# Patient Record
Sex: FEMALE | Race: WHITE | Employment: UNEMPLOYED | URBAN - METROPOLITAN AREA
[De-identification: names, ages, dates, MRNs, and addresses within clinical notes are randomized per-mention and may not be internally consistent; named-entity substitution may affect disease eponyms.]

---

## 2017-02-27 ENCOUNTER — HOSPITAL ENCOUNTER (EMERGENCY)
Age: 72
Discharge: HOME | End: 2017-02-27
Attending: EMERGENCY MEDICINE
Payer: COMMERCIAL

## 2017-02-27 VITALS
RESPIRATION RATE: 18 BRPM | DIASTOLIC BLOOD PRESSURE: 74 MMHG | HEIGHT: 62 IN | SYSTOLIC BLOOD PRESSURE: 150 MMHG | HEART RATE: 78 BPM | BODY MASS INDEX: 21.16 KG/M2 | OXYGEN SATURATION: 99 % | WEIGHT: 115 LBS | TEMPERATURE: 98.2 F

## 2017-02-27 DIAGNOSIS — S61.219A LACERATION OF FINGER, INITIAL ENCOUNTER: Primary | ICD-10-CM

## 2017-02-27 PROCEDURE — 99282 EMERGENCY DEPT VISIT SF MDM: CPT

## 2017-02-27 PROCEDURE — 12001 RPR S/N/AX/GEN/TRNK 2.5CM/<: CPT

## 2017-02-27 RX ORDER — LIDOCAINE HYDROCHLORIDE 10 MG/ML
20 INJECTION, SOLUTION INFILTRATION; PERINEURAL ONCE
Status: DISCONTINUED | OUTPATIENT
Start: 2017-02-27 | End: 2017-02-27 | Stop reason: HOSPADM

## 2018-03-01 ENCOUNTER — HOSPITAL ENCOUNTER (OUTPATIENT)
Dept: PREADMISSION TESTING | Age: 73
Discharge: HOME | End: 2018-03-05
Payer: COMMERCIAL

## 2018-03-01 VITALS
BODY MASS INDEX: 22.85 KG/M2 | RESPIRATION RATE: 16 BRPM | HEART RATE: 74 BPM | SYSTOLIC BLOOD PRESSURE: 162 MMHG | DIASTOLIC BLOOD PRESSURE: 68 MMHG | WEIGHT: 121.03 LBS | OXYGEN SATURATION: 97 % | HEIGHT: 61 IN

## 2018-03-01 LAB
ABSOLUTE EOS #: 0.1 K/UL (ref 0–0.4)
ABSOLUTE IMMATURE GRANULOCYTE: ABNORMAL K/UL (ref 0–0.3)
ABSOLUTE LYMPH #: 1 K/UL (ref 1–4.8)
ABSOLUTE MONO #: 0.2 K/UL (ref 0.2–0.8)
ANION GAP SERPL CALCULATED.3IONS-SCNC: 8 MMOL/L (ref 9–17)
BASOPHILS # BLD: 1 % (ref 0–2)
BASOPHILS ABSOLUTE: 0 K/UL (ref 0–0.2)
BUN BLDV-MCNC: 12 MG/DL (ref 8–23)
CHLORIDE BLD-SCNC: 106 MMOL/L (ref 98–107)
CO2: 30 MMOL/L (ref 20–31)
CREAT SERPL-MCNC: 0.58 MG/DL (ref 0.5–0.9)
DIFFERENTIAL TYPE: ABNORMAL
EKG ATRIAL RATE: 62 BPM
EKG P AXIS: 59 DEGREES
EKG P-R INTERVAL: 166 MS
EKG Q-T INTERVAL: 402 MS
EKG QRS DURATION: 86 MS
EKG QTC CALCULATION (BAZETT): 408 MS
EKG R AXIS: 80 DEGREES
EKG T AXIS: 74 DEGREES
EKG VENTRICULAR RATE: 62 BPM
EOSINOPHILS RELATIVE PERCENT: 3 % (ref 1–4)
GFR AFRICAN AMERICAN: >60 ML/MIN
GFR NON-AFRICAN AMERICAN: >60 ML/MIN
GFR SERPL CREATININE-BSD FRML MDRD: NORMAL ML/MIN/{1.73_M2}
GFR SERPL CREATININE-BSD FRML MDRD: NORMAL ML/MIN/{1.73_M2}
HCT VFR BLD CALC: 39.1 % (ref 36–46)
HEMOGLOBIN: 12.9 G/DL (ref 12–16)
IMMATURE GRANULOCYTES: ABNORMAL %
LYMPHOCYTES # BLD: 24 % (ref 24–44)
MCH RBC QN AUTO: 29 PG (ref 26–34)
MCHC RBC AUTO-ENTMCNC: 33.1 G/DL (ref 31–37)
MCV RBC AUTO: 87.8 FL (ref 80–100)
MONOCYTES # BLD: 5 % (ref 1–7)
NRBC AUTOMATED: ABNORMAL PER 100 WBC
PDW BLD-RTO: 14.1 % (ref 11.5–14.5)
PLATELET # BLD: 173 K/UL (ref 130–400)
PLATELET ESTIMATE: ABNORMAL
PMV BLD AUTO: 8.5 FL (ref 6–12)
POTASSIUM SERPL-SCNC: 4.3 MMOL/L (ref 3.7–5.3)
RBC # BLD: 4.45 M/UL (ref 4–5.2)
RBC # BLD: ABNORMAL 10*6/UL
SEG NEUTROPHILS: 67 % (ref 36–66)
SEGMENTED NEUTROPHILS ABSOLUTE COUNT: 2.9 K/UL (ref 1.8–7.7)
SODIUM BLD-SCNC: 144 MMOL/L (ref 135–144)
WBC # BLD: 4.3 K/UL (ref 3.5–11)
WBC # BLD: ABNORMAL 10*3/UL

## 2018-03-01 PROCEDURE — 84520 ASSAY OF UREA NITROGEN: CPT

## 2018-03-01 PROCEDURE — 93005 ELECTROCARDIOGRAM TRACING: CPT

## 2018-03-01 PROCEDURE — 82565 ASSAY OF CREATININE: CPT

## 2018-03-01 PROCEDURE — 36415 COLL VENOUS BLD VENIPUNCTURE: CPT

## 2018-03-01 PROCEDURE — 80051 ELECTROLYTE PANEL: CPT

## 2018-03-01 PROCEDURE — 85025 COMPLETE CBC W/AUTO DIFF WBC: CPT

## 2018-03-01 RX ORDER — TRAMADOL HYDROCHLORIDE 50 MG/1
50 TABLET ORAL EVERY 8 HOURS PRN
COMMUNITY
End: 2018-05-08

## 2018-03-01 ASSESSMENT — PAIN DESCRIPTION - FREQUENCY: FREQUENCY: CONTINUOUS

## 2018-03-01 ASSESSMENT — PAIN SCALES - GENERAL: PAINLEVEL_OUTOF10: 10

## 2018-03-01 ASSESSMENT — PAIN DESCRIPTION - ONSET: ONSET: AWAKENED FROM SLEEP

## 2018-03-01 ASSESSMENT — PAIN DESCRIPTION - ORIENTATION: ORIENTATION: LEFT

## 2018-03-01 ASSESSMENT — PAIN DESCRIPTION - PAIN TYPE: TYPE: CHRONIC PAIN

## 2018-03-01 ASSESSMENT — PAIN DESCRIPTION - LOCATION: LOCATION: HIP

## 2018-03-01 ASSESSMENT — PAIN DESCRIPTION - PROGRESSION: CLINICAL_PROGRESSION: GRADUALLY WORSENING

## 2018-03-01 ASSESSMENT — PAIN DESCRIPTION - DESCRIPTORS: DESCRIPTORS: CONSTANT;STABBING

## 2018-03-01 NOTE — PRE-PROCEDURE INSTRUCTIONS
not acceptable. Your friend or family member must stay at the hospital throughout your procedure. Someone must remain with you for the first 24 hours after your surgery if you receive anesthesia or medication. If you do not have someone to stay with you, your procedure may be cancelled.       If you have any other questions regarding your procedure or the day of surgery, please call [phone]      _________________________  ____________________________  Signature (Patient)              Signature (Provider) & date

## 2018-03-02 RX ORDER — VANCOMYCIN HYDROCHLORIDE 1 G/200ML
1000 INJECTION, SOLUTION INTRAVENOUS ONCE
Status: CANCELLED | OUTPATIENT
Start: 2018-03-14 | End: 2018-03-14

## 2018-03-13 ENCOUNTER — ANESTHESIA EVENT (OUTPATIENT)
Dept: OPERATING ROOM | Age: 73
End: 2018-03-13
Payer: MEDICARE

## 2018-03-14 ENCOUNTER — ANESTHESIA (OUTPATIENT)
Dept: OPERATING ROOM | Age: 73
End: 2018-03-14
Payer: MEDICARE

## 2018-03-14 ENCOUNTER — HOSPITAL ENCOUNTER (OUTPATIENT)
Age: 73
Setting detail: OUTPATIENT SURGERY
Discharge: HOME | End: 2018-03-14
Attending: ORTHOPAEDIC SURGERY | Admitting: ORTHOPAEDIC SURGERY
Payer: MEDICARE

## 2018-03-14 VITALS
HEART RATE: 69 BPM | SYSTOLIC BLOOD PRESSURE: 156 MMHG | WEIGHT: 121.03 LBS | TEMPERATURE: 96.8 F | BODY MASS INDEX: 22.85 KG/M2 | OXYGEN SATURATION: 98 % | DIASTOLIC BLOOD PRESSURE: 46 MMHG | RESPIRATION RATE: 18 BRPM | HEIGHT: 61 IN

## 2018-03-14 VITALS — OXYGEN SATURATION: 100 % | DIASTOLIC BLOOD PRESSURE: 56 MMHG | TEMPERATURE: 94.6 F | SYSTOLIC BLOOD PRESSURE: 116 MMHG

## 2018-03-14 DIAGNOSIS — G89.18 POST-OP PAIN: Primary | ICD-10-CM

## 2018-03-14 PROCEDURE — 6360000002 HC RX W HCPCS: Performed by: ORTHOPAEDIC SURGERY

## 2018-03-14 PROCEDURE — 7100000000 HC PACU RECOVERY - FIRST 15 MIN: Performed by: ORTHOPAEDIC SURGERY

## 2018-03-14 PROCEDURE — 6360000002 HC RX W HCPCS: Performed by: NURSE ANESTHETIST, CERTIFIED REGISTERED

## 2018-03-14 PROCEDURE — C1713 ANCHOR/SCREW BN/BN,TIS/BN: HCPCS | Performed by: ORTHOPAEDIC SURGERY

## 2018-03-14 PROCEDURE — A6454 SELF-ADHER BAND W>=3" <5"/YD: HCPCS | Performed by: ORTHOPAEDIC SURGERY

## 2018-03-14 PROCEDURE — 2580000003 HC RX 258: Performed by: ORTHOPAEDIC SURGERY

## 2018-03-14 PROCEDURE — 2720000010 HC SURG SUPPLY STERILE: Performed by: ORTHOPAEDIC SURGERY

## 2018-03-14 PROCEDURE — 7100000010 HC PHASE II RECOVERY - FIRST 15 MIN: Performed by: ORTHOPAEDIC SURGERY

## 2018-03-14 PROCEDURE — 6360000002 HC RX W HCPCS: Performed by: ANESTHESIOLOGY

## 2018-03-14 PROCEDURE — 6370000000 HC RX 637 (ALT 250 FOR IP): Performed by: ANESTHESIOLOGY

## 2018-03-14 PROCEDURE — 2500000003 HC RX 250 WO HCPCS: Performed by: ANESTHESIOLOGY

## 2018-03-14 PROCEDURE — 3700000000 HC ANESTHESIA ATTENDED CARE: Performed by: ORTHOPAEDIC SURGERY

## 2018-03-14 PROCEDURE — 7100000011 HC PHASE II RECOVERY - ADDTL 15 MIN: Performed by: ORTHOPAEDIC SURGERY

## 2018-03-14 PROCEDURE — 2580000003 HC RX 258: Performed by: NURSE ANESTHETIST, CERTIFIED REGISTERED

## 2018-03-14 PROCEDURE — 3700000001 HC ADD 15 MINUTES (ANESTHESIA): Performed by: ORTHOPAEDIC SURGERY

## 2018-03-14 PROCEDURE — 64450 NJX AA&/STRD OTHER PN/BRANCH: CPT | Performed by: ANESTHESIOLOGY

## 2018-03-14 PROCEDURE — 3600000013 HC SURGERY LEVEL 3 ADDTL 15MIN: Performed by: ORTHOPAEDIC SURGERY

## 2018-03-14 PROCEDURE — 2500000003 HC RX 250 WO HCPCS: Performed by: NURSE ANESTHETIST, CERTIFIED REGISTERED

## 2018-03-14 PROCEDURE — 2500000003 HC RX 250 WO HCPCS: Performed by: ORTHOPAEDIC SURGERY

## 2018-03-14 PROCEDURE — 3600000003 HC SURGERY LEVEL 3 BASE: Performed by: ORTHOPAEDIC SURGERY

## 2018-03-14 PROCEDURE — 7100000001 HC PACU RECOVERY - ADDTL 15 MIN: Performed by: ORTHOPAEDIC SURGERY

## 2018-03-14 DEVICE — ANCHOR SUTURE BIOCOMP 4.75X19.1 MM SWIVELOCK C: Type: IMPLANTABLE DEVICE | Site: HIP | Status: FUNCTIONAL

## 2018-03-14 RX ORDER — LIDOCAINE HYDROCHLORIDE 10 MG/ML
INJECTION, SOLUTION INFILTRATION; PERINEURAL PRN
Status: DISCONTINUED | OUTPATIENT
Start: 2018-03-14 | End: 2018-03-14 | Stop reason: SDUPTHER

## 2018-03-14 RX ORDER — NEOSTIGMINE METHYLSULFATE 5 MG/5 ML
SYRINGE (ML) INTRAVENOUS PRN
Status: DISCONTINUED | OUTPATIENT
Start: 2018-03-14 | End: 2018-03-14 | Stop reason: SDUPTHER

## 2018-03-14 RX ORDER — LIDOCAINE HYDROCHLORIDE 20 MG/ML
INJECTION, SOLUTION INFILTRATION; PERINEURAL PRN
Status: DISCONTINUED | OUTPATIENT
Start: 2018-03-14 | End: 2018-03-14 | Stop reason: SDUPTHER

## 2018-03-14 RX ORDER — LIDOCAINE HYDROCHLORIDE 10 MG/ML
1 INJECTION, SOLUTION EPIDURAL; INFILTRATION; INTRACAUDAL; PERINEURAL
Status: DISCONTINUED | OUTPATIENT
Start: 2018-03-15 | End: 2018-03-14 | Stop reason: HOSPADM

## 2018-03-14 RX ORDER — ONDANSETRON 2 MG/ML
4 INJECTION INTRAMUSCULAR; INTRAVENOUS
Status: DISCONTINUED | OUTPATIENT
Start: 2018-03-14 | End: 2018-03-14 | Stop reason: HOSPADM

## 2018-03-14 RX ORDER — ONDANSETRON 4 MG/1
4 TABLET, ORALLY DISINTEGRATING ORAL EVERY 8 HOURS PRN
Qty: 30 TABLET | Refills: 0
Start: 2018-03-14 | End: 2018-05-08

## 2018-03-14 RX ORDER — HYDROMORPHONE HCL 110MG/55ML
0.5 PATIENT CONTROLLED ANALGESIA SYRINGE INTRAVENOUS EVERY 5 MIN PRN
Status: DISCONTINUED | OUTPATIENT
Start: 2018-03-14 | End: 2018-03-14 | Stop reason: HOSPADM

## 2018-03-14 RX ORDER — ROCURONIUM BROMIDE 10 MG/ML
INJECTION, SOLUTION INTRAVENOUS PRN
Status: DISCONTINUED | OUTPATIENT
Start: 2018-03-14 | End: 2018-03-14 | Stop reason: SDUPTHER

## 2018-03-14 RX ORDER — FENTANYL CITRATE 50 UG/ML
INJECTION, SOLUTION INTRAMUSCULAR; INTRAVENOUS PRN
Status: DISCONTINUED | OUTPATIENT
Start: 2018-03-14 | End: 2018-03-14 | Stop reason: SDUPTHER

## 2018-03-14 RX ORDER — SODIUM CHLORIDE 0.9 % (FLUSH) 0.9 %
10 SYRINGE (ML) INJECTION PRN
Status: DISCONTINUED | OUTPATIENT
Start: 2018-03-14 | End: 2018-03-14 | Stop reason: HOSPADM

## 2018-03-14 RX ORDER — OXYCODONE HYDROCHLORIDE AND ACETAMINOPHEN 5; 325 MG/1; MG/1
TABLET ORAL
Qty: 60 TABLET | Refills: 0
Start: 2018-03-14 | End: 2018-03-21

## 2018-03-14 RX ORDER — DEXAMETHASONE SODIUM PHOSPHATE 10 MG/ML
INJECTION INTRAMUSCULAR; INTRAVENOUS PRN
Status: DISCONTINUED | OUTPATIENT
Start: 2018-03-14 | End: 2018-03-14 | Stop reason: SDUPTHER

## 2018-03-14 RX ORDER — SODIUM CHLORIDE 9 MG/ML
INJECTION, SOLUTION INTRAVENOUS CONTINUOUS
Status: DISCONTINUED | OUTPATIENT
Start: 2018-03-15 | End: 2018-03-14 | Stop reason: HOSPADM

## 2018-03-14 RX ORDER — SODIUM CHLORIDE 0.9 % (FLUSH) 0.9 %
10 SYRINGE (ML) INJECTION EVERY 12 HOURS SCHEDULED
Status: DISCONTINUED | OUTPATIENT
Start: 2018-03-14 | End: 2018-03-14 | Stop reason: HOSPADM

## 2018-03-14 RX ORDER — VANCOMYCIN HYDROCHLORIDE 1 G/200ML
1000 INJECTION, SOLUTION INTRAVENOUS ONCE
Status: COMPLETED | OUTPATIENT
Start: 2018-03-14 | End: 2018-03-14

## 2018-03-14 RX ORDER — SODIUM CHLORIDE, SODIUM LACTATE, POTASSIUM CHLORIDE, CALCIUM CHLORIDE 600; 310; 30; 20 MG/100ML; MG/100ML; MG/100ML; MG/100ML
INJECTION, SOLUTION INTRAVENOUS CONTINUOUS PRN
Status: DISCONTINUED | OUTPATIENT
Start: 2018-03-14 | End: 2018-03-14 | Stop reason: SDUPTHER

## 2018-03-14 RX ORDER — BUPIVACAINE HYDROCHLORIDE 2.5 MG/ML
INJECTION, SOLUTION EPIDURAL; INFILTRATION; INTRACAUDAL PRN
Status: DISCONTINUED | OUTPATIENT
Start: 2018-03-14 | End: 2018-03-14 | Stop reason: HOSPADM

## 2018-03-14 RX ORDER — ROPIVACAINE HYDROCHLORIDE 5 MG/ML
INJECTION, SOLUTION EPIDURAL; INFILTRATION; PERINEURAL PRN
Status: DISCONTINUED | OUTPATIENT
Start: 2018-03-14 | End: 2018-03-14 | Stop reason: SDUPTHER

## 2018-03-14 RX ORDER — ONDANSETRON 2 MG/ML
INJECTION INTRAMUSCULAR; INTRAVENOUS PRN
Status: DISCONTINUED | OUTPATIENT
Start: 2018-03-14 | End: 2018-03-14 | Stop reason: SDUPTHER

## 2018-03-14 RX ORDER — FENTANYL CITRATE 50 UG/ML
25 INJECTION, SOLUTION INTRAMUSCULAR; INTRAVENOUS EVERY 5 MIN PRN
Status: DISCONTINUED | OUTPATIENT
Start: 2018-03-14 | End: 2018-03-14 | Stop reason: HOSPADM

## 2018-03-14 RX ORDER — PROPOFOL 10 MG/ML
INJECTION, EMULSION INTRAVENOUS PRN
Status: DISCONTINUED | OUTPATIENT
Start: 2018-03-14 | End: 2018-03-14 | Stop reason: SDUPTHER

## 2018-03-14 RX ORDER — KETOROLAC TROMETHAMINE 30 MG/ML
INJECTION, SOLUTION INTRAMUSCULAR; INTRAVENOUS PRN
Status: DISCONTINUED | OUTPATIENT
Start: 2018-03-14 | End: 2018-03-14 | Stop reason: SDUPTHER

## 2018-03-14 RX ORDER — OXYCODONE HYDROCHLORIDE AND ACETAMINOPHEN 5; 325 MG/1; MG/1
1 TABLET ORAL
Status: COMPLETED | OUTPATIENT
Start: 2018-03-14 | End: 2018-03-14

## 2018-03-14 RX ORDER — MIDAZOLAM HYDROCHLORIDE 1 MG/ML
1 INJECTION INTRAMUSCULAR; INTRAVENOUS ONCE
Status: COMPLETED | OUTPATIENT
Start: 2018-03-14 | End: 2018-03-14

## 2018-03-14 RX ORDER — SODIUM CHLORIDE, SODIUM LACTATE, POTASSIUM CHLORIDE, CALCIUM CHLORIDE 600; 310; 30; 20 MG/100ML; MG/100ML; MG/100ML; MG/100ML
INJECTION, SOLUTION INTRAVENOUS CONTINUOUS
Status: DISCONTINUED | OUTPATIENT
Start: 2018-03-15 | End: 2018-03-14 | Stop reason: HOSPADM

## 2018-03-14 RX ORDER — FENTANYL CITRATE 50 UG/ML
50 INJECTION, SOLUTION INTRAMUSCULAR; INTRAVENOUS EVERY 5 MIN PRN
Status: DISCONTINUED | OUTPATIENT
Start: 2018-03-14 | End: 2018-03-14 | Stop reason: HOSPADM

## 2018-03-14 RX ORDER — HYDROMORPHONE HCL 110MG/55ML
0.25 PATIENT CONTROLLED ANALGESIA SYRINGE INTRAVENOUS EVERY 5 MIN PRN
Status: DISCONTINUED | OUTPATIENT
Start: 2018-03-14 | End: 2018-03-14 | Stop reason: HOSPADM

## 2018-03-14 RX ORDER — GLYCOPYRROLATE 1 MG/5 ML
SYRINGE (ML) INTRAVENOUS PRN
Status: DISCONTINUED | OUTPATIENT
Start: 2018-03-14 | End: 2018-03-14 | Stop reason: SDUPTHER

## 2018-03-14 RX ADMIN — ROCURONIUM BROMIDE 30 MG: 10 INJECTION, SOLUTION INTRAVENOUS at 12:47

## 2018-03-14 RX ADMIN — DEXAMETHASONE SODIUM PHOSPHATE 10 MG: 10 INJECTION INTRAMUSCULAR; INTRAVENOUS at 13:01

## 2018-03-14 RX ADMIN — MIDAZOLAM HYDROCHLORIDE 1 MG: 1 INJECTION, SOLUTION INTRAMUSCULAR; INTRAVENOUS at 12:19

## 2018-03-14 RX ADMIN — Medication 1.5 MG: at 14:19

## 2018-03-14 RX ADMIN — Medication 0.2 MG: at 14:19

## 2018-03-14 RX ADMIN — ONDANSETRON 4 MG: 2 INJECTION, SOLUTION INTRAMUSCULAR; INTRAVENOUS at 13:13

## 2018-03-14 RX ADMIN — FENTANYL CITRATE 50 MCG: 50 INJECTION, SOLUTION INTRAMUSCULAR; INTRAVENOUS at 13:29

## 2018-03-14 RX ADMIN — SODIUM CHLORIDE, POTASSIUM CHLORIDE, SODIUM LACTATE AND CALCIUM CHLORIDE: 600; 310; 30; 20 INJECTION, SOLUTION INTRAVENOUS at 12:43

## 2018-03-14 RX ADMIN — ROPIVACAINE HYDROCHLORIDE 30 ML: 5 INJECTION, SOLUTION EPIDURAL; INFILTRATION; PERINEURAL at 12:18

## 2018-03-14 RX ADMIN — FENTANYL CITRATE 50 MCG: 50 INJECTION, SOLUTION INTRAMUSCULAR; INTRAVENOUS at 15:12

## 2018-03-14 RX ADMIN — FENTANYL CITRATE 50 MCG: 50 INJECTION, SOLUTION INTRAMUSCULAR; INTRAVENOUS at 14:48

## 2018-03-14 RX ADMIN — PHENYLEPHRINE HYDROCHLORIDE 200 MCG: 10 INJECTION INTRAVENOUS at 13:05

## 2018-03-14 RX ADMIN — OXYCODONE HYDROCHLORIDE AND ACETAMINOPHEN 1 TABLET: 5; 325 TABLET ORAL at 15:31

## 2018-03-14 RX ADMIN — Medication 2.5 MG: at 14:14

## 2018-03-14 RX ADMIN — FENTANYL CITRATE 50 MCG: 50 INJECTION, SOLUTION INTRAMUSCULAR; INTRAVENOUS at 12:47

## 2018-03-14 RX ADMIN — KETOROLAC TROMETHAMINE 30 MG: 30 INJECTION, SOLUTION INTRAMUSCULAR at 13:45

## 2018-03-14 RX ADMIN — VANCOMYCIN HYDROCHLORIDE 1 G: 1 INJECTION, SOLUTION INTRAVENOUS at 12:45

## 2018-03-14 RX ADMIN — PROPOFOL 150 MG: 10 INJECTION, EMULSION INTRAVENOUS at 12:47

## 2018-03-14 RX ADMIN — LIDOCAINE HYDROCHLORIDE 3 ML: 10 INJECTION, SOLUTION INFILTRATION; PERINEURAL at 12:18

## 2018-03-14 RX ADMIN — Medication 0.4 MG: at 14:14

## 2018-03-14 RX ADMIN — LIDOCAINE HYDROCHLORIDE 50 MG: 20 INJECTION, SOLUTION INFILTRATION; PERINEURAL at 12:47

## 2018-03-14 ASSESSMENT — PULMONARY FUNCTION TESTS
PIF_VALUE: 20
PIF_VALUE: 16
PIF_VALUE: 15
PIF_VALUE: 17
PIF_VALUE: 16
PIF_VALUE: 15
PIF_VALUE: 20
PIF_VALUE: 15
PIF_VALUE: 26
PIF_VALUE: 28
PIF_VALUE: 15
PIF_VALUE: 26
PIF_VALUE: 16
PIF_VALUE: 15
PIF_VALUE: 2
PIF_VALUE: 15
PIF_VALUE: 2
PIF_VALUE: 15
PIF_VALUE: 0
PIF_VALUE: 15
PIF_VALUE: 16
PIF_VALUE: 15
PIF_VALUE: 20
PIF_VALUE: 15
PIF_VALUE: 15
PIF_VALUE: 16
PIF_VALUE: 15
PIF_VALUE: 2
PIF_VALUE: 15
PIF_VALUE: 0
PIF_VALUE: 15
PIF_VALUE: 15
PIF_VALUE: 6
PIF_VALUE: 15
PIF_VALUE: 29
PIF_VALUE: 15
PIF_VALUE: 16
PIF_VALUE: 15
PIF_VALUE: 1
PIF_VALUE: 15
PIF_VALUE: 16
PIF_VALUE: 15
PIF_VALUE: 15
PIF_VALUE: 14
PIF_VALUE: 15
PIF_VALUE: 15
PIF_VALUE: 14
PIF_VALUE: 2
PIF_VALUE: 15
PIF_VALUE: 2
PIF_VALUE: 16
PIF_VALUE: 1
PIF_VALUE: 14
PIF_VALUE: 15
PIF_VALUE: 2
PIF_VALUE: 2
PIF_VALUE: 19
PIF_VALUE: 14
PIF_VALUE: 15
PIF_VALUE: 15
PIF_VALUE: 2
PIF_VALUE: 20
PIF_VALUE: 16
PIF_VALUE: 14
PIF_VALUE: 16
PIF_VALUE: 15
PIF_VALUE: 2
PIF_VALUE: 15
PIF_VALUE: 15
PIF_VALUE: 17
PIF_VALUE: 15
PIF_VALUE: 14
PIF_VALUE: 20
PIF_VALUE: 15
PIF_VALUE: 0
PIF_VALUE: 15
PIF_VALUE: 15

## 2018-03-14 ASSESSMENT — PAIN SCALES - GENERAL
PAINLEVEL_OUTOF10: 8
PAINLEVEL_OUTOF10: 0
PAINLEVEL_OUTOF10: 7
PAINLEVEL_OUTOF10: 7
PAINLEVEL_OUTOF10: 0
PAINLEVEL_OUTOF10: 10
PAINLEVEL_OUTOF10: 5
PAINLEVEL_OUTOF10: 2
PAINLEVEL_OUTOF10: 10
PAINLEVEL_OUTOF10: 0

## 2018-03-14 ASSESSMENT — PAIN DESCRIPTION - PAIN TYPE
TYPE: SURGICAL PAIN
TYPE: CHRONIC PAIN

## 2018-03-14 ASSESSMENT — PAIN DESCRIPTION - LOCATION
LOCATION: HIP
LOCATION: HIP

## 2018-03-14 ASSESSMENT — PAIN DESCRIPTION - DESCRIPTORS: DESCRIPTORS: CONSTANT;STABBING

## 2018-03-14 ASSESSMENT — LIFESTYLE VARIABLES: SMOKING_STATUS: 0

## 2018-03-14 ASSESSMENT — PAIN DESCRIPTION - PROGRESSION: CLINICAL_PROGRESSION: GRADUALLY WORSENING

## 2018-03-14 ASSESSMENT — PAIN DESCRIPTION - FREQUENCY: FREQUENCY: CONTINUOUS

## 2018-03-14 ASSESSMENT — PAIN DESCRIPTION - ONSET: ONSET: AWAKENED FROM SLEEP

## 2018-03-14 ASSESSMENT — ENCOUNTER SYMPTOMS: SHORTNESS OF BREATH: 1

## 2018-03-14 ASSESSMENT — PAIN DESCRIPTION - ORIENTATION
ORIENTATION: LEFT
ORIENTATION: LEFT

## 2018-03-14 NOTE — BRIEF OP NOTE
Brief Postoperative Note  ______________________________________________________________    Patient: Julian Gray  YOB: 1945  MRN: [id number]  Date of Procedure: 3/14/2018    Pre-Op Diagnosis: TROCHANTERIC BURSITIS LEFT HIP, GLUTEUS MEDIUS TEAR LEFT    Post-Op Diagnosis: Same       Procedure(s):  1. ARTHROSCOPIC IT BAND RELEASE  2. ARTHROSCOPIC TROCHANTERIC BURSECTOMY   3. ARTHROSCOPIC GLUTEUS MEDIUS REPAIR    Anesthesia: General    Surgeon(s):  DO Carrie Villafana,  PGY-4  Sue Richards DO PGY-2    Staff:  Scrub Person First: Ciro Auguste     Estimated Blood Loss: 10 mL    Fluids: 700 cc crystalloid    Complications: None    Specimens:   * No specimens in log *    Implants:  Implant Name Type Inv.  Item Serial No.  Lot No. LRB No. Used   ANCHOR SUTURE SWIVELOCK 4.75 X 19.1 BIOCOMPOSITE MIN 5EA Fastener ANCHOR SUTURE SWIVELOCK 4.75 X 19.1 BIOCOMPOSITE MIN 5EA   [employer] [id number] Left 1     Drains:   Urethral Catheter Non-latex 16 fr (Active)     Findings: degenerative fraying and tear of gluteus medius, tight IT band with inflamed trochanteric bursa; see Op Note for details    Kasia Lovett DO  Date: 3/14/2018  Time: 2:21 PM

## 2018-03-14 NOTE — INTERVAL H&P NOTE
History and Physical Update    Pt Name: Samuel Root  MRN: [id number]  YOB: 1945  Date of evaluation: 3/14/2018      [x] I have reviewed the H&P done in [facility] dated 3/1/18 by Jamal Coelho CNP which meets the criteria for an Interval History and Physical note and is attached below. [x] I have examined  Samuel Case  There are no changes to the patient who is scheduled for a Left hip arthroscopy with IT band release, bursectomy and possible gluteus muscle repair by Dr Doty for Greater trochanteric bursisits of left hip  The patient denies health changes, fever, chills, productive cough, SOB,  chest pain, open sores or wounds. Vital signs: /65   Pulse 74   Temp 98.4 °F (36.9 °C) (Oral)   Resp 16   SpO2 98%     Allergies:  Bee pollen; Erythromycin; Pcn [penicillins]; and Tetanus toxoids    Medications:    Prior to Admission medications    Medication Sig Start Date End Date Taking? Authorizing Provider   traMADol (ULTRAM) 50 MG tablet Take 50 mg by mouth every 8 hours as needed for Pain. Historical Provider, MD   Probiotic Product (PROBIOTIC DAILY PO) Take by mouth 1 chewable daily prn    Historical Provider, MD   lisinopril (PRINIVIL;ZESTRIL) 10 MG tablet Take 15 mg by mouth daily     Historical Provider, MD       This is a 81 y.o. female who is pleasant, cooperative, alert and oriented x3, in no acute distress. Heart: Heart sounds are normal.  HR 74 regular rate and rhythm without murmur, gallop or rub. Lungs: Normal respiratory effort with good air exchange, unlabored and clear to auscultation without wheezes or rales bilaterally   Abdomen: soft, nontender, nondistended with bowel sounds .        Labs:  Recent Labs      03/01/18   1150   HGB  12.9   HCT  39.1   WBC  4.3   MCV  87.8   PLT  173   NA  144   K  4.3   CL  106   CO2  30   BUN  12   CREATININE  0.58       Carloz Leroy  APRN, ANP-BC  Electronically signed 3/14/2018 at 11:10 AM

## 2018-03-26 NOTE — OP NOTE
shaver proximally. The IT  band was then identified. ArthroCare device was utilized to open up the IT  band. The IT band then was split with the ArthroCare device between the  two portals. Underneath, there was bursa that was present, this was  resected back to a stable border by internally and externally rotating the  hip. The posterior structures identified and were intact. A T-shaped  split in the IT band was performed at the area of the trochanteric ridge  producing a john paul-shaped open area in the IT band. There was a very  thick posterior band that was present. The gluteus minimus was then  identified and there was tearing present. Debridement of the tear revealed  a much larger undersurface hidden lesion tear of the gluteus medius. The  gluteus minimus was intact. There was a crescent-shaped defect and a small  area of tendinous tissue that was left. The greater trochanter was then  prepared with a shanelle. Utilizing a suture passer in a horizontal mattress  fashion, a FiberTape was passed through the reducible portion of the  gluteus minimus tear. This was the anterior portion closest to the  prominence of the trochanteric ridge. This then could be reduced to the  trochanteric ridge under minimal tension. From proximal to distal, a punch  was utilized and a tap was performed. The tendon then was reduced to its  insertion and a 4.75 mm SwiveLock anchor was placed repairing the anterior  third of the gluteus medius tendon. There was a defect present  posteriorly, where there was just deficient tissue that was present. At  this time, all suction was removed from the subfascial layer. It was  injected with 20 mL of 5% Marcaine plain. Portals were closed with 3-0  nylon suture. A bulky dressing was placed. The patient was awakened by  Department of Anesthesia and transferred to the postanesthesia care unit in  a stable condition.         Ugo Evans    D: 03/23/2018 19:38:57       T:

## 2018-05-08 ENCOUNTER — HOSPITAL ENCOUNTER (EMERGENCY)
Age: 73
Discharge: HOME | End: 2018-05-08
Attending: EMERGENCY MEDICINE
Payer: MEDICARE

## 2018-05-08 ENCOUNTER — APPOINTMENT (OUTPATIENT)
Dept: GENERAL RADIOLOGY | Age: 73
End: 2018-05-08
Payer: MEDICARE

## 2018-05-08 VITALS
SYSTOLIC BLOOD PRESSURE: 158 MMHG | RESPIRATION RATE: 18 BRPM | HEIGHT: 61 IN | HEART RATE: 68 BPM | OXYGEN SATURATION: 97 % | WEIGHT: 120 LBS | DIASTOLIC BLOOD PRESSURE: 59 MMHG | TEMPERATURE: 98.6 F | BODY MASS INDEX: 22.66 KG/M2

## 2018-05-08 DIAGNOSIS — T14.8XXA MUSCLE STRAIN: Primary | ICD-10-CM

## 2018-05-08 DIAGNOSIS — R07.89 CHEST WALL PAIN: ICD-10-CM

## 2018-05-08 LAB
ABSOLUTE EOS #: 0.1 K/UL (ref 0–0.4)
ABSOLUTE IMMATURE GRANULOCYTE: ABNORMAL K/UL (ref 0–0.3)
ABSOLUTE LYMPH #: 1 K/UL (ref 1–4.8)
ABSOLUTE MONO #: 0.5 K/UL (ref 0.2–0.8)
ANION GAP SERPL CALCULATED.3IONS-SCNC: 12 MMOL/L (ref 9–17)
BASOPHILS # BLD: 1 % (ref 0–2)
BASOPHILS ABSOLUTE: 0.1 K/UL (ref 0–0.2)
BUN BLDV-MCNC: 12 MG/DL (ref 8–23)
BUN/CREAT BLD: 24 (ref 9–20)
CALCIUM SERPL-MCNC: 8.5 MG/DL (ref 8.6–10.4)
CHLORIDE BLD-SCNC: 107 MMOL/L (ref 98–107)
CO2: 24 MMOL/L (ref 20–31)
CREAT SERPL-MCNC: 0.51 MG/DL (ref 0.5–0.9)
DIFFERENTIAL TYPE: ABNORMAL
EKG ATRIAL RATE: 87 BPM
EKG P AXIS: 50 DEGREES
EKG P-R INTERVAL: 148 MS
EKG Q-T INTERVAL: 364 MS
EKG QRS DURATION: 86 MS
EKG QTC CALCULATION (BAZETT): 438 MS
EKG R AXIS: 68 DEGREES
EKG T AXIS: 52 DEGREES
EKG VENTRICULAR RATE: 87 BPM
EOSINOPHILS RELATIVE PERCENT: 2 % (ref 1–4)
GFR AFRICAN AMERICAN: >60 ML/MIN
GFR NON-AFRICAN AMERICAN: >60 ML/MIN
GFR SERPL CREATININE-BSD FRML MDRD: ABNORMAL ML/MIN/{1.73_M2}
GFR SERPL CREATININE-BSD FRML MDRD: ABNORMAL ML/MIN/{1.73_M2}
GLUCOSE BLD-MCNC: 100 MG/DL (ref 70–99)
HCT VFR BLD CALC: 36.8 % (ref 36–46)
HEMOGLOBIN: 12.2 G/DL (ref 12–16)
IMMATURE GRANULOCYTES: ABNORMAL %
LYMPHOCYTES # BLD: 14 % (ref 24–44)
MCH RBC QN AUTO: 29.5 PG (ref 26–34)
MCHC RBC AUTO-ENTMCNC: 33.1 G/DL (ref 31–37)
MCV RBC AUTO: 89.3 FL (ref 80–100)
MONOCYTES # BLD: 7 % (ref 1–7)
MYOGLOBIN: <21 NG/ML (ref 25–58)
NRBC AUTOMATED: ABNORMAL PER 100 WBC
PDW BLD-RTO: 14.5 % (ref 11.5–14.5)
PLATELET # BLD: 170 K/UL (ref 130–400)
PLATELET ESTIMATE: ABNORMAL
PMV BLD AUTO: 8.7 FL (ref 6–12)
POTASSIUM SERPL-SCNC: 3.8 MMOL/L (ref 3.7–5.3)
RBC # BLD: 4.13 M/UL (ref 4–5.2)
RBC # BLD: ABNORMAL 10*6/UL
SEG NEUTROPHILS: 76 % (ref 36–66)
SEGMENTED NEUTROPHILS ABSOLUTE COUNT: 5.3 K/UL (ref 1.8–7.7)
SODIUM BLD-SCNC: 143 MMOL/L (ref 135–144)
TROPONIN INTERP: ABNORMAL
TROPONIN T: <0.03 NG/ML
WBC # BLD: 7.1 K/UL (ref 3.5–11)
WBC # BLD: ABNORMAL 10*3/UL

## 2018-05-08 PROCEDURE — 80048 BASIC METABOLIC PNL TOTAL CA: CPT

## 2018-05-08 PROCEDURE — 83874 ASSAY OF MYOGLOBIN: CPT

## 2018-05-08 PROCEDURE — 99285 EMERGENCY DEPT VISIT HI MDM: CPT

## 2018-05-08 PROCEDURE — 93005 ELECTROCARDIOGRAM TRACING: CPT

## 2018-05-08 PROCEDURE — 84484 ASSAY OF TROPONIN QUANT: CPT

## 2018-05-08 PROCEDURE — 85025 COMPLETE CBC W/AUTO DIFF WBC: CPT

## 2018-05-08 PROCEDURE — 71046 X-RAY EXAM CHEST 2 VIEWS: CPT

## 2018-05-08 RX ORDER — HYDROCODONE BITARTRATE AND ACETAMINOPHEN 5; 325 MG/1; MG/1
1 TABLET ORAL EVERY 6 HOURS PRN
Qty: 20 TABLET | Refills: 0 | Status: SHIPPED | OUTPATIENT
Start: 2018-05-08 | End: 2018-05-15

## 2018-05-08 RX ORDER — TIZANIDINE 4 MG/1
4 TABLET ORAL EVERY 8 HOURS PRN
Qty: 20 TABLET | Refills: 0 | Status: SHIPPED | OUTPATIENT
Start: 2018-05-08 | End: 2021-09-11 | Stop reason: ALTCHOICE

## 2018-05-08 RX ORDER — IBUPROFEN 600 MG/1
600 TABLET ORAL EVERY 8 HOURS PRN
Qty: 15 TABLET | Refills: 0 | Status: SHIPPED | OUTPATIENT
Start: 2018-05-08 | End: 2021-09-11 | Stop reason: ALTCHOICE

## 2018-05-08 ASSESSMENT — ENCOUNTER SYMPTOMS
ABDOMINAL PAIN: 0
VOMITING: 0
COLOR CHANGE: 0
COUGH: 0
BACK PAIN: 0
NAUSEA: 0
SHORTNESS OF BREATH: 0

## 2018-05-08 ASSESSMENT — PAIN DESCRIPTION - PAIN TYPE: TYPE: ACUTE PAIN

## 2018-05-08 ASSESSMENT — PAIN DESCRIPTION - FREQUENCY: FREQUENCY: CONTINUOUS

## 2018-05-08 ASSESSMENT — PAIN DESCRIPTION - DESCRIPTORS: DESCRIPTORS: SHARP;RADIATING

## 2018-05-08 ASSESSMENT — PAIN SCALES - GENERAL: PAINLEVEL_OUTOF10: 10

## 2018-05-08 ASSESSMENT — PAIN DESCRIPTION - LOCATION: LOCATION: CHEST

## 2019-01-17 ENCOUNTER — APPOINTMENT (OUTPATIENT)
Dept: CT IMAGING | Age: 74
End: 2019-01-17
Payer: MEDICARE

## 2019-01-17 ENCOUNTER — HOSPITAL ENCOUNTER (EMERGENCY)
Age: 74
Discharge: HOME | End: 2019-01-17
Attending: EMERGENCY MEDICINE
Payer: MEDICARE

## 2019-01-17 VITALS
HEIGHT: 62 IN | SYSTOLIC BLOOD PRESSURE: 183 MMHG | WEIGHT: 120 LBS | TEMPERATURE: 97.7 F | DIASTOLIC BLOOD PRESSURE: 75 MMHG | OXYGEN SATURATION: 100 % | BODY MASS INDEX: 22.08 KG/M2 | HEART RATE: 75 BPM | RESPIRATION RATE: 16 BRPM

## 2019-01-17 DIAGNOSIS — M25.552 LEFT HIP PAIN: Primary | ICD-10-CM

## 2019-01-17 PROCEDURE — 6370000000 HC RX 637 (ALT 250 FOR IP): Performed by: NURSE PRACTITIONER

## 2019-01-17 PROCEDURE — 73700 CT LOWER EXTREMITY W/O DYE: CPT

## 2019-01-17 PROCEDURE — 99283 EMERGENCY DEPT VISIT LOW MDM: CPT

## 2019-01-17 RX ORDER — IBUPROFEN 600 MG/1
600 TABLET ORAL EVERY 8 HOURS PRN
Qty: 40 TABLET | Refills: 0 | Status: SHIPPED | OUTPATIENT
Start: 2019-01-17 | End: 2021-09-11 | Stop reason: ALTCHOICE

## 2019-01-17 RX ORDER — IBUPROFEN 600 MG/1
600 TABLET ORAL ONCE
Status: COMPLETED | OUTPATIENT
Start: 2019-01-17 | End: 2019-01-17

## 2019-01-17 RX ADMIN — IBUPROFEN 600 MG: 600 TABLET ORAL at 12:41

## 2019-01-17 ASSESSMENT — PAIN SCALES - GENERAL
PAINLEVEL_OUTOF10: 7
PAINLEVEL_OUTOF10: 10

## 2019-01-17 ASSESSMENT — PAIN DESCRIPTION - ORIENTATION: ORIENTATION: LEFT

## 2019-01-17 ASSESSMENT — PAIN DESCRIPTION - DESCRIPTORS: DESCRIPTORS: PRESSURE;SHARP

## 2019-01-17 ASSESSMENT — PAIN DESCRIPTION - LOCATION: LOCATION: BACK;LEG

## 2021-09-11 ENCOUNTER — HOSPITAL ENCOUNTER (EMERGENCY)
Age: 76
Discharge: HOME | End: 2021-09-12
Attending: EMERGENCY MEDICINE
Payer: MEDICARE

## 2021-09-11 ENCOUNTER — APPOINTMENT (OUTPATIENT)
Dept: CT IMAGING | Age: 76
End: 2021-09-11
Payer: MEDICARE

## 2021-09-11 VITALS
OXYGEN SATURATION: 97 % | SYSTOLIC BLOOD PRESSURE: 193 MMHG | TEMPERATURE: 99.3 F | WEIGHT: 120 LBS | DIASTOLIC BLOOD PRESSURE: 71 MMHG | RESPIRATION RATE: 20 BRPM | HEART RATE: 91 BPM | HEIGHT: 62 IN | BODY MASS INDEX: 22.08 KG/M2

## 2021-09-11 DIAGNOSIS — R07.89 CHEST WALL PAIN: Primary | ICD-10-CM

## 2021-09-11 LAB
ABSOLUTE EOS #: 0.2 K/UL (ref 0–0.4)
ABSOLUTE IMMATURE GRANULOCYTE: ABNORMAL K/UL (ref 0–0.3)
ABSOLUTE LYMPH #: 0.7 K/UL (ref 1–4.8)
ABSOLUTE MONO #: 0.3 K/UL (ref 0.1–1.2)
BASOPHILS # BLD: 2 % (ref 0–2)
BASOPHILS ABSOLUTE: 0.1 K/UL (ref 0–0.2)
DIFFERENTIAL TYPE: ABNORMAL
EOSINOPHILS RELATIVE PERCENT: 3 % (ref 1–4)
HCT VFR BLD CALC: 38.5 % (ref 36–46)
HEMOGLOBIN: 12.8 G/DL (ref 12–16)
IMMATURE GRANULOCYTES: ABNORMAL %
LYMPHOCYTES # BLD: 10 % (ref 24–44)
MCH RBC QN AUTO: 28.5 PG (ref 26–34)
MCHC RBC AUTO-ENTMCNC: 33.3 G/DL (ref 31–37)
MCV RBC AUTO: 85.6 FL (ref 80–100)
MONOCYTES # BLD: 5 % (ref 2–11)
NRBC AUTOMATED: ABNORMAL PER 100 WBC
PDW BLD-RTO: 13.9 % (ref 12.5–15.4)
PLATELET # BLD: 179 K/UL (ref 140–450)
PLATELET ESTIMATE: ABNORMAL
PMV BLD AUTO: 8.9 FL (ref 6–12)
RBC # BLD: 4.5 M/UL (ref 4–5.2)
RBC # BLD: ABNORMAL 10*6/UL
SEG NEUTROPHILS: 80 % (ref 36–66)
SEGMENTED NEUTROPHILS ABSOLUTE COUNT: 5.4 K/UL (ref 1.8–7.7)
WBC # BLD: 6.8 K/UL (ref 3.5–11)
WBC # BLD: ABNORMAL 10*3/UL

## 2021-09-11 PROCEDURE — 6360000002 HC RX W HCPCS: Performed by: EMERGENCY MEDICINE

## 2021-09-11 PROCEDURE — 84484 ASSAY OF TROPONIN QUANT: CPT

## 2021-09-11 PROCEDURE — 85025 COMPLETE CBC W/AUTO DIFF WBC: CPT

## 2021-09-11 PROCEDURE — 83735 ASSAY OF MAGNESIUM: CPT

## 2021-09-11 PROCEDURE — 99283 EMERGENCY DEPT VISIT LOW MDM: CPT

## 2021-09-11 PROCEDURE — 80053 COMPREHEN METABOLIC PANEL: CPT

## 2021-09-11 PROCEDURE — 93005 ELECTROCARDIOGRAM TRACING: CPT | Performed by: EMERGENCY MEDICINE

## 2021-09-11 PROCEDURE — 83690 ASSAY OF LIPASE: CPT

## 2021-09-11 PROCEDURE — 96374 THER/PROPH/DIAG INJ IV PUSH: CPT

## 2021-09-11 PROCEDURE — 2580000003 HC RX 258: Performed by: EMERGENCY MEDICINE

## 2021-09-11 PROCEDURE — 96375 TX/PRO/DX INJ NEW DRUG ADDON: CPT

## 2021-09-11 PROCEDURE — 36415 COLL VENOUS BLD VENIPUNCTURE: CPT

## 2021-09-11 RX ORDER — MORPHINE SULFATE 4 MG/ML
4 INJECTION, SOLUTION INTRAMUSCULAR; INTRAVENOUS ONCE
Status: COMPLETED | OUTPATIENT
Start: 2021-09-11 | End: 2021-09-11

## 2021-09-11 RX ORDER — 0.9 % SODIUM CHLORIDE 0.9 %
1000 INTRAVENOUS SOLUTION INTRAVENOUS ONCE
Status: COMPLETED | OUTPATIENT
Start: 2021-09-11 | End: 2021-09-12

## 2021-09-11 RX ADMIN — SODIUM CHLORIDE 1000 ML: 9 INJECTION, SOLUTION INTRAVENOUS at 23:46

## 2021-09-11 RX ADMIN — MORPHINE SULFATE 4 MG: 4 INJECTION INTRAVENOUS at 23:46

## 2021-09-11 ASSESSMENT — PAIN SCALES - GENERAL
PAINLEVEL_OUTOF10: 10
PAINLEVEL_OUTOF10: 10

## 2021-09-11 ASSESSMENT — ENCOUNTER SYMPTOMS
RHINORRHEA: 0
SORE THROAT: 0
VOMITING: 0
BACK PAIN: 0
NAUSEA: 0
DIARRHEA: 0
ABDOMINAL PAIN: 0
SHORTNESS OF BREATH: 0
EYE PAIN: 0
COUGH: 0

## 2021-09-11 ASSESSMENT — PAIN DESCRIPTION - LOCATION: LOCATION: CHEST

## 2021-09-12 ENCOUNTER — APPOINTMENT (OUTPATIENT)
Dept: CT IMAGING | Age: 76
End: 2021-09-12
Payer: MEDICARE

## 2021-09-12 LAB
ALBUMIN SERPL-MCNC: 4.2 G/DL (ref 3.5–5.2)
ALBUMIN/GLOBULIN RATIO: 1.6 (ref 1–2.5)
ALP BLD-CCNC: 81 U/L (ref 35–104)
ALT SERPL-CCNC: 12 U/L (ref 5–33)
ANION GAP SERPL CALCULATED.3IONS-SCNC: 9 MMOL/L (ref 9–17)
AST SERPL-CCNC: 12 U/L
BILIRUB SERPL-MCNC: 0.35 MG/DL (ref 0.3–1.2)
BUN BLDV-MCNC: 18 MG/DL (ref 8–23)
BUN/CREAT BLD: ABNORMAL (ref 9–20)
CALCIUM SERPL-MCNC: 9.2 MG/DL (ref 8.6–10.4)
CHLORIDE BLD-SCNC: 105 MMOL/L (ref 98–107)
CO2: 25 MMOL/L (ref 20–31)
CREAT SERPL-MCNC: 0.58 MG/DL (ref 0.5–0.9)
GFR AFRICAN AMERICAN: >60 ML/MIN
GFR NON-AFRICAN AMERICAN: >60 ML/MIN
GFR SERPL CREATININE-BSD FRML MDRD: ABNORMAL ML/MIN/{1.73_M2}
GFR SERPL CREATININE-BSD FRML MDRD: ABNORMAL ML/MIN/{1.73_M2}
GLUCOSE BLD-MCNC: 122 MG/DL (ref 70–99)
LIPASE: 25 U/L (ref 13–60)
MAGNESIUM: 2.1 MG/DL (ref 1.6–2.6)
POTASSIUM SERPL-SCNC: 3.7 MMOL/L (ref 3.7–5.3)
SODIUM BLD-SCNC: 139 MMOL/L (ref 135–144)
TOTAL PROTEIN: 6.9 G/DL (ref 6.4–8.3)
TROPONIN INTERP: NORMAL
TROPONIN T: NORMAL NG/ML
TROPONIN, HIGH SENSITIVITY: 6 NG/L (ref 0–14)

## 2021-09-12 PROCEDURE — 71260 CT THORAX DX C+: CPT

## 2021-09-12 PROCEDURE — 6360000002 HC RX W HCPCS: Performed by: EMERGENCY MEDICINE

## 2021-09-12 PROCEDURE — 6370000000 HC RX 637 (ALT 250 FOR IP): Performed by: EMERGENCY MEDICINE

## 2021-09-12 PROCEDURE — 2580000003 HC RX 258: Performed by: EMERGENCY MEDICINE

## 2021-09-12 PROCEDURE — 6360000004 HC RX CONTRAST MEDICATION: Performed by: EMERGENCY MEDICINE

## 2021-09-12 RX ORDER — SODIUM CHLORIDE 0.9 % (FLUSH) 0.9 %
10 SYRINGE (ML) INJECTION PRN
Status: DISCONTINUED | OUTPATIENT
Start: 2021-09-12 | End: 2021-09-12 | Stop reason: HOSPADM

## 2021-09-12 RX ORDER — OXYCODONE HYDROCHLORIDE AND ACETAMINOPHEN 5; 325 MG/1; MG/1
1-2 TABLET ORAL EVERY 6 HOURS PRN
Qty: 10 TABLET | Refills: 0 | Status: SHIPPED | OUTPATIENT
Start: 2021-09-12 | End: 2021-09-15

## 2021-09-12 RX ORDER — OXYCODONE HYDROCHLORIDE AND ACETAMINOPHEN 5; 325 MG/1; MG/1
2 TABLET ORAL ONCE
Status: COMPLETED | OUTPATIENT
Start: 2021-09-12 | End: 2021-09-12

## 2021-09-12 RX ORDER — 0.9 % SODIUM CHLORIDE 0.9 %
80 INTRAVENOUS SOLUTION INTRAVENOUS ONCE
Status: COMPLETED | OUTPATIENT
Start: 2021-09-12 | End: 2021-09-12

## 2021-09-12 RX ADMIN — OXYCODONE HYDROCHLORIDE AND ACETAMINOPHEN 2 TABLET: 5; 325 TABLET ORAL at 01:15

## 2021-09-12 RX ADMIN — IOPAMIDOL 75 ML: 755 INJECTION, SOLUTION INTRAVENOUS at 00:14

## 2021-09-12 RX ADMIN — SODIUM CHLORIDE 80 ML: 9 INJECTION, SOLUTION INTRAVENOUS at 00:15

## 2021-09-12 RX ADMIN — HYDROMORPHONE HYDROCHLORIDE 0.5 MG: 1 INJECTION, SOLUTION INTRAMUSCULAR; INTRAVENOUS; SUBCUTANEOUS at 01:15

## 2021-09-12 RX ADMIN — Medication 10 ML: at 00:15

## 2021-09-12 ASSESSMENT — PAIN SCALES - GENERAL: PAINLEVEL_OUTOF10: 10

## 2021-09-12 NOTE — ED PROVIDER NOTES
[id number] [facility] ED  [address] [facility] [address] [place] [address]  Phone: [phone]  Fax: [phone] [facility]          Pt Name: Rhea Rojas  MRN: [id number]  [place] 1945  Date of evaluation: 9/11/2021      CHIEF COMPLAINT       Chief Complaint   Patient presents with    Chest Pain       HISTORY OF PRESENT ILLNESS       Rhea Rojas is a 78 y.o. female who presents with mid and lower chest discomfort since she pushed about 10 shopping carts at [employer]. States the shopping cart handle pressed up against her chest when she pushed and fairly immediately felt pain afterwards. She reports she tried to lay down and her left arm went numb. Resolved after she sat back up. Movement makes it worse rest makes it better. No dyspnea. Does have pleuritic chest pain. No history of PE or DVT. Denies other symptoms at this time. REVIEW OF SYSTEMS       Review of Systems   Constitutional: Negative for chills, fatigue and fever. HENT: Negative for rhinorrhea and sore throat. Eyes: Negative for pain. Respiratory: Negative for cough and shortness of breath. Cardiovascular: Positive for chest pain. Gastrointestinal: Negative for abdominal pain, diarrhea, nausea and vomiting. Genitourinary: Negative for difficulty urinating. Musculoskeletal: Negative for back pain and neck pain. Skin: Negative for rash. Neurological: Negative for weakness and headaches. PAST MEDICAL HISTORY    has a past medical history of Arthritis, Displacement of lumbar intervertebral disc without myelopathy, Hypertension, Lumbar disc disease, Personal history of TIA (transient ischemic attack), Sleep deprivation, SOB (shortness of breath), and Wears glasses. SURGICAL HISTORY      has a past surgical history that includes Wrist surgery (Left); Hysterectomy; Ankle surgery (Right); Tonsillectomy; Nerve Block (10/19/15); Nerve Block (10-26-15 );  Foot surgery (2015); Hip arthroscopy (Left, 03/14/2018); and pr hip arthroscopy, dx (Left, 3/14/2018). CURRENT MEDICATIONS       Discharge Medication List as of 9/12/2021 12:58 AM      CONTINUE these medications which have NOT CHANGED    Details   Capsaicin-Menthol (ALLEVESS EX) Apply topicallyHistorical Med      lisinopril (PRINIVIL;ZESTRIL) 10 MG tablet Take 15 mg by mouth daily Historical Med             ALLERGIES     is allergic to bee pollen, erythromycin, pcn [penicillins], and tetanus toxoids. FAMILY HISTORY     She indicated that the status of her mother is unknown. She indicated that the status of her father is unknown. She indicated that the status of her brother is unknown. She indicated that the status of her paternal grandfather is unknown.     family history includes Heart Disease in her father and paternal grandfather; Raeanne fer in her brother and mother. SOCIAL HISTORY      reports that she has never smoked. She has never used smokeless tobacco. She reports that she does not drink alcohol and does not use drugs. PHYSICAL EXAM     INITIAL VITALS:  height is 5' 2\" (1.575 m) and weight is 54.4 kg (120 lb). Her oral temperature is 99.3 °F (37.4 °C). Her blood pressure is 193/71 (abnormal) and her pulse is 91. Her respiration is 20 and oxygen saturation is 97%. Physical Exam  Vitals reviewed. Constitutional:       General: She is not in acute distress. Appearance: She is well-developed. She is not ill-appearing or toxic-appearing. HENT:      Head: Normocephalic and atraumatic. Right Ear: External ear normal.      Left Ear: External ear normal.   Eyes:      General: Lids are normal.   Neck:      Trachea: No tracheal deviation. Cardiovascular:      Rate and Rhythm: Normal rate and regular rhythm. Pulmonary:      Effort: Pulmonary effort is normal. No respiratory distress. Breath sounds: Normal breath sounds.    Chest:          Comments: Patient does have chest wall tenderness to the area shown on the picture. Otherwise benign chest exam  Abdominal:      Palpations: Abdomen is soft. Tenderness: There is no abdominal tenderness. Skin:     General: Skin is warm and dry. Neurological:      Mental Status: She is alert. GCS: GCS eye subscore is 4. GCS verbal subscore is 5. GCS motor subscore is 6. Psychiatric:         Speech: Speech normal.       DIFFERENTIAL DIAGNOSIS/ MDM:     Plan to be to initiate a further cardiopulmonary musculoskeletal work-up. Most likely secondary to pushing the carts. DIAGNOSTIC RESULTS     EKG: All EKG's are interpreted by the Emergency Department Physician who either signs or Co-signs this chart in the absence of a cardiologist.    Interpreted by No att. providers found     Rhythm: normal sinus   Rate: normal  Axis: normal  Ectopy: none  Conduction: normal  ST Segments: no acute change  T Waves: no acute change    Clinical Impression: normal sinus rhythm with no acute changes/normal EKG. No acute infarction/ischemia noted. RADIOLOGY:   Interpretation per the Radiologist below, if available at the time of this note:  CT CHEST PULMONARY EMBOLISM W CONTRAST   Final Result   Mildly motion limited evaluation with no evidence of acute pulmonary embolus   to the segmental level. No acute process in the chest.             No results found.     LABS:  Results for orders placed or performed during the hospital encounter of 09/11/21   Troponin   Result Value Ref Range    Troponin, High Sensitivity 6 0 - 14 ng/L    Troponin T NOT REPORTED <0.03 ng/mL    Troponin Interp NOT REPORTED    Magnesium   Result Value Ref Range    Magnesium 2.1 1.6 - 2.6 mg/dL   Comprehensive Metabolic Panel   Result Value Ref Range    Glucose 122 (H) 70 - 99 mg/dL    BUN 18 8 - 23 mg/dL    CREATININE 0.58 0.50 - 0.90 mg/dL    Bun/Cre Ratio NOT REPORTED 9 - 20    Calcium 9.2 8.6 - 10.4 mg/dL    Sodium 139 135 - 144 mmol/L    Potassium 3.7 3.7 - 5.3 mmol/L    Chloride 105 98 - 107 mmol/L CO2 25 20 - 31 mmol/L    Anion Gap 9 9 - 17 mmol/L    Alkaline Phosphatase 81 35 - 104 U/L    ALT 12 5 - 33 U/L    AST 12 <32 U/L    Total Bilirubin 0.35 0.3 - 1.2 mg/dL    Total Protein 6.9 6.4 - 8.3 g/dL    Albumin 4.2 3.5 - 5.2 g/dL    Albumin/Globulin Ratio 1.6 1.0 - 2.5    GFR Non-African American >60 >60 mL/min    GFR African American >60 >60 mL/min    GFR Comment          GFR Staging NOT REPORTED    CBC Auto Differential   Result Value Ref Range    WBC 6.8 3.5 - 11.0 k/uL    RBC 4.50 4.0 - 5.2 m/uL    Hemoglobin 12.8 12.0 - 16.0 g/dL    Hematocrit 38.5 36 - 46 %    MCV 85.6 80 - 100 fL    MCH 28.5 26 - 34 pg    MCHC 33.3 31 - 37 g/dL    RDW 13.9 12.5 - 15.4 %    Platelets 507 735 - 824 k/uL    MPV 8.9 6.0 - 12.0 fL    NRBC Automated NOT REPORTED per 100 WBC    Differential Type NOT REPORTED     Seg Neutrophils 80 (H) 36 - 66 %    Lymphocytes 10 (L) 24 - 44 %    Monocytes 5 2 - 11 %    Eosinophils % 3 1 - 4 %    Basophils 2 0 - 2 %    Immature Granulocytes NOT REPORTED 0 %    Segs Absolute 5.40 1.8 - 7.7 k/uL    Absolute Lymph # 0.70 (L) 1.0 - 4.8 k/uL    Absolute Mono # 0.30 0.1 - 1.2 k/uL    Absolute Eos # 0.20 0.0 - 0.4 k/uL    Basophils Absolute 0.10 0.0 - 0.2 k/uL    Absolute Immature Granulocyte NOT REPORTED 0.00 - 0.30 k/uL    WBC Morphology NOT REPORTED     RBC Morphology NOT REPORTED     Platelet Estimate NOT REPORTED    Lipase   Result Value Ref Range    Lipase 25 13 - 60 U/L       EMERGENCY DEPARTMENT COURSE:     The patient was given the following medications:  Orders Placed This Encounter   Medications    0.9 % sodium chloride bolus    morphine injection 4 mg    iopamidol (ISOVUE-370) 76 % injection 75 mL    sodium chloride flush 0.9 % injection 10 mL    0.9 % sodium chloride bolus    HYDROmorphone (DILAUDID) injection 1 mg    oxyCODONE-acetaminophen (PERCOCET) 5-325 MG per tablet     Sig: Take 1-2 tablets by mouth every 6 hours as needed for Pain for up to 3 days.  WARNING:  May cause drowsiness. May impair ability to operate vehicles or machinery. Do not use in combination with alcohol. Dispense:  10 tablet     Refill:  0    oxyCODONE-acetaminophen (PERCOCET) 5-325 MG per tablet 2 tablet        Vitals:    Vitals:    09/11/21 2336   BP: (!) 193/71   Pulse: 91   Resp: 20   Temp: 99.3 °F (37.4 °C)   TempSrc: Oral   SpO2: 97%   Weight: 54.4 kg (120 lb)   Height: 5' 2\" (1.575 m)     -------------------------  BP: (!) 193/71, Temp: 99.3 °F (37.4 °C), Pulse: 91, Resp: 20      Re-evaluation Notes    Patient doing well on reevaluation. No acute changes. Cardiac work-up negative. CT scan of the chest shows no acute significant findings or obvious trauma. I feel she can be managed appropriately as an outpatient. I do not feel she requires further cardiac monitoring and I feel this is chest wall/trauma related. No evidence of fractures. Advised to follow-up with her PCP return right away if worsening or for new or concerning symptoms. She is comfortable with this plan peer    The patient presents with chest pain that is not suggesting in nature of pulmonary embolus, aortic dissection, cardiac ischemia, or other serious etiology. I considered an aortic dissection, but this is unlikely as patient is not complaining of tearing or ripping chest pain that is radiating to the back, the patient has no new neurological abnormalities and pulses are equal to all extremities. Mediastinum is within normal limits. Patient appears comfortable on physical exam and is not in distress. I also thought about a cardiac tamponade, but this is unlikely as patient is hemodynamically stable. Heart sounds are not distant, EKG does not show signs of electrical alternans and there is no JVD. I also thought about a tension pneumothorax, but this is unlikely given bilateral breath sounds and no signs of hemodynamic instability. I do not feel the patient has a PE. No clinical evidence of DVT.   I thought about an esophageal perforation, but history and physical exam does not suggest vomiting, followed by chest pain. No signs of Hamman's crunch on physical exam; again, patient appears comfortable and is well appearing and non toxic. The patient has been instructed to return if the symptpoms change or worsen in any way. Given the extremely low risk of these diagnoses further testing and evaluation for these possibilites are not indicated at this time. The patient appears stable for discharge and has been instructed to return immediately if the symptoms worsen in any way, or in 8-12 hr if not improved for re-evaluation. We also discussed returning to the Emergency Department immediately if new or worsening symptoms occur. We have discussed the symptoms which are most concerning (e.g., worsening pain, shortness of breath, a feeling of passing out, fever, any neurologic symptoms, abdominal pain or vomiting) that necessitate immediate return. The patient understands that at this time there is no evidence for a more malignant underlying process, but the patient also understands that early in the process of an illness or injury, an emergency department workup can be falsely reassuring. Routine discharge counseling was given, and the patient understands that worsening, changing or persistent symptoms should prompt an immediate call or follow up with their primary physician or return to the emergency department. The importance of appropriate follow up was also discussed. I have reviewed the disposition diagnosis with the patient and or their family/guardian. I have answered their questions and given discharge instructions. They voiced understanding of these instructions and did not have any further questions or complaints. CONSULTS:    None    CRITICAL CARE:     None    PROCEDURES:    None    FINAL IMPRESSION      1.  Chest wall pain          DISPOSITION/PLAN   DISPOSITION Decision To Discharge 09/12/2021 12:54:42 AM      Condition on Disposition    Improved    PATIENT REFERRED TO:  Ramesh Hernández, APRN - CNP  [id number]  [facility] [phone]    Schedule an appointment as soon as possible for a visit in 2 days        DISCHARGE MEDICATIONS:  Discharge Medication List as of 9/12/2021 12:58 AM      START taking these medications    Details   oxyCODONE-acetaminophen (PERCOCET) 5-325 MG per tablet Take 1-2 tablets by mouth every 6 hours as needed for Pain for up to 3 days. WARNING:  May cause drowsiness. May impair ability to operate vehicles or machinery.   Do not use in combination with alcohol., Disp-10 tablet, R-0Print             (Please note that portions of this note were completed with a voice recognition program.  Efforts were made to edit the dictations but occasionally words are mis-transcribed.)    Janeen Dsouza DO, DO  Attending Emergency Physician       Janeen Dsouza DO  09/12/21 0211

## 2021-09-13 LAB
EKG ATRIAL RATE: 84 BPM
EKG P AXIS: 47 DEGREES
EKG P-R INTERVAL: 172 MS
EKG Q-T INTERVAL: 362 MS
EKG QRS DURATION: 80 MS
EKG QTC CALCULATION (BAZETT): 427 MS
EKG R AXIS: 12 DEGREES
EKG T AXIS: 67 DEGREES
EKG VENTRICULAR RATE: 84 BPM

## 2021-09-26 ENCOUNTER — HOSPITAL ENCOUNTER (EMERGENCY)
Age: 76
Discharge: HOME | DRG: 871 | End: 2021-09-26
Attending: EMERGENCY MEDICINE
Payer: MEDICARE

## 2021-09-26 ENCOUNTER — APPOINTMENT (OUTPATIENT)
Dept: GENERAL RADIOLOGY | Age: 76
DRG: 871 | End: 2021-09-26
Payer: MEDICARE

## 2021-09-26 VITALS
HEART RATE: 109 BPM | DIASTOLIC BLOOD PRESSURE: 72 MMHG | WEIGHT: 120 LBS | OXYGEN SATURATION: 93 % | HEIGHT: 62 IN | TEMPERATURE: 98.4 F | SYSTOLIC BLOOD PRESSURE: 125 MMHG | RESPIRATION RATE: 16 BRPM | BODY MASS INDEX: 22.08 KG/M2

## 2021-09-26 DIAGNOSIS — J40 BRONCHITIS: Primary | ICD-10-CM

## 2021-09-26 DIAGNOSIS — R05.9 COUGH: ICD-10-CM

## 2021-09-26 LAB
ABSOLUTE EOS #: 0.04 K/UL (ref 0–0.4)
ABSOLUTE IMMATURE GRANULOCYTE: ABNORMAL K/UL (ref 0–0.3)
ABSOLUTE LYMPH #: 0.92 K/UL (ref 1–4.8)
ABSOLUTE MONO #: 0.92 K/UL (ref 0.1–1.2)
ALBUMIN SERPL-MCNC: 3.4 G/DL (ref 3.5–5.2)
ALBUMIN/GLOBULIN RATIO: 1.3 (ref 1–2.5)
ALP BLD-CCNC: 90 U/L (ref 35–104)
ALT SERPL-CCNC: 8 U/L (ref 5–33)
ANION GAP SERPL CALCULATED.3IONS-SCNC: 11 MMOL/L (ref 9–17)
AST SERPL-CCNC: 9 U/L
BASOPHILS # BLD: 0 % (ref 0–2)
BASOPHILS ABSOLUTE: 0.04 K/UL (ref 0–0.2)
BILIRUB SERPL-MCNC: 0.72 MG/DL (ref 0.3–1.2)
BNP INTERPRETATION: ABNORMAL
BUN BLDV-MCNC: 9 MG/DL (ref 8–23)
BUN/CREAT BLD: ABNORMAL (ref 9–20)
CALCIUM SERPL-MCNC: 8.3 MG/DL (ref 8.6–10.4)
CHLORIDE BLD-SCNC: 96 MMOL/L (ref 98–107)
CO2: 23 MMOL/L (ref 20–31)
CREAT SERPL-MCNC: 0.51 MG/DL (ref 0.5–0.9)
D-DIMER QUANTITATIVE: 2.37 MG/L FEU
DIFFERENTIAL TYPE: ABNORMAL
EOSINOPHILS RELATIVE PERCENT: 0 % (ref 1–4)
GFR AFRICAN AMERICAN: >60 ML/MIN
GFR NON-AFRICAN AMERICAN: >60 ML/MIN
GFR SERPL CREATININE-BSD FRML MDRD: ABNORMAL ML/MIN/{1.73_M2}
GFR SERPL CREATININE-BSD FRML MDRD: ABNORMAL ML/MIN/{1.73_M2}
GLUCOSE BLD-MCNC: 124 MG/DL (ref 70–99)
HCT VFR BLD CALC: 32.8 % (ref 36–46)
HEMOGLOBIN: 10.6 G/DL (ref 12–16)
IMMATURE GRANULOCYTES: ABNORMAL %
INR BLD: 1
LYMPHOCYTES # BLD: 8 % (ref 24–44)
MCH RBC QN AUTO: 28.6 PG (ref 26–34)
MCHC RBC AUTO-ENTMCNC: 32.3 G/DL (ref 31–37)
MCV RBC AUTO: 88.6 FL (ref 80–100)
MONOCYTES # BLD: 8 % (ref 2–11)
NRBC AUTOMATED: ABNORMAL PER 100 WBC
PARTIAL THROMBOPLASTIN TIME: 26.1 SEC (ref 21.3–31.3)
PDW BLD-RTO: 13.6 % (ref 12.5–15.4)
PLATELET # BLD: 236 K/UL (ref 140–450)
PLATELET ESTIMATE: ABNORMAL
PMV BLD AUTO: 10 FL (ref 8–14)
POTASSIUM SERPL-SCNC: 3.7 MMOL/L (ref 3.7–5.3)
PRO-BNP: 717 PG/ML
PROTHROMBIN TIME: 10.5 SEC (ref 9.4–12.6)
RBC # BLD: 3.7 M/UL (ref 4–5.2)
RBC # BLD: ABNORMAL 10*6/UL
SEG NEUTROPHILS: 84 % (ref 36–66)
SEGMENTED NEUTROPHILS ABSOLUTE COUNT: 9.36 K/UL (ref 1.8–7.7)
SODIUM BLD-SCNC: 130 MMOL/L (ref 135–144)
TOTAL PROTEIN: 6 G/DL (ref 6.4–8.3)
TROPONIN INTERP: NORMAL
TROPONIN T: NORMAL NG/ML
TROPONIN, HIGH SENSITIVITY: 10 NG/L (ref 0–14)
WBC # BLD: 11.3 K/UL (ref 3.5–11)
WBC # BLD: ABNORMAL 10*3/UL

## 2021-09-26 PROCEDURE — 93005 ELECTROCARDIOGRAM TRACING: CPT | Performed by: EMERGENCY MEDICINE

## 2021-09-26 PROCEDURE — 87040 BLOOD CULTURE FOR BACTERIA: CPT

## 2021-09-26 PROCEDURE — 83880 ASSAY OF NATRIURETIC PEPTIDE: CPT

## 2021-09-26 PROCEDURE — 96365 THER/PROPH/DIAG IV INF INIT: CPT

## 2021-09-26 PROCEDURE — 85610 PROTHROMBIN TIME: CPT

## 2021-09-26 PROCEDURE — 80053 COMPREHEN METABOLIC PANEL: CPT

## 2021-09-26 PROCEDURE — 84484 ASSAY OF TROPONIN QUANT: CPT

## 2021-09-26 PROCEDURE — 85379 FIBRIN DEGRADATION QUANT: CPT

## 2021-09-26 PROCEDURE — 87205 SMEAR GRAM STAIN: CPT

## 2021-09-26 PROCEDURE — 99283 EMERGENCY DEPT VISIT LOW MDM: CPT

## 2021-09-26 PROCEDURE — 85025 COMPLETE CBC W/AUTO DIFF WBC: CPT

## 2021-09-26 PROCEDURE — 71045 X-RAY EXAM CHEST 1 VIEW: CPT

## 2021-09-26 PROCEDURE — 87150 DNA/RNA AMPLIFIED PROBE: CPT

## 2021-09-26 PROCEDURE — 6360000002 HC RX W HCPCS: Performed by: EMERGENCY MEDICINE

## 2021-09-26 PROCEDURE — 36415 COLL VENOUS BLD VENIPUNCTURE: CPT

## 2021-09-26 PROCEDURE — 85730 THROMBOPLASTIN TIME PARTIAL: CPT

## 2021-09-26 PROCEDURE — 6370000000 HC RX 637 (ALT 250 FOR IP): Performed by: EMERGENCY MEDICINE

## 2021-09-26 PROCEDURE — 2580000003 HC RX 258: Performed by: EMERGENCY MEDICINE

## 2021-09-26 PROCEDURE — 96375 TX/PRO/DX INJ NEW DRUG ADDON: CPT

## 2021-09-26 RX ORDER — ALBUTEROL SULFATE 90 UG/1
2 AEROSOL, METERED RESPIRATORY (INHALATION) EVERY 4 HOURS PRN
Qty: 1 EACH | Refills: 0 | Status: ON HOLD
Start: 2021-09-26 | End: 2021-12-20 | Stop reason: HOSPADM

## 2021-09-26 RX ORDER — LEVOFLOXACIN 500 MG/1
500 TABLET, FILM COATED ORAL DAILY
Qty: 10 TABLET | Refills: 0 | Status: ON HOLD
Start: 2021-09-26 | End: 2021-10-05 | Stop reason: HOSPADM

## 2021-09-26 RX ORDER — ONDANSETRON 2 MG/ML
4 INJECTION INTRAMUSCULAR; INTRAVENOUS ONCE
Status: COMPLETED | OUTPATIENT
Start: 2021-09-26 | End: 2021-09-26

## 2021-09-26 RX ORDER — BENZONATATE 100 MG/1
200 CAPSULE ORAL ONCE
Status: COMPLETED | OUTPATIENT
Start: 2021-09-26 | End: 2021-09-26

## 2021-09-26 RX ORDER — AZITHROMYCIN 250 MG/1
TABLET, FILM COATED ORAL
COMMUNITY
Start: 2021-09-23 | End: 2021-09-26

## 2021-09-26 RX ORDER — GUAIFENESIN/DEXTROMETHORPHAN 100-10MG/5
5 SYRUP ORAL 4 TIMES DAILY PRN
Qty: 1 EACH | Refills: 0 | Status: SHIPPED | OUTPATIENT
Start: 2021-09-26 | End: 2021-10-06

## 2021-09-26 RX ORDER — GUAIFENESIN 600 MG/1
600 TABLET, EXTENDED RELEASE ORAL 2 TIMES DAILY
Qty: 20 TABLET | Refills: 0 | Status: SHIPPED | OUTPATIENT
Start: 2021-09-26 | End: 2021-10-06

## 2021-09-26 RX ORDER — BENZONATATE 100 MG/1
100 CAPSULE ORAL 3 TIMES DAILY PRN
Qty: 20 CAPSULE | Refills: 0 | Status: ON HOLD
Start: 2021-09-26 | End: 2021-10-05 | Stop reason: HOSPADM

## 2021-09-26 RX ADMIN — HYDROMORPHONE HYDROCHLORIDE 0.5 MG: 1 INJECTION, SOLUTION INTRAMUSCULAR; INTRAVENOUS; SUBCUTANEOUS at 17:51

## 2021-09-26 RX ADMIN — ONDANSETRON 4 MG: 2 INJECTION INTRAMUSCULAR; INTRAVENOUS at 17:50

## 2021-09-26 RX ADMIN — CEFTRIAXONE SODIUM 1000 MG: 1 INJECTION, POWDER, FOR SOLUTION INTRAMUSCULAR; INTRAVENOUS at 17:50

## 2021-09-26 RX ADMIN — BENZONATATE 200 MG: 100 CAPSULE ORAL at 17:50

## 2021-09-26 ASSESSMENT — PAIN SCALES - GENERAL: PAINLEVEL_OUTOF10: 8

## 2021-09-26 NOTE — ED PROVIDER NOTES
[id number] [facility]  [address] [address] [place] [address]  Phone: [phone]  Fax: Elizabeth Forrest [phone]      Pt Name: Morales Mcneal  MRN: [id number]  [place] 1945  Date of evaluation: 9/26/2021    CHIEF COMPLAINT       Chief Complaint   Patient presents with    Cough     for about 2 weeks, pain on left side, taking amoxicillin since wednesday unchanged, was at urgent care prior and diagnosed with pleural effusion, negative covid test       HISTORY OF PRESENT ILLNESS    Morales Mcneal is a 78 y.o. female who presents with a recurrent persistent cough that has been dry and nonproductive for the last week or 2. The daughter states that they took her to urgent care center over [address] of [address] which she was diagnosed as having fluid in her lungs and was told to come to the emergency department to possibly be admitted. She states that she has had pneumonia in the past when she was admitted to the [facility] at that time. Does state that they did a Covid test on her today and it was negative. He has no history of prior lung problems no COPD no emphysema she is a non-smoker. REVIEW OF SYSTEMS     Constitutional: No fevers or chills   HEENT: No sore throat, rhinorrhea, or earache   Eyes: No blurry vision or double vision no drainage   Cardiovascular: No chest pain or tachycardia   Respiratory:  see above  Gastrointestinal: No nausea, vomiting, diarrhea, constipation, or abdominal pain   : No hematuria or dysuria   Musculoskeletal: No swelling or pain   Skin: No rash   Neurological: No focal neurologic complaints, paresthesias, weakness, or headache   PAST MEDICAL HISTORY    has a past medical history of Arthritis, Displacement of lumbar intervertebral disc without myelopathy, Hypertension, Lumbar disc disease, Personal history of TIA (transient ischemic attack), Sleep deprivation, SOB (shortness of breath), and Wears glasses.     SURGICAL HISTORY      has a past surgical history that includes Wrist surgery (Left); Hysterectomy; Ankle surgery (Right); Tonsillectomy; Nerve Block (10/19/15); Nerve Block (10-26-15 ); Foot surgery (2015); Hip arthroscopy (Left, 03/14/2018); and pr hip arthroscopy, dx (Left, 3/14/2018). CURRENT MEDICATIONS       Previous Medications    AZITHROMYCIN (ZITHROMAX) 250 MG TABLET    Take 2 tablets the first day, then 1 tablet daily for 4 days. CAPSAICIN-MENTHOL (ALLEVESS EX)    Apply topically    LISINOPRIL (PRINIVIL;ZESTRIL) 10 MG TABLET    Take 15 mg by mouth daily        ALLERGIES     is allergic to bee pollen, erythromycin, pcn [penicillins], tetanus toxoids, and tetracyclines & related. FAMILY HISTORY     She indicated that the status of her mother is unknown. She indicated that the status of her father is unknown. She indicated that the status of her brother is unknown. She indicated that the status of her paternal grandfather is unknown.     family history includes Heart Disease in her father and paternal grandfather; Moira Giovanni in her brother and mother. SOCIAL HISTORY      reports that she has never smoked. She has never used smokeless tobacco. She reports that she does not drink alcohol and does not use drugs. PHYSICAL EXAM       ED Triage Vitals   BP Temp Temp src Pulse Resp SpO2 Height Weight   -- -- -- -- -- -- -- --     Constitutional: Alert, oriented x3, nontoxic, answering questions appropriately, acting properly for age, in no acute distress   HEENT: Extraocular muscles intact, mucus membranes moist, TMs clear bilaterally, no posterior pharyngeal erythema or exudates, Pupils equal, round, reactive to light,   Neck: Trachea midline   Cardiovascular: Regular rhythm and rate no S3, S4, or murmurs   Respiratory: Shallow respiratory effort with a dry nonproductive cough occasional end expiratory wheeze.   Decreased at the bases quarter to alf up symmetrically  Gastrointestinal: Soft, nontender, nondistended, positive bowel sounds. No rebound, rigidity, or guarding. Musculoskeletal: No extremity pain or swelling   Neurologic: Moving all 4 extremities without difficulty there are no gross focal neurologic deficits   Skin: Warm and dry     DIFFERENTIAL DIAGNOSIS/ MDM:   Pneumonia versus pleural effusion versus host of heart failure. On discussion with the patient with her family in attendance the patient is adamant to go home she states that she wants to go home she feels better at home she wants medications to help her with her cough. She does voice some concern that there is lung cancer that runs in her family recommend that she possibly get a referral to a pulmonologist for bronchoscopy. She is adamant that she is not going to stay here at the hospital.  Understands that she could return at any time. DIAGNOSTIC RESULTS     EKG: All EKG's are interpreted by the Emergency Department Physician who either signs or Co-signs this chart in the absence of a cardiologist.  EKG performed at 1628 shows a sinus tachycardia with a ventricular rate of 121. The axis is left there is poor R wave progression in the lateral leads consistent with a remote strain pattern or infarction pattern. The NV interval is 138 and the QRS duration is 120.       Not indicated unless otherwise documented above    LABS:  Results for orders placed or performed during the hospital encounter of 09/26/21   CBC Auto Differential   Result Value Ref Range    WBC 11.3 (H) 3.5 - 11.0 k/uL    RBC 3.70 (L) 4.0 - 5.2 m/uL    Hemoglobin 10.6 (L) 12.0 - 16.0 g/dL    Hematocrit 32.8 (L) 36 - 46 %    MCV 88.6 80 - 100 fL    MCH 28.6 26 - 34 pg    MCHC 32.3 31 - 37 g/dL    RDW 13.6 12.5 - 15.4 %    Platelets 101 983 - 122 k/uL    MPV 10.0 8.0 - 14.0 fL    NRBC Automated NOT REPORTED per 100 WBC    Differential Type NOT REPORTED     Immature Granulocytes NOT REPORTED 0 %    Absolute Immature Granulocyte NOT REPORTED 0.00 - 0.30 k/uL    WBC Morphology NOT REPORTED RBC Morphology NOT REPORTED     Platelet Estimate NOT REPORTED     Seg Neutrophils 84 (H) 36 - 66 %    Lymphocytes 8 (L) 24 - 44 %    Monocytes 8 2 - 11 %    Eosinophils % 0 (L) 1 - 4 %    Basophils 0 0 - 2 %    Segs Absolute 9.36 (H) 1.8 - 7.7 k/uL    Absolute Lymph # 0.92 (L) 1.0 - 4.8 k/uL    Absolute Mono # 0.92 0.1 - 1.2 k/uL    Absolute Eos # 0.04 0.0 - 0.4 k/uL    Basophils Absolute 0.04 0.0 - 0.2 k/uL   Comprehensive Metabolic Panel w/ Reflex to MG   Result Value Ref Range    Glucose 124 (H) 70 - 99 mg/dL    BUN 9 8 - 23 mg/dL    CREATININE 0.51 0.50 - 0.90 mg/dL    Bun/Cre Ratio NOT REPORTED 9 - 20    Calcium 8.3 (L) 8.6 - 10.4 mg/dL    Sodium 130 (L) 135 - 144 mmol/L    Potassium 3.7 3.7 - 5.3 mmol/L    Chloride 96 (L) 98 - 107 mmol/L    CO2 23 20 - 31 mmol/L    Anion Gap 11 9 - 17 mmol/L    Alkaline Phosphatase 90 35 - 104 U/L    ALT 8 5 - 33 U/L    AST 9 <32 U/L    Total Bilirubin 0.72 0.3 - 1.2 mg/dL    Total Protein 6.0 (L) 6.4 - 8.3 g/dL    Albumin 3.4 (L) 3.5 - 5.2 g/dL    Albumin/Globulin Ratio 1.3 1.0 - 2.5    GFR Non-African American >60 >60 mL/min    GFR African American >60 >60 mL/min    GFR Comment          GFR Staging NOT REPORTED    Troponin   Result Value Ref Range    Troponin, High Sensitivity 10 0 - 14 ng/L    Troponin T NOT REPORTED <0.03 ng/mL    Troponin Interp NOT REPORTED    Brain Natriuretic Peptide   Result Value Ref Range    Pro- (H) <300 pg/mL    BNP Interpretation Pro-BNP Reference Range:    D-Dimer, Quantitative   Result Value Ref Range    D-Dimer, Quant 2.37 mg/L FEU   Protime-INR   Result Value Ref Range    Protime 10.5 9.4 - 12.6 sec    INR 1.0    APTT   Result Value Ref Range    PTT 26.1 21.3 - 31.3 sec   EKG 12 Lead   Result Value Ref Range    Ventricular Rate 121 BPM    Atrial Rate 121 BPM    P-R Interval 138 ms    QRS Duration 120 ms    Q-T Interval 342 ms    QTc Calculation (Bazett) 485 ms    P Axis 17 degrees    R Axis -38 degrees    T Axis 113 degrees       Not indicated unless otherwise documented above    RADIOLOGY:   I reviewed the radiologist interpretations:    XR CHEST PORTABLE   Final Result   Small bilateral pleural effusions with left mid to lower lobe infiltrate or   atelectasis. Clinical correlation for pneumonia. Clinical and imaging follow-up to resolution recommended. Not indicated unless otherwise documented above    EMERGENCY DEPARTMENT COURSE:     The patient was given the following medications:  Orders Placed This Encounter   Medications    cefTRIAXone (ROCEPHIN) 1000 mg IVPB in 50 mL D5W minibag     Order Specific Question:   Antimicrobial Indications     Answer:   Pneumonia (CAP)    HYDROmorphone (DILAUDID) injection 0.5 mg    ondansetron (ZOFRAN) injection 4 mg    benzonatate (TESSALON) capsule 200 mg    guaiFENesin (MUCINEX) 600 MG extended release tablet     Sig: Take 1 tablet by mouth 2 times daily for 10 days     Dispense:  20 tablet     Refill:  0    benzonatate (TESSALON) 100 MG capsule     Sig: Take 1 capsule by mouth 3 times daily as needed for Cough     Dispense:  20 capsule     Refill:  0    albuterol sulfate  (90 Base) MCG/ACT inhaler     Sig: Inhale 2 puffs into the lungs every 4 hours as needed for Wheezing or Shortness of Breath     Dispense:  1 each     Refill:  0    guaiFENesin-dextromethorphan (ROBITUSSIN DM) 100-10 MG/5ML syrup     Sig: Take 5 mLs by mouth 4 times daily as needed for Cough     Dispense:  1 each     Refill:  0        Vitals:   -------------------------  /76   Pulse 124   Temp 98.4 °F (36.9 °C) (Oral)   Resp 20   Ht 5' 2\" (1.575 m)   Wt 54.4 kg (120 lb)   SpO2 94%   BMI 21.95 kg/m²         I have reviewed the disposition diagnosis with the patient and or their family/guardian. I have answered their questions and given discharge instructions. They voiced understanding of these instructions and did not have any furtherquestions or complaints.     CRITICAL CARE:    None    CONSULTS:    None    PROCEDURES:    None      OARRS Report if indicated             FINAL IMPRESSION      1. Bronchitis    2. Cough          DISPOSITION/PLAN   DISPOSITION Decision To Discharge 09/26/2021 06:10:14 PM        CONDITION ON DISPOSITION: STABLE       PATIENT REFERRED TO:  CELY Boone CNP  [address] [id number]  [phone]    In 2 days  If symptoms worsen    [facility] ED  [address] [address]  [phone]          DISCHARGE MEDICATIONS:  New Prescriptions    ALBUTEROL SULFATE  (90 BASE) MCG/ACT INHALER    Inhale 2 puffs into the lungs every 4 hours as needed for Wheezing or Shortness of Breath    BENZONATATE (TESSALON) 100 MG CAPSULE    Take 1 capsule by mouth 3 times daily as needed for Cough    GUAIFENESIN (MUCINEX) 600 MG EXTENDED RELEASE TABLET    Take 1 tablet by mouth 2 times daily for 10 days    GUAIFENESIN-DEXTROMETHORPHAN (ROBITUSSIN DM) 100-10 MG/5ML SYRUP    Take 5 mLs by mouth 4 times daily as needed for Cough       (Please note that portions of thisnote were completed with a voice recognition program.  Efforts were made to edit the dictations but occasionally words are mis-transcribed.)    Josue Cleveland MD,, MD  Attending Emergency Physician        Josue Cleveland MD  09/26/21 0670

## 2021-09-26 NOTE — ED NOTES
Patient provided with discharge instructions, prescriptions, and follow up information. Verbalized understanding. IV discontinued and dry dressing in place. A&OX3. Steady gait noted at discharge. Wheelchair declined by patient.       Mariaa Grewal RN  09/26/21 8847

## 2021-09-27 ENCOUNTER — APPOINTMENT (OUTPATIENT)
Dept: CT IMAGING | Age: 76
DRG: 871 | End: 2021-09-27
Payer: MEDICARE

## 2021-09-27 ENCOUNTER — HOSPITAL ENCOUNTER (INPATIENT)
Age: 76
LOS: 8 days | Discharge: HOME | DRG: 871 | End: 2021-10-05
Attending: EMERGENCY MEDICINE | Admitting: FAMILY MEDICINE
Payer: MEDICARE

## 2021-09-27 DIAGNOSIS — R07.89 CHEST WALL PAIN: ICD-10-CM

## 2021-09-27 DIAGNOSIS — R05.9 COUGH: ICD-10-CM

## 2021-09-27 DIAGNOSIS — R76.8 POSITIVE ANA (ANTINUCLEAR ANTIBODY): ICD-10-CM

## 2021-09-27 DIAGNOSIS — J90 PLEURAL EFFUSION: Primary | ICD-10-CM

## 2021-09-27 DIAGNOSIS — I31.39 PERICARDIAL EFFUSION: ICD-10-CM

## 2021-09-27 PROBLEM — J18.9 PNEUMONIA: Status: ACTIVE | Noted: 2021-09-27

## 2021-09-27 PROBLEM — J18.9 PNA (PNEUMONIA): Status: ACTIVE | Noted: 2021-09-27

## 2021-09-27 LAB
ABSOLUTE EOS #: 0 K/UL (ref 0–0.4)
ABSOLUTE IMMATURE GRANULOCYTE: ABNORMAL K/UL (ref 0–0.3)
ABSOLUTE LYMPH #: 0.72 K/UL (ref 1–4.8)
ABSOLUTE MONO #: 0.86 K/UL (ref 0.1–1.2)
ANION GAP SERPL CALCULATED.3IONS-SCNC: 10 MMOL/L (ref 9–17)
BASOPHILS # BLD: 0 % (ref 0–2)
BASOPHILS ABSOLUTE: 0.03 K/UL (ref 0–0.2)
BUN BLDV-MCNC: 10 MG/DL (ref 8–23)
BUN/CREAT BLD: ABNORMAL (ref 9–20)
CALCIUM SERPL-MCNC: 8.1 MG/DL (ref 8.6–10.4)
CHLORIDE BLD-SCNC: 91 MMOL/L (ref 98–107)
CO2: 22 MMOL/L (ref 20–31)
CREAT SERPL-MCNC: 0.46 MG/DL (ref 0.5–0.9)
DIFFERENTIAL TYPE: ABNORMAL
EKG ATRIAL RATE: 120 BPM
EKG ATRIAL RATE: 121 BPM
EKG P AXIS: 17 DEGREES
EKG P AXIS: 35 DEGREES
EKG P-R INTERVAL: 138 MS
EKG P-R INTERVAL: 170 MS
EKG Q-T INTERVAL: 296 MS
EKG Q-T INTERVAL: 342 MS
EKG QRS DURATION: 120 MS
EKG QRS DURATION: 72 MS
EKG QTC CALCULATION (BAZETT): 418 MS
EKG QTC CALCULATION (BAZETT): 485 MS
EKG R AXIS: -38 DEGREES
EKG R AXIS: 14 DEGREES
EKG T AXIS: 102 DEGREES
EKG T AXIS: 113 DEGREES
EKG VENTRICULAR RATE: 120 BPM
EKG VENTRICULAR RATE: 121 BPM
EOSINOPHILS RELATIVE PERCENT: 0 % (ref 1–4)
GFR AFRICAN AMERICAN: >60 ML/MIN
GFR NON-AFRICAN AMERICAN: >60 ML/MIN
GFR SERPL CREATININE-BSD FRML MDRD: ABNORMAL ML/MIN/{1.73_M2}
GFR SERPL CREATININE-BSD FRML MDRD: ABNORMAL ML/MIN/{1.73_M2}
GLUCOSE BLD-MCNC: 165 MG/DL (ref 70–99)
HCT VFR BLD CALC: 31.4 % (ref 36–46)
HEMOGLOBIN: 10.4 G/DL (ref 12–16)
IMMATURE GRANULOCYTES: ABNORMAL %
LYMPHOCYTES # BLD: 5 % (ref 24–44)
MCH RBC QN AUTO: 28.9 PG (ref 26–34)
MCHC RBC AUTO-ENTMCNC: 33.1 G/DL (ref 31–37)
MCV RBC AUTO: 87.2 FL (ref 80–100)
MONOCYTES # BLD: 6 % (ref 2–11)
NRBC AUTOMATED: ABNORMAL PER 100 WBC
PDW BLD-RTO: 13.4 % (ref 12.5–15.4)
PLATELET # BLD: 226 K/UL (ref 140–450)
PLATELET ESTIMATE: ABNORMAL
PMV BLD AUTO: 10.3 FL (ref 8–14)
POTASSIUM SERPL-SCNC: 4.3 MMOL/L (ref 3.7–5.3)
RBC # BLD: 3.6 M/UL (ref 4–5.2)
RBC # BLD: ABNORMAL 10*6/UL
SARS-COV-2, RAPID: NOT DETECTED
SEG NEUTROPHILS: 89 % (ref 36–66)
SEGMENTED NEUTROPHILS ABSOLUTE COUNT: 12.54 K/UL (ref 1.8–7.7)
SODIUM BLD-SCNC: 123 MMOL/L (ref 135–144)
SPECIMEN DESCRIPTION: NORMAL
TROPONIN INTERP: NORMAL
TROPONIN T: NORMAL NG/ML
TROPONIN, HIGH SENSITIVITY: 14 NG/L (ref 0–14)
WBC # BLD: 14.2 K/UL (ref 3.5–11)
WBC # BLD: ABNORMAL 10*3/UL

## 2021-09-27 PROCEDURE — 80048 BASIC METABOLIC PNL TOTAL CA: CPT

## 2021-09-27 PROCEDURE — 6360000004 HC RX CONTRAST MEDICATION: Performed by: EMERGENCY MEDICINE

## 2021-09-27 PROCEDURE — 6370000000 HC RX 637 (ALT 250 FOR IP): Performed by: EMERGENCY MEDICINE

## 2021-09-27 PROCEDURE — 93005 ELECTROCARDIOGRAM TRACING: CPT | Performed by: EMERGENCY MEDICINE

## 2021-09-27 PROCEDURE — 87635 SARS-COV-2 COVID-19 AMP PRB: CPT

## 2021-09-27 PROCEDURE — 36415 COLL VENOUS BLD VENIPUNCTURE: CPT

## 2021-09-27 PROCEDURE — 96374 THER/PROPH/DIAG INJ IV PUSH: CPT

## 2021-09-27 PROCEDURE — 2060000000 HC ICU INTERMEDIATE R&B

## 2021-09-27 PROCEDURE — 71260 CT THORAX DX C+: CPT

## 2021-09-27 PROCEDURE — 85025 COMPLETE CBC W/AUTO DIFF WBC: CPT

## 2021-09-27 PROCEDURE — 93005 ELECTROCARDIOGRAM TRACING: CPT | Performed by: STUDENT IN AN ORGANIZED HEALTH CARE EDUCATION/TRAINING PROGRAM

## 2021-09-27 PROCEDURE — 84484 ASSAY OF TROPONIN QUANT: CPT

## 2021-09-27 PROCEDURE — 2580000003 HC RX 258: Performed by: EMERGENCY MEDICINE

## 2021-09-27 PROCEDURE — 99285 EMERGENCY DEPT VISIT HI MDM: CPT

## 2021-09-27 PROCEDURE — 6360000002 HC RX W HCPCS: Performed by: EMERGENCY MEDICINE

## 2021-09-27 RX ORDER — LEVOFLOXACIN 5 MG/ML
500 INJECTION, SOLUTION INTRAVENOUS ONCE
Status: COMPLETED | OUTPATIENT
Start: 2021-09-27 | End: 2021-09-27

## 2021-09-27 RX ORDER — ONDANSETRON 2 MG/ML
4 INJECTION INTRAMUSCULAR; INTRAVENOUS ONCE
Status: COMPLETED | OUTPATIENT
Start: 2021-09-27 | End: 2021-09-27

## 2021-09-27 RX ORDER — BENZONATATE 100 MG/1
100 CAPSULE ORAL ONCE
Status: COMPLETED | OUTPATIENT
Start: 2021-09-27 | End: 2021-09-27

## 2021-09-27 RX ORDER — 0.9 % SODIUM CHLORIDE 0.9 %
80 INTRAVENOUS SOLUTION INTRAVENOUS ONCE
Status: COMPLETED | OUTPATIENT
Start: 2021-09-27 | End: 2021-09-27

## 2021-09-27 RX ORDER — SODIUM CHLORIDE 0.9 % (FLUSH) 0.9 %
10 SYRINGE (ML) INJECTION PRN
Status: DISCONTINUED | OUTPATIENT
Start: 2021-09-27 | End: 2021-10-05 | Stop reason: HOSPADM

## 2021-09-27 RX ADMIN — SODIUM CHLORIDE, PRESERVATIVE FREE 10 ML: 5 INJECTION INTRAVENOUS at 08:03

## 2021-09-27 RX ADMIN — HYDROMORPHONE HYDROCHLORIDE 0.5 MG: 1 INJECTION, SOLUTION INTRAMUSCULAR; INTRAVENOUS; SUBCUTANEOUS at 11:18

## 2021-09-27 RX ADMIN — BENZONATATE 100 MG: 100 CAPSULE ORAL at 08:02

## 2021-09-27 RX ADMIN — HYDROMORPHONE HYDROCHLORIDE 0.5 MG: 1 INJECTION, SOLUTION INTRAMUSCULAR; INTRAVENOUS; SUBCUTANEOUS at 16:30

## 2021-09-27 RX ADMIN — BENZONATATE 100 MG: 100 CAPSULE ORAL at 15:25

## 2021-09-27 RX ADMIN — ONDANSETRON 4 MG: 2 INJECTION, SOLUTION INTRAMUSCULAR; INTRAVENOUS at 16:30

## 2021-09-27 RX ADMIN — IOPAMIDOL 75 ML: 755 INJECTION, SOLUTION INTRAVENOUS at 08:02

## 2021-09-27 RX ADMIN — SODIUM CHLORIDE 80 ML: 9 INJECTION, SOLUTION INTRAVENOUS at 08:03

## 2021-09-27 RX ADMIN — LEVOFLOXACIN 500 MG: 5 INJECTION, SOLUTION INTRAVENOUS at 19:13

## 2021-09-27 RX ADMIN — HYDROMORPHONE HYDROCHLORIDE 0.5 MG: 1 INJECTION, SOLUTION INTRAMUSCULAR; INTRAVENOUS; SUBCUTANEOUS at 08:02

## 2021-09-27 RX ADMIN — ONDANSETRON 4 MG: 2 INJECTION INTRAMUSCULAR; INTRAVENOUS at 20:47

## 2021-09-27 ASSESSMENT — PAIN DESCRIPTION - PAIN TYPE: TYPE: ACUTE PAIN

## 2021-09-27 ASSESSMENT — PAIN DESCRIPTION - FREQUENCY: FREQUENCY: INTERMITTENT

## 2021-09-27 ASSESSMENT — PAIN DESCRIPTION - DESCRIPTORS: DESCRIPTORS: ACHING

## 2021-09-27 ASSESSMENT — PAIN SCALES - GENERAL
PAINLEVEL_OUTOF10: 8
PAINLEVEL_OUTOF10: 8
PAINLEVEL_OUTOF10: 6
PAINLEVEL_OUTOF10: 5

## 2021-09-27 ASSESSMENT — PAIN - FUNCTIONAL ASSESSMENT: PAIN_FUNCTIONAL_ASSESSMENT: PREVENTS OR INTERFERES SOME ACTIVE ACTIVITIES AND ADLS

## 2021-09-27 NOTE — ED PROVIDER NOTES
[address] [facility] ED  [address] [facility] [address]  Phone: [phone]  Fax: Elizabeth Forrest [phone]      Pt Name: Luisito Rivas  MRN: [id number]  [place] 1945  Date of evaluation: 9/27/2021    CHIEF COMPLAINT       Chief Complaint   Patient presents with    Cough       HISTORY OF PRESENT ILLNESS    Luisito Rivas is a 78 y.o. female who presents to the emergency room complaining of left-sided chest wall pain and persistent cough. Was seen here yesterday and treated offered admission at that time but wanted to go home. She continues to have cough and worsening left-sided chest pain. Tessalon and Dilaudid did help her yesterday. 2 weeks ago she was seen in urgent care diagnosed with fluid around her lungs and was placed on amoxicillin. Yesterday she was placed on Levaquin. She continues have pain with shortness of breath and presents tachycardic with a coughing fit. No fevers. No other symptoms. Pain worse with movement. REVIEW OF SYSTEMS       Constitutional: No fevers or chills   HEENT: No sore throat, rhinorrhea, or earache   Eyes: No blurry vision or double vision no drainage   Cardiovascular: Left-sided chest pain positive tachycardia  Respiratory: No wheezing positive shortness of breath and cough  Gastrointestinal: No nausea, vomiting, diarrhea, constipation, or abdominal pain   : No hematuria or dysuria   Musculoskeletal: No swelling or pain   Skin: No rash   Neurological: No focal neurologic complaints, paresthesias, weakness, or headache     PAST MEDICAL HISTORY    has a past medical history of Arthritis, Displacement of lumbar intervertebral disc without myelopathy, Hypertension, Lumbar disc disease, Personal history of TIA (transient ischemic attack), Sleep deprivation, SOB (shortness of breath), and Wears glasses. SURGICAL HISTORY      has a past surgical history that includes Wrist surgery (Left); Hysterectomy;  Ankle surgery (Right); Tonsillectomy; Nerve Block (10/19/15); Nerve Block (10-26-15 ); Foot surgery (2015); Hip arthroscopy (Left, 03/14/2018); and pr hip arthroscopy, dx (Left, 3/14/2018). CURRENT MEDICATIONS       Previous Medications    ALBUTEROL SULFATE  (90 BASE) MCG/ACT INHALER    Inhale 2 puffs into the lungs every 4 hours as needed for Wheezing or Shortness of Breath    BENZONATATE (TESSALON) 100 MG CAPSULE    Take 1 capsule by mouth 3 times daily as needed for Cough    CAPSAICIN-MENTHOL (ALLEVESS EX)    Apply topically    GUAIFENESIN (MUCINEX) 600 MG EXTENDED RELEASE TABLET    Take 1 tablet by mouth 2 times daily for 10 days    GUAIFENESIN-DEXTROMETHORPHAN (ROBITUSSIN DM) 100-10 MG/5ML SYRUP    Take 5 mLs by mouth 4 times daily as needed for Cough    LEVOFLOXACIN (LEVAQUIN) 500 MG TABLET    Take 1 tablet by mouth daily for 10 days    LISINOPRIL (PRINIVIL;ZESTRIL) 10 MG TABLET    Take 15 mg by mouth daily        ALLERGIES     is allergic to bee pollen, erythromycin, pcn [penicillins], tetanus toxoids, and tetracyclines & related. FAMILY HISTORY     She indicated that the status of her mother is unknown. She indicated that the status of her father is unknown. She indicated that the status of her brother is unknown. She indicated that the status of her paternal grandfather is unknown.     family history includes Heart Disease in her father and paternal grandfather; Rodolfo Zbigniew in her brother and mother. SOCIAL HISTORY      reports that she has never smoked. She has never used smokeless tobacco. She reports that she does not drink alcohol and does not use drugs.     PHYSICAL EXAM       ED Triage Vitals      98 °F (36.7 °C) 121 30 129/72 95 %         Constitutional: Alert, oriented x3, nontoxic, answering questions appropriately, acting properly for age, moderate distress with coughing fits  HEENT: Extraocular muscles intact,   Neck: Trachea midline   Cardiovascular: Regular rhythm and rate no S3, S4, or Platelet Estimate NOT REPORTED     Seg Neutrophils 89 (H) 36 - 66 %    Lymphocytes 5 (L) 24 - 44 %    Monocytes 6 2 - 11 %    Eosinophils % 0 (L) 1 - 4 %    Basophils 0 0 - 2 %    Segs Absolute 12.54 (H) 1.8 - 7.7 k/uL    Absolute Lymph # 0.72 (L) 1.0 - 4.8 k/uL    Absolute Mono # 0.86 0.1 - 1.2 k/uL    Absolute Eos # 0.00 0.0 - 0.4 k/uL    Basophils Absolute 0.03 0.0 - 0.2 k/uL   Basic Metabolic Panel   Result Value Ref Range    Glucose 165 (H) 70 - 99 mg/dL    BUN 10 8 - 23 mg/dL    CREATININE 0.46 (L) 0.50 - 0.90 mg/dL    Bun/Cre Ratio NOT REPORTED 9 - 20    Calcium 8.1 (L) 8.6 - 10.4 mg/dL    Sodium 123 (L) 135 - 144 mmol/L    Potassium 4.3 3.7 - 5.3 mmol/L    Chloride 91 (L) 98 - 107 mmol/L    CO2 22 20 - 31 mmol/L    Anion Gap 10 9 - 17 mmol/L    GFR Non-African American >60 >60 mL/min    GFR African American >60 >60 mL/min    GFR Comment          GFR Staging NOT REPORTED    Troponin   Result Value Ref Range    Troponin, High Sensitivity 14 0 - 14 ng/L    Troponin T NOT REPORTED <0.03 ng/mL    Troponin Interp NOT REPORTED    EKG 12 Lead   Result Value Ref Range    Ventricular Rate 120 BPM    Atrial Rate 120 BPM    P-R Interval 170 ms    QRS Duration 72 ms    Q-T Interval 296 ms    QTc Calculation (Bazett) 418 ms    P Axis 35 degrees    R Axis 14 degrees    T Axis 102 degrees       Not indicated unless otherwise documented above    RADIOLOGY:   I reviewed the radiologist interpretations:    CT CHEST PULMONARY EMBOLISM W CONTRAST   Final Result   1. No evidence for acute pulmonary embolism. 2. Moderate pericardial effusion. 3. Small right and moderate left pleural effusion with adjacent subsegmental   atelectasis.              Not indicated unless otherwise documented above    EMERGENCY DEPARTMENT COURSE:     The patient was given the following medications:  Orders Placed This Encounter   Medications    HYDROmorphone (DILAUDID) injection 0.5 mg    benzonatate (TESSALON) capsule 100 mg    0.9 % sodium chloride bolus    iopamidol (ISOVUE-370) 76 % injection 75 mL    sodium chloride flush 0.9 % injection 10 mL    HYDROmorphone (DILAUDID) injection 0.5 mg    benzonatate (TESSALON) capsule 100 mg    levoFLOXacin (LEVAQUIN) 500 MG/100ML infusion 500 mg     Order Specific Question:   Antimicrobial Indications     Answer:   Pneumonia (CAP)    ondansetron (ZOFRAN) injection 4 mg    HYDROmorphone (DILAUDID) injection 0.5 mg        Vitals:   -------------------------  /68   Pulse 109   Temp 98 °F (36.7 °C) (Skin)   Resp 28   Ht 5' 2\" (1.575 m)   Wt 54.4 kg (119 lb 14.9 oz)   SpO2 96%   BMI 21.94 kg/m²     9 AM CT no pulmonary embolism does show pericardial effusion was not present 3 weeks ago. Moderate pleural effusions on the left side. Patient will require transfer. Requesting [facility]. 9:25 AM discussed with Dr. Edi Schaefer at [facility] agrees to admission. Elevated white blood cell count received Levaquin yesterday at 4 PM not due until 4 PM today. Awaiting bed assignment and transfer. 6:15 PM patient reevaluated multiple times was having coughing spells here and there. Required pain medicine and antinausea medicine as well. Bed might not be available until tomorrow. She is hemodynamically stable. Her tachycardia is improved. No hypoxia. 7 PM care transferred to Dr. Anh Garza:    None    CONSULTS:    None    PROCEDURES:    None      OARRS Report if indicated             FINAL IMPRESSION      1. Pleural effusion    2. Chest wall pain    3. Pericardial effusion          DISPOSITION/PLAN   DISPOSITION Admitted 09/27/2021 10:01:28 AM        CONDITION ON DISPOSITION: STABLE       PATIENT REFERRED TO:  No follow-up provider specified.     DISCHARGE MEDICATIONS:  New Prescriptions    No medications on file       (Please note that portions of this note were completed with a voice recognition program.  Efforts were made to edit the dictations but occasionally words are mis-transcribed.)    Savannah Birmingham DO   Attending Emergency Physician     Savannah Birmingham DO  09/27/21 1324

## 2021-09-27 NOTE — Clinical Note
Patient Class: Inpatient [101]   REQUIRED: Diagnosis: Pneumonia [089814]   Estimated Length of Stay: Estimated stay of more than 2 midnights   Admitting Provider: Vic Ramirez [[id number]]   Preferred Department: M/S

## 2021-09-28 ENCOUNTER — APPOINTMENT (OUTPATIENT)
Dept: GENERAL RADIOLOGY | Age: 76
DRG: 871 | End: 2021-09-28
Payer: MEDICARE

## 2021-09-28 ENCOUNTER — APPOINTMENT (OUTPATIENT)
Dept: ULTRASOUND IMAGING | Age: 76
DRG: 871 | End: 2021-09-28
Payer: MEDICARE

## 2021-09-28 PROBLEM — A41.89 VIRAL SEPSIS (HCC): Status: ACTIVE | Noted: 2021-09-28

## 2021-09-28 PROBLEM — A41.9 SEPSIS (HCC): Status: ACTIVE | Noted: 2021-09-28

## 2021-09-28 PROBLEM — B97.89 VIRAL SEPSIS (HCC): Status: ACTIVE | Noted: 2021-09-28

## 2021-09-28 PROBLEM — R00.0 SINUS TACHYCARDIA: Status: ACTIVE | Noted: 2021-09-28

## 2021-09-28 PROBLEM — I10 ESSENTIAL HYPERTENSION: Status: ACTIVE | Noted: 2021-09-28

## 2021-09-28 PROBLEM — A32.7: Status: ACTIVE | Noted: 2021-09-28

## 2021-09-28 PROBLEM — J90 PLEURAL EFFUSION: Status: ACTIVE | Noted: 2021-09-28

## 2021-09-28 PROBLEM — I31.39 PERICARDIAL EFFUSION: Status: ACTIVE | Noted: 2021-09-28

## 2021-09-28 PROBLEM — R63.0 POOR APPETITE: Status: ACTIVE | Noted: 2021-09-28

## 2021-09-28 PROBLEM — E87.1 HYPONATREMIA: Status: ACTIVE | Noted: 2021-09-28

## 2021-09-28 LAB
ABSOLUTE EOS #: 0.09 K/UL (ref 0–0.4)
ABSOLUTE IMMATURE GRANULOCYTE: ABNORMAL K/UL (ref 0–0.3)
ABSOLUTE LYMPH #: 1.09 K/UL (ref 1–4.8)
ABSOLUTE MONO #: 1.17 K/UL (ref 0.1–1.2)
ADENOVIRUS PCR: NOT DETECTED
ANION GAP SERPL CALCULATED.3IONS-SCNC: 9 MMOL/L (ref 9–17)
BASOPHILS # BLD: 0 % (ref 0–2)
BASOPHILS ABSOLUTE: 0.04 K/UL (ref 0–0.2)
BORDETELLA PARAPERTUSSIS: NOT DETECTED
BORDETELLA PERTUSSIS PCR: NOT DETECTED
BUN BLDV-MCNC: 10 MG/DL (ref 8–23)
BUN/CREAT BLD: ABNORMAL (ref 9–20)
C-REACTIVE PROTEIN: 179.7 MG/L (ref 0–5)
CALCIUM SERPL-MCNC: 8.1 MG/DL (ref 8.6–10.4)
CHLAMYDIA PNEUMONIAE BY PCR: NOT DETECTED
CHLORIDE BLD-SCNC: 92 MMOL/L (ref 98–107)
CO2: 23 MMOL/L (ref 20–31)
CORONAVIRUS 229E PCR: NOT DETECTED
CORONAVIRUS HKU1 PCR: NOT DETECTED
CORONAVIRUS NL63 PCR: NOT DETECTED
CORONAVIRUS OC43 PCR: NOT DETECTED
CREAT SERPL-MCNC: 0.41 MG/DL (ref 0.5–0.9)
DIFFERENTIAL TYPE: ABNORMAL
EKG ATRIAL RATE: 101 BPM
EKG P AXIS: 43 DEGREES
EKG P-R INTERVAL: 186 MS
EKG Q-T INTERVAL: 322 MS
EKG QRS DURATION: 70 MS
EKG QTC CALCULATION (BAZETT): 417 MS
EKG R AXIS: 15 DEGREES
EKG T AXIS: 132 DEGREES
EKG VENTRICULAR RATE: 101 BPM
EOSINOPHILS RELATIVE PERCENT: 1 % (ref 1–4)
GFR AFRICAN AMERICAN: >60 ML/MIN
GFR NON-AFRICAN AMERICAN: >60 ML/MIN
GFR SERPL CREATININE-BSD FRML MDRD: ABNORMAL ML/MIN/{1.73_M2}
GFR SERPL CREATININE-BSD FRML MDRD: ABNORMAL ML/MIN/{1.73_M2}
GLUCOSE BLD-MCNC: 129 MG/DL (ref 70–99)
GLUCOSE, FLUID: 114 MG/DL
HCT VFR BLD CALC: 38.7 % (ref 36–46)
HEMOGLOBIN: 12.2 G/DL (ref 12–16)
HUMAN METAPNEUMOVIRUS PCR: NOT DETECTED
IMMATURE GRANULOCYTES: ABNORMAL %
INFLUENZA A BY PCR: NOT DETECTED
INFLUENZA A H1 (2009) PCR: ABNORMAL
INFLUENZA A H1 PCR: ABNORMAL
INFLUENZA A H3 PCR: ABNORMAL
INFLUENZA B BY PCR: NOT DETECTED
LACTATE DEHYDROGENASE, FLUID: 111 U/L
LACTATE DEHYDROGENASE: 152 U/L (ref 135–214)
LACTIC ACID, SEPSIS WHOLE BLOOD: 1.6 MMOL/L (ref 0.5–1.9)
LACTIC ACID, SEPSIS: NORMAL MMOL/L (ref 0.5–1.9)
LYMPHOCYTES # BLD: 8 % (ref 24–44)
MCH RBC QN AUTO: 28.9 PG (ref 26–34)
MCHC RBC AUTO-ENTMCNC: 31.5 G/DL (ref 31–37)
MCV RBC AUTO: 91.7 FL (ref 80–100)
MONOCYTES # BLD: 9 % (ref 2–11)
MYCOPLASMA PNEUMONIAE PCR: NOT DETECTED
NRBC AUTOMATED: ABNORMAL PER 100 WBC
PARAINFLUENZA 1 PCR: NOT DETECTED
PARAINFLUENZA 2 PCR: NOT DETECTED
PARAINFLUENZA 3 PCR: NOT DETECTED
PARAINFLUENZA 4 PCR: NOT DETECTED
PDW BLD-RTO: 13.7 % (ref 12.5–15.4)
PH FLUID: 8
PLATELET # BLD: 254 K/UL (ref 140–450)
PLATELET ESTIMATE: ABNORMAL
PMV BLD AUTO: 10.6 FL (ref 8–14)
POTASSIUM SERPL-SCNC: 4.5 MMOL/L (ref 3.7–5.3)
PROCALCITONIN: 0.13 NG/ML
RBC # BLD: 4.22 M/UL (ref 4–5.2)
RBC # BLD: ABNORMAL 10*6/UL
RESP SYNCYTIAL VIRUS PCR: NOT DETECTED
RHINO/ENTEROVIRUS PCR: DETECTED
SARS-COV-2, PCR: NOT DETECTED
SEG NEUTROPHILS: 82 % (ref 36–66)
SEGMENTED NEUTROPHILS ABSOLUTE COUNT: 11.05 K/UL (ref 1.8–7.7)
SODIUM BLD-SCNC: 124 MMOL/L (ref 135–144)
SPECIMEN DESCRIPTION: ABNORMAL
SPECIMEN TYPE: NORMAL
TOTAL PROTEIN, BODY FLUID: 3.5 G/DL
TOTAL PROTEIN: 5.9 G/DL (ref 6.4–8.3)
TSH SERPL DL<=0.05 MIU/L-ACNC: 1.24 MIU/L (ref 0.3–5)
WBC # BLD: 13.4 K/UL (ref 3.5–11)
WBC # BLD: ABNORMAL 10*3/UL

## 2021-09-28 PROCEDURE — 84145 PROCALCITONIN (PCT): CPT

## 2021-09-28 PROCEDURE — 86140 C-REACTIVE PROTEIN: CPT

## 2021-09-28 PROCEDURE — 2500000003 HC RX 250 WO HCPCS: Performed by: STUDENT IN AN ORGANIZED HEALTH CARE EDUCATION/TRAINING PROGRAM

## 2021-09-28 PROCEDURE — 86038 ANTINUCLEAR ANTIBODIES: CPT

## 2021-09-28 PROCEDURE — 93005 ELECTROCARDIOGRAM TRACING: CPT | Performed by: INTERNAL MEDICINE

## 2021-09-28 PROCEDURE — 6370000000 HC RX 637 (ALT 250 FOR IP): Performed by: STUDENT IN AN ORGANIZED HEALTH CARE EDUCATION/TRAINING PROGRAM

## 2021-09-28 PROCEDURE — 71046 X-RAY EXAM CHEST 2 VIEWS: CPT

## 2021-09-28 PROCEDURE — 87449 NOS EACH ORGANISM AG IA: CPT

## 2021-09-28 PROCEDURE — 89051 BODY FLUID CELL COUNT: CPT

## 2021-09-28 PROCEDURE — 88305 TISSUE EXAM BY PATHOLOGIST: CPT

## 2021-09-28 PROCEDURE — 87206 SMEAR FLUORESCENT/ACID STAI: CPT

## 2021-09-28 PROCEDURE — 84157 ASSAY OF PROTEIN OTHER: CPT

## 2021-09-28 PROCEDURE — 87070 CULTURE OTHR SPECIMN AEROBIC: CPT

## 2021-09-28 PROCEDURE — 83986 ASSAY PH BODY FLUID NOS: CPT

## 2021-09-28 PROCEDURE — 88112 CYTOPATH CELL ENHANCE TECH: CPT

## 2021-09-28 PROCEDURE — 83605 ASSAY OF LACTIC ACID: CPT

## 2021-09-28 PROCEDURE — 87205 SMEAR GRAM STAIN: CPT

## 2021-09-28 PROCEDURE — 2709999900 HC NON-CHARGEABLE SUPPLY

## 2021-09-28 PROCEDURE — 2580000003 HC RX 258: Performed by: EMERGENCY MEDICINE

## 2021-09-28 PROCEDURE — 86225 DNA ANTIBODY NATIVE: CPT

## 2021-09-28 PROCEDURE — 86235 NUCLEAR ANTIGEN ANTIBODY: CPT

## 2021-09-28 PROCEDURE — 6360000002 HC RX W HCPCS: Performed by: EMERGENCY MEDICINE

## 2021-09-28 PROCEDURE — 32555 ASPIRATE PLEURA W/ IMAGING: CPT

## 2021-09-28 PROCEDURE — 36415 COLL VENOUS BLD VENIPUNCTURE: CPT

## 2021-09-28 PROCEDURE — 83615 LACTATE (LD) (LDH) ENZYME: CPT

## 2021-09-28 PROCEDURE — 2060000000 HC ICU INTERMEDIATE R&B

## 2021-09-28 PROCEDURE — 87116 MYCOBACTERIA CULTURE: CPT

## 2021-09-28 PROCEDURE — 85025 COMPLETE CBC W/AUTO DIFF WBC: CPT

## 2021-09-28 PROCEDURE — 87075 CULTR BACTERIA EXCEPT BLOOD: CPT

## 2021-09-28 PROCEDURE — 87102 FUNGUS ISOLATION CULTURE: CPT

## 2021-09-28 PROCEDURE — 84155 ASSAY OF PROTEIN SERUM: CPT

## 2021-09-28 PROCEDURE — 2580000003 HC RX 258: Performed by: STUDENT IN AN ORGANIZED HEALTH CARE EDUCATION/TRAINING PROGRAM

## 2021-09-28 PROCEDURE — 87641 MR-STAPH DNA AMP PROBE: CPT

## 2021-09-28 PROCEDURE — 80048 BASIC METABOLIC PNL TOTAL CA: CPT

## 2021-09-28 PROCEDURE — 99223 1ST HOSP IP/OBS HIGH 75: CPT | Performed by: INTERNAL MEDICINE

## 2021-09-28 PROCEDURE — 99223 1ST HOSP IP/OBS HIGH 75: CPT | Performed by: STUDENT IN AN ORGANIZED HEALTH CARE EDUCATION/TRAINING PROGRAM

## 2021-09-28 PROCEDURE — 82945 GLUCOSE OTHER FLUID: CPT

## 2021-09-28 PROCEDURE — 87040 BLOOD CULTURE FOR BACTERIA: CPT

## 2021-09-28 PROCEDURE — 84443 ASSAY THYROID STIM HORMONE: CPT

## 2021-09-28 PROCEDURE — 0202U NFCT DS 22 TRGT SARS-COV-2: CPT

## 2021-09-28 PROCEDURE — 0W9B3ZZ DRAINAGE OF LEFT PLEURAL CAVITY, PERCUTANEOUS APPROACH: ICD-10-PCS | Performed by: RADIOLOGY

## 2021-09-28 RX ORDER — SODIUM CHLORIDE 0.9 % (FLUSH) 0.9 %
5-40 SYRINGE (ML) INJECTION PRN
Status: DISCONTINUED | OUTPATIENT
Start: 2021-09-28 | End: 2021-10-05 | Stop reason: HOSPADM

## 2021-09-28 RX ORDER — ONDANSETRON 2 MG/ML
4 INJECTION INTRAMUSCULAR; INTRAVENOUS ONCE
Status: COMPLETED | OUTPATIENT
Start: 2021-09-28 | End: 2021-09-28

## 2021-09-28 RX ORDER — ALBUTEROL SULFATE 90 UG/1
2 AEROSOL, METERED RESPIRATORY (INHALATION) EVERY 4 HOURS PRN
Status: DISCONTINUED | OUTPATIENT
Start: 2021-09-28 | End: 2021-10-05 | Stop reason: HOSPADM

## 2021-09-28 RX ORDER — ACETAMINOPHEN 325 MG/1
650 TABLET ORAL EVERY 6 HOURS PRN
Status: DISCONTINUED | OUTPATIENT
Start: 2021-09-28 | End: 2021-10-05 | Stop reason: HOSPADM

## 2021-09-28 RX ORDER — MAGNESIUM SULFATE 1 G/100ML
1000 INJECTION INTRAVENOUS PRN
Status: DISCONTINUED | OUTPATIENT
Start: 2021-09-28 | End: 2021-10-05 | Stop reason: HOSPADM

## 2021-09-28 RX ORDER — 0.9 % SODIUM CHLORIDE 0.9 %
500 INTRAVENOUS SOLUTION INTRAVENOUS ONCE
Status: COMPLETED | OUTPATIENT
Start: 2021-09-28 | End: 2021-09-28

## 2021-09-28 RX ORDER — LEVOFLOXACIN 5 MG/ML
750 INJECTION, SOLUTION INTRAVENOUS EVERY 24 HOURS
Status: DISCONTINUED | OUTPATIENT
Start: 2021-09-29 | End: 2021-09-28

## 2021-09-28 RX ORDER — BENZONATATE 100 MG/1
100 CAPSULE ORAL 3 TIMES DAILY PRN
Status: DISCONTINUED | OUTPATIENT
Start: 2021-09-28 | End: 2021-10-04

## 2021-09-28 RX ORDER — LISINOPRIL 5 MG/1
15 TABLET ORAL DAILY
Status: DISCONTINUED | OUTPATIENT
Start: 2021-09-28 | End: 2021-09-30

## 2021-09-28 RX ORDER — SODIUM CHLORIDE 0.9 % (FLUSH) 0.9 %
5-40 SYRINGE (ML) INJECTION EVERY 12 HOURS SCHEDULED
Status: DISCONTINUED | OUTPATIENT
Start: 2021-09-28 | End: 2021-10-05 | Stop reason: HOSPADM

## 2021-09-28 RX ORDER — OXYCODONE HYDROCHLORIDE 5 MG/1
5 TABLET ORAL EVERY 6 HOURS PRN
Status: DISCONTINUED | OUTPATIENT
Start: 2021-09-28 | End: 2021-10-05 | Stop reason: HOSPADM

## 2021-09-28 RX ORDER — POLYETHYLENE GLYCOL 3350 17 G/17G
17 POWDER, FOR SOLUTION ORAL DAILY PRN
Status: DISCONTINUED | OUTPATIENT
Start: 2021-09-28 | End: 2021-10-05 | Stop reason: HOSPADM

## 2021-09-28 RX ORDER — ACETAMINOPHEN 650 MG/1
650 SUPPOSITORY RECTAL EVERY 6 HOURS PRN
Status: DISCONTINUED | OUTPATIENT
Start: 2021-09-28 | End: 2021-10-05 | Stop reason: HOSPADM

## 2021-09-28 RX ORDER — SODIUM CHLORIDE 9 MG/ML
1000 INJECTION, SOLUTION INTRAVENOUS CONTINUOUS
Status: DISCONTINUED | OUTPATIENT
Start: 2021-09-28 | End: 2021-09-28

## 2021-09-28 RX ORDER — SODIUM CHLORIDE 9 MG/ML
INJECTION, SOLUTION INTRAVENOUS CONTINUOUS
Status: DISCONTINUED | OUTPATIENT
Start: 2021-09-28 | End: 2021-09-30

## 2021-09-28 RX ORDER — CODEINE PHOSPHATE AND GUAIFENESIN 10; 100 MG/5ML; MG/5ML
5 SOLUTION ORAL EVERY 4 HOURS PRN
Status: DISCONTINUED | OUTPATIENT
Start: 2021-09-28 | End: 2021-10-04

## 2021-09-28 RX ORDER — POTASSIUM CHLORIDE 20 MEQ/1
40 TABLET, EXTENDED RELEASE ORAL PRN
Status: DISCONTINUED | OUTPATIENT
Start: 2021-09-28 | End: 2021-10-05 | Stop reason: HOSPADM

## 2021-09-28 RX ORDER — DEXTROMETHORPHAN POLISTIREX 30 MG/5ML
30 SUSPENSION ORAL EVERY 12 HOURS SCHEDULED
Status: DISCONTINUED | OUTPATIENT
Start: 2021-09-28 | End: 2021-10-05 | Stop reason: HOSPADM

## 2021-09-28 RX ORDER — LIDOCAINE 4 G/G
1 PATCH TOPICAL DAILY
Status: DISCONTINUED | OUTPATIENT
Start: 2021-09-28 | End: 2021-10-05 | Stop reason: HOSPADM

## 2021-09-28 RX ORDER — ONDANSETRON 2 MG/ML
4 INJECTION INTRAMUSCULAR; INTRAVENOUS EVERY 6 HOURS PRN
Status: DISCONTINUED | OUTPATIENT
Start: 2021-09-28 | End: 2021-10-05 | Stop reason: HOSPADM

## 2021-09-28 RX ORDER — SODIUM CHLORIDE 9 MG/ML
25 INJECTION, SOLUTION INTRAVENOUS PRN
Status: DISCONTINUED | OUTPATIENT
Start: 2021-09-28 | End: 2021-10-05 | Stop reason: HOSPADM

## 2021-09-28 RX ORDER — LEVOFLOXACIN 5 MG/ML
750 INJECTION, SOLUTION INTRAVENOUS EVERY 24 HOURS
Status: DISCONTINUED | OUTPATIENT
Start: 2021-09-28 | End: 2021-09-28

## 2021-09-28 RX ORDER — ONDANSETRON 4 MG/1
4 TABLET, ORALLY DISINTEGRATING ORAL EVERY 8 HOURS PRN
Status: DISCONTINUED | OUTPATIENT
Start: 2021-09-28 | End: 2021-10-05 | Stop reason: HOSPADM

## 2021-09-28 RX ORDER — POTASSIUM CHLORIDE 7.45 MG/ML
10 INJECTION INTRAVENOUS PRN
Status: DISCONTINUED | OUTPATIENT
Start: 2021-09-28 | End: 2021-10-05 | Stop reason: HOSPADM

## 2021-09-28 RX ORDER — PROMETHAZINE HYDROCHLORIDE 25 MG/ML
12.5 INJECTION, SOLUTION INTRAMUSCULAR; INTRAVENOUS ONCE
Status: COMPLETED | OUTPATIENT
Start: 2021-09-28 | End: 2021-09-28

## 2021-09-28 RX ORDER — DIPHENHYDRAMINE HYDROCHLORIDE 50 MG/ML
25 INJECTION INTRAMUSCULAR; INTRAVENOUS EVERY 6 HOURS PRN
Status: DISCONTINUED | OUTPATIENT
Start: 2021-09-28 | End: 2021-10-05 | Stop reason: HOSPADM

## 2021-09-28 RX ADMIN — OXYCODONE HYDROCHLORIDE 5 MG: 5 TABLET ORAL at 23:35

## 2021-09-28 RX ADMIN — PROMETHAZINE HYDROCHLORIDE 12.5 MG: 25 INJECTION INTRAMUSCULAR; INTRAVENOUS at 02:13

## 2021-09-28 RX ADMIN — SODIUM CHLORIDE 500 ML: 0.9 INJECTION, SOLUTION INTRAVENOUS at 14:10

## 2021-09-28 RX ADMIN — Medication 30 MG: at 20:59

## 2021-09-28 RX ADMIN — SODIUM CHLORIDE: 9 INJECTION, SOLUTION INTRAVENOUS at 14:08

## 2021-09-28 RX ADMIN — OXYCODONE HYDROCHLORIDE 5 MG: 5 TABLET ORAL at 11:30

## 2021-09-28 RX ADMIN — SODIUM CHLORIDE 1000 ML: 9 INJECTION, SOLUTION INTRAVENOUS at 04:07

## 2021-09-28 RX ADMIN — HYDROMORPHONE HYDROCHLORIDE 0.5 MG: 1 INJECTION, SOLUTION INTRAMUSCULAR; INTRAVENOUS; SUBCUTANEOUS at 01:01

## 2021-09-28 RX ADMIN — BENZONATATE 100 MG: 100 CAPSULE ORAL at 17:25

## 2021-09-28 RX ADMIN — BENZONATATE 100 MG: 100 CAPSULE ORAL at 11:29

## 2021-09-28 RX ADMIN — BENZONATATE 100 MG: 100 CAPSULE ORAL at 06:34

## 2021-09-28 RX ADMIN — GUAIFENESIN AND CODEINE PHOSPHATE 5 ML: 100; 10 SOLUTION ORAL at 22:17

## 2021-09-28 RX ADMIN — GUAIFENESIN AND CODEINE PHOSPHATE 5 ML: 100; 10 SOLUTION ORAL at 17:39

## 2021-09-28 RX ADMIN — OXYCODONE HYDROCHLORIDE 5 MG: 5 TABLET ORAL at 17:25

## 2021-09-28 RX ADMIN — SULFAMETHOXAZOLE AND TRIMETHOPRIM 316.8 MG: 80; 16 INJECTION, SOLUTION, CONCENTRATE INTRAVENOUS at 20:51

## 2021-09-28 RX ADMIN — ONDANSETRON 4 MG: 2 INJECTION INTRAMUSCULAR; INTRAVENOUS at 01:01

## 2021-09-28 RX ADMIN — SODIUM CHLORIDE, PRESERVATIVE FREE 10 ML: 5 INJECTION INTRAVENOUS at 10:15

## 2021-09-28 ASSESSMENT — PAIN DESCRIPTION - ONSET: ONSET: ON-GOING

## 2021-09-28 ASSESSMENT — ENCOUNTER SYMPTOMS
NAUSEA: 0
ABDOMINAL DISTENTION: 0
SHORTNESS OF BREATH: 1
SINUS PRESSURE: 0
PHOTOPHOBIA: 0
BACK PAIN: 0
FACIAL SWELLING: 0
ANAL BLEEDING: 0
SORE THROAT: 0
VOMITING: 0
WHEEZING: 0
CONSTIPATION: 0
COUGH: 1
DIARRHEA: 0
STRIDOR: 0
RHINORRHEA: 0
ABDOMINAL PAIN: 0
CHEST TIGHTNESS: 0

## 2021-09-28 ASSESSMENT — PAIN DESCRIPTION - FREQUENCY: FREQUENCY: CONTINUOUS

## 2021-09-28 ASSESSMENT — PAIN SCALES - GENERAL
PAINLEVEL_OUTOF10: 4
PAINLEVEL_OUTOF10: 5
PAINLEVEL_OUTOF10: 7
PAINLEVEL_OUTOF10: 8
PAINLEVEL_OUTOF10: 8

## 2021-09-28 ASSESSMENT — PAIN DESCRIPTION - LOCATION: LOCATION: CHEST

## 2021-09-28 ASSESSMENT — PAIN DESCRIPTION - PAIN TYPE: TYPE: ACUTE PAIN

## 2021-09-28 ASSESSMENT — PAIN DESCRIPTION - DESCRIPTORS: DESCRIPTORS: ACHING

## 2021-09-28 NOTE — ED NOTES
Access called for update on status of [facility] availability[facility] not feel pt is Med/surg appropriate. Dr Fern Jack notified and requesting to discuss case with on-call . Prasanna Cotton supervisor notified.      Soheila Mercer RN  09/28/21 7173

## 2021-09-28 NOTE — H&P
[facility]  Office: [address], DO, Tamie Valadez DO, Agapito Padilla DO, Tarsha Amie Blood, DO, Katy Alexander MD, Woodrow Merchant MD, Aldo Navarro MD, Carlos Emery MD, Imtiaz Perkins MD, Viktor Valdivia MD, Julius Mcfadden MD, Rosmery Marlow DO, Zahira Taylor DO, Tiana Bond MD,  Logan Bernal DO, Amelia Horner MD, Filippo Mcmillan MD, Estevan Gibbs MD, Gvaino Phelps MD, , Yelena Hammer MD, Lyubov Hernandez MD, Deedee Palma MD, Adrianna Tesfaye CNP, [address], CNP, Jeannette Marcial, CNP, Alli Simpson, CNS, Keren Boucher, CNP, Brigida Farris, CNP, Franco Walters, CNP, Sharlene Soni, CNP, Snehal Cunha, CNP, Mary Dodge PAMichelleC, Leonora Fernandez, DNP, Asad Carranza, CNP, Christos Reina, CNP, Venancio Manuel, CNP, Nahun Marrero, CNP, Elias French, CNP, Flash Taylor, CNP, Tino Hernadez, CNP, Leti Jimenez, CNP         [facility]   [address] [facility]    HISTORY AND PHYSICAL EXAMINATION            Date:   9/28/2021  Patient name:  Cony Ceja  Date of admission:  9/27/2021  7:16 AM  MRN:   [id number]  Account:  [de-identified]  YOB: 1945  PCP:    CELY Gaitan CNP  Room:   2022/2022-01  Code Status:    Full Code    Chief Complaint:     Chief Complaint   Patient presents with    Cough       History Obtained From:     patient    History of Present Illness:     Cony Ceja is a 78 y.o. Non- / non  female who presents with Cough   and is admitted to the hospital for the management of <principal problem not specified>. 81-year-old female presented to [facility] ER with complaints of left-sided chest pain and persistent cough. Patient has had cough for last 2 weeks, and progressively worsening. Dry in nature, no phlegm production. She has heat intolerance. No fever or chills. Patient was discharged from ER a day ago with oral Levaquin, she returned back the next day as her symptoms were worsening.   She had poor appetite for last 4 days. Patient has been a non-smoker and has no previous history of cardiac or lung disease. In the ER patient was noted to be afebrile, tachycardic with pulse rate of 121, blood pressure 129/72, saturating well on room air around 95%. She was noted to have hyponatremia with sodium of 124, chloride 92, creatinine 0.4, glucose 129, pro-Clay 0.13. Elevated leukocytosis with WBC 14.2, 13.4. Rapid Covid was negative twice. Patient had CT chest which showed small right and moderate left pleural effusion with adjacent subsegmental atelectasis. CT scan was also concerning for moderate pericardial effusion. Family history positive of lung cancer in mother    Past Medical History:     Past Medical History:   Diagnosis Date    Arthritis     Displacement of lumbar intervertebral disc without myelopathy 9/30/2015    Hypertension     exacerbated after last injection 10-    Lumbar disc disease     Personal history of TIA (transient ischemic attack)     2012 mini stroke high blood pressure    Sleep deprivation     SOB (shortness of breath)     \"walk to fast and going upstairs\" \"can walk a city block\"    Wears glasses         Past Surgical History:     Past Surgical History:   Procedure Laterality Date    ANKLE SURGERY Right     x 3    FOOT SURGERY  2015    left    HIP ARTHROSCOPY Left 03/14/2018    band release bursectomy    HYSTERECTOMY      NERVE BLOCK  10/19/15    caudal #1  celestone 9mg    NERVE BLOCK  10-26-15     caudal epidural steroid block #2 decadron 5 mg    IL HIP ARTHROSCOPY, DX Left 3/14/2018    LEFT HIP ARTHROSCOPY WITH  IT BAND RELEASE BURSECTOMY WITH GLUTEUS MUSCLE REPAIR performed by Myla Navarro DO at [facility] Left     x 2        Medications Prior to Admission:     Prior to Admission medications    Medication Sig Start Date End Date Taking?  Authorizing Provider   guaiFENesin (MUCINEX) 600 MG extended release tablet Take 1 tablet by mouth 2 times daily for 10 days 9/26/21 10/6/21  Tess Odonnell MD   benzonatate (TESSALON) 100 MG capsule Take 1 capsule by mouth 3 times daily as needed for Cough 9/26/21 10/3/21  Tess Odonnell MD   albuterol sulfate  (90 Base) MCG/ACT inhaler Inhale 2 puffs into the lungs every 4 hours as needed for Wheezing or Shortness of Breath 9/26/21 10/3/21  Tess Odonnell MD   guaiFENesin-dextromethorphan [facility] DM) 100-10 MG/5ML syrup Take 5 mLs by mouth 4 times daily as needed for Cough 9/26/21 10/6/21  Tess Odonnell MD   levoFLOXacin (LEVAQUIN) 500 MG tablet Take 1 tablet by mouth daily for 10 days 9/26/21 10/6/21  Tess Odonnell MD   Capsaicin-Menthol (ALLEVESS EX) Apply topically    Historical Provider, MD   lisinopril (PRINIVIL;ZESTRIL) 10 MG tablet Take 15 mg by mouth daily     Historical Provider, MD        Allergies:     Bee pollen, Erythromycin, Pcn [penicillins], Tetanus toxoids, and Tetracyclines & related    Social History:     Tobacco:    reports that she has never smoked. She has never used smokeless tobacco.  Alcohol:      reports no history of alcohol use. Drug Use:  reports no history of drug use. Family History:     Family History   Problem Relation Age of Onset    Lung Cancer Mother     Lung Cancer Brother     Heart Disease Father     Heart Disease Paternal Grandfather        Review of Systems:     Positive and Negative as described in HPI. Review of Systems   Constitutional: Positive for activity change, appetite change, chills, diaphoresis and fatigue. Negative for fever. HENT: Positive for congestion. Negative for drooling, ear discharge, ear pain and facial swelling. Respiratory: Positive for cough and shortness of breath. Negative for wheezing and stridor. Cardiovascular: Positive for chest pain. Negative for palpitations and leg swelling.    Gastrointestinal: Negative for abdominal distention, abdominal pain, anal bleeding, constipation and diarrhea. Endocrine: Positive for heat intolerance. Genitourinary: Negative for difficulty urinating, flank pain, frequency and hematuria. Musculoskeletal: Positive for gait problem. Negative for arthralgias. Neurological: Negative for dizziness and facial asymmetry. Psychiatric/Behavioral: Negative for agitation, behavioral problems, confusion, decreased concentration and dysphoric mood. Physical Exam:   /74   Pulse 113   Temp 98.6 °F (37 °C) (Oral)   Resp (!) 31   Ht 5' 2\" (1.575 m)   Wt 139 lb 6.4 oz (63.2 kg)   SpO2 92%   BMI 25.50 kg/m²   Temp (24hrs), Av.6 °F (37 °C), Min:98.6 °F (37 °C), Max:98.6 °F (37 °C)    No results for input(s): POCGLU in the last 72 hours. No intake or output data in the 24 hours ending 21 1209    Physical Exam  HENT:      Head: Normocephalic and atraumatic. Right Ear: Tympanic membrane normal.      Left Ear: Tympanic membrane normal.      Nose: Congestion present. Mouth/Throat:      Mouth: Mucous membranes are dry. Eyes:      Extraocular Movements: Extraocular movements intact. Pupils: Pupils are equal, round, and reactive to light. Cardiovascular:      Rate and Rhythm: Regular rhythm. Tachycardia present. Pulses: Normal pulses. Heart sounds: No murmur heard. Pulmonary:      Effort: Pulmonary effort is normal.      Comments: Decreased breath sounds at the bases  Abdominal:      General: Abdomen is flat. Bowel sounds are normal.   Musculoskeletal:         General: Normal range of motion. Skin:     General: Skin is warm. Neurological:      General: No focal deficit present. Mental Status: She is alert and oriented to person, place, and time.    Psychiatric:         Mood and Affect: Mood normal.         Investigations:      Laboratory Testing:  Recent Results (from the past 24 hour(s))   EKG 12 Lead    Collection Time: 21 11:07 PM   Result Value Ref Range    Ventricular Rate 101 BPM    Atrial Rate 101 BPM    P-R Interval 186 ms    QRS Duration 70 ms    Q-T Interval 322 ms    QTc Calculation (Bazett) 417 ms    P Axis 43 degrees    R Axis 15 degrees    T Axis 132 degrees   Basic Metabolic Panel    Collection Time: 09/28/21  3:34 AM   Result Value Ref Range    Glucose 129 (H) 70 - 99 mg/dL    BUN 10 8 - 23 mg/dL    CREATININE 0.41 (L) 0.50 - 0.90 mg/dL    Bun/Cre Ratio NOT REPORTED 9 - 20    Calcium 8.1 (L) 8.6 - 10.4 mg/dL    Sodium 124 (L) 135 - 144 mmol/L    Potassium 4.5 3.7 - 5.3 mmol/L    Chloride 92 (L) 98 - 107 mmol/L    CO2 23 20 - 31 mmol/L    Anion Gap 9 9 - 17 mmol/L    GFR Non-African American >60 >60 mL/min    GFR African American >60 >60 mL/min    GFR Comment          GFR Staging NOT REPORTED    Procalcitonin    Collection Time: 09/28/21  3:34 AM   Result Value Ref Range    Procalcitonin 0.13 (H) <0.09 ng/mL   CBC auto differential    Collection Time: 09/28/21  6:32 AM   Result Value Ref Range    WBC 13.4 (H) 3.5 - 11.0 k/uL    RBC 4.22 4.0 - 5.2 m/uL    Hemoglobin 12.2 12.0 - 16.0 g/dL    Hematocrit 38.7 36 - 46 %    MCV 91.7 80 - 100 fL    MCH 28.9 26 - 34 pg    MCHC 31.5 31 - 37 g/dL    RDW 13.7 12.5 - 15.4 %    Platelets 776 624 - 530 k/uL    MPV 10.6 8.0 - 14.0 fL    NRBC Automated NOT REPORTED per 100 WBC    Differential Type NOT REPORTED     Immature Granulocytes NOT REPORTED 0 %    Absolute Immature Granulocyte NOT REPORTED 0.00 - 0.30 k/uL    WBC Morphology NOT REPORTED     RBC Morphology NOT REPORTED     Platelet Estimate NOT REPORTED     Seg Neutrophils 82 (H) 36 - 66 %    Lymphocytes 8 (L) 24 - 44 %    Monocytes 9 2 - 11 %    Eosinophils % 1 1 - 4 %    Basophils 0 0 - 2 %    Segs Absolute 11.05 (H) 1.8 - 7.7 k/uL    Absolute Lymph # 1.09 1.0 - 4.8 k/uL    Absolute Mono # 1.17 0.1 - 1.2 k/uL    Absolute Eos # 0.09 0.0 - 0.4 k/uL    Basophils Absolute 0.04 0.0 - 0.2 k/uL       Imaging/Diagnostics:  XR CHEST PORTABLE    Result Date: 9/26/2021  Small bilateral pleural effusions with left mid to lower lobe infiltrate or atelectasis. Clinical correlation for pneumonia. Clinical and imaging follow-up to resolution recommended. CT CHEST PULMONARY EMBOLISM W CONTRAST    Result Date: 9/27/2021  1. No evidence for acute pulmonary embolism. 2. Moderate pericardial effusion. 3. Small right and moderate left pleural effusion with adjacent subsegmental atelectasis. Assessment :      Hospital Problems         Last Modified POA    PNA (pneumonia) 9/27/2021 Yes    Pneumonia 9/27/2021 Yes    Sinus tachycardia 9/28/2021 Yes    Sepsis ([address] 9/28/2021 Yes    Poor appetite 9/28/2021 Yes    Pericardial effusion 9/28/2021 Yes    Pleural effusion 9/28/2021 Yes    Hyponatremia 9/28/2021 Yes          Plan:     Patient status inpatient in the Med/Surge    Sepsis likely secondary to pneumonia with leukocytosis-start Levaquin 750 mg IV daily, check viral respiratory panel, strep pneumoniae, Legionella, mycoplasma, Gram stain and culture respiratory. Bilateral pleural effusions-more on the left side, will plan for thoracentesis for diagnosis. Send fluid analysis for LDH, protein, Gram stain, AFB. Will consult pulmonology. Pericardial effusion- check echo to better characterize the effusion seen on CT scan, consult cardiology    Sinus tachycardia-we will give 500 ml fluid bolus and monitor for response. Hyponatremia with volume depletion-monitor response with fluid bolus, recheck sodium in the evening and and gentle hydration if tolerated by the patient. Essential hypertension-resume home lisinopril with holding parameters below blood pressure 043 systolic.     Persistent cough-add lidocaine patch, add guanifescin with Tessalon Perles     Hold dvt prop for thoracentesis    Consultations:   IP CONSULT TO CARDIOLOGY  IP CONSULT TO PULMONOLOGY     Patient is admitted as inpatient status because of co-morbidities listed above, severity of signs and symptoms as outlined, requirement for current medical therapies and most importantly because of direct risk to patient if care not provided in a hospital setting. Expected length of stay > 48 hours.     Altaf Vigil MD  9/28/2021  12:09 PM    Copy sent to Dr. Kait Graham, CELY - CNP

## 2021-09-28 NOTE — PLAN OF CARE
Patient blood culture positive for Listeria, respiratory viral panel shows positive rhino/enterovirus PCR. Patient is allergic to ampicillin, had anaphylaxis. We will continue Levaquin and consult infectious disease. Second blood culture report pending.

## 2021-09-28 NOTE — PROGRESS NOTES
Physical Therapy        Physical Therapy Cancel Note      DATE: 2021    NAME: Isabella Carver  MRN: [id number]   : 1945      Patient not seen this date for Physical Therapy due to:     Other: pt just admitted, little info in chart; per RN, pt to be tested for COVID; check back as time allows or evaluate 21      Electronically signed by Brooklyn Eaton PT on 2021 at 9:52 AM

## 2021-09-28 NOTE — PROGRESS NOTES
Physician Progress Note      PATIENT:               Lane Constantino  CSN #:                  [id number]  :                       1945  ADMIT DATE:       2021 7:16 AM  DISCH DATE:  RESPONDING  PROVIDER #:        Ryan Greer MD          QUERY TEXT:    Pt admitted with Sepsis/Pneumonia If possible, please document in the progress   notes and discharge summary if you are evaluating and/or treating any of the   following:    Note: CAP and HCAP indicate where the pneumonia was acquired, not a specific   type. The medical record reflects the following:  Risk Factors: Persistant cough,chest pain  Clinical Indicators: Transferred from [facility] [address] - chest   pain/cough/Pneumonia/Sepsis. No improvement in symptoms w/Amoxicillin given   PTA. CXR shows Bilateral pleural effusions/Pericardial effusion. viral   respiratory panel, strep pneumoniae, Legionella, mycoplasma, Gram stain and   culture respiratory ordered -pending results. Covid negative. Treatment:IV Levaquin, Inhalers, Cardioloy & Pulmonary consults   ,isolation,stepdown monitoring  Options provided:  -- Bacterial pneumonia  -- Viral pneumonia  -- Gram negative pneumonia  -- Gram positive pneumonia  -- MRSA pneumonia  -- MSSA pneumonia  -- Other - I will add my own diagnosis  -- Disagree - Not applicable / Not valid  -- Disagree - Clinically unable to determine / Unknown  -- Refer to Clinical Documentation Reviewer    PROVIDER RESPONSE TEXT:    This patient has gram positive pneumonia.     Query created by: Vinayak Diehl on 2021 2:42 PM      Electronically signed by:  Ryan Greer MD 2021 6:36 PM

## 2021-09-28 NOTE — PROGRESS NOTES
Patient here for ultrasound thoracentesis. Ren CLAIRE in room and consent signed. Ultrasound of left back done. Back prepped, draped and numbed with lidocaine. yellow fluid drained. Needle out and dressing on. Post scan has been completed. Specimen to be sent to lab. Patient for post CXR. Patient tolerated well.

## 2021-09-28 NOTE — BRIEF OP NOTE
Brief Postoperative Note for Thoracentesis    Natalie Kimbrough  YOB: 1945  [id number]    Pre-operative Diagnosis: left Pleural effusion      Post-operative Diagnosis: Same    Procedure: Ultrasound guided Thoracentesis     Anesthesia: 1% Lidocaine     Surgeons/Assistants: Amanda Gutiérrez PA-C    Complications: none    EBL: Minimal    Specimens: were obtained    Ultrasound guided left thoracentesis performed. 800 ml clear yellow fluid obtained. Dressing applied.       Electronically signed by DAKOTAH Salazar on 9/28/2021 at 4:27 PM

## 2021-09-28 NOTE — CARE COORDINATION
Case Management Initial Discharge Plan  Josué Davidson,             Met with:patient, spouse/SO (Hampton Ross  And family member(dtr) to discuss discharge plans. Information verified: address, contacts, phone number, , insurance Yes  Insurance Provider: Shobha Rock     Emergency Contact/Next of Kin name & number: as per face shhet Dtr and SO listed  Who are involved in patient's support system? above    PCP: CELY Sivla - CNP  Date of last visit: 2020       Discharge Planning    Living Arrangements:  Spouse/Significant Other     Home has 1 stories  2 stairs to climb to get into front door, 0stairs to climb to reach second floor  Location of bedroom/bathroom in home main    Patient able to perform ADL's:Independent    Current Services (outpatient & in home) no  DME equipment: no  DME provider: no    Is patient receiving oral anticoagulation therapy? No    If indicated:   Physician managing anticoagulation treatment:   Where does patient obtain lab work for ATC treatment? Potential Assistance Needed:   Pt requests if need is present she wants a nebulizer     Patient agreeable to home care: No  [place] of choice provided:  no    Prior SNF/Rehab Placement and Facility:   Agreeable to SNF/Rehab: No  [place] of choice provided: no     Evaluation: no    Expected Discharge date:       Patient expects to be discharged to: If home: is the family and/or caregiver wiling & able to provide support at home? yes  Who will be providing this support? above*    Follow Up Appointment: Best Day/ Time:      Transportation provider:   Transportation arrangements needed for discharge: No    Readmission Risk              Risk of Unplanned Readmission:  15             Does patient have a readmission risk score greater than 14?: No  If yes, follow-up appointment must be made within 7 days of discharge.      Goals of Care:       Educated pt on transitional options, did not  provided freedom of choice and are agreeable with plan      Discharge Plan:Home with SO independent          Electronically signed by Su Shone, RN on 9/28/21 at 3:04 PM EDT

## 2021-09-28 NOTE — CONSULTS
PULMONARY & CRITICAL CARE MEDICINE CONSULT NOTE     Patient:  Yesika Hahn  MRN: [id number]  [facility] date: 9/27/2021  Primary Care Physician: CELY Nelson - CNP  Consulting Physician: Karla Wynne MD  CODE Status: Full Code  LOS: 1     SUBJECTIVE     Reason for consult: Pleural Effusions and Cough x 2 weeks    HISTORY OF PRESENT ILLNESS:  The patient is a 78 y.o. female with PMH of asthma, hypertension who presented to the ER at [place] on 9/12/21 for complaints of chest pain, shortness of breath after pushing multiple shopping carts. CT chest at the time was unremarkable. This was attributed to physical strain and she was discharged from the ER. She returns to the [facility] ER on 9/26 with complaints of cough, shortness of breath and difficulty sleeping where CT scan of the chest shows moderate pericardial effusion, small right and moderate left pleural effusions with adjacent subsegmental atelectasis. Patient declined admission at that time and was discharged home with albuterol inhaler, tessalon, guaifenesin and dextromethorphan. Due to worsening symptoms she returned to the ER the next day, stayed overnight and was transferred to [facility] for admission on 9/28/21. On my evaluation, patient is tachycardic in the 130s, tachypneic, blood pressure stable, and unable to speak due to cough.  Respiratory panel was sent which showed Entero/rhinovirus positive PCR test.    PAST MEDICAL HISTORY:        Diagnosis Date    Arthritis     Displacement of lumbar intervertebral disc without myelopathy 9/30/2015    Hypertension     exacerbated after last injection 10-    Lumbar disc disease     Personal history of TIA (transient ischemic attack)     2012 mini stroke high blood pressure    Sleep deprivation     SOB (shortness of breath)     \"walk to fast and going upstairs\" \"can walk a city block\"    Wears glasses      PAST SURGICAL HISTORY:        Procedure Laterality Date    normal bowel sounds. Extremities:  No edema, redness, tenderness in the calves. Skin:  Warm, dry, no gross lesions or rashes. DATA REVIEW     Medications: Current Inpatient  Scheduled Meds:   lisinopril  15 mg Oral Daily    levofloxacin  750 mg IntraVENous Q24H    sodium chloride flush  5-40 mL IntraVENous 2 times per day    [Held by provider] enoxaparin  40 mg SubCUTAneous Daily    lidocaine  1 patch TransDERmal Daily    sodium chloride flush  5-40 mL IntraVENous 2 times per day    sodium chloride  500 mL IntraVENous Once     Continuous Infusions:   sodium chloride      sodium chloride      sodium chloride         INPUT/OUTPUT:  No intake/output data recorded. LABS:  ABGs:   No results for input(s): POCPH, POCPCO2, POCPO2, POCHCO3, YDJE4RFV in the last 72 hours. CBC:   Recent Labs     09/26/21 1653 09/27/21  0912 09/28/21  0632   WBC 11.3* 14.2* 13.4*   HGB 10.6* 10.4* 12.2   HCT 32.8* 31.4* 38.7   MCV 88.6 87.2 91.7    226 254   LYMPHOPCT 8* 5* 8*   RBC 3.70* 3.60* 4.22   MCH 28.6 28.9 28.9   MCHC 32.3 33.1 31.5   RDW 13.6 13.4 13.7     CRP:   No results for input(s): CRP in the last 72 hours. LDH:   Recent Labs     09/28/21  1246        BMP:   Recent Labs     09/26/21 1653 09/27/21  0912 09/28/21  0334   * 123* 124*   K 3.7 4.3 4.5   CL 96* 91* 92*   CO2 23 22 23   BUN 9 10 10   CREATININE 0.51 0.46* 0.41*   GLUCOSE 124* 165* 129*     Liver Function Test:   Recent Labs     09/26/21 1653 09/28/21  1246   PROT 6.0* 5.9*   LABALBU 3.4*  --    ALT 8  --    AST 9  --    ALKPHOS 90  --    BILITOT 0.72  --      Coagulation Profile:   Recent Labs     09/26/21  1653   INR 1.0   PROTIME 10.5   APTT 26.1     D-Dimer:  Recent Labs     09/26/21 1653   DDIMER 2.37     Lactic Acid:  No results for input(s): LACTA in the last 72 hours. Cardiac Enzymes:  No results for input(s): CKTOTAL, CKMB, CKMBINDEX, TROPONINI in the last 72 hours.     Invalid input(s): TROPONIN, HSTROP  BNP/ProBNP:   Recent Labs     09/26/21  1653   PROBNP 717*     Triglycerides:  No results for input(s): TRIG in the last 72 hours. Microbiology:  Urine Culture:  No components found for: CURINE  Blood Culture:  No components found for: CBLOOD, CFUNGUSBL  Sputum Culture:  No components found for: [facility]  Recent Labs     09/26/21  1740 09/27/21  0858 09/28/21  1000   SPECDESC . BLOOD   < > .NASOPHARYNGEAL SWAB   SPECIAL 14 mL Right Antecubital  --   --    CULTURE NO GROWTH 2 DAYS  --   --     < > = values in this interval not displayed. Recent Labs     09/26/21  1653 09/26/21  1740   SPECIAL 20 ML RIGHT ARM 14 mL Right Antecubital   CULTURE NO GROWTH 2 DAYS NO GROWTH 2 DAYS          Radiology Reports:  CT CHEST PULMONARY EMBOLISM W CONTRAST   Final Result   1. No evidence for acute pulmonary embolism. 2. Moderate pericardial effusion. 3. Small right and moderate left pleural effusion with adjacent subsegmental   atelectasis. XR CHEST (2 VW)    (Results Pending)   US THORACENTESIS Which side should the procedure be performed? Radiologist Recommendation    (Results Pending)        Echocardiogram:   No results found for this or any previous visit. ASSESSMENT AND PLAN     Assessment:    // Viral syndrome  // Pericardial effusion  // Pleural effusion    Plan:  - Respiratory support to keep saturations above 90%. - Thoracentesis today and labs to determine nature of pleural effusion  - Will need echo to determine severity of pericardial effusion and potential draining. Appreciate Cardiology recommendations  - Symptomatic management of cough. Dextromethorphan and PRN tessalon.  - Droplet precautions. Thank you for this interesting consult. We will continue to follow. I will discuss with attending.     Ari Parikh MD  PGY-3, Internal Medicine Resident  [address]         9/28/2021, 1:54 PM    Please note that this chart was generated using voice recognition Dragon dictation software. Although every effort was made to ensure the accuracy of this automated transcription, some errors in transcription may have occurred. Attending Physician Statement  I have discussed the care of Bonita Benjamin, including pertinent history and exam findings with the resident. I have reviewed the key elements of all parts of the encounter with the resident. I have seen and examined the patient with the resident. I agree with the assessment and plan and status of the problem list as documented. I seen the patient today I have reviewed the imaging studies, labs seen, chest x-rays and CT scan of the chest seen previously 2 weeks ago when on 09/27/2021. Patient had chest pain apparently on 09/11/2021 and was seen in the emergency room in [place] at that time apparently started after she pushed the cart she had a CT scan of the chest done on 09/12/2021 which was negative for any acute pathology infiltrate or effusion. According to patient she had been having cough for last 1-1/2-week which has been increasing she gets short of breath easily. Especially for last 1 week she has been having more cough cough is mostly dry she just get chest tightness. She also have orthopnea without pedal edema. According to patient she had gained 14 pounds in last 1-1/2 to 2 weeks. She did not complain of fever does not complain of nausea vomiting diarrhea abdominal pain. She does not complain of photophobia. She did not have altered mental status. She does not complain of hemoptysis, she does not complain of skin rash joint pain or swelling or arthralgia. Her symptoms were getting worse and [facility] and she had a chest x-ray done which shows mild bilateral pleural effusion but she did not want to wait in the emergency room and left.   She presented again on 09/27/2021 with symptoms worsening and had a CT scan of the chest done which showed bilateral pleural effusion moderate pericardial effusion she was transferred here to [place].  She was seen by cardiology today this morning. Waiting for echocardiogram.  Our service was consulted for pleural effusion and cough. When I saw her patient was tachycardic with heart rate of 120s sinus tachycardia. She was on room air maintaining saturation sitting up able to talk complete sentences. She had bilateral decreased breath sound at bases slightly more reduced on left side as compared to right and dullness on percussion at left base. Labs shows hyponatremia with sodium of one twenty-four BUN ten creatinine 0.41 bicarbonate twenty-three. CRP was 179.7. AST ALT 2 days ago was normal WBC this morning was 13.4 hemoglobin twelve platelet count two fifty-four. She has two Covid test done which was negative. Rhino/enterovirus PCR was positive. Pleuropericardial effusion likely inflammatory/viral/other infectious etiology less likely but connective tissue disease cannot be excluded although history is last 1-1/2 to 2-week and no other signs symptoms of arthralgia/arthritis or skin rash. On review of the CT scan I did not see areas of consolidation there is mild basilar atelectasis likely secondary to pleural effusion no other consolidation seen. Pericardial effusion is seen on CT scan with bilateral pleural effusion larger on the left as compared to right. Thoracentesis of left pleural effusion and will send pleural fluid analysis for culture cell count, chemistry and SOSA. Recommend SOSA with reflex. Recommend to check TSH. Check CRP. Need echocardiogram as early as possible to determine severity of pericardial effusion if there is any sign of tamponade and or cardiomyopathy. Need follow-up with cardiology to determine if there is need for pericardiocentesis. Suggest/consider ID evaluation    Discussed with daughter and patient questions answered.   Discussed with nursing staff, treatment and plan discussed. Please note that this chart was generated using voice recognition Dragon dictation software. Although every effort was made to ensure the accuracy of this automated transcription, some errors in transcription may have occurred.      Christiano Levin MD  9/28/2021 6:21 PM

## 2021-09-28 NOTE — CONSULTS
[facility]    Consult / H&P               Today's Date: 9/28/2021  Patient Name: Isai Whitmore  Date of admission: 9/27/2021  7:16 AM  Patient's age: 78 y.o., 1945  Admission Dx: Chest wall pain [R07.89]  Pericardial effusion [I31.3]  Pneumonia [J18.9]  Pleural effusion [J90]  PNA (pneumonia) [J18.9]    Reason for Consult:  Cardiac evaluation of pericardial effusion    Requesting Physician: Pernell Mojica MD    CHIEF COMPLAINT:  Cough, left sided chest pain     History Obtained From:  electronic medical record, RN     HISTORY OF PRESENT ILLNESS:      The patient is a 78 y. o.female who is admitted to the hospital for non productive cough, left sided chest pain, progressively worsening for last 2 weeks. She has shortness of breath associated with cough spells    Past Medical History:   has a past medical history of Arthritis, Displacement of lumbar intervertebral disc without myelopathy, Hypertension, Lumbar disc disease, Personal history of TIA (transient ischemic attack), Sleep deprivation, SOB (shortness of breath), and Wears glasses. Past Surgical History:   has a past surgical history that includes Wrist surgery (Left); Hysterectomy; Ankle surgery (Right); Tonsillectomy; Nerve Block (10/19/15); Nerve Block (10-26-15 ); Foot surgery (2015); Hip arthroscopy (Left, 03/14/2018); and pr hip arthroscopy, dx (Left, 3/14/2018). Home Medications:    Prior to Admission medications    Medication Sig Start Date End Date Taking?  Authorizing Provider   guaiFENesin (MUCINEX) 600 MG extended release tablet Take 1 tablet by mouth 2 times daily for 10 days 9/26/21 10/6/21  Zenia Hurd MD   benzonatate (TESSALON) 100 MG capsule Take 1 capsule by mouth 3 times daily as needed for Cough 9/26/21 10/3/21  Zenia Hurd MD   albuterol sulfate  (90 Base) MCG/ACT inhaler Inhale 2 puffs into the lungs every 4 hours as needed for Wheezing or Shortness of Breath 9/26/21 10/3/21  Karina Song MD Gianluca   guaiFENesin-dextromethorphan (ROBITUSSIN DM) 100-10 MG/5ML syrup Take 5 mLs by mouth 4 times daily as needed for Cough 9/26/21 10/6/21  Albert Diaz MD   levoFLOXacin (LEVAQUIN) 500 MG tablet Take 1 tablet by mouth daily for 10 days 9/26/21 10/6/21  Albert Diaz MD   Capsaicin-Menthol (ALLEVESS EX) Apply topically    Historical Provider, MD   lisinopril (PRINIVIL;ZESTRIL) 10 MG tablet Take 15 mg by mouth daily     Historical Provider, MD      Current Facility-Administered Medications: albuterol sulfate  (90 Base) MCG/ACT inhaler 2 puff, 2 puff, Inhalation, Q4H PRN  benzonatate (TESSALON) capsule 100 mg, 100 mg, Oral, TID PRN  lisinopril (PRINIVIL;ZESTRIL) tablet 15 mg, 15 mg, Oral, Daily  levoFLOXacin (LEVAQUIN) 750 MG/150ML infusion 750 mg, 750 mg, IntraVENous, Q24H  sodium chloride flush 0.9 % injection 5-40 mL, 5-40 mL, IntraVENous, 2 times per day  sodium chloride flush 0.9 % injection 5-40 mL, 5-40 mL, IntraVENous, PRN  0.9 % sodium chloride infusion, 25 mL, IntraVENous, PRN  enoxaparin (LOVENOX) injection 40 mg, 40 mg, SubCUTAneous, Daily  ondansetron (ZOFRAN-ODT) disintegrating tablet 4 mg, 4 mg, Oral, Q8H PRN **OR** ondansetron (ZOFRAN) injection 4 mg, 4 mg, IntraVENous, Q6H PRN  polyethylene glycol (GLYCOLAX) packet 17 g, 17 g, Oral, Daily PRN  acetaminophen (TYLENOL) tablet 650 mg, 650 mg, Oral, Q6H PRN **OR** acetaminophen (TYLENOL) suppository 650 mg, 650 mg, Rectal, Q6H PRN  potassium chloride (KLOR-CON M) extended release tablet 40 mEq, 40 mEq, Oral, PRN **OR** potassium bicarb-citric acid (EFFER-K) effervescent tablet 40 mEq, 40 mEq, Oral, PRN **OR** potassium chloride 10 mEq/100 mL IVPB (Peripheral Line), 10 mEq, IntraVENous, PRN  magnesium sulfate 1000 mg in dextrose 5% 100 mL IVPB, 1,000 mg, IntraVENous, PRN  sodium chloride flush 0.9 % injection 10 mL, 10 mL, IntraVENous, PRN    Allergies:  Bee pollen, Erythromycin, Pcn [penicillins], Tetanus toxoids, and Tetracyclines & related    Social History:   reports that she has never smoked. She has never used smokeless tobacco. She reports that she does not drink alcohol and does not use drugs. Family History: family history includes Heart Disease in her father and paternal grandfather; Nadine Pickup in her brother and mother. No h/o sudden cardiac death. Yes for premature CAD    REVIEW OF SYSTEMS:    Deferred as the patient is in isolation    PHYSICAL EXAM:      BP (!) 144/73   Pulse 91   Temp 98 °F (36.7 °C) (Skin)   Resp 27   Ht 5' 2\" (1.575 m)   Wt 119 lb 14.9 oz (54.4 kg)   SpO2 93%   BMI 21.94 kg/m²    Deferred as the patient is in isolation     DATA:    Diagnostics:      EKG:   Sinus tachycardia   Anterior infarct (cited on or before 11-SEP-2021)   Abnormal ECG   When compared with ECG of 26-SEP-2021 16:28,   QRS duration has decreased   Serial changes of evolving Anterior infarct Present    ECHO:   ordered, but not yet obtained. Stress Test:   not obtained. Cardiac Angiography:   not obtained. Labs:     CBC:   Recent Labs     09/27/21  0912 09/28/21  0632   WBC 14.2* 13.4*   HGB 10.4* 12.2   HCT 31.4* 38.7    254     BMP:   Recent Labs     09/27/21  0912 09/28/21  0334   * 124*   K 4.3 4.5   CO2 22 23   BUN 10 10   CREATININE 0.46* 0.41*   LABGLOM >60 >60   GLUCOSE 165* 129*     BNP: No results for input(s): BNP in the last 72 hours. PT/INR:   Recent Labs     09/26/21  1653   PROTIME 10.5   INR 1.0     APTT:  Recent Labs     09/26/21  1653   APTT 26.1     CARDIAC ENZYMES:No results for input(s): CKTOTAL, CKMB, CKMBINDEX, TROPONINI in the last 72 hours. FASTING LIPID PANEL:No results found for: HDL, LDLDIRECT, LDLCALC, TRIG  LIVER PROFILE:  Recent Labs     09/26/21  1653   AST 9   ALT 8   LABALBU 3.4*       IMPRESSION:    1. EKG- Sinus tachy   2. Trop- 10,14  3. ProBNP- 717   4. CXR- small b/l pleural effusion with mid to lower lobe infiltrate or atelectasis.    5. CT Chest- moderate pericardial effusion. 6. HTN, on lisinorpil  7. B/l varicose veins     Patient Active Problem List   Diagnosis    Displacement of lumbar intervertebral disc without myelopathy    PNA (pneumonia)    Pneumonia       RECOMMENDATIONS:  1. Will order TTE to evaluate the structure and function of heart as well as pericardial effusion. Further recommendations to follow 2D echo report. 2. Diuresis ? 3. Keep K above 4 and Mg 2.   4. Rest management as per the primary,       Will discuss with rounding attending Dr. Alexa Gonzalez  for final recommendations. Ouray Headings, MD  Cardiology Service  Internal Medicine Residency Program, PGY-1  [facility]        Attending Physician Statement:    I have discussed the care of  Jessica Iniguez , including pertinent history and exam findings, with the Cardiology fellow/resident. I have seen and examined the patient and the key elements of all parts of the encounter have been performed by me. I agree with the assessment, plan and orders as documented by the fellow/resident, after I modified exam findings and plan of treatments, and the final version is my approved version of the assessment. Additional Comments: The patient was not seen due to possible Covid Pneumonia. Will obtain Echo to assess for any pericardial effusion. Will follow.

## 2021-09-29 ENCOUNTER — APPOINTMENT (OUTPATIENT)
Dept: CARDIAC CATH/INVASIVE PROCEDURES | Age: 76
DRG: 871 | End: 2021-09-29
Payer: MEDICARE

## 2021-09-29 LAB
ABSOLUTE EOS #: 0.43 K/UL (ref 0–0.44)
ABSOLUTE IMMATURE GRANULOCYTE: 0.24 K/UL (ref 0–0.3)
ABSOLUTE LYMPH #: 1.34 K/UL (ref 1.1–3.7)
ABSOLUTE MONO #: 1.09 K/UL (ref 0.1–1.2)
ALBUMIN FLUID: 2.6 G/DL
ANION GAP SERPL CALCULATED.3IONS-SCNC: 13 MMOL/L (ref 9–17)
ANTI DNA DOUBLE STRANDED: 1 IU/ML
ANTI-NUCLEAR ANTIBODY (ANA): POSITIVE
APPEARANCE FLUID: NORMAL
BASO FLUID: NORMAL %
BASOPHILS # BLD: 1 % (ref 0–2)
BASOPHILS ABSOLUTE: 0.07 K/UL (ref 0–0.2)
BUN BLDV-MCNC: 12 MG/DL (ref 8–23)
BUN/CREAT BLD: ABNORMAL (ref 9–20)
CALCIUM SERPL-MCNC: 7.9 MG/DL (ref 8.6–10.4)
CASE NUMBER:: NORMAL
CHLORIDE BLD-SCNC: 94 MMOL/L (ref 98–107)
CO2: 19 MMOL/L (ref 20–31)
COLOR FLUID: NORMAL
CREAT SERPL-MCNC: 0.61 MG/DL (ref 0.5–0.9)
CULTURE: ABNORMAL
DIFFERENTIAL TYPE: ABNORMAL
EKG ATRIAL RATE: 120 BPM
EKG P AXIS: 20 DEGREES
EKG P-R INTERVAL: 132 MS
EKG Q-T INTERVAL: 342 MS
EKG QRS DURATION: 124 MS
EKG QTC CALCULATION (BAZETT): 483 MS
EKG R AXIS: -31 DEGREES
EKG T AXIS: 132 DEGREES
EKG VENTRICULAR RATE: 120 BPM
ENA ANTIBODIES SCREEN: 6 U/ML
EOSINOPHIL FLUID: NORMAL %
EOSINOPHILS RELATIVE PERCENT: 3 % (ref 1–4)
FLUID DIFF COMMENT: NORMAL
GFR AFRICAN AMERICAN: >60 ML/MIN
GFR NON-AFRICAN AMERICAN: >60 ML/MIN
GFR SERPL CREATININE-BSD FRML MDRD: ABNORMAL ML/MIN/{1.73_M2}
GFR SERPL CREATININE-BSD FRML MDRD: ABNORMAL ML/MIN/{1.73_M2}
GLUCOSE BLD-MCNC: 95 MG/DL (ref 70–99)
GLUCOSE, FLUID: 90 MG/DL
HCT VFR BLD CALC: 34.4 % (ref 36.3–47.1)
HEMOGLOBIN: 11 G/DL (ref 11.9–15.1)
IMMATURE GRANULOCYTES: 2 %
LACTATE DEHYDROGENASE, FLUID: 153 U/L
LACTATE DEHYDROGENASE: 179 U/L (ref 135–214)
LEGIONELLA PNEUMOPHILIA AG, URINE: NEGATIVE
LV EF: 55 %
LVEF MODALITY: NORMAL
LYMPHOCYTES # BLD: 11 % (ref 24–43)
LYMPHOCYTES, BODY FLUID: 4 %
Lab: ABNORMAL
MCH RBC QN AUTO: 30.1 PG (ref 25.2–33.5)
MCHC RBC AUTO-ENTMCNC: 32 G/DL (ref 28.4–34.8)
MCV RBC AUTO: 94 FL (ref 82.6–102.9)
MONOCYTE, FLUID: NORMAL %
MONOCYTES # BLD: 9 % (ref 3–12)
MRSA, DNA, NASAL: NORMAL
NEUTROPHIL, FLUID: 63 %
NRBC AUTOMATED: 0 PER 100 WBC
OTHER CELLS FLUID: NORMAL %
PDW BLD-RTO: 13.5 % (ref 11.8–14.4)
PH FLUID: 8
PLATELET # BLD: 454 K/UL (ref 138–453)
PLATELET ESTIMATE: ABNORMAL
PMV BLD AUTO: 10.7 FL (ref 8.1–13.5)
POTASSIUM SERPL-SCNC: 4.2 MMOL/L (ref 3.7–5.3)
RBC # BLD: 3.66 M/UL (ref 3.95–5.11)
RBC # BLD: ABNORMAL 10*6/UL
RBC FLUID: <3000 /MM3
SEG NEUTROPHILS: 75 % (ref 36–65)
SEGMENTED NEUTROPHILS ABSOLUTE COUNT: 9.5 K/UL (ref 1.5–8.1)
SODIUM BLD-SCNC: 126 MMOL/L (ref 135–144)
SPECIMEN DESCRIPTION: ABNORMAL
SPECIMEN DESCRIPTION: NORMAL
SPECIMEN DESCRIPTION: NORMAL
SPECIMEN TYPE: NORMAL
TOTAL PROTEIN, BODY FLUID: 4.4 G/DL
WBC # BLD: 12.7 K/UL (ref 3.5–11.3)
WBC # BLD: ABNORMAL 10*3/UL
WBC FLUID: 364 /MM3

## 2021-09-29 PROCEDURE — 6370000000 HC RX 637 (ALT 250 FOR IP): Performed by: STUDENT IN AN ORGANIZED HEALTH CARE EDUCATION/TRAINING PROGRAM

## 2021-09-29 PROCEDURE — 6360000002 HC RX W HCPCS

## 2021-09-29 PROCEDURE — 0W9B3ZZ DRAINAGE OF LEFT PLEURAL CAVITY, PERCUTANEOUS APPROACH: ICD-10-PCS | Performed by: RADIOLOGY

## 2021-09-29 PROCEDURE — C1769 GUIDE WIRE: HCPCS

## 2021-09-29 PROCEDURE — 94761 N-INVAS EAR/PLS OXIMETRY MLT: CPT

## 2021-09-29 PROCEDURE — 99233 SBSQ HOSP IP/OBS HIGH 50: CPT | Performed by: INTERNAL MEDICINE

## 2021-09-29 PROCEDURE — 2000000000 HC ICU R&B

## 2021-09-29 PROCEDURE — 82945 GLUCOSE OTHER FLUID: CPT

## 2021-09-29 PROCEDURE — 2709999900 HC NON-CHARGEABLE SUPPLY

## 2021-09-29 PROCEDURE — 83986 ASSAY PH BODY FLUID NOS: CPT

## 2021-09-29 PROCEDURE — 80048 BASIC METABOLIC PNL TOTAL CA: CPT

## 2021-09-29 PROCEDURE — 99232 SBSQ HOSP IP/OBS MODERATE 35: CPT | Performed by: INTERNAL MEDICINE

## 2021-09-29 PROCEDURE — 82042 OTHER SOURCE ALBUMIN QUAN EA: CPT

## 2021-09-29 PROCEDURE — 87205 SMEAR GRAM STAIN: CPT

## 2021-09-29 PROCEDURE — 87015 SPECIMEN INFECT AGNT CONCNTJ: CPT

## 2021-09-29 PROCEDURE — 2580000003 HC RX 258: Performed by: STUDENT IN AN ORGANIZED HEALTH CARE EDUCATION/TRAINING PROGRAM

## 2021-09-29 PROCEDURE — 93010 ELECTROCARDIOGRAM REPORT: CPT | Performed by: INTERNAL MEDICINE

## 2021-09-29 PROCEDURE — 83615 LACTATE (LD) (LDH) ENZYME: CPT

## 2021-09-29 PROCEDURE — 87075 CULTR BACTERIA EXCEPT BLOOD: CPT

## 2021-09-29 PROCEDURE — 33016 PERICARDIOCENTESIS W/IMAGING: CPT | Performed by: INTERNAL MEDICINE

## 2021-09-29 PROCEDURE — 2500000003 HC RX 250 WO HCPCS

## 2021-09-29 PROCEDURE — 2700000000 HC OXYGEN THERAPY PER DAY

## 2021-09-29 PROCEDURE — APPSS180 APP SPLIT SHARED TIME > 60 MINUTES: Performed by: NURSE PRACTITIONER

## 2021-09-29 PROCEDURE — 87116 MYCOBACTERIA CULTURE: CPT

## 2021-09-29 PROCEDURE — 87206 SMEAR FLUORESCENT/ACID STAI: CPT

## 2021-09-29 PROCEDURE — 36415 COLL VENOUS BLD VENIPUNCTURE: CPT

## 2021-09-29 PROCEDURE — 85025 COMPLETE CBC W/AUTO DIFF WBC: CPT

## 2021-09-29 PROCEDURE — 99222 1ST HOSP IP/OBS MODERATE 55: CPT | Performed by: INTERNAL MEDICINE

## 2021-09-29 PROCEDURE — 93306 TTE W/DOPPLER COMPLETE: CPT

## 2021-09-29 PROCEDURE — 0W9D3ZZ DRAINAGE OF PERICARDIAL CAVITY, PERCUTANEOUS APPROACH: ICD-10-PCS | Performed by: INTERNAL MEDICINE

## 2021-09-29 PROCEDURE — 89051 BODY FLUID CELL COUNT: CPT

## 2021-09-29 PROCEDURE — 84157 ASSAY OF PROTEIN OTHER: CPT

## 2021-09-29 PROCEDURE — 87070 CULTURE OTHR SPECIMN AEROBIC: CPT

## 2021-09-29 RX ADMIN — Medication 30 MG: at 07:55

## 2021-09-29 RX ADMIN — BENZONATATE 100 MG: 100 CAPSULE ORAL at 19:47

## 2021-09-29 RX ADMIN — BENZONATATE 100 MG: 100 CAPSULE ORAL at 10:43

## 2021-09-29 RX ADMIN — Medication 30 MG: at 19:48

## 2021-09-29 RX ADMIN — SODIUM CHLORIDE, PRESERVATIVE FREE 10 ML: 5 INJECTION INTRAVENOUS at 19:47

## 2021-09-29 RX ADMIN — LISINOPRIL 15 MG: 5 TABLET ORAL at 07:53

## 2021-09-29 RX ADMIN — BENZONATATE 100 MG: 100 CAPSULE ORAL at 01:35

## 2021-09-29 RX ADMIN — GUAIFENESIN AND CODEINE PHOSPHATE 5 ML: 100; 10 SOLUTION ORAL at 19:10

## 2021-09-29 RX ADMIN — OXYCODONE HYDROCHLORIDE 5 MG: 5 TABLET ORAL at 07:53

## 2021-09-29 RX ADMIN — GUAIFENESIN AND CODEINE PHOSPHATE 5 ML: 100; 10 SOLUTION ORAL at 03:49

## 2021-09-29 RX ADMIN — GUAIFENESIN AND CODEINE PHOSPHATE 5 ML: 100; 10 SOLUTION ORAL at 15:11

## 2021-09-29 RX ADMIN — OXYCODONE HYDROCHLORIDE 5 MG: 5 TABLET ORAL at 19:47

## 2021-09-29 ASSESSMENT — ENCOUNTER SYMPTOMS
EYE REDNESS: 0
COUGH: 1
ABDOMINAL PAIN: 0
SINUS PRESSURE: 0
BACK PAIN: 0
SORE THROAT: 0
CHEST TIGHTNESS: 0
DIARRHEA: 0
SHORTNESS OF BREATH: 1
VOMITING: 0
PHOTOPHOBIA: 0
RHINORRHEA: 0
NAUSEA: 0

## 2021-09-29 ASSESSMENT — PAIN SCALES - GENERAL
PAINLEVEL_OUTOF10: 3
PAINLEVEL_OUTOF10: 8
PAINLEVEL_OUTOF10: 7

## 2021-09-29 NOTE — PLAN OF CARE
Discussed with the lab, blood culture with gram-positive rods but is not Listeria    Stop Bactrim.     Jamal Gonzalez MD. Infectious Diseases

## 2021-09-29 NOTE — CONSULTS
[facility] - Initial Consult Note  Today's Date and Time: 9/29/2021, 11:53 AM    Impression :   Shortness of breath  History of TIA (2012 mini stroke high blood pressure)  Bacteremia with Gram positive bacilli = Contaminant 9-28-21    Recommendations:   Monitor off antibiotics    Medical Decision Making/Summary/Discussion:9/29/2021       Infection Control Recommendations   Oliver Precautions    Antimicrobial Stewardship Recommendations     Simplification of therapy  Targeted therapy  IV to oral conversion    Coordination of Outpatient Care:   Estimated Length of IV antimicrobials:None   Patient will need Midline Catheter Insertion: No  Patient will need PICC line Insertion:No  Patient will need: Home IV , [place],  SNF,  LTAC:TBD  Patient will need outpatient wound care:No    Chief complaint/reason for consultation:   Sepsis/ pneumonia      History of Present Illness:   Cony Ceja is a 78y.o.-year-old  female who was initially admitted on 9/27/2021. Patient seen at the request of Dr. Lorenzo Del Cid:    Patient presented to the ED on 9/27/2021 complaining of left-sided chest wall pain and persistent cough. She presented with shortness of breath pain and tachycardic with a coughing fit. Pain is worse with movement per ED note. She was seen a day before and was offered admission but she preferred going home. Two weeks ago she was seen at an urgent care and was diagnosed with pleural effusion and was placed on amoxicillin at the time. CURRENT EVALUATION: 9/29/2021      Patient seen with family at bedside. Patient complains of persistent coughing. Patient denies any nausea, vomiting, fever or chills at this time    Ultrasound guided thoracentesis was performed on 9/28/2021.  800 mL of clear yellow fluid was obtained. Cultures were sent to lab. Echo was completed at bedside today (9/29/2021).   Pending results    Bactrim was stopped at midnight as blood cultures with gram-positive rods. Likely contamination    Labs, X rays reviewed: 9/29/2021    BUN:12  Cr:0.61    WBC:12.7  Hb:11.0  Plat: 659    Cultures:  Urine:    Blood:   9-22-21: No growth in 22 hours    Sputum :    Wound:    Culture with smear, acid fast bacillius: pending 09/28/21    Discussed with patient, RN, family. I have personally reviewed the past medical history, past surgical history, medications, social history, and family history, and I have updated the database accordingly.   Past Medical History:     Past Medical History:   Diagnosis Date    Arthritis     Displacement of lumbar intervertebral disc without myelopathy 9/30/2015    Hypertension     exacerbated after last injection 10-    Lumbar disc disease     Personal history of TIA (transient ischemic attack)     2012 mini stroke high blood pressure    Sleep deprivation     SOB (shortness of breath)     \"walk to fast and going upstairs\" \"can walk a city block\"    Wears glasses        Past Surgical  History:     Past Surgical History:   Procedure Laterality Date    ANKLE SURGERY Right     x 3    FOOT SURGERY  2015    left    HIP ARTHROSCOPY Left 03/14/2018    band release bursectomy    HYSTERECTOMY      NERVE BLOCK  10/19/15    caudal #1  celestone 9mg    NERVE BLOCK  10-26-15     caudal epidural steroid block #2 decadron 5 mg    IA HIP ARTHROSCOPY, DX Left 3/14/2018    LEFT HIP ARTHROSCOPY WITH  IT BAND RELEASE BURSECTOMY WITH GLUTEUS MUSCLE REPAIR performed by Paul Lomas DO at [address] [place] [address] Avenue Left     x 2       Medications:      lisinopril  15 mg Oral Daily    sodium chloride flush  5-40 mL IntraVENous 2 times per day    [Held by provider] enoxaparin  40 mg SubCUTAneous Daily    lidocaine  1 patch TransDERmal Daily    sodium chloride flush  5-40 mL IntraVENous 2 times per day    dextromethorphan  30 mg Oral 2 times per day       Social History:     Social History Socioeconomic History    Marital status:      Spouse name: Not on file    Number of children: Not on file    Years of education: Not on file    Highest education level: Not on file   Occupational History     Employer: [employer]     Comment: [employer]   Tobacco Use    Smoking status: Never Smoker    Smokeless tobacco: Never Used   Vaping Use    Vaping Use: Never used   Substance and Sexual Activity    Alcohol use: No    Drug use: No    Sexual activity: Not on file   Other Topics Concern    Not on file   Social History Narrative    Not on file     Social Determinants of Health     Financial Resource Strain:     Difficulty of Paying Living Expenses:    Food Insecurity:     Worried About [address] in the Last Year:     [address] [address] [place] in the Last Year:    Transportation Needs:     Lack of Transportation (Medical):  Lack of Transportation (Non-Medical):    Physical Activity:     Days of Exercise per Week:     Minutes of Exercise per Session:    Stress:     Feeling of Stress :    Social Connections:     Frequency of Communication with Friends and Family:     Frequency of Social Gatherings with Friends and Family:     Attends Taoism Services:     Active Member of Clubs or Organizations:     Attends Club or Organization Meetings:     Marital Status:    Intimate Partner Violence:     Fear of Current or Ex-Partner:     Emotionally Abused:     Physically Abused:     Sexually Abused:        Family History:     Family History   Problem Relation Age of Onset    Lung Cancer Mother     Lung Cancer Brother     Heart Disease Father     Heart Disease Paternal Grandfather         Allergies:   Bee pollen, Erythromycin, Pcn [penicillins], Tetanus toxoids, and Tetracyclines & related     Review of Systems:   Constitutional: No fevers or chills. No systemic complaints  Head: No headaches  Eyes: No double vision or blurry vision. No conjunctival inflammation.   ENT: No sore throat or runny nose. . No hearing loss, tinnitus or vertigo. Cardiovascular: No chest pain or palpitations. No shortness of breath. No CALVILLO  Lung: No shortness of breath or cough. No sputum production  Abdomen: No nausea, vomiting, diarrhea, or abdominal pain. Jennifer Raddle No cramps. Genitourinary: No increased urinary frequency, or dysuria. No hematuria. No suprapubic or CVA pain  Musculoskeletal: No muscle aches or pains. No joint effusions, swelling or deformities  Hematologic: No bleeding or bruising. Neurologic: No headache, weakness, numbness, or tingling. Integument: No rash, no ulcers. Psychiatric: No depression. Endocrine: No polyuria, no polydipsia, no polyphagia. Physical Examination :     Patient Vitals for the past 8 hrs:   BP Temp Temp src Pulse Resp SpO2   09/29/21 1125 (!) 98/55 98.1 °F (36.7 °C) Oral 100 29 97 %   09/29/21 0753 138/66 -- -- -- -- --     General Appearance: Awake, alert, and in no apparent distress  Head:  Normocephalic, no trauma  Eyes: Pupils equal, round, reactive to light and accommodation; extraocular movements intact; sclera anicteric; conjunctivae pink. No embolic phenomena. ENT: Oropharynx clear, without erythema, exudate, or thrush. No tenderness of sinuses. Mouth/throat: mucosa pink and moist. No lesions. Dentition in good repair. Neck:Supple, without lymphadenopathy. Thyroid normal, No bruits. Pulmonary/Chest: Clear to auscultation, without wheezes, rales, or rhonchi. No dullness to percussion. Cardiovascular: Regular rate and rhythm without murmurs, rubs, or gallops. Abdomen: Soft, non tender. Bowel sounds normal. No organomegaly  All four Extremities: No cyanosis, clubbing, edema, or effusions. Neurologic: No gross sensory or motor deficits. Skin: Warm and dry with good turgor. No signs of peripheral arterial or venous insufficiency. No ulcerations. No open wounds.     Medical Decision Making -Laboratory:   I have independently reviewed/ordered the following labs:    CBC with Differential:   Recent Labs     09/28/21  0632 09/29/21  0509   WBC 13.4* 12.7*   HGB 12.2 11.0*   HCT 38.7 34.4*    454*   LYMPHOPCT 8* 11*   MONOPCT 9 9     BMP:   Recent Labs     09/28/21  0334 09/29/21  0509   * 126*   K 4.5 4.2   CL 92* 94*   CO2 23 19*   BUN 10 12   CREATININE 0.41* 0.61     Hepatic Function Panel:   Recent Labs     09/26/21  1653 09/28/21  1246   PROT 6.0* 5.9*   LABALBU 3.4*  --    BILITOT 0.72  --    ALKPHOS 90  --    ALT 8  --    AST 9  --      No results for input(s): RPR in the last 72 hours. No results for input(s): HIV in the last 72 hours. No results for input(s): BC in the last 72 hours. Lab Results   Component Value Date    RBC 3.66 09/29/2021    WBC 12.7 09/29/2021     Lab Results   Component Value Date    CREATININE 0.61 09/29/2021    GLUCOSE 95 09/29/2021       Medical Decision Making-Imaging:     EXAMINATION:   TWO XRAY VIEWS OF THE CHEST       9/28/2021 4:57 pm       COMPARISON:   CT chest September 27, 2021       HISTORY:   ORDERING SYSTEM PROVIDED HISTORY: evaluate pna, pleural effusions   TECHNOLOGIST PROVIDED HISTORY:   evaluate pna, pleural effusions       FINDINGS:   Cardiomegaly.  Small left greater than right pleural effusions.  Possible   mild edema.  Mediastinum normal.  Bony thorax intact.           Impression   Possible mild CHF.  Small effusions. EXAMINATION:   CTA OF THE CHEST 9/27/2021 7:47 am       TECHNIQUE:   CTA of the chest was performed after the administration of intravenous   contrast.  Multiplanar reformatted images are provided for review.  MIP   images are provided for review.  Dose modulation, iterative reconstruction,   and/or weight based adjustment of the mA/kV was utilized to reduce the   radiation dose to as low as reasonably achievable.       COMPARISON:   09/12/2021       HISTORY:   ORDERING SYSTEM PROVIDED HISTORY: Left-sided chest pain elevated D-dimer   yesterday   TECHNOLOGIST PROVIDED HISTORY:   Left-sided chest pain elevated D-dimer yesterday   Decision Support Exception - unselect if not a suspected or confirmed   emergency medical condition->Emergency Medical Condition (MA)   Reason for Exam: chest pain   Acuity: Acute   Type of Exam: Initial   Additional signs and symptoms: elevated D-dimer   Relevant Medical/Surgical History: hx HTN and TIA       FINDINGS:   Pulmonary Arteries: Pulmonary arteries show no evidence of intraluminal   filling defect to suggest pulmonary embolism.  Main pulmonary artery is   normal in caliber.       Mediastinum: Moderate pericardial effusion.  Minor atherosclerotic disease. Normal caliber aorta.  No significant lymphadenopathy.  Thyroid gland and   esophagus grossly normal.       Lungs/pleura: Small right and moderate left pleural effusion with adjacent   compressive subsegmental atelectasis at the lung base.       No significant lung nodules or masses.  No pneumothorax.       Upper Abdomen: Limited images of the upper abdomen are unremarkable.       Soft Tissues/Bones: No acute bone or soft tissue abnormality.           Impression   1. No evidence for acute pulmonary embolism. 2. Moderate pericardial effusion. 3. Small right and moderate left pleural effusion with adjacent subsegmental   atelectasis.           Medical Decision Wghgbo-[id number]-[id number]:       Medical Decision Making-Other:     Note:  Labs, medications, radiologic studies were reviewed with personal review of films  Large amounts of data were reviewed  Discussed with nursing Staff, Discharge planner  Infection Control and Prevention measures reviewed  All prior entries were reviewed  Administer medications as ordered  Prognosis: [address]  Discharge planning reviewed  Follow up as outpatient. Thank you for allowing us to participate in the care of this patient. Please call with questions.     Derrick Nettles DPM     ATTESTATION:    I have discussed the case, including pertinent history and exam findings with the residents and students. I have seen and examined the patient and the key elements of the encounter have been performed by me. I was present when the student obtained his information or examined the patient. I have reviewed the laboratory data, other diagnostic studies and discussed them with the residents. I have updated the medical record where necessary. I agree with the assessment, plan and orders as documented by the resident/ student.     Hellen Johnson MD.    Pager: [phone] - Office: [phone]

## 2021-09-29 NOTE — PLAN OF CARE
Problem: Pain:  Goal: Pain level will decrease  Description: Pain level will decrease  Outcome: Ongoing  Goal: Control of acute pain  Description: Control of acute pain  Outcome: Ongoing  Goal: Control of chronic pain  Description: Control of chronic pain  Outcome: Ongoing    Pain level assessment complete. Pt rated pain at 7/10. Pt educated on pain scale and control interventions. PRN pain medication given per pt request. Pt instructed to call out with new onset of pain or unrelieved pain. Will continue to monitor. Goal: Patient's pain/discomfort is manageable  Description: Patient's pain/discomfort is manageable  Outcome: Ongoing     Problem: Falls - Risk of:  Goal: Will remain free from falls  Description: Will remain free from falls  Outcome: Ongoing  Goal: Absence of physical injury  Description: Absence of physical injury  Outcome: Ongoing    Pt assessed as a fall risk this shift. Remains free from falls and accidental injury at this time. Fall precautions in place, including falling star sign. Floor free from obstacles, and bed is locked and in lowest position. Adequate lighting provided. Pt encouraged to call before getting Out Of Bed for any need. Will continue to monitor needs during hourly rounding, and reinforce education on use of call light.      Problem: Infection:  Goal: Will remain free from infection  Description: Will remain free from infection  Outcome: Ongoing     Problem: Safety:  Goal: Free from accidental physical injury  Description: Free from accidental physical injury  Outcome: Ongoing  Goal: Free from intentional harm  Description: Free from intentional harm  Outcome: Ongoing     Problem: Daily Care:  Goal: Daily care needs are met  Description: Daily care needs are met  Outcome: Ongoing     Problem: Skin Integrity:  Goal: Skin integrity will stabilize  Description: Skin integrity will stabilize  Outcome: Ongoing     Problem: Discharge Planning:  Goal: Patients continuum of care needs are met  Description: Patients continuum of care needs are met  Outcome: Ongoing

## 2021-09-29 NOTE — PROGRESS NOTES
[facility]  Office: [address], DO, Lorna Lobo, DO, Berkley Yamile, DO, Johana Cristina Blood, DO, Bina Dockery MD, Macie Johnson MD, Miles Medrano MD, Balbir Padilla MD, Hernan Herrera MD, Erick Burgos MD, Ivett Caballero MD, Pamella No, DO, Shaka Duke DO, Jess Ivory MD,  Derick Murdock DO, Max Mejia MD, Brian Houston MD, Karla Wynne MD, Francisca Grace MD, , Luis Roldan MD, Fe Larson MD, Martina Davis MD, Willie Bello, [facility], [facility], CNP, Betsy Fan, CNP, Emeli Jarquin, CNS, Ilsa Eaton, CNP, Clement Duron, CNP, Roxanna Boateng, CNP, Cheryl Orozco, CNP, Luke Boateng, CNP, Kameron Rodriguez PA-C, Cleone Denver, [facility], Livier Lal, CNP, Olga Neves, CNP, Stacey Nguyễn, CNP, Kyaw England, [facility], Jet Link, [facility], Aisha Castellon, [facility], Evan Jaquez, CNP, Mauriciojackson Cuello, [address] [place] [address]    Progress Note    9/29/2021    8:46 AM    Name:   Yesika Hahn  MRN:     [id number]     Acct:      [de-identified]   Room:   2022/2022-01  IP Day:  2  Admit Date:  9/27/2021  7:16 AM    PCP:   CELY Nelson CNP  Code Status:  Full Code    Subjective:     C/C:   Chief Complaint   Patient presents with    Cough     Interval History Status: worsened     Patient seen and evaluated in room resting in bed, tachycardic, hypotensive. Endorsing shortness of breath with some reproducible chest pain and nausea without emesis. No other patient symptomology at this time    Brief History:     Yesika Hahn is a 78 y.o. Non- / non  female who presents with Cough   and is admitted to the hospital for the management of <principal problem not specified>.     68-year-old female presented to [facility] ER with complaints of left-sided chest pain and persistent cough. Patient has had cough for last 2 weeks, and progressively worsening. Dry in nature, no phlegm production.  She has heat intolerance. No fever or chills. Patient was discharged from ER a day ago with oral Levaquin, she returned back the next day as her symptoms were worsening. She had poor appetite for last 4 days. Patient has been a non-smoker and has no previous history of cardiac or lung disease.     In the ER patient was noted to be afebrile, tachycardic with pulse rate of 121, blood pressure 129/72, saturating well on room air around 95%. She was noted to have hyponatremia with sodium of 124, chloride 92, creatinine 0.4, glucose 129, pro-Clay 0.13. Elevated leukocytosis with WBC 14.2, 13.4. Rapid Covid was negative twice. Patient had CT chest which showed small right and moderate left pleural effusion with adjacent subsegmental atelectasis. CT scan was also concerning for moderate pericardial effusion. Review of Systems:     Constitutional:  negative for chills, fevers, sweats  Respiratory:  negative for cough, dyspnea on exertion, +shortness of breath, wheezing  Cardiovascular:  negative for chest pain, chest pressure/discomfort, lower extremity edema, palpitations  Gastrointestinal:  negative for abdominal pain, constipation, diarrhea, nausea, vomiting  Neurological:  negative for dizziness, headache    Medications: Allergies:     Allergies   Allergen Reactions    Bee Pollen Anaphylaxis    Erythromycin Anaphylaxis    Pcn [Penicillins] Anaphylaxis    Tetanus Toxoids Anaphylaxis    Tetracyclines & Related Anaphylaxis       Current Meds:   Scheduled Meds:    lisinopril  15 mg Oral Daily    sodium chloride flush  5-40 mL IntraVENous 2 times per day    [Held by provider] enoxaparin  40 mg SubCUTAneous Daily    lidocaine  1 patch TransDERmal Daily    sodium chloride flush  5-40 mL IntraVENous 2 times per day    dextromethorphan  30 mg Oral 2 times per day     Continuous Infusions:    sodium chloride      sodium chloride      sodium chloride 30 mL/hr at 09/28/21 1408     PRN Meds: albuterol sulfate HFA, benzonatate, sodium chloride flush, sodium chloride, ondansetron **OR** ondansetron, polyethylene glycol, acetaminophen **OR** acetaminophen, potassium chloride **OR** potassium alternative oral replacement **OR** potassium chloride, magnesium sulfate, oxyCODONE, guaiFENesin-codeine, sodium chloride flush, sodium chloride, diphenhydrAMINE, sodium chloride flush    Data:     Past Medical History:   has a past medical history of Arthritis, Displacement of lumbar intervertebral disc without myelopathy, Hypertension, Lumbar disc disease, Personal history of TIA (transient ischemic attack), Sleep deprivation, SOB (shortness of breath), and Wears glasses. Social History:   reports that she has never smoked. She has never used smokeless tobacco. She reports that she does not drink alcohol and does not use drugs. Family History:   Family History   Problem Relation Age of Onset    Lung Cancer Mother     Lung Cancer Brother     Heart Disease Father     Heart Disease Paternal Grandfather        Vitals:  /66   Pulse 97   Temp 98.4 °F (36.9 °C) (Oral)   Resp 27   Ht 5' 2\" (1.575 m)   Wt 139 lb 6.4 oz (63.2 kg)   SpO2 95%   BMI 25.50 kg/m²   Temp (24hrs), Av.2 °F (36.8 °C), Min:97.9 °F (36.6 °C), Max:98.6 °F (37 °C)    No results for input(s): POCGLU in the last 72 hours. I/O (24Hr):     Intake/Output Summary (Last 24 hours) at 2021 0846  Last data filed at 2021 1640  Gross per 24 hour   Intake --   Output 250 ml   Net -250 ml       Labs:  Hematology:  Recent Labs     21  1653 21  1653 21  0912 21  0334 21  0632 21  0509   WBC 11.3*   < > 14.2*  --  13.4* 12.7*   RBC 3.70*   < > 3.60*  --  4.22 3.66*   HGB 10.6*   < > 10.4*  --  12.2 11.0*   HCT 32.8*   < > 31.4*  --  38.7 34.4*   MCV 88.6   < > 87.2  --  91.7 94.0   MCH 28.6   < > 28.9  --  28.9 30.1   MCHC 32.3   < > 33.1  --  31.5 32.0   RDW 13.6   < > 13.4  --  13.7 13.5      < > 226  --  254 454*   MPV 10.0   < > 10.3  --  10.6 10.7   CRP  --   --   --  179.7*  --   --    INR 1.0  --   --   --   --   --    DDIMER 2.37  --   --   --   --   --     < > = values in this interval not displayed. Chemistry:  Recent Labs     09/26/21 1653 09/26/21 1653 09/27/21 0912 09/28/21 0334 09/29/21  0509   *   < > 123* 124* 126*   K 3.7   < > 4.3 4.5 4.2   CL 96*   < > 91* 92* 94*   CO2 23   < > 22 23 19*   GLUCOSE 124*   < > 165* 129* 95   BUN 9   < > 10 10 12   CREATININE 0.51   < > 0.46* 0.41* 0.61   ANIONGAP 11   < > 10 9 13   LABGLOM >60   < > >60 >60 >60   GFRAA >60   < > >60 >60 >60   CALCIUM 8.3*   < > 8.1* 8.1* 7.9*   PROBNP 717*  --   --   --   --    TROPHS 10  --  14  --   --     < > = values in this interval not displayed. Recent Labs     09/26/21 1653 09/28/21 0334 09/28/21  1246   PROT 6.0*  --  5.9*   LABALBU 3.4*  --   --    TSH  --  1.24  --    AST 9  --   --    ALT 8  --   --    LDH  --   --  152   ALKPHOS 90  --   --    BILITOT 0.72  --   --        Lab Results   Component Value Date/Time    SPECIAL NOT REPORTED 09/28/2021 12:45 PM     Lab Results   Component Value Date/Time    CULTURE NO GROWTH 18 HOURS 09/28/2021 12:45 PM       Radiology:  XR CHEST (2 VW)    Result Date: 9/28/2021  Possible mild CHF. Small effusions. XR CHEST PORTABLE    Result Date: 9/26/2021  Small bilateral pleural effusions with left mid to lower lobe infiltrate or atelectasis. Clinical correlation for pneumonia. Clinical and imaging follow-up to resolution recommended. CT CHEST PULMONARY EMBOLISM W CONTRAST    Result Date: 9/27/2021  1. No evidence for acute pulmonary embolism. 2. Moderate pericardial effusion. 3. Small right and moderate left pleural effusion with adjacent subsegmental atelectasis. US THORACENTESIS Which side should the procedure be performed? Left    Result Date: 9/28/2021  Successful ultrasound guided thoracentesis.        Physical Examination:        General appearance: alert, cooperative and ill-appearing  Mental Status:  oriented to person, place and time and normal affect  Lungs: Diminished posteriorly on the left, crackles bilaterally posteriorly  Heart: Tachycardic and rhythm, no murmur  Abdomen:  soft, nontender, nondistended, normal bowel sounds, no masses, hepatomegaly, splenomegaly  Extremities:  no edema, redness, tenderness in the calves  Skin:  no gross lesions, rashes, induration    Assessment:        Hospital Problems         Last Modified POA    * (Principal) Listeria sepsis ([facility] [address] 9/28/2021 Yes    PNA (pneumonia) 9/27/2021 Yes    Pneumonia 9/27/2021 Yes    Sinus tachycardia 9/28/2021 Yes    Viral sepsis ([address] 9/28/2021 Yes    Poor appetite 9/28/2021 Yes    Pericardial effusion 9/28/2021 Yes    Pleural effusion 9/28/2021 Yes    Hyponatremia 9/28/2021 Yes    Essential hypertension 9/28/2021 Yes          Plan:        Traumatic cardiac tamponade:   - Echo results reviewed. - Discussed with cardiology. - Plan for pericardial tap/window and transfer to ICU for further monitoring    Bilateral pleural effusions:   - Worse on the left than the right initially. - Status post 800 mL of clear, yellow fluid from thoracentesis on 9/28    Acute hypoxic respiratory failure:   - Secondary to both 1 and 2.    - Continue low-flow O2 as warranted. Hyponatremia:   - Likely secondary to poor p.o. intake. - Patient states diminished appetite at home. - Will readdress fluid status post cardiac procedure    Gram-positive rods:   - Identified in 1 out of 2 blood cultures. - Likely contaminant.     - Infectious disease following.    - Bactrim discontinued per infectious disease    Moderate malnutrition:   - With hypoalbuminemia and hypoproteinemia  -Supplements twice daily with meals once p.o. route has been reestablished post procedure    Rhinovirus positive    Covid negative status    Transfer to ICU    CELY Tavares NP  9/29/2021  8:46 AM

## 2021-09-29 NOTE — PROGRESS NOTES
[facility]   Progress Note                   Date:   9/29/2021  Patient name: Terese Vivar  Date of admission:  9/27/2021  7:16 AM  MRN:   [id number]  YOB: 1945  PCP: CELY Villarreal CNP    Reason for Admission: Chest wall pain [R07.89]  Pericardial effusion [I31.3]  Pneumonia [J18.9]  Pleural effusion [J90]  PNA (pneumonia) [J18.9]    Subjective:       Clinical Changes / Abnormalities: Patient seen and examined [facility] family members present at the bed side. Echo completed this morning. Discussion with Echo results with primary team NP and MD and also asked Echo to be reviewed by Dr. Rae Kennedy. Patient continues to have frequent cough that is causing some chest discomfort. Also complains of SOB and is on supplemental oxygen. Reviewed vitals, labs, tele, & previous testing. Tele shows sinus tach with 's to 110's. BP was stable but is hypotensive currently. Plan to send to Cath lab for Pericardiocentesis this afternoon.      Medications:   Scheduled Meds:   lisinopril  15 mg Oral Daily    sodium chloride flush  5-40 mL IntraVENous 2 times per day    [Held by provider] enoxaparin  40 mg SubCUTAneous Daily    lidocaine  1 patch TransDERmal Daily    sodium chloride flush  5-40 mL IntraVENous 2 times per day    dextromethorphan  30 mg Oral 2 times per day     Continuous Infusions:   sodium chloride      sodium chloride      sodium chloride 30 mL/hr at 09/28/21 1408     CBC:   Recent Labs     09/27/21  0912 09/28/21  0632 09/29/21  0509   WBC 14.2* 13.4* 12.7*   HGB 10.4* 12.2 11.0*    254 454*     BMP:    Recent Labs     09/27/21  0912 09/28/21  0334 09/29/21  0509   * 124* 126*   K 4.3 4.5 4.2   CL 91* 92* 94*   CO2 22 23 19*   BUN 10 10 12   CREATININE 0.46* 0.41* 0.61   GLUCOSE 165* 129* 95     Hepatic:   Recent Labs     09/26/21  1653   AST 9   ALT 8   BILITOT 0.72   ALKPHOS 90     Troponin:   Recent Labs     09/26/21  1653 09/27/21  0912 TROPHS 10 14     BNP: No results for input(s): BNP in the last 72 hours. Lipids: No results for input(s): CHOL, HDL in the last 72 hours. Invalid input(s): LDLCALCU  INR:   Recent Labs     09/26/21  1653   INR 1.0       Objective:   Vitals: BP (!) 98/55   Pulse 100   Temp 98.1 °F (36.7 °C) (Oral)   Resp 29   Ht 5' 2\" (1.575 m)   Wt 139 lb 6.4 oz (63.2 kg)   SpO2 97%   BMI 25.50 kg/m²   General appearance: alert and cooperative with exam  HEENT: Head: Normocephalic, no lesions, without obvious abnormality. Neck: no JVD, trachea midline, no adenopathy  Lungs: Diminished throughout. Supplemental oxygen per NC. Frequent cough. Heart: Regular rate and rhythm, s1/s2 auscultated, no murmurs. Sinus tachycardia. Abdomen: soft, non-tender, bowel sounds active  Extremities: no edema  Neurologic: not done    ECHO 9/29/2021     Summary  Left ventricle is normal in size, global left ventricular systolic function  is normal, calculated ejection fraction is 55%. Right atrium appears small. Moderate to large pericardial effusion. Posteriorly measuring 2.29cm, Anteriorly measuring 3.66cm. Gelatinous  material is noted throughout. Cannot rule out right ventricular collapse in the subcostal views, clinical  correlation recommended. Mitral and tricuspid inflows show physiologic mild evidence of tamponade. Pleural effusion noted. Assessment / Acute Cardiac Problems:     1. EKG- Sinus tachy   2. Trop- 10,14  3. ProBNP- 717   4. CXR- small b/l pleural effusion with mid to lower lobe infiltrate or atelectasis. 5. CT Chest- moderate pericardial effusion. 6. HTN, on lisinorpil  7.  B/l varicose veins     Patient Active Problem List:     Displacement of lumbar intervertebral disc without myelopathy     PNA (pneumonia)     Pneumonia     Sinus tachycardia     Viral sepsis (HCC)     Poor appetite     Pericardial effusion     Pleural effusion     Hyponatremia     Essential hypertension     Listeria sepsis ([address]      Plan of Treatment:   1. Echo as noted above. Reviewed with Dr. Tejal Martinez. Patient will need pericardiocentesis. Continues to have Sinus Tachycardia with 's to 110's. Plan for pericardiocentesis this afternoon. 2. HTN. Hypotensive currently. Received Lisinopril dose this morning. Will place on hold and re-evaluate post pericardiocentesis. 3. Antibiotics per ID. 4. Keep K+ > 4.0, and Mg+ > 2.0  5.  Remainder of care management per primary team.          Electronically signed by CELY Minor CNP on 9/29/2021 at 11:55 [facility]      [phone]

## 2021-09-29 NOTE — FLOWSHEET NOTE
Assessment: Patient is a 78 y.o. female who arrived to the hospital due to a \"cough. \" Patient was sitting up in hospital bed, when  visited. Intervention:  visited patient per initial rounding visits.  introduced herself to patient and learned about her arrival to the hospital. Patient shared that she has been at the \"emergency room and urgent care,\" for her \"cough. \" Patient indicated that it \"has not improved since Friday. \" Patient shared that her daughter was present in room with her earlier and indicated that she Sally Espinal been her rock. \" Patient's bedside nurse arrived to room and tended to patient's needs during visit. Patient thanked  for visit and support. Outcome: Patient was receptive of 's visit and support. Plan: Chaplains can make follow-up visit, per request. Markso Manley can be reached 24/7 via [employer]. Hollis Leary     09/28/21 0262   Encounter Summary   Services provided to: Patient   Referral/Consult From: Rounding   Support System Children   Continue Visiting   (9/28/2021)   Complexity of Encounter Low   Length of Encounter 15 minutes   Spiritual Assessment Completed Yes   Routine   Type Initial   Spiritual/Restorationist   Type Spiritual support   Assessment Calm; Approachable; Hopeful;Coping;Helplessness   Intervention Active listening;Explored feelings, thoughts, concerns;Explored coping resources;Nurtured hope;Sustaining presence/ Ministry of presence; Discussed illness/injury and it's impact   Outcome Expressed gratitude;Receptive

## 2021-09-29 NOTE — PROGRESS NOTES
PULMONARY & CRITICAL CARE MEDICINE PROGRESS NOTE     Patient:  Sofy Thomas  MRN: [id number]  Admit date: 9/27/2021  Primary Care Physician: CELY Whitley - CNP  Consulting Physician: Fátima Tompkins MD  CODE Status: Full Code  LOS: 2     SUBJECTIVE     Chief Complaint/ Reason for consult:  Pleural Effusions and Cough x 2 weeks    Hospital Course: The patient is a 78 y.o. female with PMH of asthma, hypertension who presented to the ER at [place] on 9/12/21 for complaints of chest pain, shortness of breath after pushing multiple shopping carts. CT chest at the time was unremarkable. This was attributed to physical strain and she was discharged from the ER.     She returns to the [facility] ER on 9/26 with complaints of cough, shortness of breath and difficulty sleeping where CT scan of the chest shows moderate pericardial effusion, small right and moderate left pleural effusions with adjacent subsegmental atelectasis. Patient declined admission at that time and was discharged home with albuterol inhaler, tessalon, guaifenesin and dextromethorphan.     Due to worsening symptoms she returned to the ER the next day, stayed overnight and was transferred to [facility] for admission on 9/28/21.      On my evaluation, patient is tachycardic in the 130s, tachypneic, blood pressure stable, and unable to speak due to cough. Respiratory panel was sent which showed Entero/rhinovirus positive PCR test.    Interval History:  09/29/21  Pt was seen and examined at bedside. Resting comfortably in the bed. Continues to have severe cough  Saturating appropriately at 3.5L through NC  Vitals stable. Labs reviewed. Pleural fluid seems to be exudative  No acute events overnight. Review Of Systems:  Review of Systems   Constitutional: Positive for fatigue. Negative for chills, diaphoresis and fever. HENT: Negative for hearing loss, rhinorrhea, sinus pressure and sore throat.     Eyes: Negative for photophobia and redness. Respiratory: Positive for cough and shortness of breath. Negative for chest tightness. Cardiovascular: Negative for chest pain. Gastrointestinal: Negative for abdominal pain, diarrhea, nausea and vomiting. Genitourinary: Negative for dysuria, frequency and urgency. Musculoskeletal: Negative for back pain and neck stiffness. Skin: Negative for rash. Neurological: Negative for seizures and speech difficulty. Psychiatric/Behavioral: Negative for agitation, confusion and decreased concentration. OBJECTIVE     PaO2/FiO2 RATIO:  No results for input(s): POCPO2 in the last 72 hours. VITAL SIGNS:   LAST:  BP (!) 102/52   Pulse 103   Temp 98.7 °F (37.1 °C) (Axillary)   Resp 18   Ht 5' 2\" (1.575 m)   Wt 139 lb 6.4 oz (63.2 kg)   SpO2 96%   BMI 25.50 kg/m²   8-24 HR RANGE:  TEMP Temp  Av.5 °F (36.9 °C)  Min: 97.9 °F (36.6 °C)  Max: 99.7 °F (93.6 °C)   BP Systolic (25BRG), ZS , Min:91 , FLF:710      Diastolic (65PWK), IFT:61, Min:52, Max:112     PULSE Pulse  Av.3  Min: 94  Max: 119   RR Resp  Av.5  Min: 15  Max: 29   O2 SAT SpO2  Av %  Min: 90 %  Max: 97 %   OXYGEN DELIVERY O2 Flow Rate (L/min)  Av.8 L/min  Min: 2 L/min  Max: 3.5 L/min        Systemic Examination:   Physical Exam -  Constitutional:  Alert, cooperative and no distress. Mental Status:  Oriented to person, place and time and normal affect. Lungs:  Bilateral air entry present, lung fields clear. Normal effort. Cough persists  Heart:  Regular rate and rhythm, no murmur. Abdomen:  Soft, nontender, nondistended, normal bowel sounds. Extremities:  No edema, redness, tenderness in the calves. Skin:  Warm, dry, no gross lesions or rashes.     DATA REVIEW     Medications: Current Inpatient  Scheduled Meds:   lisinopril  15 mg Oral Daily    sodium chloride flush  5-40 mL IntraVENous 2 times per day    [Held by provider] enoxaparin  40 mg SubCUTAneous Daily    lidocaine  1 components found for: CSPUTUM  Recent Labs     09/29/21  1359   SPECDESC . FLUID . PERICARDIAL FLUID   SPECIAL NOT REPORTED   CULTURE PENDING     Recent Labs     09/28/21  1145 09/28/21  1245 09/29/21  1359   SPECDESC . THORACENTESIS FLUID  . THORACENTESIS FLUID  . THORACENTESIS FLUID .BLOOD . FLUID . PERICARDIAL FLUID   SPECIAL NOT REPORTED  NOT REPORTED NOT REPORTED NOT REPORTED   CULTURE NO GROWTH 10 HOURS  PENDING NO GROWTH 22 HOURS PENDING        Pathology:    Radiology Reports:  XR CHEST (2 VW)   Final Result   Possible mild CHF. Small effusions. US THORACENTESIS Which side should the procedure be performed? Left   Final Result   Successful ultrasound guided thoracentesis. CT CHEST PULMONARY EMBOLISM W CONTRAST   Final Result   1. No evidence for acute pulmonary embolism. 2. Moderate pericardial effusion. 3. Small right and moderate left pleural effusion with adjacent subsegmental   atelectasis. Echocardiogram:   Results for orders placed during the hospital encounter of 09/27/21    ECHO Complete 2D W Doppler W Color    Narrative  Transthoracic Echocardiography Report (TTE)    Patient Name Jose Hairston     Date of Study               09/29/2021  Bobby JACOB    Date of      1945  Gender                      Female  Birth    Age          78 year(s)  Race                            Room Number  2022        Height:                     62 inch, 157.48 cm    Corporate ID [id number]    Weight:                     134 pounds, 60.8 kg  #    Patient Acct [de-identified]   BSA:          1.61 m^2      BMI:     24.51 kg/m^2  #    MR #         [id number]     Sonographer                 Oumar Abo    Accession #  [id number]  Interpreting Physician      [address] [place] [address]    Fellow                   Referring Nurse  Practitioner    Interpreting             Referring Physician         Fadumo Lynn  Fellow    Type of Study    TTE procedure:2D Echocardiogram, M-Mode, Doppler, Color Doppler.     Procedure Date  Date: 09/29/2021 Start: 09:35 AM    Study Location: [facility]  Technical Quality: Adequate visualization    Indications:Pericardial effusion. History / Tech. Comments:  Procedure explained to patient. Echo completed at the bedside. PMHx:  Hypertension    Patient Status: Inpatient    Height: 62 inches Weight: 134 pounds BSA: 1.61 m^2 BMI: 24.51 kg/m^2    CONCLUSIONS    Summary  Left ventricle is normal in size, global left ventricular systolic function  is normal, calculated ejection fraction is 55%. Right atrium appears small. Moderate to large pericardial effusion. Posteriorly measuring 2.29cm, Anteriorly measuring 3.66cm. Gelatinous  material is noted throughout. Cannot rule out right ventricular collapse in the subcostal views, clinical  correlation recommended. Mitral and tricuspid inflows show physiologic mild evidence of tamponade. Pleural effusion noted. Signature  ----------------------------------------------------------------------------  Electronically signed by Cait Moon(Sonographer) on 09/29/2021 10:15 AM  ----------------------------------------------------------------------------    ----------------------------------------------------------------------------  Electronically signed by Patrice Martel(Interpreting physician) on 09/29/2021  10:22 AM  ----------------------------------------------------------------------------  FINDINGS  Left Atrium  Left atrium is normal in size. Inter-atrial septum is intact with no evidence for an atrial septal defect,  by color doppler. Left Ventricle  Left ventricle is normal in size, global left ventricular systolic function  is normal, calculated ejection fraction is 55%. Right Atrium  Right atrium appears small. Right Ventricle  Right ventricle appears reduced in size. Mitral Valve  Normal mitral valve structure. No mitral stenosis. No evidence of mitral regurgitation. Aortic Valve  Aortic valve is trileaflet.   Aortic valve is sclerotic but opens well. No aortic insufficiency. Tricuspid Valve  Tricuspid valve was not well visualized. No tricuspid regurgitation was seen. Insignificant tricuspid regurgitation, unable to estimate RVSP. Pulmonic Valve  Pulmonic valve not well visualized but Doppler velocities are normal.  No pulmonic insufficiency. Pericardial Effusion  Moderate to large pericardial effusion. Posteriorly measuring 2.29cm at largest visualized accumulation (parasternal  views.)  Anteriorly measuring 3.66cm at largest visualized accumulation (subcostal  views.)  Gelatinous material is noted throughout. Cannot rule out right ventricular collapse in the subcostal views, clinical  correlation recommended. Mitral and tricuspid inflows show physiologic mild evidence of tamponade. Pleural Effusion  Pleural effusion noted. Miscellaneous  Normal aortic root dimension. E/E' average = 10.65. IVC not well visualized.     M-mode / 2D Measurements & Calculations:    LVIDd:2.8 cm(3.7 - 5.6 cm)        Diastolic GUZJDA:91 ml  BRVV:8.7 cm(0.6 - 1.1 cm)         Aortic Root:2.8 cm(2.0 - 3.7 cm)  LVPWd:0.8 cm(0.6 - 1.1 cm)        LA Dimension: 3 cm(1.9 - 4.0 cm)  LA volume/Index: 39.98 ml /25m^2    RVDd:2.6 cm    Mitral:                                  Aortic    Valve Area (P1/2-Time): 5 cm^2           Peak Velocity: 1.48 m/s  Peak E-Wave: 0.84 m/s                    Mean Velocity: 1.04 m/s  Peak A-Wave: 1.08 m/s                    Peak Gradient: 8.76 mmHg  E/A Ratio: 0.77                          Mean Gradient: 5 mmHg  Peak Gradient: 2.8 mmHg  Mean Gradient: 2 mmHg  Deceleration Time: 169 msec              AV VTI: 26.9 cm  P1/2t: 44 msec    Mean Velocity: 0.67 m/s    Pulmonic:    Peak Velocity: 1.15 m/s  Peak Gradient: 5.29 mmHg    Diastology / Tissue Doppler  Septal Wall E' velocity:0.08 m/s  Septal Wall E/E':9.8  Lateral Wall E' velocity:0.07 m/s  Lateral Wall E/E':11.5       ASSESSMENT AND PLAN     Assessment:    // Viral syndrome  // Pericardial effusion  // Pleural effusion     Plan:  - Respiratory support to keep saturations above 90%. - Thoracentesis shows exudative fluid with monocytes  - Echo done this morning showing Moderate to large pericardial effusion. Patient to go for pericardiocentesis. - Symptomatic management of cough. Dextromethorphan and PRN tessalon.  - Droplet precautions. We will continue to follow. I will discuss with attending. Luana Dela Cruz MD  PGY-3, Internal Medicine Resident  [address] [place] [address], [place] [address]         9/29/2021, 4:01 PM    Please note that this chart was generated using voice recognition Dragon dictation software. Although every effort was made to ensure the accuracy of this automated transcription, some errors in transcription may have occurred. Attending Physician Statement  I have discussed the care of Jackson Tolentino, including pertinent history and exam findings with the resident. I have reviewed the key elements of all parts of the encounter with the resident. I have seen and examined the patient with the resident. I agree with the assessment and plan and status of the problem list as documented. I have seen the patient during my rounds this morning, chart reviewed, labs and medications reviewed. Overnight events noted patient had a T-max of 99.7. She is hemodynamically stable overnight with systolic blood pressure above 100. She is on 2 L nasal cannula and maintaining saturation above 95%. She does continue to complain of cough cough is mostly dry she does not have any wheezing. On exam she is mildly tachypneic not in distress. She has bilateral breath sounds present but decreased at both the bases in lower hemithorax. Distant heart sounds. Labs show sodium 126. BUN was 12 creatinine 0.61 bicarbonate 19. WBC count is 12.7 hemoglobin is stable at 11 platelet count 529.   A blood culture 1 out of 2 was reported initially as Listeria but per infectious disease history did not confirm when it is gram-positive bacilli identification pending. Thoracentesis done of left side about 850 mL fluid was withdrawn. Consistent with exudative according to protein criteria glucose 114 LDH is only 111 but total protein was 3.5 WBC was total 364. Gram stain negative. Chest x-ray after thoracentesis shows small bilateral pleural effusion and cardiomegaly. When I saw her this morning patient echocardiogram was pending at that time echo was called to do the echocardiogram and to inform cardiology after echo was done. Echo was done which shows moderate to large pericardial effusion and cardiology seen the patient plan for pericardiocentesis and to transfer patient to CVICU. We will follow the patient depending upon the further evaluation and chest x-ray for follow-up as needed  Discussed with nursing staff, treatment and plan discussed. Discussed with respiratory therapist.         Please note that this chart was generated using voice recognition Dragon dictation software. Although every effort was made to ensure the accuracy of this automated transcription, some errors in transcription may have occurred.      Angelica Frazier MD  9/29/2021 6:47 PM

## 2021-09-30 ENCOUNTER — APPOINTMENT (OUTPATIENT)
Dept: GENERAL RADIOLOGY | Age: 76
DRG: 871 | End: 2021-09-30
Payer: MEDICARE

## 2021-09-30 ENCOUNTER — APPOINTMENT (OUTPATIENT)
Dept: ULTRASOUND IMAGING | Age: 76
DRG: 871 | End: 2021-09-30
Payer: MEDICARE

## 2021-09-30 LAB
ABSOLUTE EOS #: 0.45 K/UL (ref 0–0.44)
ABSOLUTE IMMATURE GRANULOCYTE: 0.15 K/UL (ref 0–0.3)
ABSOLUTE LYMPH #: 1.07 K/UL (ref 1.1–3.7)
ABSOLUTE MONO #: 0.82 K/UL (ref 0.1–1.2)
ANION GAP SERPL CALCULATED.3IONS-SCNC: 9 MMOL/L (ref 9–17)
APPEARANCE FLUID: NORMAL
BASO FLUID: NORMAL %
BASOPHILS # BLD: 1 % (ref 0–2)
BASOPHILS ABSOLUTE: 0.06 K/UL (ref 0–0.2)
BUN BLDV-MCNC: 16 MG/DL (ref 8–23)
BUN/CREAT BLD: ABNORMAL (ref 9–20)
CALCIUM SERPL-MCNC: 8 MG/DL (ref 8.6–10.4)
CHLORIDE BLD-SCNC: 96 MMOL/L (ref 98–107)
CO2: 20 MMOL/L (ref 20–31)
COLOR FLUID: NORMAL
CREAT SERPL-MCNC: 0.62 MG/DL (ref 0.5–0.9)
DIFFERENTIAL TYPE: ABNORMAL
EOSINOPHIL FLUID: NORMAL %
EOSINOPHILS RELATIVE PERCENT: 4 % (ref 1–4)
FLUID DIFF COMMENT: NORMAL
GFR AFRICAN AMERICAN: >60 ML/MIN
GFR NON-AFRICAN AMERICAN: >60 ML/MIN
GFR SERPL CREATININE-BSD FRML MDRD: ABNORMAL ML/MIN/{1.73_M2}
GFR SERPL CREATININE-BSD FRML MDRD: ABNORMAL ML/MIN/{1.73_M2}
GLUCOSE BLD-MCNC: 95 MG/DL (ref 70–99)
HCT VFR BLD CALC: 33.5 % (ref 36.3–47.1)
HEMOGLOBIN: 10.8 G/DL (ref 11.9–15.1)
IMMATURE GRANULOCYTES: 1 %
LYMPHOCYTES # BLD: 10 % (ref 24–43)
LYMPHOCYTES, BODY FLUID: 60 %
MCH RBC QN AUTO: 28.3 PG (ref 25.2–33.5)
MCHC RBC AUTO-ENTMCNC: 32.2 G/DL (ref 28.4–34.8)
MCV RBC AUTO: 87.9 FL (ref 82.6–102.9)
MONOCYTE, FLUID: NORMAL %
MONOCYTES # BLD: 7 % (ref 3–12)
NEUTROPHIL, FLUID: 16 %
NRBC AUTOMATED: 0 PER 100 WBC
OTHER CELLS FLUID: NORMAL %
PDW BLD-RTO: 13.3 % (ref 11.8–14.4)
PLATELET # BLD: 276 K/UL (ref 138–453)
PLATELET ESTIMATE: ABNORMAL
PMV BLD AUTO: 10.4 FL (ref 8.1–13.5)
POTASSIUM SERPL-SCNC: 4.3 MMOL/L (ref 3.7–5.3)
RBC # BLD: 3.81 M/UL (ref 3.95–5.11)
RBC # BLD: ABNORMAL 10*6/UL
RBC FLUID: 9000 /MM3
SEG NEUTROPHILS: 77 % (ref 36–65)
SEGMENTED NEUTROPHILS ABSOLUTE COUNT: 8.7 K/UL (ref 1.5–8.1)
SODIUM BLD-SCNC: 125 MMOL/L (ref 135–144)
SPECIMEN TYPE: NORMAL
SURGICAL PATHOLOGY REPORT: NORMAL
WBC # BLD: 11.3 K/UL (ref 3.5–11.3)
WBC # BLD: ABNORMAL 10*3/UL
WBC FLUID: 625 /MM3

## 2021-09-30 PROCEDURE — 6360000002 HC RX W HCPCS: Performed by: NURSE PRACTITIONER

## 2021-09-30 PROCEDURE — 6370000000 HC RX 637 (ALT 250 FOR IP): Performed by: STUDENT IN AN ORGANIZED HEALTH CARE EDUCATION/TRAINING PROGRAM

## 2021-09-30 PROCEDURE — 93325 DOPPLER ECHO COLOR FLOW MAPG: CPT

## 2021-09-30 PROCEDURE — 99233 SBSQ HOSP IP/OBS HIGH 50: CPT | Performed by: INTERNAL MEDICINE

## 2021-09-30 PROCEDURE — 2000000000 HC ICU R&B

## 2021-09-30 PROCEDURE — 6370000000 HC RX 637 (ALT 250 FOR IP): Performed by: NURSE PRACTITIONER

## 2021-09-30 PROCEDURE — 80048 BASIC METABOLIC PNL TOTAL CA: CPT

## 2021-09-30 PROCEDURE — 36415 COLL VENOUS BLD VENIPUNCTURE: CPT

## 2021-09-30 PROCEDURE — 2709999900 US THORACENTESIS

## 2021-09-30 PROCEDURE — 2580000003 HC RX 258: Performed by: STUDENT IN AN ORGANIZED HEALTH CARE EDUCATION/TRAINING PROGRAM

## 2021-09-30 PROCEDURE — 97530 THERAPEUTIC ACTIVITIES: CPT

## 2021-09-30 PROCEDURE — 71045 X-RAY EXAM CHEST 1 VIEW: CPT

## 2021-09-30 PROCEDURE — 94761 N-INVAS EAR/PLS OXIMETRY MLT: CPT

## 2021-09-30 PROCEDURE — 85025 COMPLETE CBC W/AUTO DIFF WBC: CPT

## 2021-09-30 PROCEDURE — 94664 DEMO&/EVAL PT USE INHALER: CPT

## 2021-09-30 PROCEDURE — APPSS60 APP SPLIT SHARED TIME 46-60 MINUTES: Performed by: NURSE PRACTITIONER

## 2021-09-30 PROCEDURE — 6360000002 HC RX W HCPCS: Performed by: STUDENT IN AN ORGANIZED HEALTH CARE EDUCATION/TRAINING PROGRAM

## 2021-09-30 PROCEDURE — 6370000000 HC RX 637 (ALT 250 FOR IP): Performed by: INTERNAL MEDICINE

## 2021-09-30 PROCEDURE — 2700000000 HC OXYGEN THERAPY PER DAY

## 2021-09-30 PROCEDURE — 93308 TTE F-UP OR LMTD: CPT

## 2021-09-30 PROCEDURE — 97162 PT EVAL MOD COMPLEX 30 MIN: CPT

## 2021-09-30 PROCEDURE — 99232 SBSQ HOSP IP/OBS MODERATE 35: CPT | Performed by: INTERNAL MEDICINE

## 2021-09-30 PROCEDURE — 94640 AIRWAY INHALATION TREATMENT: CPT

## 2021-09-30 RX ORDER — FUROSEMIDE 10 MG/ML
40 INJECTION INTRAMUSCULAR; INTRAVENOUS DAILY
Status: DISCONTINUED | OUTPATIENT
Start: 2021-09-30 | End: 2021-10-05 | Stop reason: HOSPADM

## 2021-09-30 RX ORDER — FLUTICASONE PROPIONATE 50 MCG
1 SPRAY, SUSPENSION (ML) NASAL DAILY
Status: DISCONTINUED | OUTPATIENT
Start: 2021-09-30 | End: 2021-10-05 | Stop reason: HOSPADM

## 2021-09-30 RX ORDER — BUDESONIDE AND FORMOTEROL FUMARATE DIHYDRATE 160; 4.5 UG/1; UG/1
2 AEROSOL RESPIRATORY (INHALATION) 2 TIMES DAILY
Status: DISCONTINUED | OUTPATIENT
Start: 2021-09-30 | End: 2021-10-05 | Stop reason: HOSPADM

## 2021-09-30 RX ORDER — CETIRIZINE HYDROCHLORIDE 10 MG/1
10 TABLET ORAL DAILY
Status: DISCONTINUED | OUTPATIENT
Start: 2021-09-30 | End: 2021-10-05 | Stop reason: HOSPADM

## 2021-09-30 RX ADMIN — SODIUM CHLORIDE, PRESERVATIVE FREE 10 ML: 5 INJECTION INTRAVENOUS at 09:52

## 2021-09-30 RX ADMIN — Medication 30 MG: at 09:44

## 2021-09-30 RX ADMIN — GUAIFENESIN AND CODEINE PHOSPHATE 5 ML: 100; 10 SOLUTION ORAL at 07:54

## 2021-09-30 RX ADMIN — BENZONATATE 100 MG: 100 CAPSULE ORAL at 04:16

## 2021-09-30 RX ADMIN — GUAIFENESIN AND CODEINE PHOSPHATE 5 ML: 100; 10 SOLUTION ORAL at 11:17

## 2021-09-30 RX ADMIN — FUROSEMIDE 40 MG: 10 INJECTION, SOLUTION INTRAMUSCULAR; INTRAVENOUS at 15:04

## 2021-09-30 RX ADMIN — SODIUM CHLORIDE, PRESERVATIVE FREE 10 ML: 5 INJECTION INTRAVENOUS at 20:55

## 2021-09-30 RX ADMIN — Medication 30 MG: at 20:26

## 2021-09-30 RX ADMIN — FLUTICASONE PROPIONATE 1 SPRAY: 50 SPRAY, METERED NASAL at 12:35

## 2021-09-30 RX ADMIN — BENZONATATE 100 MG: 100 CAPSULE ORAL at 20:26

## 2021-09-30 RX ADMIN — DIPHENHYDRAMINE HYDROCHLORIDE 25 MG: 50 INJECTION, SOLUTION INTRAMUSCULAR; INTRAVENOUS at 00:59

## 2021-09-30 RX ADMIN — OXYCODONE HYDROCHLORIDE 5 MG: 5 TABLET ORAL at 20:26

## 2021-09-30 RX ADMIN — CETIRIZINE HYDROCHLORIDE 10 MG: 10 TABLET ORAL at 12:35

## 2021-09-30 RX ADMIN — DIPHENHYDRAMINE HYDROCHLORIDE 25 MG: 50 INJECTION, SOLUTION INTRAMUSCULAR; INTRAVENOUS at 20:26

## 2021-09-30 RX ADMIN — GUAIFENESIN AND CODEINE PHOSPHATE 5 ML: 100; 10 SOLUTION ORAL at 18:01

## 2021-09-30 RX ADMIN — BUDESONIDE AND FORMOTEROL FUMARATE DIHYDRATE 2 PUFF: 160; 4.5 AEROSOL RESPIRATORY (INHALATION) at 20:51

## 2021-09-30 RX ADMIN — GUAIFENESIN AND CODEINE PHOSPHATE 5 ML: 100; 10 SOLUTION ORAL at 00:59

## 2021-09-30 RX ADMIN — BENZONATATE 100 MG: 100 CAPSULE ORAL at 12:35

## 2021-09-30 ASSESSMENT — ENCOUNTER SYMPTOMS
BACK PAIN: 0
EYE REDNESS: 0
ABDOMINAL PAIN: 0
DIARRHEA: 0
CHEST TIGHTNESS: 0
RHINORRHEA: 0
SINUS PRESSURE: 0
PHOTOPHOBIA: 0
COUGH: 1
SORE THROAT: 0
VOMITING: 0
SHORTNESS OF BREATH: 1
TACHYPNEA: 1
NAUSEA: 0

## 2021-09-30 ASSESSMENT — PAIN SCALES - GENERAL
PAINLEVEL_OUTOF10: 7
PAINLEVEL_OUTOF10: 0

## 2021-09-30 NOTE — BRIEF OP NOTE
Brief Postoperative Note for Thoracentesis    Jono Parra  YOB: 1945  [id number]    Pre-operative Diagnosis: left Pleural effusion      Post-operative Diagnosis: Same    Procedure: Ultrasound guided Thoracentesis     Anesthesia: 1% Lidocaine     Surgeons/Assistants: Jesús Gutiérrez PA-C    Complications: none    EBL: Minimal    Specimens: were obtained    Ultrasound guided left thoracentesis performed. 500 ml clear yellow fluid obtained. Dressing applied.       Electronically signed by DAKOTAH Alexis on 9/30/2021 at 5:10 PM

## 2021-09-30 NOTE — PROGRESS NOTES
[facility]  Office: [address], DO, Jenny Roldan, DO, Ludmila Ramsay, DO, Kaz Zazueta Blood, DO, Cornelia James MD, John Medina MD, Muna Recinos MD, Juliana Kamara MD, Katarzyna Trotter MD, Babak Mosquera MD, Dru Ortega MD, Kimberley Hernandez, DO, Jean-Claude Clark, DO, Hussein Villela MD,  Kevin Joseph, DO, Jens Loja MD, Ysabel Salcido MD, Lester Vaughan MD, Leroy Chau MD, , Kelly Gagnon MD, Carmenza Garcia MD, Chao John MD, Alferd Felty, CNP, [facility], CNP, Matthew Leal, CNP, Avelina Fox, CNS, Moises Langston, CNP, Leelee Norman, CNP, Lien Martinez, CNP, Albin Larson, CNP, Jossie Abel, [facility], Sravan Jackson PA-C, Emmy Lam, [facility], Kvng Perdomo, CNP, Dixon Edgar, CNP, Cristel Henning, CNP, Mike Boyd, CNP, Sanjiv Nunez, CNP, Bia Alarcon, CNP, Rima Matthews, [facility], Esther Brady, [address] [place] [address]    Progress Note    9/30/2021    7:51 AM    Name:   Kuldeep So  MRN:     [id number]     Acct:      [de-identified]   Room:   [address] [place] [address] Day:  3  Admit Date:  9/27/2021  7:16 AM    PCP:   CELY Boone CNP  Code Status:  Full Code    Subjective:     C/C:   Chief Complaint   Patient presents with    Cough     Interval History Status: worsened     Patient evaluated in room continues to endorse dry nonproductive cough which is not new for her. Pericardial drain in place with minimal drainage. Continues to endorse shortness of breath that is improving when compared to yesterday. Brief History:     Kuldeep So is a 78 y.o. Non- / non  female who presents with Cough   and is admitted to the hospital for the management of <principal problem not specified>.     68-year-old female presented to [facility] ER with complaints of left-sided chest pain and persistent cough. Patient has had cough for last 2 weeks, and progressively worsening.   Dry in nature, no phlegm production. She has heat intolerance. No fever or chills. Patient was discharged from ER a day ago with oral Levaquin, she returned back the next day as her symptoms were worsening. She had poor appetite for last 4 days. Patient has been a non-smoker and has no previous history of cardiac or lung disease.     In the ER patient was noted to be afebrile, tachycardic with pulse rate of 121, blood pressure 129/72, saturating well on room air around 95%. She was noted to have hyponatremia with sodium of 124, chloride 92, creatinine 0.4, glucose 129, pro-Clay 0.13. Elevated leukocytosis with WBC 14.2, 13.4. Rapid Covid was negative twice. Patient had CT chest which showed small right and moderate left pleural effusion with adjacent subsegmental atelectasis. CT scan was also concerning for moderate pericardial effusion. Review of Systems:     Constitutional:  negative for chills, fevers, sweats  Respiratory:  negative for cough, dyspnea on exertion, +shortness of breath, wheezing  Cardiovascular:  negative for chest pain, chest pressure/discomfort, lower extremity edema, palpitations  Gastrointestinal:  negative for abdominal pain, constipation, diarrhea, nausea, vomiting  Neurological:  negative for dizziness, headache    Medications: Allergies:     Allergies   Allergen Reactions    Bee Pollen Anaphylaxis    Erythromycin Anaphylaxis    Pcn [Penicillins] Anaphylaxis    Tetanus Toxoids Anaphylaxis    Tetracyclines & Related Anaphylaxis       Current Meds:   Scheduled Meds:    [Held by provider] lisinopril  15 mg Oral Daily    sodium chloride flush  5-40 mL IntraVENous 2 times per day    [Held by provider] enoxaparin  40 mg SubCUTAneous Daily    lidocaine  1 patch TransDERmal Daily    sodium chloride flush  5-40 mL IntraVENous 2 times per day    dextromethorphan  30 mg Oral 2 times per day     Continuous Infusions:    sodium chloride      sodium chloride      sodium chloride 30 mL/hr at 21 1408     PRN Meds: albuterol sulfate HFA, benzonatate, sodium chloride flush, sodium chloride, ondansetron **OR** ondansetron, polyethylene glycol, acetaminophen **OR** acetaminophen, potassium chloride **OR** potassium alternative oral replacement **OR** potassium chloride, magnesium sulfate, oxyCODONE, guaiFENesin-codeine, sodium chloride flush, sodium chloride, diphenhydrAMINE, sodium chloride flush    Data:     Past Medical History:   has a past medical history of Arthritis, Displacement of lumbar intervertebral disc without myelopathy, Hypertension, Lumbar disc disease, Personal history of TIA (transient ischemic attack), Sleep deprivation, SOB (shortness of breath), and Wears glasses. Social History:   reports that she has never smoked. She has never used smokeless tobacco. She reports that she does not drink alcohol and does not use drugs. Family History:   Family History   Problem Relation Age of Onset    Lung Cancer Mother     Lung Cancer Brother     Heart Disease Father     Heart Disease Paternal Grandfather        Vitals:  BP (!) 142/62   Pulse 104   Temp 98.2 °F (36.8 °C) (Oral)   Resp 22   Ht 5' 2\" (1.575 m)   Wt 139 lb 6.4 oz (63.2 kg)   SpO2 97%   BMI 25.50 kg/m²   Temp (24hrs), Av.5 °F (36.9 °C), Min:97.7 °F (36.5 °C), Max:99.7 °F (37.6 °C)    No results for input(s): POCGLU in the last 72 hours. I/O (24Hr):     Intake/Output Summary (Last 24 hours) at 2021 0751  Last data filed at 2021 0617  Gross per 24 hour   Intake --   Output 260 ml   Net -260 ml       Labs:  Hematology:  Recent Labs     21  0912 21  0334 21  0632 21  0509 21  0526   WBC   < >  --  13.4* 12.7* 11.3   RBC   < >  --  4.22 3.66* 3.81*   HGB   < >  --  12.2 11.0* 10.8*   HCT   < >  --  38.7 34.4* 33.5*   MCV   < >  --  91.7 94.0 87.9   MCH   < >  --  28.9 30.1 28.3   MCHC   < >  --  31.5 32.0 32.2   RDW   < >  --  13.7 13.5 13.3   PLT   < >  --  254 454* 276 MPV   < >  --  10.6 10.7 10.4   CRP  --  179.7*  --   --   --     < > = values in this interval not displayed. Chemistry:  Recent Labs     09/27/21  0912 09/27/21  0912 09/28/21  0334 09/29/21  0509 09/30/21  0526   *   < > 124* 126* 125*   K 4.3   < > 4.5 4.2 4.3   CL 91*   < > 92* 94* 96*   CO2 22   < > 23 19* 20   GLUCOSE 165*   < > 129* 95 95   BUN 10   < > 10 12 16   CREATININE 0.46*   < > 0.41* 0.61 0.62   ANIONGAP 10   < > 9 13 9   LABGLOM >60   < > >60 >60 >60   GFRAA >60   < > >60 >60 >60   CALCIUM 8.1*   < > 8.1* 7.9* 8.0*   TROPHS 14  --   --   --   --     < > = values in this interval not displayed. Recent Labs     09/28/21 0334 09/28/21  1246 09/29/21  1446   PROT  --  5.9*  --    TSH 1.24  --   --    LDH  --  152 179       Lab Results   Component Value Date/Time    SPECIAL NOT REPORTED 09/29/2021 07:37 PM     Lab Results   Component Value Date/Time    CULTURE PENDING 09/29/2021 07:37 PM       Radiology:  XR CHEST (2 VW)    Result Date: 9/28/2021  Possible mild CHF. Small effusions. XR CHEST PORTABLE    Result Date: 9/26/2021  Small bilateral pleural effusions with left mid to lower lobe infiltrate or atelectasis. Clinical correlation for pneumonia. Clinical and imaging follow-up to resolution recommended. CT CHEST PULMONARY EMBOLISM W CONTRAST    Result Date: 9/27/2021  1. No evidence for acute pulmonary embolism. 2. Moderate pericardial effusion. 3. Small right and moderate left pleural effusion with adjacent subsegmental atelectasis. US THORACENTESIS Which side should the procedure be performed? Left    Result Date: 9/28/2021  Successful ultrasound guided thoracentesis.        Physical Examination:        General appearance:  alert, cooperative and ill-appearing  Mental Status:  oriented to person, place and time and normal affect  Lungs: Shallow inspiratory effort, CTA bilaterally anteriorly, diminished posteriorly  Heart: Tachycardic, regular rhythm, no murmur  Abdomen:  soft, nontender, nondistended, normal bowel sounds, no masses, hepatomegaly, splenomegaly  Extremities:  no edema, redness, tenderness in the calves  Skin:  no gross lesions, rashes, induration    Assessment:        Hospital Problems         Last Modified POA    * (Principal) Listeria sepsis ([facility] [address] 9/28/2021 Yes    PNA (pneumonia) 9/27/2021 Yes    Pneumonia 9/27/2021 Yes    Sinus tachycardia 9/28/2021 Yes    Viral sepsis ([facility] [address] 9/28/2021 Yes    Poor appetite 9/28/2021 Yes    Pericardial effusion 9/28/2021 Yes    Pleural effusion 9/28/2021 Yes    Hyponatremia 9/28/2021 Yes    Essential hypertension 9/28/2021 Yes    Chest wall pain 9/29/2021 Yes          Plan:        Traumatic cardiac tamponade:   - Echo results reviewed. - Discussed with cardiology. - Plan for pericardial tap completed     Bilateral pleural effusions:   - Worse on the left than the right initially. - Status post 800 mL of clear, yellow fluid from thoracentesis on 9/28  - repeat CXR today     Acute hypoxic respiratory failure:   - Secondary to both 1 and 2.    - Continue low-flow O2 as warranted. Hyponatremia:   - Likely secondary to poor p.o. intake. - Patient states diminished appetite at home. - Will readdress fluid status post cardiac procedure    Gram-positive rods:   - Identified in 1 out of 2 blood cultures.     - deemed contaminant by ID    Moderate malnutrition:   - With hypoalbuminemia and hypoproteinemia  -Supplements twice daily with meals starting today     Rhinovirus positive    Covid negative status    Transfer to ICU    CELY Del Real NP  9/30/2021  7:51 AM

## 2021-09-30 NOTE — PROGRESS NOTES
[facility]   Progress Note                   Date:   9/30/2021  Patient name: Courtney Burden  Date of admission:  9/27/2021  7:16 AM  MRN:   [id number]  YOB: 1945  PCP: CELY Araujo CNP    Reason for Admission: Chest wall pain [R07.89]  Pericardial effusion [I31.3]  Pneumonia [J18.9]  Pleural effusion [J90]  PNA (pneumonia) [J18.9]    Subjective:       Clinical Changes / Abnormalities:    Patient seen and examined at bedside this morning. No acute events over the night. S/p pericardiocentesis with drainage of 200 cc on 9/29. Medications:   Scheduled Meds:   lisinopril  15 mg Oral Daily    sodium chloride flush  5-40 mL IntraVENous 2 times per day    [Held by provider] enoxaparin  40 mg SubCUTAneous Daily    lidocaine  1 patch TransDERmal Daily    sodium chloride flush  5-40 mL IntraVENous 2 times per day    dextromethorphan  30 mg Oral 2 times per day     Continuous Infusions:   sodium chloride      sodium chloride      sodium chloride 30 mL/hr at 09/28/21 1408     CBC:   Recent Labs     09/28/21  0632 09/29/21  0509 09/30/21  0526   WBC 13.4* 12.7* 11.3   HGB 12.2 11.0* 10.8*    454* 276     BMP:    Recent Labs     09/27/21  0912 09/28/21  0334 09/29/21  0509   * 124* 126*   K 4.3 4.5 4.2   CL 91* 92* 94*   CO2 22 23 19*   BUN 10 10 12   CREATININE 0.46* 0.41* 0.61   GLUCOSE 165* 129* 95     Hepatic:   No results for input(s): AST, ALT, ALB, BILITOT, ALKPHOS in the last 72 hours. Troponin:   Recent Labs     09/27/21  0912   TROPHS 14     BNP: No results for input(s): BNP in the last 72 hours. Lipids: No results for input(s): CHOL, HDL in the last 72 hours. Invalid input(s): LDLCALCU  INR:   No results for input(s): INR in the last 72 hours.     Objective:   Vitals: BP (!) 142/62   Pulse 104   Temp 98.2 °F (36.8 °C) (Oral)   Resp 22   Ht 5' 2\" (1.575 m)   Wt 139 lb 6.4 oz (63.2 kg)   SpO2 97%   BMI 25.50 kg/m²   General appearance: alert and cooperative with exam  HEENT: Head: Normocephalic, no lesions, without obvious abnormality. Neck: no JVD, trachea midline, no adenopathy  Lungs: Diminished throughout. Supplemental oxygen per NC. Frequent cough. Heart: Regular rate and rhythm, s1/s2 auscultated, no murmurs. Sinus tachycardia. Abdomen: soft, non-tender, bowel sounds active  Extremities: no edema  Neurologic: not done    ECHO 9/29/2021     Summary  Left ventricle is normal in size, global left ventricular systolic function  is normal, calculated ejection fraction is 55%. Right atrium appears small. Moderate to large pericardial effusion. Posteriorly measuring 2.29cm, Anteriorly measuring 3.66cm. Gelatinous  material is noted throughout. Cannot rule out right ventricular collapse in the subcostal views, clinical  correlation recommended. Mitral and tricuspid inflows show physiologic mild evidence of tamponade. Pleural effusion noted. Assessment / Acute Cardiac Problems:     1. Pleural effusion 2/2 rhinovirus/enterovirus s/p thoracentesis  2. Pericardial effusion 2/p pericardiocentesis on 9/29  3. Viral syndrome  4. SOSA positive  5. Pleurisy 2/2 viral vs rheumatological etiology? 6. HTN, on lisinorpil  7. B/l varicose veins         Plan of Treatment:   1. Pericardial and pleural effusion in setting of positive viral panel. Possible etiology is viral vs. Rheumatological disorder given positive SOSA. Would follow up pleural and pericardial fluid c/s.  2. Will obtain a limited echo today  3. Would recommend to keep lisinopril on hold and re-initiate at lower dose if patient is hypertensive.         Electronically signed by Robyn Martinez MD on 9/30/2021 at 6:17 AM  [facility]      [phone]

## 2021-09-30 NOTE — PROGRESS NOTES
PT HAD IN PT THORACENTESIS TODAY. 500 ML OF PLEURAL FLUID REMOVED. PT TOLERATED WELL WITH STABLE SA02 THROUGHOUT PROCEDURE. 2X2 GAUZE AND TEGADERM TO CATHETER SITE CLEAN AND DRY. REPORT TO FLOOR. RESP REMAINS LABORED. READY FOR TRANSPORT.

## 2021-09-30 NOTE — PROGRESS NOTES
Physical Therapy    Facility/Department: [facility]  Initial Assessment    NAME: Salvador Bentley  : 1945  MRN: [id number]    Date of Service: 2021  68-year-old female presented to \[facility]"" ER with complaints of left-sided chest pain and persistent cough. Patient has had cough for last 2 weeks, and progressively worsening. Dry in nature, no phlegm production. She has heat intolerance. No fever or chills. Patient was discharged from ER a day ago with oral Levaquin, she returned back the next day as her symptoms were worsening. She had poor appetite for last 4 days. Patient has been a non-smoker and has no previous history of cardiac or lung disease. Pt found to have rhinovirus/enterovirus. Discharge Recommendations:  Further therapy recommended at discharge. PT Equipment Recommendations  Equipment Needed: No    Assessment    Pt cooperative, states she feels weak and unsteady on her feet. Min A for transfers and gait. Educated pt re: importance of being OOB, increasing activity as able to improve strength/independence. Pt agreeable to be OOB/up to chair throughout the day. Body structures, Functions, Activity limitations: Decreased functional mobility ; Decreased endurance;Decreased balance;Decreased posture  Prognosis: Good  Decision Making: Medium Complexity  PT Education: Goals;PT Role;Plan of Care  Barriers to Learning: none  REQUIRES PT FOLLOW UP: Yes  Activity Tolerance  Activity Tolerance: Patient limited by fatigue;Patient limited by endurance       Patient Diagnosis(es): The primary encounter diagnosis was Pleural effusion. Diagnoses of Chest wall pain and Pericardial effusion were also pertinent to this visit. has a past medical history of Arthritis, Displacement of lumbar intervertebral disc without myelopathy, Hypertension, Lumbar disc disease, Personal history of TIA (transient ischemic attack), Sleep deprivation, SOB (shortness of breath), and Wears glasses.    has a past surgical history that includes Wrist surgery (Left); Hysterectomy; Ankle surgery (Right); Tonsillectomy; Nerve Block (10/19/15); Nerve Block (10-26-15 ); Foot surgery (2015); Hip arthroscopy (Left, 03/14/2018); and pr hip arthroscopy, dx (Left, 3/14/2018).     Restrictions  Restrictions/Precautions  Restrictions/Precautions: Cardiac, General Precautions, Fall Risk, Up as Tolerated, Isolation (droplet precautions (N-95) rhinovirus/enterovirus)  Required Braces or Orthoses?: No  Vision/Hearing  Vision: Impaired  Vision Exceptions: Wears glasses at all times  Hearing: Within functional limits     Subjective  General  Patient assessed for rehabilitation services?: Yes  Response To Previous Treatment: Not applicable  Family / Caregiver Present: Yes (dtr and SO (SO entered at end of PT session))  Follows Commands: Within Functional Limits  Pain Screening  Patient Currently in Pain: Denies  Vital Signs  Patient Currently in Pain: Denies       Orientation  Orientation  Overall Orientation Status: Within Normal Limits  Social/Functional History  Social/Functional History  Lives With: Significant other  Type of Home: House  Home Layout: One level  Home Access: Stairs to enter without rails (Pt states they are going to put up some handrails)  Entrance Stairs - Number of Steps: 3  ADL Assistance: Independent  Ambulation Assistance: Independent  Transfer Assistance: Independent  Active : Yes  Mode of Transportation: Car  Occupation: Part time employment  Type of occupation: [employer] carlyn for [employer]    Objective     Observation/Palpation  Posture: Fair    AROM RLE (degrees)  RLE AROM: WFL  AROM LLE (degrees)  LLE AROM : WFL  AROM RUE (degrees)  RUE AROM : WFL  AROM LUE (degrees)  LUE AROM : WFL  Strength RLE  Strength RLE: WFL  Strength LLE  Strength LLE: WFL  Strength RUE  Strength RUE: WFL  Strength LUE  Strength LUE: WFL  Tone RLE  RLE Tone: Normotonic  Tone LLE  LLE Tone: Normotonic  Motor Control  Gross Motor?: WFL  Sensation  Overall Sensation Status: WNL  Bed mobility  Rolling to Right: Modified independent  Supine to Sit: Modified independent  Scooting: Supervision  Transfers  Sit to Stand: Minimal Assistance  Stand to sit: Minimal Assistance  Bed to Chair: Minimal assistance  Stand Pivot Transfers: Minimal Assistance  Ambulation  Ambulation?: Yes  Ambulation 1  Surface: level tile  Device: No Device  Other Apparatus: O2  Assistance: Minimal assistance  Quality of Gait: mildly flexed, wide VINCE, moves cautiously and slowly, unsteady  Gait Deviations: Slow Ira; Increased VINCE; Decreased step length;Decreased arm swing;Decreased head and trunk rotation;Deviated path;Staggers  Distance: 10'x2  Stairs/Curb  Stairs?: No     Balance  Posture: Fair  Sitting - Static: Good  Sitting - Dynamic: Good  Standing - Static: Fair  Standing - Dynamic: Fair;-  Other exercises  Other exercises 1: AROM x 4 reps  Other exercises 2: use of IS--pt able to achieve 1000 ccs     Plan   Plan  Times per week: 5-6 visits weekly  Times per day: Daily  Current Treatment Recommendations: Strengthening, ROM, Balance Training, Functional Mobility Training, Transfer Training, Endurance Training, Gait Training, Stair training, Safety Education & Training, Patient/Caregiver Education & Training, Positioning  Plan Comment:  (educated pt re: importance of being OOB/up in chair for improvement of breathing/counteract bed rest)  Safety Devices  Type of devices: Call light within reach, Gait belt, Patient at risk for falls, Left in chair, Nurse notified  Restraints  Initially in place: No    AM-PAC Score  AM-PAC Inpatient Mobility Raw Score : 18 (09/30/21 0859)  AM-PAC Inpatient T-Scale Score : 43.63 (09/30/21 0859)  Mobility Inpatient CMS 0-100% Score: 46.58 (09/30/21 0859)  Mobility Inpatient CMS G-Code Modifier : CK (09/30/21 0859)          Goals  Short term goals  Time Frame for Short term goals: 12 visits  Short term goal 1: independent bed mobility without use of bed rails/controls  Short term goal 2: independent transfers  Short term goal 3: independent gait without device x 100'  Short term goal 4: independent stair ambulation x 3 steps with 1 HR  Patient Goals   Patient goals : feel better, go home       Therapy Time   Individual Concurrent Group Co-treatment   Time In 0815         Time Out 0845         Minutes 30                 Behzad Partida, PT

## 2021-09-30 NOTE — PROGRESS NOTES
[facility] - Progress Note  Today's Date and Time: 9/30/2021, 11:09 AM    Impression :   Shortness of breath  Rhinovirus or enterovirus infection  History of TIA (2012 mini stroke high blood pressure)  Bacteremia with Gram positive bacilli = Contaminant 9-28-21    Recommendations:   Monitor off antibiotics    Medical Decision Making/Summary/Discussion:9/30/2021       Infection Control Recommendations   Benoit Precautions    Antimicrobial Stewardship Recommendations     Simplification of therapy  Targeted therapy  IV to oral conversion    Coordination of Outpatient Care:   Estimated Length of IV antimicrobials:None   Patient will need Midline Catheter Insertion: No  Patient will need PICC line Insertion:No  Patient will need: Home IV , [place],  SNF,  LTAC:TBD  Patient will need outpatient wound care:No    Chief complaint/reason for consultation:   Sepsis/ pneumonia    History of Present Illness:   Luisito Rivas is a 78y.o.-year-old  female who was initially admitted on 9/27/2021. Patient seen at the request of Dr. Rina Covarrubias:    Patient presented to the ED on 9/27/2021 complaining of left-sided chest wall pain and persistent cough. She presented with shortness of breath pain and tachycardic with a coughing fit. Pain is worse with movement per ED note. She was seen a day before and was offered admission but she preferred going home. Two weeks ago she was seen at an urgent care and was diagnosed with pleural effusion and was placed on amoxicillin at the time. CURRENT EVALUATION: 9/30/2021    Patient seen with family at bedside. Patient complains of persistent coughing and states its most prominent when she gets up from a nap. Patient denies any nausea, vomiting, fever or chills at this time    Ultrasound guided thoracentesis was performed on 9/28/2021.  800 mL of clear yellow fluid was obtained. Cultures were sent to lab.     Cardiac block #2 decadron 5 mg    WI HIP ARTHROSCOPY, DX Left 3/14/2018    LEFT HIP ARTHROSCOPY WITH  IT BAND RELEASE BURSECTOMY WITH GLUTEUS MUSCLE REPAIR performed by Gene Valles DO at [address] Left     x 2       Medications:      fluticasone  1 spray Each Nostril Daily    cetirizine  10 mg Oral Daily    [Held by provider] lisinopril  15 mg Oral Daily    sodium chloride flush  5-40 mL IntraVENous 2 times per day    enoxaparin  40 mg SubCUTAneous Daily    lidocaine  1 patch TransDERmal Daily    sodium chloride flush  5-40 mL IntraVENous 2 times per day    dextromethorphan  30 mg Oral 2 times per day       Social History:     Social History     Socioeconomic History    Marital status:      Spouse name: Not on file    Number of children: Not on file    Years of education: Not on file    Highest education level: Not on file   Occupational History     Employer: [employer]     Comment: [place]   Tobacco Use    Smoking status: Never Smoker    Smokeless tobacco: Never Used   Vaping Use    Vaping Use: Never used   Substance and Sexual Activity    Alcohol use: No    Drug use: No    Sexual activity: Not on file   Other Topics Concern    Not on file   Social History Narrative    Not on file     Social Determinants of Health     Financial Resource Strain:     Difficulty of Paying Living Expenses:    Food Insecurity:     Worried About Running Out of Food in the Last Year:     Ran Out of Food in the Last Year:    Transportation Needs:     Lack of Transportation (Medical):      Lack of Transportation (Non-Medical):    Physical Activity:     Days of Exercise per Week:     Minutes of Exercise per Session:    Stress:     Feeling of Stress :    Social Connections:     Frequency of Communication with Friends and Family:     Frequency of Social Gatherings with Friends and Family:     Attends Mormon Services:     Active Member of Clubs or Organizations:     Attends Club or Organization Meetings:     Marital Status:    Intimate Partner Violence:     Fear of Current or Ex-Partner:     Emotionally Abused:     Physically Abused:     Sexually Abused:        Family History:     Family History   Problem Relation Age of Onset    Lung Cancer Mother     Lung Cancer Brother     Heart Disease Father     Heart Disease Paternal Grandfather         Allergies:   Bee pollen, Erythromycin, Pcn [penicillins], Tetanus toxoids, and Tetracyclines & related     Review of Systems:   Constitutional: No fevers or chills. No systemic complaints  Head: No headaches  Eyes: No double vision or blurry vision. No conjunctival inflammation. ENT: No sore throat or runny nose. . No hearing loss, tinnitus or vertigo. Cardiovascular: No chest pain or palpitations. No shortness of breath. No CALVILLO  Lung: No shortness of breath or cough. No sputum production  Abdomen: No nausea, vomiting, diarrhea, or abdominal pain. Mati Amend No cramps. Genitourinary: No increased urinary frequency, or dysuria. No hematuria. No suprapubic or CVA pain  Musculoskeletal: No muscle aches or pains. No joint effusions, swelling or deformities  Hematologic: No bleeding or bruising. Neurologic: No headache, weakness, numbness, or tingling. Integument: No rash, no ulcers. Psychiatric: No depression. Endocrine: No polyuria, no polydipsia, no polyphagia. Physical Examination :     Patient Vitals for the past 8 hrs:   BP Temp Temp src Pulse Resp SpO2   09/30/21 1000 106/63 -- -- 106 -- 96 %   09/30/21 0900 (!) 91/49 -- -- 105 -- 95 %   09/30/21 0800 109/69 98.4 °F (36.9 °C) Oral 112 25 (!) 89 %   09/30/21 0700 (!) 96/53 -- -- 95 -- 93 %   09/30/21 0400 (!) 142/62 98.2 °F (36.8 °C) Oral -- 22 --     General Appearance: Awake, alert, and in no apparent distress  Head:  Normocephalic, no trauma  Eyes: Pupils equal, round, reactive to light and accommodation; extraocular movements intact; sclera anicteric; conjunctivae pink.  No embolic phenomena. ENT: Oropharynx clear, without erythema, exudate, or thrush. No tenderness of sinuses. Mouth/throat: mucosa pink and moist. No lesions. Dentition in good repair. Neck:Supple, without lymphadenopathy. Thyroid normal, No bruits. Pulmonary/Chest: Clear to auscultation, without wheezes, rales, or rhonchi. No dullness to percussion. Cardiovascular: Regular rate and rhythm without murmurs, rubs, or gallops. Abdomen: Soft, non tender. Bowel sounds normal. No organomegaly  All four Extremities: No cyanosis, clubbing, edema, or effusions. Neurologic: No gross sensory or motor deficits. Skin: Warm and dry with good turgor. No signs of peripheral arterial or venous insufficiency. No ulcerations. No open wounds. Medical Decision Making -Laboratory:   I have independently reviewed/ordered the following labs:    CBC with Differential:   Recent Labs     09/29/21  0509 09/30/21  0526   WBC 12.7* 11.3   HGB 11.0* 10.8*   HCT 34.4* 33.5*   * 276   LYMPHOPCT 11* 10*   MONOPCT 9 7     BMP:   Recent Labs     09/29/21  0509 09/30/21  0526   * 125*   K 4.2 4.3   CL 94* 96*   CO2 19* 20   BUN 12 16   CREATININE 0.61 0.62     Hepatic Function Panel:   Recent Labs     09/28/21  1246   PROT 5.9*     No results for input(s): RPR in the last 72 hours. No results for input(s): HIV in the last 72 hours. No results for input(s): BC in the last 72 hours.   Lab Results   Component Value Date    RBC 3.81 09/30/2021    WBC 11.3 09/30/2021     Lab Results   Component Value Date    CREATININE 0.62 09/30/2021    GLUCOSE 95 09/30/2021       Medical Decision Making-Imaging:     EXAMINATION:   TWO XRAY VIEWS OF THE CHEST       9/28/2021 4:57 pm       COMPARISON:   CT chest September 27, 2021       HISTORY:   ORDERING SYSTEM PROVIDED HISTORY: evaluate pna, pleural effusions   TECHNOLOGIST PROVIDED HISTORY:   evaluate pna, pleural effusions       FINDINGS:   Cardiomegaly.  Small left greater than right pleural effusions.  Possible   mild edema.  Mediastinum normal.  Bony thorax intact.           Impression   Possible mild CHF.  Small effusions. EXAMINATION:   CTA OF THE CHEST 9/27/2021 7:47 am       TECHNIQUE:   CTA of the chest was performed after the administration of intravenous   contrast.  Multiplanar reformatted images are provided for review.  MIP   images are provided for review. Dose modulation, iterative reconstruction,   and/or weight based adjustment of the mA/kV was utilized to reduce the   radiation dose to as low as reasonably achievable.       COMPARISON:   09/12/2021       HISTORY:   ORDERING SYSTEM PROVIDED HISTORY: Left-sided chest pain elevated D-dimer   yesterday   TECHNOLOGIST PROVIDED HISTORY:   Left-sided chest pain elevated D-dimer yesterday   Decision Support Exception - unselect if not a suspected or confirmed   emergency medical condition->Emergency Medical Condition (MA)   Reason for Exam: chest pain   Acuity: Acute   Type of Exam: Initial   Additional signs and symptoms: elevated D-dimer   Relevant Medical/Surgical History: hx HTN and TIA       FINDINGS:   Pulmonary Arteries: Pulmonary arteries show no evidence of intraluminal   filling defect to suggest pulmonary embolism.  Main pulmonary artery is   normal in caliber.       Mediastinum: Moderate pericardial effusion.  Minor atherosclerotic disease. Normal caliber aorta.  No significant lymphadenopathy.  Thyroid gland and   esophagus grossly normal.       Lungs/pleura: Small right and moderate left pleural effusion with adjacent   compressive subsegmental atelectasis at the lung base.       No significant lung nodules or masses.  No pneumothorax.       Upper Abdomen: Limited images of the upper abdomen are unremarkable.       Soft Tissues/Bones: No acute bone or soft tissue abnormality.           Impression   1. No evidence for acute pulmonary embolism. 2. Moderate pericardial effusion.    3. Small right and moderate left pleural effusion with adjacent subsegmental   atelectasis.           Medical Decision [id number]:   Results for Sam Clay (MRN [id number]) as of 9/30/2021 13:10   Ref.  Range 9/27/2021 08:58 9/28/2021 10:00   Adenovirus PCR Latest Ref Range: Not Detected   Not Detected   B Pertussis by PCR Latest Ref Range: Not Detected   Not Detected   Chlamydia pneumoniae By PCR Latest Ref Range: Not Detected   Not Detected   Coronavirus 229E PCR Latest Ref Range: Not Detected   Not Detected   Coronavirus HKU1 PCR Latest Ref Range: Not Detected   Not Detected   Coronavirus NL63 PCR Latest Ref Range: Not Detected   Not Detected   Coronavirus OC Latest Ref Range: Not Detected   Not Detected   Human Metapneumovirus PCR Latest Ref Range: Not Detected   Not Detected   Influenza A by PCR Latest Ref Range: Not Detected   Not Detected   Influenza A H1 (2009) PCR Latest Ref Range: Not Detected   NOT REPORTED   Influenza A H1 PCR Latest Ref Range: Not Detected   NOT REPORTED   Influenza A H3 PCR Latest Ref Range: Not Detected   NOT REPORTED   Influenza B by PCR Latest Ref Range: Not Detected   Not Detected   MRSA DNA PROBE, NASAL Unknown  Rpt   Parainfluenza 1 PCR Latest Ref Range: Not Detected   Not Detected   Parainfluenza 2 PCR Latest Ref Range: Not Detected   Not Detected   Parainfluenza 3 PCR Latest Ref Range: Not Detected   Not Detected   Parainfluenza 4 PCR Latest Ref Range: Not Detected   Not Detected   Resp Syncytial Virus PCR Latest Ref Range: Not Detected   Not Detected   Rhino/Enterovirus PCR Latest Ref Range: Not Detected   DETECTED (A)   Mycoplasma pneumo by PCR Latest Ref Range: Not Detected   Not Detected   SARS-CoV-2, PCR Latest Ref Range: Not Detected   Not Detected   SARS-CoV-2, Rapid Latest Ref Range: Not Detected  Not Detected        Medical Decision Making-Other:     Note:  Labs, medications, radiologic studies were reviewed with personal review of films  Large amounts of data were reviewed  Discussed with nursing Staff, Discharge planner  Infection Control and Prevention measures reviewed  All prior entries were reviewed  Administer medications as ordered  Prognosis: [address]  Discharge planning reviewed  Follow up as outpatient. Thank you for allowing us to participate in the care of this patient. Please call with questions. Jama Jaimes DPM     ATTESTATION:    I have discussed the case, including pertinent history and exam findings with the residents and students. I have seen and examined the patient and the key elements of the encounter have been performed by me. I was present when the student obtained his information or examined the patient. I have reviewed the laboratory data, other diagnostic studies and discussed them with the residents. I have updated the medical record where necessary. I agree with the assessment, plan and orders as documented by the resident/ student.     Dossie Cockayne, MD.    Pager: [phone] - Office: [phone]

## 2021-09-30 NOTE — PROGRESS NOTES
PULMONARY & CRITICAL CARE MEDICINE PROGRESS NOTE     Patient:  Jono Parra  MRN: [id number]  Admit date: 9/27/2021  Primary Care Physician: CELY Jules - CNP  Consulting Physician: Tracy Anthony MD  CODE Status: Full Code  LOS: 3     SUBJECTIVE     Chief Complaint/ Reason for consult:  Pleural Effusions and Cough x 2 weeks    Hospital Course: The patient is a 78 y.o. female with PMH of asthma, hypertension who presented to the ER at [place] on 9/12/21 for complaints of chest pain, shortness of breath after pushing multiple shopping carts. CT chest at the time was unremarkable. This was attributed to physical strain and she was discharged from the ER.     She returns to the [facility] on 9/26 with complaints of cough, shortness of breath and difficulty sleeping where CT scan of the chest shows moderate pericardial effusion, small right and moderate left pleural effusions with adjacent subsegmental atelectasis. Patient declined admission at that time and was discharged home with albuterol inhaler, tessalon, guaifenesin and dextromethorphan.     Due to worsening symptoms she returned to the ER the next day, stayed overnight and was transferred to [facility] for admission on 9/28/21.      On my evaluation, patient is tachycardic in the 130s, tachypneic, blood pressure stable, and unable to speak due to cough. Respiratory panel was sent which showed Entero/rhinovirus positive PCR test.    Interval History:  09/30/21  Pt was seen and examined at bedside.   Resting comfortably in the chair  Continues to have severe cough with talking and taking a deep breath  Does not bother her during sleep  Saturating appropriately at 3L through NC  Regarding the cough, patient has had it for more than 1 year  It is mostly dry  Denied much of acid reflux symptoms  Has postnasal discharge in the morning but none beyond that  Patient has been on lisinopril since 2005      Review Of Systems:  Review of lesions or rashes. DATA REVIEW     Medications: Current Inpatient  Scheduled Meds:   fluticasone  1 spray Each Nostril Daily    cetirizine  10 mg Oral Daily    budesonide-formoterol  2 puff Inhalation BID    [Held by provider] lisinopril  15 mg Oral Daily    sodium chloride flush  5-40 mL IntraVENous 2 times per day    enoxaparin  40 mg SubCUTAneous Daily    lidocaine  1 patch TransDERmal Daily    sodium chloride flush  5-40 mL IntraVENous 2 times per day    dextromethorphan  30 mg Oral 2 times per day     Continuous Infusions:   sodium chloride      sodium chloride      sodium chloride 30 mL/hr at 09/28/21 1408       INPUT/OUTPUT:  In: 360 [P.O.:360]  Out: 260 [Urine:250; Drains:10]  Date 09/30/21 0000 - 09/30/21 2359   Shift 7389-3354 1141-9604 9487-0899 24 Hour Total   INTAKE   P.O.(mL/kg/hr)  360  360   Shift Total(mL/kg)  360(5.7)  360(5.7)   OUTPUT   Drains(mL/kg) 10(0.2) 0(0)  10(0.2)   Shift Total(mL/kg) 10(0.2) 0(0)  10(0.2)   Weight (kg) 63.2 63.2 63.2 63.2        LABS:  ABGs:   No results for input(s): POCPH, POCPCO2, POCPO2, POCHCO3, ZZQB2FVB in the last 72 hours. CBC:   Recent Labs     09/28/21  0632 09/29/21  0509 09/30/21  0526   WBC 13.4* 12.7* 11.3   HGB 12.2 11.0* 10.8*   HCT 38.7 34.4* 33.5*   MCV 91.7 94.0 87.9    454* 276   LYMPHOPCT 8* 11* 10*   RBC 4.22 3.66* 3.81*   MCH 28.9 30.1 28.3   MCHC 31.5 32.0 32.2   RDW 13.7 13.5 13.3     CRP:   Recent Labs     09/28/21  0334   .7*     LDH:   Recent Labs     09/28/21  1246 09/29/21  1446    179     BMP:   Recent Labs     09/28/21  0334 09/29/21  0509 09/30/21  0526   * 126* 125*   K 4.5 4.2 4.3   CL 92* 94* 96*   CO2 23 19* 20   BUN 10 12 16   CREATININE 0.41* 0.61 0.62   GLUCOSE 129* 95 95     Liver Function Test:   Recent Labs     09/28/21  1246   PROT 5.9*     Coagulation Profile:   No results for input(s): INR, PROTIME, APTT in the last 72 hours.   D-Dimer:  No results for input(s): DDIMER in the last 72 hours. Lactic Acid:  No results for input(s): LACTA in the last 72 hours. Cardiac Enzymes:  No results for input(s): CKTOTAL, CKMB, CKMBINDEX, TROPONINI in the last 72 hours. Invalid input(s): TROPONIN, HSTROP  BNP/ProBNP:   No results for input(s): BNP, PROBNP in the last 72 hours. Triglycerides:  No results for input(s): TRIG in the last 72 hours. Microbiology:  Urine Culture:  No components found for: CURINE  Blood Culture:  No components found for: CBLOOD, CFUNGUSBL  Sputum Culture:  No components found for: CSPUTUM  Recent Labs     09/29/21 1937   SPECDESC . PLEURAL FLUID   SPECIAL NOT REPORTED   CULTURE PENDING     Recent Labs     09/28/21  1245 09/29/21  1359 09/29/21 1937   SPECDESC . BLOOD . FLUID . PERICARDIAL FLUID . PLEURAL FLUID   SPECIAL NOT REPORTED NOT REPORTED NOT REPORTED   CULTURE NO GROWTH 2 DAYS NO GROWTH 21 HOURS PENDING        Pathology:    Radiology Reports:  XR CHEST PORTABLE   Final Result   Worsening of bilateral pleural effusion, pulmonary vascular congestion and   bilateral mid and lower lung airspace disease. XR CHEST (2 VW)   Final Result   Possible mild CHF. Small effusions. US THORACENTESIS Which side should the procedure be performed? Left   Final Result   Successful ultrasound guided thoracentesis. CT CHEST PULMONARY EMBOLISM W CONTRAST   Final Result   1. No evidence for acute pulmonary embolism. 2. Moderate pericardial effusion. 3. Small right and moderate left pleural effusion with adjacent subsegmental   atelectasis.               Echocardiogram:   Results for orders placed during the hospital encounter of 09/27/21    ECHO Complete 2D W Doppler W Color    Narrative  Transthoracic Echocardiography Report (TTE)    Patient Name Sadia Wyatt     Date of Study               09/29/2021  Oz JACOB    Date of      1945  Gender                      Female  Birth    Age          78 year(s)  Race                            Room Number  2022 Height:                     62 inch, 157.48 cm    Corporate ID [id number]    Weight:                     134 pounds, 60.8 kg  #    Patient Acct [de-identified]   BSA:          1.61 m^2      BMI:     24.51 kg/m^2  #    MR #         [id number]     Sonographer                 Earline Pate    Accession #  [id number]  Interpreting Physician      [address] [place] [address]    Fellow                   Referring Nurse  Practitioner    Interpreting             Referring Physician         Farheen Navarrete  Fellow    Type of Study    TTE procedure:2D Echocardiogram, M-Mode, Doppler, Color Doppler. Procedure Date  Date: 09/29/2021 Start: 09:35 AM    Study Location: [facility]  Technical Quality: Adequate visualization    Indications:Pericardial effusion. History / Tech. Comments:  Procedure explained to patient. Echo completed at the bedside. PMHx:  Hypertension    Patient Status: Inpatient    Height: 62 inches Weight: 134 pounds BSA: 1.61 m^2 BMI: 24.51 kg/m^2    CONCLUSIONS    Summary  Left ventricle is normal in size, global left ventricular systolic function  is normal, calculated ejection fraction is 55%. Right atrium appears small. Moderate to large pericardial effusion. Posteriorly measuring 2.29cm, Anteriorly measuring 3.66cm. Gelatinous  material is noted throughout. Cannot rule out right ventricular collapse in the subcostal views, clinical  correlation recommended. Mitral and tricuspid inflows show physiologic mild evidence of tamponade. Pleural effusion noted.     Signature  ----------------------------------------------------------------------------  Electronically signed by Cait Esteves(Sonographer) on 09/29/2021 10:15 AM  ----------------------------------------------------------------------------    ----------------------------------------------------------------------------  Electronically signed by Kiki MartelInterpreting physician) on 09/29/2021  10:22 AM  ----------------------------------------------------------------------------  FINDINGS  Left Atrium  Left atrium is normal in size. Inter-atrial septum is intact with no evidence for an atrial septal defect,  by color doppler. Left Ventricle  Left ventricle is normal in size, global left ventricular systolic function  is normal, calculated ejection fraction is 55%. Right Atrium  Right atrium appears small. Right Ventricle  Right ventricle appears reduced in size. Mitral Valve  Normal mitral valve structure. No mitral stenosis. No evidence of mitral regurgitation. Aortic Valve  Aortic valve is trileaflet. Aortic valve is sclerotic but opens well. No aortic insufficiency. Tricuspid Valve  Tricuspid valve was not well visualized. No tricuspid regurgitation was seen. Insignificant tricuspid regurgitation, unable to estimate RVSP. Pulmonic Valve  Pulmonic valve not well visualized but Doppler velocities are normal.  No pulmonic insufficiency. Pericardial Effusion  Moderate to large pericardial effusion. Posteriorly measuring 2.29cm at largest visualized accumulation (parasternal  views.)  Anteriorly measuring 3.66cm at largest visualized accumulation (subcostal  views.)  Gelatinous material is noted throughout. Cannot rule out right ventricular collapse in the subcostal views, clinical  correlation recommended. Mitral and tricuspid inflows show physiologic mild evidence of tamponade. Pleural Effusion  Pleural effusion noted. Miscellaneous  Normal aortic root dimension. E/E' average = 10.65. IVC not well visualized.     M-mode / 2D Measurements & Calculations:    LVIDd:2.8 cm(3.7 - 5.6 cm)        Diastolic CVMVXF:24 ml  PNMV:8.6 cm(0.6 - 1.1 cm)         Aortic Root:2.8 cm(2.0 - 3.7 cm)  LVPWd:0.8 cm(0.6 - 1.1 cm)        LA Dimension: 3 cm(1.9 - 4.0 cm)  LA volume/Index: 39.98 ml /25m^2    RVDd:2.6 cm    Mitral:                                  Aortic    Valve Area (P1/2-Time): 5 cm^2 Peak Velocity: 1.48 m/s  Peak E-Wave: 0.84 m/s                    Mean Velocity: 1.04 m/s  Peak A-Wave: 1.08 m/s                    Peak Gradient: 8.76 mmHg  E/A Ratio: 0.77                          Mean Gradient: 5 mmHg  Peak Gradient: 2.8 mmHg  Mean Gradient: 2 mmHg  Deceleration Time: 169 msec              AV VTI: 26.9 cm  P1/2t: 44 msec    Mean Velocity: 0.67 m/s    Pulmonic:    Peak Velocity: 1.15 m/s  Peak Gradient: 5.29 mmHg    Diastology / Tissue Doppler  Septal Wall E' velocity:0.08 m/s  Septal Wall E/E':9.8  Lateral Wall E' velocity:0.07 m/s  Lateral Wall E/E':11.5       ASSESSMENT AND PLAN     Assessment:    // Viral syndrome  // Pericardial effusion  // Pleural effusion  //Chronic cough could be lisinopril induced and acute deterioration could be related to a viral infection.     Plan:  -Follow-up on the rheumatologic tests and pleural fluid studies  -Respiratory support to keep saturations above 90%. -Patient had pericardiocentesis yesterday  - Symptomatic management of cough. Dextromethorphan and PRN tessalon.  -Recommend discontinuing lisinopril. It is currently on hold  - Droplet precautions.  -Symbicort was started to decrease airway inflammation  -Patient and family were updated      Electronically signed byKell Spivey MD  9/30/2021 1:21 PM    Please note that this chart was generated using voice recognition Dragon dictation software. Although every effort was made to ensure the accuracy of this automated transcription, some errors in transcription may have occurred.

## 2021-10-01 ENCOUNTER — APPOINTMENT (OUTPATIENT)
Dept: CT IMAGING | Age: 76
DRG: 871 | End: 2021-10-01
Payer: MEDICARE

## 2021-10-01 ENCOUNTER — APPOINTMENT (OUTPATIENT)
Dept: ULTRASOUND IMAGING | Age: 76
DRG: 871 | End: 2021-10-01
Payer: MEDICARE

## 2021-10-01 LAB
ABSOLUTE EOS #: 0.4 K/UL (ref 0–0.44)
ABSOLUTE IMMATURE GRANULOCYTE: 0.13 K/UL (ref 0–0.3)
ABSOLUTE LYMPH #: 1.22 K/UL (ref 1.1–3.7)
ABSOLUTE MONO #: 0.7 K/UL (ref 0.1–1.2)
ANION GAP SERPL CALCULATED.3IONS-SCNC: 10 MMOL/L (ref 9–17)
BASOPHILS # BLD: 1 % (ref 0–2)
BASOPHILS ABSOLUTE: 0.05 K/UL (ref 0–0.2)
BUN BLDV-MCNC: 17 MG/DL (ref 8–23)
BUN/CREAT BLD: ABNORMAL (ref 9–20)
CALCIUM SERPL-MCNC: 7.5 MG/DL (ref 8.6–10.4)
CASE NUMBER:: NORMAL
CHLORIDE BLD-SCNC: 96 MMOL/L (ref 98–107)
CO2: 21 MMOL/L (ref 20–31)
COMPLEMENT C3: 150 MG/DL (ref 90–180)
COMPLEMENT C4: 40 MG/DL (ref 10–40)
CREAT SERPL-MCNC: 0.65 MG/DL (ref 0.5–0.9)
DIFFERENTIAL TYPE: ABNORMAL
EOSINOPHILS RELATIVE PERCENT: 4 % (ref 1–4)
GFR AFRICAN AMERICAN: >60 ML/MIN
GFR NON-AFRICAN AMERICAN: >60 ML/MIN
GFR SERPL CREATININE-BSD FRML MDRD: ABNORMAL ML/MIN/{1.73_M2}
GFR SERPL CREATININE-BSD FRML MDRD: ABNORMAL ML/MIN/{1.73_M2}
GLUCOSE BLD-MCNC: 98 MG/DL (ref 70–99)
GLUCOSE, FLUID: 114 MG/DL
HCT VFR BLD CALC: 33.7 % (ref 36.3–47.1)
HEMOGLOBIN: 10.5 G/DL (ref 11.9–15.1)
IMMATURE GRANULOCYTES: 1 %
LACTATE DEHYDROGENASE, FLUID: 157 U/L
LYMPHOCYTES # BLD: 14 % (ref 24–43)
MAGNESIUM: 2.5 MG/DL (ref 1.6–2.6)
MCH RBC QN AUTO: 27.9 PG (ref 25.2–33.5)
MCHC RBC AUTO-ENTMCNC: 31.2 G/DL (ref 28.4–34.8)
MCV RBC AUTO: 89.6 FL (ref 82.6–102.9)
MONOCYTES # BLD: 8 % (ref 3–12)
NRBC AUTOMATED: 0 PER 100 WBC
PDW BLD-RTO: 13.4 % (ref 11.8–14.4)
PH FLUID: 7.2
PLATELET # BLD: 290 K/UL (ref 138–453)
PLATELET ESTIMATE: ABNORMAL
PMV BLD AUTO: 10 FL (ref 8.1–13.5)
POTASSIUM SERPL-SCNC: 3.9 MMOL/L (ref 3.7–5.3)
RBC # BLD: 3.76 M/UL (ref 3.95–5.11)
RBC # BLD: ABNORMAL 10*6/UL
SEG NEUTROPHILS: 72 % (ref 36–65)
SEGMENTED NEUTROPHILS ABSOLUTE COUNT: 6.49 K/UL (ref 1.5–8.1)
SODIUM BLD-SCNC: 127 MMOL/L (ref 135–144)
SPECIMEN DESCRIPTION: NORMAL
SPECIMEN TYPE: NORMAL
TOTAL PROTEIN, BODY FLUID: 3.1 G/DL
WBC # BLD: 9 K/UL (ref 3.5–11.3)
WBC # BLD: ABNORMAL 10*3/UL

## 2021-10-01 PROCEDURE — 86038 ANTINUCLEAR ANTIBODIES: CPT

## 2021-10-01 PROCEDURE — 86147 CARDIOLIPIN ANTIBODY EA IG: CPT

## 2021-10-01 PROCEDURE — 84157 ASSAY OF PROTEIN OTHER: CPT

## 2021-10-01 PROCEDURE — 86146 BETA-2 GLYCOPROTEIN ANTIBODY: CPT

## 2021-10-01 PROCEDURE — 6360000002 HC RX W HCPCS: Performed by: NURSE PRACTITIONER

## 2021-10-01 PROCEDURE — 99223 1ST HOSP IP/OBS HIGH 75: CPT | Performed by: PHYSICIAN ASSISTANT

## 2021-10-01 PROCEDURE — 97116 GAIT TRAINING THERAPY: CPT

## 2021-10-01 PROCEDURE — 6370000000 HC RX 637 (ALT 250 FOR IP): Performed by: INTERNAL MEDICINE

## 2021-10-01 PROCEDURE — 86225 DNA ANTIBODY NATIVE: CPT

## 2021-10-01 PROCEDURE — 83986 ASSAY PH BODY FLUID NOS: CPT

## 2021-10-01 PROCEDURE — 97535 SELF CARE MNGMENT TRAINING: CPT

## 2021-10-01 PROCEDURE — 6370000000 HC RX 637 (ALT 250 FOR IP): Performed by: NURSE PRACTITIONER

## 2021-10-01 PROCEDURE — 87070 CULTURE OTHR SPECIMN AEROBIC: CPT

## 2021-10-01 PROCEDURE — 2580000003 HC RX 258: Performed by: STUDENT IN AN ORGANIZED HEALTH CARE EDUCATION/TRAINING PROGRAM

## 2021-10-01 PROCEDURE — 6370000000 HC RX 637 (ALT 250 FOR IP): Performed by: STUDENT IN AN ORGANIZED HEALTH CARE EDUCATION/TRAINING PROGRAM

## 2021-10-01 PROCEDURE — APPSS30 APP SPLIT SHARED TIME 16-30 MINUTES: Performed by: NURSE PRACTITIONER

## 2021-10-01 PROCEDURE — 83735 ASSAY OF MAGNESIUM: CPT

## 2021-10-01 PROCEDURE — 87205 SMEAR GRAM STAIN: CPT

## 2021-10-01 PROCEDURE — 80048 BASIC METABOLIC PNL TOTAL CA: CPT

## 2021-10-01 PROCEDURE — 97166 OT EVAL MOD COMPLEX 45 MIN: CPT

## 2021-10-01 PROCEDURE — 86235 NUCLEAR ANTIGEN ANTIBODY: CPT

## 2021-10-01 PROCEDURE — 2709999900 US THORACENTESIS

## 2021-10-01 PROCEDURE — 82945 GLUCOSE OTHER FLUID: CPT

## 2021-10-01 PROCEDURE — 71250 CT THORAX DX C-: CPT

## 2021-10-01 PROCEDURE — 36415 COLL VENOUS BLD VENIPUNCTURE: CPT

## 2021-10-01 PROCEDURE — 2060000000 HC ICU INTERMEDIATE R&B

## 2021-10-01 PROCEDURE — 97530 THERAPEUTIC ACTIVITIES: CPT

## 2021-10-01 PROCEDURE — 0W993ZZ DRAINAGE OF RIGHT PLEURAL CAVITY, PERCUTANEOUS APPROACH: ICD-10-PCS | Performed by: RADIOLOGY

## 2021-10-01 PROCEDURE — 99232 SBSQ HOSP IP/OBS MODERATE 35: CPT | Performed by: INTERNAL MEDICINE

## 2021-10-01 PROCEDURE — 99233 SBSQ HOSP IP/OBS HIGH 50: CPT | Performed by: INTERNAL MEDICINE

## 2021-10-01 PROCEDURE — 88305 TISSUE EXAM BY PATHOLOGIST: CPT

## 2021-10-01 PROCEDURE — 89051 BODY FLUID CELL COUNT: CPT

## 2021-10-01 PROCEDURE — 85025 COMPLETE CBC W/AUTO DIFF WBC: CPT

## 2021-10-01 PROCEDURE — 94640 AIRWAY INHALATION TREATMENT: CPT

## 2021-10-01 PROCEDURE — 88112 CYTOPATH CELL ENHANCE TECH: CPT

## 2021-10-01 PROCEDURE — 6360000002 HC RX W HCPCS: Performed by: INTERNAL MEDICINE

## 2021-10-01 PROCEDURE — 2700000000 HC OXYGEN THERAPY PER DAY

## 2021-10-01 PROCEDURE — 97110 THERAPEUTIC EXERCISES: CPT

## 2021-10-01 PROCEDURE — 87075 CULTR BACTERIA EXCEPT BLOOD: CPT

## 2021-10-01 PROCEDURE — 87206 SMEAR FLUORESCENT/ACID STAI: CPT

## 2021-10-01 PROCEDURE — 6360000002 HC RX W HCPCS: Performed by: STUDENT IN AN ORGANIZED HEALTH CARE EDUCATION/TRAINING PROGRAM

## 2021-10-01 PROCEDURE — 87116 MYCOBACTERIA CULTURE: CPT

## 2021-10-01 PROCEDURE — 86160 COMPLEMENT ANTIGEN: CPT

## 2021-10-01 PROCEDURE — 83615 LACTATE (LD) (LDH) ENZYME: CPT

## 2021-10-01 RX ORDER — METHYLPREDNISOLONE SODIUM SUCCINATE 40 MG/ML
40 INJECTION, POWDER, LYOPHILIZED, FOR SOLUTION INTRAMUSCULAR; INTRAVENOUS EVERY 12 HOURS
Status: DISCONTINUED | OUTPATIENT
Start: 2021-10-01 | End: 2021-10-04

## 2021-10-01 RX ADMIN — GUAIFENESIN AND CODEINE PHOSPHATE 5 ML: 100; 10 SOLUTION ORAL at 02:28

## 2021-10-01 RX ADMIN — FLUTICASONE PROPIONATE 1 SPRAY: 50 SPRAY, METERED NASAL at 08:36

## 2021-10-01 RX ADMIN — METHYLPREDNISOLONE SODIUM SUCCINATE 40 MG: 40 INJECTION, POWDER, FOR SOLUTION INTRAMUSCULAR; INTRAVENOUS at 20:56

## 2021-10-01 RX ADMIN — GUAIFENESIN AND CODEINE PHOSPHATE 5 ML: 100; 10 SOLUTION ORAL at 07:38

## 2021-10-01 RX ADMIN — GUAIFENESIN AND CODEINE PHOSPHATE 5 ML: 100; 10 SOLUTION ORAL at 16:02

## 2021-10-01 RX ADMIN — BENZONATATE 100 MG: 100 CAPSULE ORAL at 16:19

## 2021-10-01 RX ADMIN — CETIRIZINE HYDROCHLORIDE 10 MG: 10 TABLET ORAL at 08:33

## 2021-10-01 RX ADMIN — BENZONATATE 100 MG: 100 CAPSULE ORAL at 19:59

## 2021-10-01 RX ADMIN — BUDESONIDE AND FORMOTEROL FUMARATE DIHYDRATE 2 PUFF: 160; 4.5 AEROSOL RESPIRATORY (INHALATION) at 20:19

## 2021-10-01 RX ADMIN — BUDESONIDE AND FORMOTEROL FUMARATE DIHYDRATE 2 PUFF: 160; 4.5 AEROSOL RESPIRATORY (INHALATION) at 09:16

## 2021-10-01 RX ADMIN — GUAIFENESIN AND CODEINE PHOSPHATE 5 ML: 100; 10 SOLUTION ORAL at 19:59

## 2021-10-01 RX ADMIN — SODIUM CHLORIDE, PRESERVATIVE FREE 10 ML: 5 INJECTION INTRAVENOUS at 20:02

## 2021-10-01 RX ADMIN — SODIUM CHLORIDE, PRESERVATIVE FREE 10 ML: 5 INJECTION INTRAVENOUS at 20:03

## 2021-10-01 RX ADMIN — BENZONATATE 100 MG: 100 CAPSULE ORAL at 08:55

## 2021-10-01 RX ADMIN — FUROSEMIDE 40 MG: 10 INJECTION, SOLUTION INTRAMUSCULAR; INTRAVENOUS at 08:38

## 2021-10-01 RX ADMIN — ENOXAPARIN SODIUM 40 MG: 40 INJECTION SUBCUTANEOUS at 08:34

## 2021-10-01 RX ADMIN — SODIUM CHLORIDE, PRESERVATIVE FREE 10 ML: 5 INJECTION INTRAVENOUS at 09:00

## 2021-10-01 RX ADMIN — Medication 30 MG: at 08:33

## 2021-10-01 RX ADMIN — SODIUM CHLORIDE, PRESERVATIVE FREE 10 ML: 5 INJECTION INTRAVENOUS at 08:35

## 2021-10-01 RX ADMIN — Medication 30 MG: at 20:55

## 2021-10-01 ASSESSMENT — ENCOUNTER SYMPTOMS
DIARRHEA: 0
NAUSEA: 0
CHEST TIGHTNESS: 0
BACK PAIN: 0
SHORTNESS OF BREATH: 1
EYE REDNESS: 0
ABDOMINAL PAIN: 0
RHINORRHEA: 0
COUGH: 1
SINUS PRESSURE: 0
PHOTOPHOBIA: 0
SORE THROAT: 0
VOMITING: 0

## 2021-10-01 ASSESSMENT — PAIN SCALES - GENERAL
PAINLEVEL_OUTOF10: 0

## 2021-10-01 NOTE — BRIEF OP NOTE
Brief Postoperative Note for Thoracentesis    Alfonso Jasso  YOB: 1945  [id number]    Pre-operative Diagnosis: right Pleural effusion      Post-operative Diagnosis: Same    Procedure: Ultrasound guided Thoracentesis     Anesthesia: 1% Lidocaine     Surgeons/Assistants: Duglas Gutiérrez PA-C    Complications: none    EBL: Minimal    Specimens: were obtained    Ultrasound guided right thoracentesis performed. 550 ml clear yellow fluid obtained. Dressing applied.       Electronically signed by DAKOTAH Aguirre on 10/1/2021 at 1:20 PM

## 2021-10-01 NOTE — PROGRESS NOTES
Patient in room 8  Pulse ox and BP taken  DAKOTAH DENT in room  - US tech in room  Site prepped and draped  Access obtained  Draining STRAW colored fluid  TOTAL OF 550LITERS REMOVED AND SENT TO LAB  Access removed  Site covered with 2x2 and tegaderm  Patient tolerated well  REPORT TO FLOOR RN  Stable upon transport.

## 2021-10-01 NOTE — CONSULTS
Rheumatology Inpatient Consult Note          Patient: Cony Ceja / 1945 (44 y.o.)  MRN: [id number]        Reason for Consult: Positive SOSA  Requesting Physician:  CELY Beltran  Primary Care Physician: CELY Gaitan - BALDOMERO    CHIEF COMPLAINT:    Chief Complaint   Patient presents with    Cough       History Obtained From:  patient, electronic medical record    HISTORY OF PRESENT ILLNESS:                The patient is a 78 y.o. female with significant past medical history of hypertension who presents with chronic cough with progressive shortness of breath. This has been getting worse over the past couple of weeks. She was found to have pleural effusion and pericardial effusion. She underwent pericardiocentesis as well as paracentesis. The fluid came back as exudative. No evidence for infection. Negative fluid cultures. Negative blood cultures except for culture that might have been contaminated. Her laboratory work-up showed positive SOSA with positive JENI. We were consulted to evaluate for underlying autoimmune disease. I have to admit the patient is a poor historian. She, however denies any previous history of lupus or any other connective tissue disorder. She denies any joint pain, skin rash or photosensitivity. No Raynaud's phenomenon. No previous history of pleurisy or pericarditis. No history of blood clots. No family history of connective tissue disorder. She denies any significant dryness in her eyes or mouth. No lymphadenopathy or parotid gland swelling.   Her viral testing showed negative COVID-19 testing with positive enterovirus/rhinovirus in the viral panel    Past Medical History:    Past Medical History:   Diagnosis Date    Arthritis     Displacement of lumbar intervertebral disc without myelopathy 9/30/2015    Hypertension     exacerbated after last injection 10-    Lumbar disc disease     Personal history of TIA (transient ischemic attack)     2012 mini stroke high blood pressure    Sleep deprivation     SOB (shortness of breath)     \"walk to fast and going upstairs\" \"can walk a city block\"    Wears glasses        Past Surgical History:    Past Surgical History:   Procedure Laterality Date    ANKLE SURGERY Right     x 3    FOOT SURGERY  2015    left    HIP ARTHROSCOPY Left 03/14/2018    band release bursectomy    HYSTERECTOMY      NERVE BLOCK  10/19/15    caudal #1  celestone 9mg    NERVE BLOCK  10-26-15     caudal epidural steroid block #2 decadron 5 mg    NV HIP ARTHROSCOPY, DX Left 3/14/2018    LEFT HIP ARTHROSCOPY WITH  IT BAND RELEASE BURSECTOMY WITH GLUTEUS MUSCLE REPAIR performed by Abraham Fernandez DO at [address] Left     x 2       Allergies:  Bee pollen, Erythromycin, Pcn [penicillins], Tetanus toxoids, and Tetracyclines & related      Current Medications:    Current Facility-Administered Medications   Medication Dose Route Frequency Provider Last Rate Last Admin    methylPREDNISolone sodium (SOLU-MEDROL) injection 40 mg  40 mg IntraVENous Q12H Floyd Craft MD        fluticasone (FLONASE) 50 MCG/ACT nasal spray 1 spray  1 spray Each Nostril Daily CELY Dawn NP   1 spray at 10/01/21 0836    cetirizine (ZYRTEC) tablet 10 mg  10 mg Oral Daily CELY Dawn NP   10 mg at 10/01/21 0833    budesonide-formoterol (SYMBICORT) 160-4.5 MCG/ACT inhaler 2 puff  2 puff Inhalation BID Kyleigh Abdullahi MD   2 puff at 10/01/21 0916    [Held by provider] furosemide (LASIX) injection 40 mg  40 mg IntraVENous Daily CELY Dawn NP   40 mg at 10/01/21 0838    albuterol sulfate  (90 Base) MCG/ACT inhaler 2 puff  2 puff Inhalation Q4H PRN Robb Rodríguez MD        benzonatate (TESSALON) capsule 100 mg  100 mg Oral TID PRN Robb Rodríguez MD   100 mg at 10/01/21 1619    sodium chloride flush 0.9 % injection 5-40 mL  5-40 mL IntraVENous 2 times per day Robb Rodríguez MD   10 mL at 10/01/21 0900    sodium chloride flush 0.9 % injection 5-40 mL  5-40 mL IntraVENous PRN Catalina Martini MD        0.9 % sodium chloride infusion  25 mL IntraVENous PRN Catalina Martini MD        enoxaparin (LOVENOX) injection 40 mg  40 mg SubCUTAneous Daily Catalina Martini MD   40 mg at 10/01/21 0834    ondansetron (ZOFRAN-ODT) disintegrating tablet 4 mg  4 mg Oral Q8H PRN Catalina Martini MD        Or    ondansetron [facility]) injection 4 mg  4 mg IntraVENous Q6H PRN Catalina Martini MD        polyethylene glycol [facility]) packet 17 g  17 g Oral Daily PRN Catalina Martini MD        acetaminophen (TYLENOL) tablet 650 mg  650 mg Oral Q6H PRN Catalina Martini MD        Or    acetaminophen (TYLENOL) suppository 650 mg  650 mg Rectal Q6H PRN Catalina Martini MD        potassium chloride (KLOR-CON M) extended release tablet 40 mEq  40 mEq Oral PRN CELY Garduno NP        Or    potassium bicarb-citric acid (EFFER-K) effervescent tablet 40 mEq  40 mEq Oral PRN CELY Garduno NP        Or   [place] potassium chloride 10 mEq/100 mL IVPB (Peripheral Line)  10 mEq IntraVENous PRN CELY Garduno NP        magnesium sulfate 1000 mg in dextrose 5% 100 mL IVPB  1,000 mg IntraVENous PRN CELY Garduno - NP        lidocaine 4 % external patch 1 patch  1 patch TransDERmal Daily Yamile Mock MD   1 patch at 10/01/21 0833    oxyCODONE (ROXICODONE) immediate release tablet 5 mg  5 mg Oral Q6H PRN Yamile Mock MD   5 mg at 09/30/21 2026    guaiFENesin-codeine (GUAIFENESIN AC) 100-10 MG/5ML liquid 5 mL  5 mL Oral Q4H PRN Yamile Mock MD   5 mL at 10/01/21 1602    sodium chloride flush 0.9 % injection 5-40 mL  5-40 mL IntraVENous 2 times per day Yamile Mock MD   10 mL at 10/01/21 0835    sodium chloride flush 0.9 % injection 5-40 mL  5-40 mL IntraVENous PRN Yamile Mock MD        0.9 % sodium chloride infusion  25 mL IntraVENous PRN Yamile Mock MD        dextromethorphan (DELSYM) 30 MG/5ML extended release liquid 30 mg  30 mg Oral 2 times per day Sebastian Smith MD   30 mg at 10/01/21 5204    diphenhydrAMINE (BENADRYL) injection 25 mg  25 mg IntraVENous Q6H PRN Keith Mon MD   25 mg at 09/30/21 2026    sodium chloride flush 0.9 % injection 10 mL  10 mL IntraVENous PRN Brian Bowie MD   10 mL at 09/27/21 0803       Home Medications:    Prior to Admission medications    Medication Sig Start Date End Date Taking?  Authorizing Provider   guaiFENesin (MUCINEX) 600 MG extended release tablet Take 1 tablet by mouth 2 times daily for 10 days 9/26/21 10/6/21  Margarita Serrano MD   benzonatate (TESSALON) 100 MG capsule Take 1 capsule by mouth 3 times daily as needed for Cough 9/26/21 10/3/21  Margarita Serrano MD   albuterol sulfate  (90 Base) MCG/ACT inhaler Inhale 2 puffs into the lungs every 4 hours as needed for Wheezing or Shortness of Breath 9/26/21 10/3/21  Margarita Serrano MD   guaiFENesin-dextromethorphan [facility] DM) 100-10 MG/5ML syrup Take 5 mLs by mouth 4 times daily as needed for Cough 9/26/21 10/6/21  Margarita Serrano MD   levoFLOXacin (LEVAQUIN) 500 MG tablet Take 1 tablet by mouth daily for 10 days 9/26/21 10/6/21  Margarita Serrano MD   Capsaicin-Menthol (ALLEVESS EX) Apply topically    Historical Provider, MD   lisinopril (PRINIVIL;ZESTRIL) 10 MG tablet Take 15 mg by mouth daily     Historical Provider, MD       REVIEW OF SYSTEMS:    CONSTITUTIONAL:  fatigue  DERMATOLOGICAL: negative  NEUROLOGICAL:  negative  EYES:  negative  HEENT:  negative  RESPIRATORY:  positive for  cough with sputum, dyspnea and pleuritic pain  CARDIOVASCULAR:  negative  GASTROINTESTINAL:  negative  GENITO-URINARY: no dysuria, trouble voiding, or hematuria  ENDOCRINE: negative  MUSCULOSKELETAL:  negative  HEMATOLOGICAL AND LYMPHATIC: negative  IMMUNOLOGICAL: negative  PSYCHOLOGICAL: negative    Family History:       Problem Relation Age of Onset    Lung Cancer Mother     Lung Cancer Brother     Heart Disease Father    [employer] Heart Disease Paternal Grandfather        Social History:    TOBACCO:   reports that she has never smoked. She has never used smokeless tobacco.  ETOH:   reports no history of alcohol use. DRUGS:   reports no history of drug use. PHYSICAL EXAM:      Vitals:    /74   Pulse 85   Temp 97.9 °F (36.6 °C) (Oral)   Resp 18   Ht 5' 2\" (1.575 m)   Wt 139 lb 6.4 oz (63.2 kg)   SpO2 99%   BMI 25.50 kg/m²   GENERAL EXAM: On exam- pt appears stated age. He/she is a pleasant male/female in no acute distress. NEURO:  Alert and oriented x 3. Cranial nerves intact Motor strength 5/5 bilateral upper and lower extremities  HEENT: head- atraumatic-normocephalic. No discharge from ears, nose or throat. NECK: Supple. No jugular venous distention. LUNGS: Bilateral chest movements without the use of accessory muscles. Respirations easy, nonlabored, breath sounds clear. CARDIOVASCULAR:  Heart rate and rhythm regular. ABDOMEN: Abdomen soft, nondistended, nontender, bowel sounds present. No palpable hernias noted. RECTAL: exam deferred. EXTREMITIES: No calf tenderness. No edema. Gait normal.   MUSCULOSKELETAL:   NECK:  Full ROM  SHOULDERS:  Full ROM  ELBOWS:  No swelling, warmth, full ROM  WRISTS:  No swelling, warmth, full ROM  MCP: no synovial thickening, Non-tender. PIP: no swelling, no tenderness  DIP: no swelling, no tenderness  No flexor crepitus, : 4+/4+  HIPS: full ROM on FABERE  KNEES: no swelling, no warmth, no effusion  ANKLES:  No warmth, no swelling, no erythema. Full ROM  TOES: non-tender  SKIN: No rashes or nodules noted.     DATA:    CBC with Differential:    Lab Results   Component Value Date    WBC 9.0 10/01/2021    RBC 3.76 10/01/2021    HGB 10.5 10/01/2021    HCT 33.7 10/01/2021     10/01/2021    MCV 89.6 10/01/2021    MCH 27.9 10/01/2021    MCHC 31.2 10/01/2021    RDW 13.4 10/01/2021    LYMPHOPCT 14 10/01/2021    MONOPCT 8 10/01/2021    BASOPCT 1 10/01/2021    MONOSABS 0.70 10/01/2021 LYMPHSABS 1.22 10/01/2021    EOSABS 0.40 10/01/2021    BASOSABS 0.05 10/01/2021    DIFFTYPE NOT REPORTED 10/01/2021     BMP:    Lab Results   Component Value Date     10/01/2021    K 3.9 10/01/2021    CL 96 10/01/2021    CO2 21 10/01/2021    BUN 17 10/01/2021    LABALBU 3.4 09/26/2021    CREATININE 0.65 10/01/2021    CALCIUM 7.5 10/01/2021    GFRAA >60 10/01/2021    LABGLOM >60 10/01/2021    GLUCOSE 98 10/01/2021     SOSA:    Lab Results   Component Value Date    SOAS POSITIVE 09/28/2021       IMPRESSION/RECOMMENDATIONS:   70-year-old white female patient with past medical history that is significant mainly for hypertension that is being treated with lisinopril. She has been having chronic cough which could be secondary to lisinopril. She, however has been having worsening cough with worsening shortness of breath over the past couple of weeks. Further testing revealed moderate to severe pericardial effusion as well as bilateral pleural effusion. Her serology showed positive SOSA with positive JENI. The profile is still pending. Her CRP is elevated. 1 certainly should suspect autoimmunity as an underlying cause for her new onset recurrent pleural and pericardial effusion. 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

## 2021-10-01 NOTE — PROGRESS NOTES
Occupational Therapy   Occupational Therapy Initial Assessment  Date: 10/1/2021   Patient Name: Lane Cox  MRN: [id number]     : 1945    Date of Service: 10/1/2021     Chief Complaint   Patient presents with    Cough         Discharge Recommendations:  Patient would benefit from continued therapy after discharge  OT Equipment Recommendations  Equipment Needed: Yes  Mobility Devices: ADL Assistive Devices  ADL Assistive Devices: Long-handled Sponge;Sock-Aid Hard;Reacher    Assessment   Performance deficits / Impairments: Decreased functional mobility ; Decreased ADL status; Decreased endurance;Decreased high-level IADLs;Decreased safe awareness;Decreased balance  Assessment: Pt lying supine with transport in stephenson upon arrival. Completed bed mobility with CGA and functional mobility to bedside chair with Min A d/t minor LOBs. Hand held assist. Pt declined use of RW throughout session and became very tearful requiring emotional support. Demonstrated ability to don slippers and wash face Mod IND seated. Pt is expected to require skilled OT services during their acute hospitalization stay to address the above noted deficits through skilled occupational therapy intervention for promotion of increased independence throughout ADLs, IADLs and functional mobility tasks. Pt is expected to be safe to return home with assist from family as needed upon discharge. Prognosis: Good  Decision Making: Medium Complexity  OT Education: OT Role;Energy Conservation;Plan of Care;Transfer Training;Equipment; Family Education  Patient Education: purpose of eval, activity promotion, use of rest breaks, pursed lip breathing, purpose/benefit of walker. Good return to all education w/ exception of fair return to use of RW.  REQUIRES OT FOLLOW UP: Yes  Activity Tolerance  Activity Tolerance: Patient limited by fatigue  Safety Devices  Safety Devices in place: Yes  Type of devices: Call light within reach; Left in chair;Nurse notified;Gait belt  Restraints  Initially in place: No           Patient Diagnosis(es): The primary encounter diagnosis was Pleural effusion. Diagnoses of Chest wall pain and Pericardial effusion were also pertinent to this visit. has a past medical history of Arthritis, Displacement of lumbar intervertebral disc without myelopathy, Hypertension, Lumbar disc disease, Personal history of TIA (transient ischemic attack), Sleep deprivation, SOB (shortness of breath), and Wears glasses. has a past surgical history that includes Wrist surgery (Left); Hysterectomy; Ankle surgery (Right); Tonsillectomy; Nerve Block (10/19/15); Nerve Block (10-26-15 ); Foot surgery (2015); Hip arthroscopy (Left, 03/14/2018); and pr hip arthroscopy, dx (Left, 3/14/2018). Restrictions  Restrictions/Precautions  Restrictions/Precautions: Fall Risk, Up as Tolerated, Isolation (Droplet)  Required Braces or Orthoses?: No  Position Activity Restriction  Other position/activity restrictions: Hx of multiple sxs on L hip-pt reports tender on L side; Up with assistance    Subjective   General  Patient assessed for rehabilitation services?: Yes  Family / Caregiver Present: Yes (daughter and )  General Comment  Comments: RN ok'd for OT lisy this AM. Pt agreeable to session, pleasent/cooperative throughuot; however, pt became tearful throughout requiring emotional support from daughter and Marielle Shaffer.   Patient Currently in Pain: Denies  Pain Assessment  Pain Assessment: 0-10  Pain Level: 0  Vital Signs  Temp: 97.9 °F (36.6 °C)  Temp Source: Oral  Pulse: 85  Heart Rate Source: Monitor  Resp: 18  BP: 112/74  BP Location: Left upper arm  MAP (mmHg): 85  Patient Position: Up in chair  Level of Consciousness: Alert (0)  MEWS Score: 1  Patient Currently in Pain: Denies  Oxygen Therapy  SpO2: 99 %  Pulse Oximeter Device Mode: Continuous  Pulse Oximeter Device Location: Left;Hand;Finger  O2 Device: Nasal cannula  O2 Flow Rate (L/min): 3 L/min independent ; Increased time to complete;Setup (Pt washed face seated supported in recliner)  UE Bathing: Stand by assistance;Setup; Increased time to complete  LE Bathing: Setup; Increased time to complete;Contact guard assistance  UE Dressing: Modified independent ;Setup  LE Dressing: Modified independent ;Setup (Pt donned slippers seated edge of transport chair with figure four tech)  Toileting: Contact guard assistance; Increased time to complete;Setup  Additional Comments: Once pt completed mobility to chair she was provided with restbreak d/t reporting feeling fatigued. Plan was to complete mobility to bathroom to complete oral hygiene. Writer requested pt tried with RW and pt became extremely tearful requiring significant emotional support. Once pt was calmed down, declined to want to complete any further ADL tasks d/t needed to rest.  Tone RUE  RUE Tone: Normotonic  Tone LUE  LUE Tone: Normotonic  Coordination  Movements Are Fluid And Coordinated: Yes     Bed mobility  Supine to Sit: Unable to assess  Sit to Supine: Unable to assess  Comment: Pt in [place] with transport returning from CT scan upon arrival and retired to bedside chair at conclusion of session. Transfers  Sit to stand: Contact guard assistance  Stand to sit: Contact guard assistance  Transfer Comments: No AD, pt declined using AD despite encouragement.      Cognition  Overall Cognitive Status: WFL        Sensation  Overall Sensation Status: WFL (Denies any numbness/tingling)        LUE AROM (degrees)  LUE AROM : WFL  Left Hand AROM (degrees)  Left Hand AROM: WFL  RUE AROM (degrees)  RUE AROM : WFL  Right Hand AROM (degrees)  Right Hand AROM: WFL  LUE Strength  Gross LUE Strength: WFL  L Hand General: 5/5  LUE Strength Comment: Grossly 4/5  RUE Strength  Gross RUE Strength: WFL  R Hand General: 5/5  RUE Strength Comment: Grossly 4/5                   Plan   Plan  Times per week: 3-4x/wk  Current Treatment Recommendations: Safety Education & Training, Patient/Caregiver Education & Training, Self-Care / ADL, Functional Mobility Training, Equipment Evaluation, Education, & procurement, Home Management Training, Endurance Training    AM-PAC Score        AM-PAC Inpatient Daily Activity Raw Score: 21 (10/01/21 1328)  AM-PAC Inpatient ADL T-Scale Score : 44.27 (10/01/21 1328)  ADL Inpatient CMS 0-100% Score: 32.79 (10/01/21 1328)  ADL Inpatient CMS G-Code Modifier : General Eye (10/01/21 1328)    Goals  Short term goals  Time Frame for Short term goals: By discharge, pt will:  Short term goal 1: Demo functional sit<>stand transfers with SUP, using LRD PRN  Short term goal 2: Demo functional mobility with SUP, using LRD PRN and 0 LOB  Short term goal 3: Demo +15 minutes of standing tolerance during ADL task with SBA  Short term goal 4: Demo +30 minutes of activity tolerance  with SUP to promote increased engagement in ADLs/IADLs  Short term goal 5: Demo use of energy conservation techniques IND throughout all functional/ADL tasks with 0 VCs for initiation  Short term goal 6: Demo UB ADLs with Mod IND  Short term goal 7: Demo LB ADLs with SUP       Therapy Time   Individual Concurrent Group Co-treatment   Time In 1045         Time Out 1130         Minutes 45         Timed Code Treatment Minutes: Deniz Alejo 97, OTR/L

## 2021-10-01 NOTE — PROGRESS NOTES
Physical Therapy  Facility/Department: [facility]  Daily Treatment Note  NAME: Yesika Hahn  : 1945  MRN: [id number]    Date of Service: 10/1/2021    Discharge Recommendations:  Patient would benefit from continued therapy after discharge   PT Equipment Recommendations  Equipment Needed: No    Assessment   Body structures, Functions, Activity limitations: Decreased functional mobility ; Decreased endurance;Decreased balance;Decreased posture  Assessment: Pt amb 90 ft without device and CGA. limited by decreased endurance and coughing spells. WOuld likely be ok to d/c to prior living arrangements with 2 hour assist from family  Prognosis: Good  REQUIRES PT FOLLOW UP: Yes  Activity Tolerance  Activity Tolerance: Patient limited by fatigue;Patient limited by endurance     Patient Diagnosis(es): The primary encounter diagnosis was Pleural effusion. Diagnoses of Chest wall pain and Pericardial effusion were also pertinent to this visit. has a past medical history of Arthritis, Displacement of lumbar intervertebral disc without myelopathy, Hypertension, Lumbar disc disease, Personal history of TIA (transient ischemic attack), Sleep deprivation, SOB (shortness of breath), and Wears glasses. has a past surgical history that includes Wrist surgery (Left); Hysterectomy; Ankle surgery (Right); Tonsillectomy; Nerve Block (10/19/15); Nerve Block (10-26-15 ); Foot surgery (); Hip arthroscopy (Left, 2018); and pr hip arthroscopy, dx (Left, 3/14/2018). Restrictions  Restrictions/Precautions  Restrictions/Precautions: Fall Risk, Up as Tolerated, Isolation (droplet)  Required Braces or Orthoses?: No  Position Activity Restriction  Other position/activity restrictions: Hx of multiple sxs on L hip-pt reports tender on L side; Up with assistance  Subjective   General  Response To Previous Treatment: Patient with no complaints from previous session.   Family / Caregiver Present: Yes  Subjective  Subjective: Pt resting in bed upon arrival, agreeable to PT. Frusterated re being in hospital and not as indepenent as she used to be  Pain Screening  Patient Currently in Pain: Denies (denies pain, but c/o frequent coughing spells that are uncomfortable)  Vital Signs  Patient Currently in Pain: Denies (denies pain, but c/o frequent coughing spells that are uncomfortable)       Orientation  Orientation  Overall Orientation Status: Within Normal Limits  Cognition      Objective   Bed mobility  Rolling to Right: Modified independent  Supine to Sit: Modified independent  Sit to Supine: Modified independent  Scooting: Modified independent  Transfers  Sit to Stand: Contact guard assistance  Stand to sit: Contact guard assistance  Ambulation  Ambulation?: Yes  Ambulation 1  Surface: level tile  Device: No Device  Other Apparatus: O2 (3L)  Assistance: Contact guard assistance  Quality of Gait: narrow VINCE, very slow gaby, shuffles, unsteady  Distance: 90 ft  Comments: 2 standing rest breaks  Stairs/Curb  Stairs?: No     Balance  Posture: Fair  Sitting - Static: Good  Sitting - Dynamic: Good  Standing - Static: Fair  Standing - Dynamic: Fair;-  Comments: unsupported    Exercise  Upper extremity exercises: Bicep curl, shoulder flexion/extension, punches, shoulder abduction/adduction.  Reps: 5x,  Limited by fatigue and endurnace     Goals  Short term goals  Time Frame for Short term goals: 12 visits  Short term goal 1: independent bed mobility without use of bed rails/controls  Short term goal 2: independent transfers  Short term goal 3: independent gait without device x 100'  Short term goal 4: independent stair ambulation x 3 steps with 1 HR  Patient Goals   Patient goals : feel better, go home    Plan    Plan  Times per week: 5-6 visits weekly  Times per day: Daily  Current Treatment Recommendations: Strengthening, ROM, Balance Training, Functional Mobility Training, Transfer Training, Endurance Training, Gait Training, Stair training,

## 2021-10-01 NOTE — PROGRESS NOTES
[facility] - Progress Note  Today's Date and Time: 10/1/2021, 9:44 AM    Impression :   Shortness of breath  Rhinovirus or enterovirus infection  History of TIA (2012 mini stroke high blood pressure)  Bacteremia with Gram positive bacilli = Contaminant 9-28-21    Recommendations:   Monitor off antibiotics    Medical Decision Making/Summary/Discussion:10/1/2021       Infection Control Recommendations   [place] Precautions    Antimicrobial Stewardship Recommendations     Discontinuation of therapy    Coordination of Outpatient Care:   Estimated Length of IV antimicrobials:None   Patient will need Midline Catheter Insertion: No  Patient will need PICC line Insertion:No  Patient will need: Home IV , [place],  SNF,  LTAC:TBD  Patient will need outpatient wound care:No    Chief complaint/reason for consultation:   Sepsis/ pneumonia    History of Present Illness:   Fitz Solitario is a 78y.o.-year-old  female who was initially admitted on 9/27/2021. Patient seen at the request of Dr. Nia Guerin:    Patient presented to the ED on 9/27/2021 complaining of left-sided chest wall pain and persistent cough. She presented with shortness of breath pain and tachycardic with a coughing fit. Pain is worse with movement per ED note. She was seen a day before and was offered admission but she preferred going home. Two weeks ago she was seen at an urgent care and was diagnosed with pleural effusion and was placed on amoxicillin at the time. CURRENT EVALUATION: 10/1/2021    Evaluated in the ICU    Afebrile  VS stable on 3 L NC    CT surgery consulted for possible pericardial window. Ultrasound guided thoracentesis was performed on 9/28/2021.  800 mL of clear yellow fluid was obtained. Cultures were sent to lab. No growth     Cardiac catherterization performed (9/29/21) due to large pericardial effusion with some hemodynamic abnormalities.  200mL total amount of serosanguinous fluid obtained. Culture: No growth     Rhinovirus/enterovirus detected on 9/28/21 by PCR    Echocardiogram 9/30/21  Global left ventricular systolic function is normal. Calculated ejection  fraction 51% by Heart Model. Small to moderate pericardial effusion seen with gelatinous material, more  so anteriorly. No obvious RA/RV diastolic collapse. Echo was completed at bedside today (9/29/2021). Left ventricle is normal in size, global left ventricular systolic function is normal with ejection fracture is 55%. Right atrium appears small  Moderate to large pericardial effusion. Mitral and tricuspid inflow shows physiologic mild evidence of tamponade. Monitoring patient off antibiotics    Labs, X rays reviewed: 10/1/2021    BUN:17  Cr:0.65    WBC:9.0  Hb:10.5  Plat: 290    Cultures:  Urine:    Blood:   9-22-21: No growth    Sputum :    Wound:  9-29-21: Pericardial fluid; No growth   9-28-21: Thoracentesis: No growth      Culture with smear, acid fast bacillius: No acid fast bacilli seen 09/28/21    Discussed with patient, RN, family. I have personally reviewed the past medical history, past surgical history, medications, social history, and family history, and I have updated the database accordingly.   Past Medical History:     Past Medical History:   Diagnosis Date    Arthritis     Displacement of lumbar intervertebral disc without myelopathy 9/30/2015    Hypertension     exacerbated after last injection 10-    Lumbar disc disease     Personal history of TIA (transient ischemic attack)     2012 mini stroke high blood pressure    Sleep deprivation     SOB (shortness of breath)     \"walk to fast and going upstairs\" \"can walk a city block\"    Wears glasses        Past Surgical  History:     Past Surgical History:   Procedure Laterality Date    ANKLE SURGERY Right     x 3    FOOT SURGERY  2015    left    HIP ARTHROSCOPY Left 03/14/2018    band release bursectomy    HYSTERECTOMY      NERVE BLOCK  10/19/15    caudal #1  celestone 9mg    NERVE BLOCK  10-26-15     caudal epidural steroid block #2 decadron 5 mg    NM HIP ARTHROSCOPY, DX Left 3/14/2018    LEFT HIP ARTHROSCOPY WITH  IT BAND RELEASE BURSECTOMY WITH GLUTEUS MUSCLE REPAIR performed by Ariella Tolbert DO at [address] [place] [address] Avenue Left     x 2       Medications:      fluticasone  1 spray Each Nostril Daily    cetirizine  10 mg Oral Daily    budesonide-formoterol  2 puff Inhalation BID    furosemide  40 mg IntraVENous Daily    sodium chloride flush  5-40 mL IntraVENous 2 times per day    enoxaparin  40 mg SubCUTAneous Daily    lidocaine  1 patch TransDERmal Daily    sodium chloride flush  5-40 mL IntraVENous 2 times per day    dextromethorphan  30 mg Oral 2 times per day       Social History:     Social History     Socioeconomic History    Marital status:      Spouse name: Not on file    Number of children: Not on file    Years of education: Not on file    Highest education level: Not on file   Occupational History     Employer: [employer]     Comment: [employer]   Tobacco Use    Smoking status: Never Smoker    Smokeless tobacco: Never Used   Vaping Use    Vaping Use: Never used   Substance and Sexual Activity    Alcohol use: No    Drug use: No    Sexual activity: Not on file   Other Topics Concern    Not on file   Social History Narrative    Not on file     Social Determinants of Health     Financial Resource Strain:     Difficulty of Paying Living Expenses:    Food Insecurity:     Worried About Running Out of Food in the Last Year:     Ran Out of Food in the Last Year:    Transportation Needs:     Lack of Transportation (Medical):      Lack of Transportation (Non-Medical):    Physical Activity:     Days of Exercise per Week:     Minutes of Exercise per Session:    Stress:     Feeling of Stress :    Social Connections:     Frequency of Communication with Friends and Family:     Frequency of Social Gatherings with Friends and Family:     Attends Denominational Services:     Active Member of Clubs or Organizations:     Attends Club or Organization Meetings:     Marital Status:    Intimate Partner Violence:     Fear of Current or Ex-Partner:     Emotionally Abused:     Physically Abused:     Sexually Abused:        Family History:     Family History   Problem Relation Age of Onset    Lung Cancer Mother     Lung Cancer Brother     Heart Disease Father     Heart Disease Paternal Grandfather         Allergies:   Bee pollen, Erythromycin, Pcn [penicillins], Tetanus toxoids, and Tetracyclines & related     Review of Systems:   Constitutional: No fevers or chills. No systemic complaints  Head: No headaches  Eyes: No double vision or blurry vision. No conjunctival inflammation. ENT: No sore throat or runny nose. . No hearing loss, tinnitus or vertigo. Cardiovascular: No chest pain or palpitations. No shortness of breath. No CALVILLO  Lung: No shortness of breath or cough. No sputum production  Abdomen: No nausea, vomiting, diarrhea, or abdominal pain. Daily Rast No cramps. Genitourinary: No increased urinary frequency, or dysuria. No hematuria. No suprapubic or CVA pain  Musculoskeletal: No muscle aches or pains. No joint effusions, swelling or deformities  Hematologic: No bleeding or bruising. Neurologic: No headache, weakness, numbness, or tingling. Integument: No rash, no ulcers. Psychiatric: No depression. Endocrine: No polyuria, no polydipsia, no polyphagia.     Physical Examination :     Patient Vitals for the past 8 hrs:   BP Temp Temp src Pulse Resp SpO2   10/01/21 0919 -- -- -- -- 20 96 %   10/01/21 0900 (!) 58/51 -- -- 104 -- 96 %   10/01/21 0800 (!) 110/58 97.5 °F (36.4 °C) Axillary 95 21 93 %   10/01/21 0700 (!) 98/53 -- -- 90 -- 95 %   10/01/21 0600 (!) 97/56 -- -- 88 16 --   10/01/21 0500 (!) 93/50 98.1 °F (36.7 °C) Oral 87 16 --   10/01/21 0450 (!) 95/53 -- -- 87 16 -- 10/01/21 0230 138/61 98.1 °F (36.7 °C) Oral 110 24 --     General Appearance: Awake, alert, and in no apparent distress  Head:  Normocephalic, no trauma  Eyes: Pupils equal, round, reactive to light and accommodation; extraocular movements intact; sclera anicteric; conjunctivae pink. No embolic phenomena. ENT: Oropharynx clear, without erythema, exudate, or thrush. No tenderness of sinuses. Mouth/throat: mucosa pink and moist. No lesions. Dentition in good repair. Neck:Supple, without lymphadenopathy. Thyroid normal, No bruits. Pulmonary/Chest: Clear to auscultation, without wheezes, rales, or rhonchi. No dullness to percussion. Cardiovascular: Regular rate and rhythm without murmurs, rubs, or gallops. Abdomen: Soft, non tender. Bowel sounds normal. No organomegaly  All four Extremities: No cyanosis, clubbing, edema, or effusions. Neurologic: No gross sensory or motor deficits. Skin: Warm and dry with good turgor. No signs of peripheral arterial or venous insufficiency. No ulcerations. No open wounds. Medical Decision Making -Laboratory:   I have independently reviewed/ordered the following labs:    CBC with Differential:   Recent Labs     09/30/21  0526 10/01/21  0537   WBC 11.3 9.0   HGB 10.8* 10.5*   HCT 33.5* 33.7*    290   LYMPHOPCT 10* 14*   MONOPCT 7 8     BMP:   Recent Labs     09/30/21  0526 10/01/21  0537   * 127*   K 4.3 3.9   CL 96* 96*   CO2 20 21   BUN 16 17   CREATININE 0.62 0.65   MG  --  2.5     Hepatic Function Panel:   Recent Labs     09/28/21  1246   PROT 5.9*     No results for input(s): RPR in the last 72 hours. No results for input(s): HIV in the last 72 hours. No results for input(s): BC in the last 72 hours.   Lab Results   Component Value Date    RBC 3.76 10/01/2021    WBC 9.0 10/01/2021     Lab Results   Component Value Date    CREATININE 0.65 10/01/2021    GLUCOSE 98 10/01/2021       Medical Decision Making-Imaging:     EXAMINATION:   TWO XRAY VIEWS OF THE CHEST       9/28/2021 4:57 pm       COMPARISON:   CT chest September 27, 2021       HISTORY:   ORDERING SYSTEM PROVIDED HISTORY: evaluate pna, pleural effusions   TECHNOLOGIST PROVIDED HISTORY:   evaluate pna, pleural effusions       FINDINGS:   Cardiomegaly.  Small left greater than right pleural effusions.  Possible   mild edema.  Mediastinum normal.  Bony thorax intact.           Impression   Possible mild CHF.  Small effusions. EXAMINATION:   CTA OF THE CHEST 9/27/2021 7:47 am       TECHNIQUE:   CTA of the chest was performed after the administration of intravenous   contrast.  Multiplanar reformatted images are provided for review.  MIP   images are provided for review. Dose modulation, iterative reconstruction,   and/or weight based adjustment of the mA/kV was utilized to reduce the   radiation dose to as low as reasonably achievable.       COMPARISON:   09/12/2021       HISTORY:   ORDERING SYSTEM PROVIDED HISTORY: Left-sided chest pain elevated D-dimer   yesterday   TECHNOLOGIST PROVIDED HISTORY:   Left-sided chest pain elevated D-dimer yesterday   Decision Support Exception - unselect if not a suspected or confirmed   emergency medical condition->Emergency Medical Condition (MA)   Reason for Exam: chest pain   Acuity: Acute   Type of Exam: Initial   Additional signs and symptoms: elevated D-dimer   Relevant Medical/Surgical History: hx HTN and TIA       FINDINGS:   Pulmonary Arteries: Pulmonary arteries show no evidence of intraluminal   filling defect to suggest pulmonary embolism.  Main pulmonary artery is   normal in caliber.       Mediastinum: Moderate pericardial effusion.  Minor atherosclerotic disease.    Normal caliber aorta.  No significant lymphadenopathy.  Thyroid gland and   esophagus grossly normal.       Lungs/pleura: Small right and moderate left pleural effusion with adjacent   compressive subsegmental atelectasis at the lung base.       No significant lung nodules or masses.  No pneumothorax.       Upper Abdomen: Limited images of the upper abdomen are unremarkable.       Soft Tissues/Bones: No acute bone or soft tissue abnormality.           Impression   1. No evidence for acute pulmonary embolism. 2. Moderate pericardial effusion. 3. Small right and moderate left pleural effusion with adjacent subsegmental   atelectasis.           Medical Decision [id number]:   Results for Ramya Thompson (MRN [id number]) as of 9/30/2021 13:10   Ref.  Range 9/27/2021 08:58 9/28/2021 10:00   Adenovirus PCR Latest Ref Range: Not Detected   Not Detected   B Pertussis by PCR Latest Ref Range: Not Detected   Not Detected   Chlamydia pneumoniae By PCR Latest Ref Range: Not Detected   Not Detected   Coronavirus 229E PCR Latest Ref Range: Not Detected   Not Detected   Coronavirus HKU1 PCR Latest Ref Range: Not Detected   Not Detected   Coronavirus NL63 PCR Latest Ref Range: Not Detected   Not Detected   Coronavirus OC Latest Ref Range: Not Detected   Not Detected   Human Metapneumovirus PCR Latest Ref Range: Not Detected   Not Detected   Influenza A by PCR Latest Ref Range: Not Detected   Not Detected   Influenza A H1 (2009) PCR Latest Ref Range: Not Detected   NOT REPORTED   Influenza A H1 PCR Latest Ref Range: Not Detected   NOT REPORTED   Influenza A H3 PCR Latest Ref Range: Not Detected   NOT REPORTED   Influenza B by PCR Latest Ref Range: Not Detected   Not Detected   MRSA DNA PROBE, NASAL Unknown  Rpt   Parainfluenza 1 PCR Latest Ref Range: Not Detected   Not Detected   Parainfluenza 2 PCR Latest Ref Range: Not Detected   Not Detected   Parainfluenza 3 PCR Latest Ref Range: Not Detected   Not Detected   Parainfluenza 4 PCR Latest Ref Range: Not Detected   Not Detected   Resp Syncytial Virus PCR Latest Ref Range: Not Detected   Not Detected   Rhino/Enterovirus PCR Latest Ref Range: Not Detected   DETECTED (A)   Mycoplasma pneumo by PCR Latest Ref Range: Not Detected   Not Detected   SARS-CoV-2, PCR Latest Ref Range: Not Detected   Not Detected   SARS-CoV-2, Rapid Latest Ref Range: Not Detected  Not Detected        Medical Decision Making-Other:     Note:  Labs, medications, radiologic studies were reviewed with personal review of films  Large amounts of data were reviewed  Discussed with nursing Staff, Discharge planner  Infection Control and Prevention measures reviewed  All prior entries were reviewed  Administer medications as ordered  Prognosis: [address]  Discharge planning reviewed  Follow up as outpatient. Thank you for allowing us to participate in the care of this patient. Please call with questions. Montana Tejada, CELY - CNP     ATTESTATION:    I have discussed the case, including pertinent history and exam findings with the APRN. I have evaluated the  History, physical findings and pictures of the patient and the key elements of the encounter have been performed by me. I have reviewed the laboratory data, other diagnostic studies and discussed them with the APRN. I have updated the medical record where necessary. I agree with the assessment, plan and orders as documented by the APRN.     Yousuf Vazquez MD.        Pager: [phone] - Office: [phone]

## 2021-10-01 NOTE — PROGRESS NOTES
[facility]  Office: [address], DO, Laurence Gaming, DO, Eveline Kelsey, DO, Ike Hernández [facility], DO, Olvin Kilgore MD, Tawana Zamarripa MD, Kennis Osler, MD, María Elena Read MD, Nathen Durham MD, Tereza Byrnes MD, Fátima Tompkins MD, Katelyn Huston, DO, Royer Harden, DO, Ena Mahoney MD,  Eduardo To DO, Richard Damon MD, Daisy Reyes MD, Kwan Figueroa MD, Fadumo Lynn MD, , Jagruti Faria MD, Allison Soto MD, Rebecca Hargrove MD, Claudia Sadler [facility], [facility] Ismaelrichy, CNP, Ariadne Garcia, CNP, Yassine Ring, CNS, Luis Delaney, Geoff Siddiqi, CNP, Jazmin Tapia, CNP, Leopold Grant, CNP, Sunil Mondragon, CNP, Bashir Yepez PA-C, Kingsley Loo, [facility], Doni Gonzalez, CNP, Estelita Mayers, CNP, Jermaine Chung, CNP, Bernice Castano, CNP, Camelia Burrell, CNP, Christopher Heart, CNP, Travis Olivo, CNP, Rody Wyman, [address] [place] [address]    Progress Note    10/1/2021    4:26 PM    Name:   Sofy Thomas  MRN:     [id number]     Acct:      [de-identified]   Room:   [address] [place] [address] Day:  4  Admit Date:  9/27/2021  7:16 AM    PCP:   CELY Whitley CNP  Code Status:  Full Code    Subjective:     C/C:   Chief Complaint   Patient presents with    Cough     Interval History Status: unchanged. Pt seen and evaluated this a.m. She continues to have a persistent cough. Breathing a little better after thoracentesis. Denies chest pain. No new complaints. Brief History:     Sofy Thomas is a 78 y.o. Non- / non  female who presents with Cough   and is admitted to the hospital for the management of <principal problem not specified>.     53-year-old female presented to [facility] ER with complaints of left-sided chest pain and persistent cough. Patient has had cough for last 2 weeks, and progressively worsening. Dry in nature, no phlegm production. She has heat intolerance. No fever or chills.  Patient was discharged from ER a day ago with oral Levaquin, she returned back the next day as her symptoms were worsening. She had poor appetite for last 4 days. Patient has been a non-smoker and has no previous history of cardiac or lung disease.     In the ER patient was noted to be afebrile, tachycardic with pulse rate of 121, blood pressure 129/72, saturating well on room air around 95%. She was noted to have hyponatremia with sodium of 124, chloride 92, creatinine 0.4, glucose 129, pro-Clay 0.13. Elevated leukocytosis with WBC 14.2, 13.4. Rapid Covid was negative twice. Patient had CT chest which showed small right and moderate left pleural effusion with adjacent subsegmental atelectasis. CT scan was also concerning for moderate pericardial effusion. Review of Systems:     Constitutional:  negative for chills, fevers, sweats  Respiratory:  negative for cough, dyspnea on exertion, +shortness of breath, wheezing  Cardiovascular:  negative for chest pain, chest pressure/discomfort, lower extremity edema, palpitations  Gastrointestinal:  negative for abdominal pain, constipation, diarrhea, nausea, vomiting  Neurological:  negative for dizziness, headache    Medications: Allergies:     Allergies   Allergen Reactions    Bee Pollen Anaphylaxis    Erythromycin Anaphylaxis    Pcn [Penicillins] Anaphylaxis    Tetanus Toxoids Anaphylaxis    Tetracyclines & Related Anaphylaxis       Current Meds:   Scheduled Meds:    methylPREDNISolone  40 mg IntraVENous Q12H    fluticasone  1 spray Each Nostril Daily    cetirizine  10 mg Oral Daily    budesonide-formoterol  2 puff Inhalation BID    furosemide  40 mg IntraVENous Daily    sodium chloride flush  5-40 mL IntraVENous 2 times per day    enoxaparin  40 mg SubCUTAneous Daily    lidocaine  1 patch TransDERmal Daily    sodium chloride flush  5-40 mL IntraVENous 2 times per day    dextromethorphan  30 mg Oral 2 times per day     Continuous Infusions:    sodium chloride      sodium chloride       PRN Meds: albuterol sulfate HFA, benzonatate, sodium chloride flush, sodium chloride, ondansetron **OR** ondansetron, polyethylene glycol, acetaminophen **OR** acetaminophen, potassium chloride **OR** potassium alternative oral replacement **OR** potassium chloride, magnesium sulfate, oxyCODONE, guaiFENesin-codeine, sodium chloride flush, sodium chloride, diphenhydrAMINE, sodium chloride flush    Data:     Past Medical History:   has a past medical history of Arthritis, Displacement of lumbar intervertebral disc without myelopathy, Hypertension, Lumbar disc disease, Personal history of TIA (transient ischemic attack), Sleep deprivation, SOB (shortness of breath), and Wears glasses. Social History:   reports that she has never smoked. She has never used smokeless tobacco. She reports that she does not drink alcohol and does not use drugs. Family History:   Family History   Problem Relation Age of Onset    Lung Cancer Mother     Lung Cancer Brother     Heart Disease Father     Heart Disease Paternal Grandfather        Vitals:  /74   Pulse 85   Temp 97.9 °F (36.6 °C) (Oral)   Resp 18   Ht 5' 2\" (1.575 m)   Wt 139 lb 6.4 oz (63.2 kg)   SpO2 99%   BMI 25.50 kg/m²   Temp (24hrs), Av.9 °F (36.6 °C), Min:97.5 °F (36.4 °C), Max:98.1 °F (36.7 °C)    No results for input(s): POCGLU in the last 72 hours. I/O (24Hr):     Intake/Output Summary (Last 24 hours) at 10/1/2021 1626  Last data filed at 10/1/2021 1400  Gross per 24 hour   Intake 360 ml   Output 2450 ml   Net -2090 ml       Labs:  Hematology:  Recent Labs     21  0509 21  0526 10/01/21  0537   WBC 12.7* 11.3 9.0   RBC 3.66* 3.81* 3.76*   HGB 11.0* 10.8* 10.5*   HCT 34.4* 33.5* 33.7*   MCV 94.0 87.9 89.6   MCH 30.1 28.3 27.9   MCHC 32.0 32.2 31.2   RDW 13.5 13.3 13.4   * 276 290   MPV 10.7 10.4 10.0     Chemistry:  Recent Labs     21  0509 21  0526 10/01/21  0537   * 125* 127*   K 4.2 4.3 3.9   CL 94* 96* 96*   CO2 19* 20 21   GLUCOSE 95 95 98   BUN 12 16 17   CREATININE 0.61 0.62 0.65   MG  --   --  2.5   ANIONGAP 13 9 10   LABGLOM >60 >60 >60   GFRAA >60 >60 >60   CALCIUM 7.9* 8.0* 7.5*     Recent Labs     09/29/21  1446          Lab Results   Component Value Date/Time    SPECIAL NOT REPORTED 09/29/2021 07:37 PM     Lab Results   Component Value Date/Time    CULTURE PENDING 09/29/2021 07:37 PM       Radiology:  XR CHEST (2 VW)    Result Date: 9/28/2021  Possible mild CHF. Small effusions. XR CHEST PORTABLE    Result Date: 9/26/2021  Small bilateral pleural effusions with left mid to lower lobe infiltrate or atelectasis. Clinical correlation for pneumonia. Clinical and imaging follow-up to resolution recommended. CT CHEST PULMONARY EMBOLISM W CONTRAST    Result Date: 9/27/2021  1. No evidence for acute pulmonary embolism. 2. Moderate pericardial effusion. 3. Small right and moderate left pleural effusion with adjacent subsegmental atelectasis. US THORACENTESIS Which side should the procedure be performed? Left    Result Date: 9/28/2021  Successful ultrasound guided thoracentesis.        Physical Examination:        General appearance:  alert, cooperative and ill-appearing  Mental Status:  oriented to person, place and time and normal affect  Lungs: Shallow inspiratory effort, CTA bilaterally anteriorly, diminished posteriorly  Heart: Tachycardic, regular rhythm, no murmur  Abdomen:  soft, nontender, nondistended, normal bowel sounds, no masses, hepatomegaly, splenomegaly  Extremities:  no edema, redness, tenderness in the calves  Skin:  no gross lesions, rashes, induration    Assessment:        Hospital Problems         Last Modified POA    * (Principal) Listeria sepsis ([facility] [address] 9/28/2021 Yes    PNA (pneumonia) 9/27/2021 Yes    Pneumonia 9/27/2021 Yes    Sinus tachycardia 9/28/2021 Yes    Viral sepsis ([address] 9/28/2021 Yes    Poor appetite 9/28/2021 Yes Pericardial effusion 9/28/2021 Yes    Pleural effusion 9/28/2021 Yes    Hyponatremia 9/28/2021 Yes    Essential hypertension 9/28/2021 Yes    Chest wall pain 9/29/2021 Yes          Plan:        Cardiac tamponade:   - pericardial drain in place. Reviewed echocardiogram images personally and discussed with CT surgery team. Will continue to monitor output and clinical condition prior to determining necessity of pericardial window. Bilateral pleural effusions:   - s/p thoracentesis on the left x 2. Fluid studies consistent with exudate. Culture with NGTD. Reviewed CT scan from today. Left sided pleural effusion appears to be re accumulating. Right sided pleural effusion has increased size compared to prior. Discussed with pulmonology attending, Dr. Norman Gowers. Will order right sided paracentesis and fluid studies. SOSA found to be positive. Consult to rheumatology placed. Will repeat SOSA with reflex. Check complement levels. Acute hypoxic respiratory failure:   - Secondary to both 1 and 2.    - Continue low-flow O2 as warranted. Hyponatremia:   - ? SIADH. Fluid restrict <1L daily. Continue to monitor. Hold lasix    Gram-positive jj bacteremia:   - Identified in 1 out of 2 blood cultures.     - deemed contaminant by ID    Moderate malnutrition:   - With hypoalbuminemia and hypoproteinemia  -Supplements twice daily with meals starting today     Rhinovirus positive      Rabia Wyatt PA-C  10/1/2021  4:26 PM

## 2021-10-01 NOTE — CONSULTS
[facility]  History and Physical    Pt Name: James Mcconnell  MRN: [id number]  [place]: 1945  Date of evaluation: 10/1/2021  Primary Care Physician: CELY Schaeffer CNP  Patient evaluated at the request of  Dr. Lai Loving  Reason for evaluation: Pericardial effusion     History of Present Illness:       James Mcconnell is a 78y.o. year old admitted through the ER on 9/28/21 with complaint of shortness of breath. ECHO showed pericardial effusion. Pericardiocentesis no longer functioning. We've been asked to evaluate for possible pericardial window.      Review of Systems:     General Denies any fever or chills  HEENT Denies any diplopia, tinnitus or vertigo  Resp positive for  dry cough and dyspnea  Cardiac Denies any chest pain, palpitations, claudication or edema  GI Denies any melena, hematochezia, hematemesis or pyrosis   Denies any frequency, urgency, hesitancy or incontinence  Heme Denies bruising or bleeding easily  Endocrine Denies any history of diabetes or thyroid disease  Neuro Denies any focal motor or sensory deficits    Past Medical History         Diagnosis Date    Arthritis     Displacement of lumbar intervertebral disc without myelopathy 9/30/2015    Hypertension     exacerbated after last injection 10-    Lumbar disc disease     Personal history of TIA (transient ischemic attack)     2012 mini stroke high blood pressure    Sleep deprivation     SOB (shortness of breath)     \"walk to fast and going upstairs\" \"can walk a city block\"    Wears glasses        Past Surgical History         Procedure Laterality Date    ANKLE SURGERY Right     x 3    FOOT SURGERY  2015    left    HIP ARTHROSCOPY Left 03/14/2018    band release bursectomy    HYSTERECTOMY      NERVE BLOCK  10/19/15    caudal #1  celestone 9mg    NERVE BLOCK  10-26-15     caudal epidural steroid block #2 decadron 5 mg    UT HIP ARTHROSCOPY, DX Left 3/14/2018    LEFT HIP ARTHROSCOPY WITH  IT BAND RELEASE BURSECTOMY WITH GLUTEUS MUSCLE REPAIR performed by Amada Jewell DO at [facility] Left     x 2       Medications     Prior to Admission medications    Medication Sig Start Date End Date Taking? Authorizing Provider   guaiFENesin (MUCINEX) 600 MG extended release tablet Take 1 tablet by mouth 2 times daily for 10 days 9/26/21 10/6/21  Philomena Boyd MD   benzonatate (TESSALON) 100 MG capsule Take 1 capsule by mouth 3 times daily as needed for Cough 9/26/21 10/3/21  Philomena Boyd MD   albuterol sulfate  (90 Base) MCG/ACT inhaler Inhale 2 puffs into the lungs every 4 hours as needed for Wheezing or Shortness of Breath 9/26/21 10/3/21  Philomena Boyd MD   guaiFENesin-dextromethorphan [facility] DM) 100-10 MG/5ML syrup Take 5 mLs by mouth 4 times daily as needed for Cough 9/26/21 10/6/21  Philomena Boyd MD   levoFLOXacin (LEVAQUIN) 500 MG tablet Take 1 tablet by mouth daily for 10 days 9/26/21 10/6/21  Philomena Boyd MD   Capsaicin-Menthol (ALLEVESS EX) Apply topically    Historical Provider, MD   lisinopril (PRINIVIL;ZESTRIL) 10 MG tablet Take 15 mg by mouth daily     Historical Provider, MD        Allergies     is allergic to bee pollen, erythromycin, pcn [penicillins], tetanus toxoids, and tetracyclines & related. Family History     family history includes Heart Disease in her father and paternal grandfather; Chema Soles in her brother and mother. Social History      reports that she has never smoked. She has never used smokeless tobacco.   reports no history of alcohol use. reports no history of drug use. OBJECTIVE:     VITALS:  height is 5' 2\" (1.575 m) and weight is 139 lb 6.4 oz (63.2 kg). Her oral temperature is 98.1 °F (36.7 °C). Her blood pressure is 97/56 (abnormal) and her pulse is 88. Her respiration is 16 and oxygen saturation is 98%.    CONSTITUTIONAL: Alert and oriented times 3, no acute distress and cooperative to examination. HEENT: Head is normocephalic, atraumatic. EOMI, PERRLA  NECK: Soft, trachea midline and straight  LUNGS: Chest expands equally bilaterally upon respiration, no accessory muscle used. Ausculation reveals no wheezes, rales or rhonchi. CARDIOVASCULAR: Auscultation: Rhythm: rapid rate  ABDOMEN: soft, nontender, nondistended, no masses or organomegaly, no hernias palpable, no guarding or peritoneal signs. Bowel sounds are present in all four quadrants Scars are consistent with previous surgical history. NEUROLOGIC: CN II-XII are grossly intact. There are no focalizing motor or sensory deficits  EXTREMITIES: no cyanosis, clubbing or edema    LABS:     Recent Labs     09/29/21  0509 09/30/21  0526 10/01/21  0537   WBC 12.7* 11.3 9.0   HGB 11.0* 10.8* 10.5*   HCT 34.4* 33.5* 33.7*   * 276 290   * 125* 127*   K 4.2 4.3 3.9   CL 94* 96* 96*   CO2 19* 20 21   BUN 12 16 17   CREATININE 0.61 0.62 0.65   CALCIUM 7.9* 8.0* 7.5*       RADIOLOGY:     CTscan chest:  1. No evidence for acute pulmonary embolism. 2. Moderate pericardial effusion. 3. Small right and moderate left pleural effusion with adjacent subsegmental atelectasis. ECHO:  Right Ventricle  Right ventricular function appears reduced. Mitral Valve  Normal mitral valve structure and function. No mitral regurgitation. Aortic Valve  Normal aortic valve structure. No aortic insufficiency. Tricuspid Valve  Normal tricuspid valve structure and function. No tricuspid regurgitation. Pulmonic Valve  The pulmonic valve is normal in structure. No pulmonic insufficiency. Pericardial Effusion  Small to moderate pericardial effusion seen with gelatinous material, more  so anteriorly. Pleural Effusion  Left pleural effusion. Miscellaneous  IVC not well visualized. Assessment:     Pericardial effusion mostly anterior gelatinized  Recent tap left pleural effusion  H/O TIA  +SOSA  Hyponatremia      Plan:      Will discuss options with Dr. Mcarthur Fees.  Keep NEALO      DAKOTAH Hennessy PA-C  Electronically signed 10/1/2021 at 7:48 AM

## 2021-10-01 NOTE — PROGRESS NOTES
[facility]   Progress Note                   Date:   9/30/2021  Patient name: Cony Ceja  Date of admission:  9/27/2021  7:16 AM  MRN:   [id number]  YOB: 1945  PCP: CELY Gaitan CNP    Reason for Admission: Chest wall pain [R07.89]  Pericardial effusion [I31.3]  Pneumonia [J18.9]  Pleural effusion [J90]  PNA (pneumonia) [J18.9]    Subjective:       Clinical Changes / Abnormalities:    Patient seen and examined at bedside this morning. No acute events over the night. S/p pericardiocentesis   Had thoracentesis by IR yesterday       Medications:   Scheduled Meds:   fluticasone  1 spray Each Nostril Daily    cetirizine  10 mg Oral Daily    budesonide-formoterol  2 puff Inhalation BID    furosemide  40 mg IntraVENous Daily    sodium chloride flush  5-40 mL IntraVENous 2 times per day    enoxaparin  40 mg SubCUTAneous Daily    lidocaine  1 patch TransDERmal Daily    sodium chloride flush  5-40 mL IntraVENous 2 times per day    dextromethorphan  30 mg Oral 2 times per day     Continuous Infusions:   sodium chloride      sodium chloride       CBC:   Recent Labs     09/28/21  0632 09/29/21  0509 09/30/21  0526   WBC 13.4* 12.7* 11.3   HGB 12.2 11.0* 10.8*    454* 276     BMP:    Recent Labs     09/28/21  0334 09/29/21  0509 09/30/21  0526   * 126* 125*   K 4.5 4.2 4.3   CL 92* 94* 96*   CO2 23 19* 20   BUN 10 12 16   CREATININE 0.41* 0.61 0.62   GLUCOSE 129* 95 95     Hepatic:   No results for input(s): AST, ALT, ALB, BILITOT, ALKPHOS in the last 72 hours. Troponin:   No results for input(s): TROPHS in the last 72 hours. BNP: No results for input(s): BNP in the last 72 hours. Lipids: No results for input(s): CHOL, HDL in the last 72 hours. Invalid input(s): LDLCALCU  INR:   No results for input(s): INR in the last 72 hours.     Objective:   Vitals: BP (!) 110/58   Pulse 106   Temp 98.1 °F (36.7 °C) (Oral)   Resp 26 Comment: coughing pretty much continuously  Ht 5' 2\" (1.575 m)   Wt 139 lb 6.4 oz (63.2 kg)   SpO2 99%   BMI 25.50 kg/m²   General appearance: alert and cooperative with exam  HEENT: Head: Normocephalic, no lesions, without obvious abnormality. Neck: no JVD, trachea midline, no adenopathy  Lungs: Diminished throughout. Supplemental oxygen per NC. Frequent cough. Heart: Regular rate and rhythm, s1/s2 auscultated, no murmurs. Sinus tachycardia. Abdomen: soft, non-tender, bowel sounds active  Extremities: no edema  Neurologic: not done    ECHO 9/29/2021     Summary  Left ventricle is normal in size, global left ventricular systolic function  is normal, calculated ejection fraction is 55%. Right atrium appears small. Moderate to large pericardial effusion. Posteriorly measuring 2.29cm, Anteriorly measuring 3.66cm. Gelatinous  material is noted throughout. Cannot rule out right ventricular collapse in the subcostal views, clinical  correlation recommended. Mitral and tricuspid inflows show physiologic mild evidence of tamponade. Pleural effusion noted. Assessment / Acute Cardiac Problems:     1. Pleural effusion 2/2 rhinovirus/enterovirus s/p thoracentesis  2. Pericardial effusion 2/p pericardiocentesis on 9/29  3. H/o TIA  4. SOSA positive  5. Hyponatremia  6. HTN, on lisinorpil  7. B/l varicose veins         Plan of Treatment:   1. Pericardial and pleural effusion in setting of positive viral panel. Possible etiology is viral vs. Rheumatological disorder given positive SOSA. Would follow up pleural and pericardial fluid c/s.  2. Had thoracentesis by IR yesterday. 3. Limited ECHO showed small to moderate pericadical effusion on 09/30/2021      Electronically signed by Estephanie Reis MD on 9/30/2021 at 9:51 PM  [facility]    I performed a history and physical examination of the patient and discussed management with the resident. I reviewed the residents note and agree with the documented findings and plan of care. Any areas of disagreement are noted on the chart. I was personally present for the key portions of any procedures. I have documented in the chart those procedures where I was not present during the key portions. I have personally evaluated this patient and have completed at least one if not all key elements of the E/M (history, physical exam, and MDM). Additional findings are as noted. ID is on board. TSH normal. S/p pericardial drain- not draing fluid. TTE yesterday showed small to moderate effusion with gelatinous material. Will consult CT surgery for evaluation of pericardial window.     Leonora Mitchell MD

## 2021-10-01 NOTE — PROGRESS NOTES
the chest repeat CT scan of the chest showed pericardial effusion is a smaller she has a small left pleural effusion and she has a moderate right pleural effusion which is increased from last CT scan of the chest on admission  Her pleural fluid on the left side is exudative by protein criteria and so far Gram stain and culture negative and AFB smear is negative. Pericardial fluid culture is negative and AB smear is negative. Cytology is pending from pleural fluid and pericardial fluid. Review Of Systems:  Review of Systems   Constitutional: Positive for fatigue. Negative for chills, diaphoresis and fever. HENT: Negative for hearing loss, rhinorrhea, sinus pressure and sore throat. Eyes: Negative for photophobia and redness. Respiratory: Positive for cough and shortness of breath. Negative for chest tightness. Cardiovascular: Negative for chest pain. Gastrointestinal: Negative for abdominal pain, diarrhea, nausea and vomiting. Genitourinary: Negative for dysuria, frequency and urgency. Musculoskeletal: Negative for back pain and neck stiffness. Skin: Negative for rash. Neurological: Negative for seizures and speech difficulty. Psychiatric/Behavioral: Negative for agitation, confusion and decreased concentration. OBJECTIVE     PaO2/FiO2 RATIO:  No results for input(s): POCPO2 in the last 72 hours.          VITAL SIGNS:   LAST:  /60   Pulse 98   Temp 97.9 °F (36.6 °C) (Oral)   Resp 18   Ht 5' 2\" (1.575 m)   Wt 139 lb 6.4 oz (63.2 kg)   SpO2 97%   BMI 25.50 kg/m²   8-24 HR RANGE:  TEMP Temp  Av.9 °F (36.6 °C)  Min: 97.5 °F (36.4 °C)  Max: 98.1 °F (26.3 °C)   BP Systolic (35GMH), UDW:807 , Min:58 , WLR:858      Diastolic (01TSP), ZKY:61, Min:50, Max:72     PULSE Pulse  Av.7  Min: 87  Max: 118   RR Resp  Av.8  Min: 16  Max: 21   O2 SAT SpO2  Av.7 %  Min: 93 %  Max: 97 %   OXYGEN DELIVERY O2 Flow Rate (L/min)  Avg: 3 L/min  Min: 3 L/min  Max: 3 L/min Systemic Examination:   Physical Exam -  Constitutional:  Alert, cooperative and no distress. Mental Status:  Oriented to person, place and time and normal affect. Lungs:  Bilateral air entry present, lung fields clear. Normal effort. Cough persists  Heart:  Regular rate and rhythm, no murmur. Abdomen:  Soft, nontender, nondistended, normal bowel sounds. Extremities:  No edema, redness, tenderness in the calves. Skin:  Warm, dry, no gross lesions or rashes. DATA REVIEW     Medications: Current Inpatient  Scheduled Meds:   fluticasone  1 spray Each Nostril Daily    cetirizine  10 mg Oral Daily    budesonide-formoterol  2 puff Inhalation BID    furosemide  40 mg IntraVENous Daily    sodium chloride flush  5-40 mL IntraVENous 2 times per day    enoxaparin  40 mg SubCUTAneous Daily    lidocaine  1 patch TransDERmal Daily    sodium chloride flush  5-40 mL IntraVENous 2 times per day    dextromethorphan  30 mg Oral 2 times per day     Continuous Infusions:   sodium chloride      sodium chloride         INPUT/OUTPUT:  In: 360 [P.O.:360]  Out: 2600 [Urine:2600]  Date 10/01/21 0000 - 10/01/21 2359   Shift 6306-0430 2456-6167 1469-4187 24 Hour Total   INTAKE   P.O.(mL/kg/hr)  360  360   Shift Total(mL/kg)  360(5.7)  360(5.7)   OUTPUT   Urine(mL/kg/hr) 1450(2.9) 800  2250   Drains(mL/kg) 0(0) 0(0)  0(0)   Shift Total(mL/kg) 1450(22.9) 800(12.7)  2250(35.6)   Weight (kg) 63.2 63.2 63.2 63.2        LABS:  ABGs:   No results for input(s): POCPH, POCPCO2, POCPO2, POCHCO3, QJIY5ZBC in the last 72 hours. CBC:   Recent Labs     09/29/21  0509 09/30/21  0526 10/01/21  0537   WBC 12.7* 11.3 9.0   HGB 11.0* 10.8* 10.5*   HCT 34.4* 33.5* 33.7*   MCV 94.0 87.9 89.6   * 276 290   LYMPHOPCT 11* 10* 14*   RBC 3.66* 3.81* 3.76*   MCH 30.1 28.3 27.9   MCHC 32.0 32.2 31.2   RDW 13.5 13.3 13.4     CRP:   No results for input(s): CRP in the last 72 hours.   LDH:   Recent Labs     09/28/21  1246 09/29/21  1446   LDH 152 179     BMP:   Recent Labs     09/29/21  0509 09/30/21  0526 10/01/21  0537   * 125* 127*   K 4.2 4.3 3.9   CL 94* 96* 96*   CO2 19* 20 21   BUN 12 16 17   CREATININE 0.61 0.62 0.65   GLUCOSE 95 95 98     Liver Function Test:   Recent Labs     09/28/21  1246   PROT 5.9*     Coagulation Profile:   No results for input(s): INR, PROTIME, APTT in the last 72 hours. D-Dimer:  No results for input(s): DDIMER in the last 72 hours. Lactic Acid:  No results for input(s): LACTA in the last 72 hours. Cardiac Enzymes:  No results for input(s): CKTOTAL, CKMB, CKMBINDEX, TROPONINI in the last 72 hours. Invalid input(s): TROPONIN, HSTROP  BNP/ProBNP:   No results for input(s): BNP, PROBNP in the last 72 hours. Triglycerides:  No results for input(s): TRIG in the last 72 hours. Microbiology:  Urine Culture:  No components found for: CURINE  Blood Culture:  No components found for: CBLOOD, CFUNGUSBL  Sputum Culture:  No components found for: CSPUTUM  Recent Labs     09/29/21 1937   SPECDESC . PLEURAL FLUID   SPECIAL NOT REPORTED   CULTURE PENDING     Recent Labs     09/28/21  1245 09/29/21  1359 09/29/21 1937   SPECDESC . BLOOD . FLUID . PERICARDIAL FLUID . PLEURAL FLUID   SPECIAL NOT REPORTED NOT REPORTED NOT REPORTED   CULTURE NO GROWTH 3 DAYS NO GROWTH 2 DAYS PENDING        Pathology:    Radiology Reports:  CT CHEST WO CONTRAST   Final Result   1. Pericardial drain in place with interval reduction of effusion. 2.  Right pleural effusion has increased in size compared to 09/27/2021 CT   exam, moderate in size. 3.  Small residual left pleural effusion, improved. US THORACENTESIS Which side should the procedure be performed? Left   Final Result   Successful ultrasound guided thoracentesis. XR CHEST PORTABLE   Final Result   Worsening of bilateral pleural effusion, pulmonary vascular congestion and   bilateral mid and lower lung airspace disease.          XR CHEST (2 VW)   Final Result Possible mild CHF. Small effusions. US THORACENTESIS Which side should the procedure be performed? Left   Final Result   Successful ultrasound guided thoracentesis. CT CHEST PULMONARY EMBOLISM W CONTRAST   Final Result   1. No evidence for acute pulmonary embolism. 2. Moderate pericardial effusion. 3. Small right and moderate left pleural effusion with adjacent subsegmental   atelectasis. IR GUIDED THORACENTESIS PLEURAL    (Results Pending)        Echocardiogram:   Results for orders placed during the hospital encounter of 09/27/21    ECHO Complete 2D W Doppler W Color    Narrative  Transthoracic Echocardiography Report (TTE)    Patient Name Anders Salinas     Date of Study               09/29/2021  Eder JACOB    Date of      1945  Gender                      Female  Birth    Age          78 year(s)  Race                            Room Number  2022        Height:                     62 inch, 157.48 cm    Corporate ID [id number]    Weight:                     134 pounds, 60.8 kg  #    Patient Acct [de-identified]   BSA:          1.61 m^2      BMI:     24.51 kg/m^2  #    MR #         [id number]     Sonographer                 Olvin Patino    Accession #  [id number]  Interpreting Physician      [address] [place] [address]    Fellow                   Referring Nurse  Practitioner    Interpreting             Referring Physician         Surinder Mayer  Fellow    Type of Study    TTE procedure:2D Echocardiogram, M-Mode, Doppler, Color Doppler. Procedure Date  Date: 09/29/2021 Start: 09:35 AM    Study Location: [facility]  Technical Quality: Adequate visualization    Indications:Pericardial effusion. History / Tech. Comments:  Procedure explained to patient. Echo completed at the bedside.     PMHx:  Hypertension    Patient Status: Inpatient    Height: 62 inches Weight: 134 pounds BSA: 1.61 m^2 BMI: 24.51 kg/m^2    CONCLUSIONS    Summary  Left ventricle is normal in size, global left ventricular systolic function  is normal, calculated ejection fraction is 55%. Right atrium appears small. Moderate to large pericardial effusion. Posteriorly measuring 2.29cm, Anteriorly measuring 3.66cm. Gelatinous  material is noted throughout. Cannot rule out right ventricular collapse in the subcostal views, clinical  correlation recommended. Mitral and tricuspid inflows show physiologic mild evidence of tamponade. Pleural effusion noted. Signature  ----------------------------------------------------------------------------  Electronically signed by Cait Apple(Sonographer) on 09/29/2021 10:15 AM  ----------------------------------------------------------------------------    ----------------------------------------------------------------------------  Electronically signed by Patrice Martel(Interpreting physician) on 09/29/2021  10:22 AM  ----------------------------------------------------------------------------  FINDINGS  Left Atrium  Left atrium is normal in size. Inter-atrial septum is intact with no evidence for an atrial septal defect,  by color doppler. Left Ventricle  Left ventricle is normal in size, global left ventricular systolic function  is normal, calculated ejection fraction is 55%. Right Atrium  Right atrium appears small. Right Ventricle  Right ventricle appears reduced in size. Mitral Valve  Normal mitral valve structure. No mitral stenosis. No evidence of mitral regurgitation. Aortic Valve  Aortic valve is trileaflet. Aortic valve is sclerotic but opens well. No aortic insufficiency. Tricuspid Valve  Tricuspid valve was not well visualized. No tricuspid regurgitation was seen. Insignificant tricuspid regurgitation, unable to estimate RVSP. Pulmonic Valve  Pulmonic valve not well visualized but Doppler velocities are normal.  No pulmonic insufficiency. Pericardial Effusion  Moderate to large pericardial effusion.   Posteriorly measuring 2.29cm at largest visualized accumulation (parasternal  views.)  Anteriorly measuring 3.66cm at largest visualized accumulation (subcostal  views.)  Gelatinous material is noted throughout. Cannot rule out right ventricular collapse in the subcostal views, clinical  correlation recommended. Mitral and tricuspid inflows show physiologic mild evidence of tamponade. Pleural Effusion  Pleural effusion noted. Miscellaneous  Normal aortic root dimension. E/E' average = 10.65. IVC not well visualized. M-mode / 2D Measurements & Calculations:    LVIDd:2.8 cm(3.7 - 5.6 cm)        Diastolic OXOXQL:31 ml  TNOX:6.1 cm(0.6 - 1.1 cm)         Aortic Root:2.8 cm(2.0 - 3.7 cm)  LVPWd:0.8 cm(0.6 - 1.1 cm)        LA Dimension: 3 cm(1.9 - 4.0 cm)  LA volume/Index: 39.98 ml /25m^2    RVDd:2.6 cm    Mitral:                                  Aortic    Valve Area (P1/2-Time): 5 cm^2           Peak Velocity: 1.48 m/s  Peak E-Wave: 0.84 m/s                    Mean Velocity: 1.04 m/s  Peak A-Wave: 1.08 m/s                    Peak Gradient: 8.76 mmHg  E/A Ratio: 0.77                          Mean Gradient: 5 mmHg  Peak Gradient: 2.8 mmHg  Mean Gradient: 2 mmHg  Deceleration Time: 169 msec              AV VTI: 26.9 cm  P1/2t: 44 msec    Mean Velocity: 0.67 m/s    Pulmonic:    Peak Velocity: 1.15 m/s  Peak Gradient: 5.29 mmHg    Diastology / Tissue Doppler  Septal Wall E' velocity:0.08 m/s  Septal Wall E/E':9.8  Lateral Wall E' velocity:0.07 m/s  Lateral Wall E/E':11.5       ASSESSMENT AND PLAN     Assessment:    // Viral syndrome  // Pericardial effusion  // Bilateral pleural effusion  // Acute on chronic cough  // SOSA positive    Plan:  I reviewed the CT scan of the chest shows increasing right pleural effusion and recommend right-sided thoracentesis today discussed with patient and she agrees. We will send pleural fluid analysis from right side also. Follow-up cytology of pleural fluid and pericardial fluid.   Rheumatology was consulted because of SOSA positive he does not have any signs of connective tissue disease. She still have pericardial drain in present will need follow-up with cardiology and possibly repeat echo. CT surgery was consulted. Of antibiotic per infectious disease recommendation. Symptomatic treatment for cough. Droplet precaution   Continue with nasal cannula to keep saturation above 90% and wean O2. Discussed with family and updated. Discussed with primary service. Discussed with nursing staff, treatment and plan discussed. Please note that this chart was generated using voice recognition Dragon dictation software. Although every effort was made to ensure the accuracy of this automated transcription, some errors in transcription may have occurred.      Angelica Frazier MD  10/1/2021 12:39 PM

## 2021-10-02 PROBLEM — I31.4 CARDIAC TAMPONADE: Status: ACTIVE | Noted: 2021-10-02

## 2021-10-02 LAB
-: NORMAL
ABSOLUTE EOS #: 0 K/UL (ref 0–0.44)
ABSOLUTE IMMATURE GRANULOCYTE: 0.06 K/UL (ref 0–0.3)
ABSOLUTE LYMPH #: 0.57 K/UL (ref 1.1–3.7)
ABSOLUTE MONO #: 0.06 K/UL (ref 0.1–1.2)
AMORPHOUS: NORMAL
ANION GAP SERPL CALCULATED.3IONS-SCNC: 12 MMOL/L (ref 9–17)
BACTERIA: NORMAL
BASOPHILS # BLD: 0 % (ref 0–2)
BASOPHILS ABSOLUTE: 0 K/UL (ref 0–0.2)
BILIRUBIN URINE: NEGATIVE
BUN BLDV-MCNC: 14 MG/DL (ref 8–23)
BUN/CREAT BLD: ABNORMAL (ref 9–20)
CALCIUM SERPL-MCNC: 7.8 MG/DL (ref 8.6–10.4)
CASTS UA: NORMAL /LPF (ref 0–8)
CHLORIDE BLD-SCNC: 97 MMOL/L (ref 98–107)
CO2: 22 MMOL/L (ref 20–31)
COLOR: YELLOW
COMMENT UA: ABNORMAL
CREAT SERPL-MCNC: 0.48 MG/DL (ref 0.5–0.9)
CRYSTALS, UA: NORMAL /HPF
CULTURE: NORMAL
DIFFERENTIAL TYPE: ABNORMAL
EOSINOPHILS RELATIVE PERCENT: 0 % (ref 1–4)
EPITHELIAL CELLS UA: NORMAL /HPF (ref 0–5)
GFR AFRICAN AMERICAN: >60 ML/MIN
GFR NON-AFRICAN AMERICAN: >60 ML/MIN
GFR SERPL CREATININE-BSD FRML MDRD: ABNORMAL ML/MIN/{1.73_M2}
GFR SERPL CREATININE-BSD FRML MDRD: ABNORMAL ML/MIN/{1.73_M2}
GLUCOSE BLD-MCNC: 187 MG/DL (ref 70–99)
GLUCOSE URINE: NEGATIVE
HCT VFR BLD CALC: 31.6 % (ref 36.3–47.1)
HEMOGLOBIN: 10.2 G/DL (ref 11.9–15.1)
IMMATURE GRANULOCYTES: 1 %
KETONES, URINE: NEGATIVE
LEUKOCYTE ESTERASE, URINE: NEGATIVE
LYMPHOCYTES # BLD: 9 % (ref 24–43)
Lab: NORMAL
MCH RBC QN AUTO: 28.6 PG (ref 25.2–33.5)
MCHC RBC AUTO-ENTMCNC: 32.3 G/DL (ref 28.4–34.8)
MCV RBC AUTO: 88.5 FL (ref 82.6–102.9)
MONOCYTES # BLD: 1 % (ref 3–12)
MORPHOLOGY: NORMAL
MUCUS: NORMAL
NITRITE, URINE: NEGATIVE
NRBC AUTOMATED: 0 PER 100 WBC
OTHER OBSERVATIONS UA: NORMAL
PDW BLD-RTO: 13.2 % (ref 11.8–14.4)
PH UA: 5.5 (ref 5–8)
PLATELET # BLD: 373 K/UL (ref 138–453)
PLATELET ESTIMATE: ABNORMAL
PMV BLD AUTO: 10.3 FL (ref 8.1–13.5)
POTASSIUM SERPL-SCNC: 5 MMOL/L (ref 3.7–5.3)
PROTEIN UA: NEGATIVE
RBC # BLD: 3.57 M/UL (ref 3.95–5.11)
RBC # BLD: ABNORMAL 10*6/UL
RBC UA: NORMAL /HPF (ref 0–4)
RENAL EPITHELIAL, UA: NORMAL /HPF
SEG NEUTROPHILS: 89 % (ref 36–65)
SEGMENTED NEUTROPHILS ABSOLUTE COUNT: 5.61 K/UL (ref 1.5–8.1)
SODIUM BLD-SCNC: 131 MMOL/L (ref 135–144)
SPECIFIC GRAVITY UA: 1.01 (ref 1–1.03)
SPECIMEN DESCRIPTION: NORMAL
TRICHOMONAS: NORMAL
TURBIDITY: CLEAR
URINE HGB: ABNORMAL
UROBILINOGEN, URINE: NORMAL
WBC # BLD: 6.3 K/UL (ref 3.5–11.3)
WBC # BLD: ABNORMAL 10*3/UL
WBC UA: NORMAL /HPF (ref 0–5)
YEAST: NORMAL

## 2021-10-02 PROCEDURE — 97530 THERAPEUTIC ACTIVITIES: CPT

## 2021-10-02 PROCEDURE — 6360000002 HC RX W HCPCS: Performed by: INTERNAL MEDICINE

## 2021-10-02 PROCEDURE — 94640 AIRWAY INHALATION TREATMENT: CPT

## 2021-10-02 PROCEDURE — 6370000000 HC RX 637 (ALT 250 FOR IP): Performed by: INTERNAL MEDICINE

## 2021-10-02 PROCEDURE — 85025 COMPLETE CBC W/AUTO DIFF WBC: CPT

## 2021-10-02 PROCEDURE — 97110 THERAPEUTIC EXERCISES: CPT

## 2021-10-02 PROCEDURE — 2060000000 HC ICU INTERMEDIATE R&B

## 2021-10-02 PROCEDURE — 99233 SBSQ HOSP IP/OBS HIGH 50: CPT | Performed by: INTERNAL MEDICINE

## 2021-10-02 PROCEDURE — 80048 BASIC METABOLIC PNL TOTAL CA: CPT

## 2021-10-02 PROCEDURE — 97116 GAIT TRAINING THERAPY: CPT

## 2021-10-02 PROCEDURE — 6370000000 HC RX 637 (ALT 250 FOR IP): Performed by: STUDENT IN AN ORGANIZED HEALTH CARE EDUCATION/TRAINING PROGRAM

## 2021-10-02 PROCEDURE — 99232 SBSQ HOSP IP/OBS MODERATE 35: CPT | Performed by: INTERNAL MEDICINE

## 2021-10-02 PROCEDURE — 81001 URINALYSIS AUTO W/SCOPE: CPT

## 2021-10-02 PROCEDURE — 6360000002 HC RX W HCPCS: Performed by: STUDENT IN AN ORGANIZED HEALTH CARE EDUCATION/TRAINING PROGRAM

## 2021-10-02 PROCEDURE — 6370000000 HC RX 637 (ALT 250 FOR IP): Performed by: NURSE PRACTITIONER

## 2021-10-02 PROCEDURE — APPSS30 APP SPLIT SHARED TIME 16-30 MINUTES: Performed by: NURSE PRACTITIONER

## 2021-10-02 PROCEDURE — 94761 N-INVAS EAR/PLS OXIMETRY MLT: CPT

## 2021-10-02 PROCEDURE — 2700000000 HC OXYGEN THERAPY PER DAY

## 2021-10-02 PROCEDURE — 2580000003 HC RX 258: Performed by: STUDENT IN AN ORGANIZED HEALTH CARE EDUCATION/TRAINING PROGRAM

## 2021-10-02 PROCEDURE — 36415 COLL VENOUS BLD VENIPUNCTURE: CPT

## 2021-10-02 RX ADMIN — Medication 30 MG: at 21:35

## 2021-10-02 RX ADMIN — GUAIFENESIN AND CODEINE PHOSPHATE 5 ML: 100; 10 SOLUTION ORAL at 11:32

## 2021-10-02 RX ADMIN — FLUTICASONE PROPIONATE 1 SPRAY: 50 SPRAY, METERED NASAL at 09:01

## 2021-10-02 RX ADMIN — Medication 30 MG: at 09:01

## 2021-10-02 RX ADMIN — ENOXAPARIN SODIUM 40 MG: 40 INJECTION SUBCUTANEOUS at 09:01

## 2021-10-02 RX ADMIN — METHYLPREDNISOLONE SODIUM SUCCINATE 40 MG: 40 INJECTION, POWDER, FOR SOLUTION INTRAMUSCULAR; INTRAVENOUS at 09:07

## 2021-10-02 RX ADMIN — METHYLPREDNISOLONE SODIUM SUCCINATE 40 MG: 40 INJECTION, POWDER, FOR SOLUTION INTRAMUSCULAR; INTRAVENOUS at 21:34

## 2021-10-02 RX ADMIN — GUAIFENESIN AND CODEINE PHOSPHATE 5 ML: 100; 10 SOLUTION ORAL at 19:01

## 2021-10-02 RX ADMIN — SODIUM CHLORIDE, PRESERVATIVE FREE 10 ML: 5 INJECTION INTRAVENOUS at 09:00

## 2021-10-02 RX ADMIN — SODIUM CHLORIDE, PRESERVATIVE FREE 10 ML: 5 INJECTION INTRAVENOUS at 21:00

## 2021-10-02 RX ADMIN — BUDESONIDE AND FORMOTEROL FUMARATE DIHYDRATE 2 PUFF: 160; 4.5 AEROSOL RESPIRATORY (INHALATION) at 10:52

## 2021-10-02 RX ADMIN — GUAIFENESIN AND CODEINE PHOSPHATE 5 ML: 100; 10 SOLUTION ORAL at 02:35

## 2021-10-02 RX ADMIN — CETIRIZINE HYDROCHLORIDE 10 MG: 10 TABLET ORAL at 09:01

## 2021-10-02 RX ADMIN — BENZONATATE 100 MG: 100 CAPSULE ORAL at 19:00

## 2021-10-02 RX ADMIN — BUDESONIDE AND FORMOTEROL FUMARATE DIHYDRATE 2 PUFF: 160; 4.5 AEROSOL RESPIRATORY (INHALATION) at 20:59

## 2021-10-02 ASSESSMENT — ENCOUNTER SYMPTOMS
EYE REDNESS: 0
ABDOMINAL PAIN: 0
COUGH: 1
SINUS PRESSURE: 0
BACK PAIN: 0
CHEST TIGHTNESS: 0
RHINORRHEA: 0
DIARRHEA: 0
SORE THROAT: 0
NAUSEA: 0
VOMITING: 0
SHORTNESS OF BREATH: 1
PHOTOPHOBIA: 0

## 2021-10-02 ASSESSMENT — PAIN SCALES - GENERAL
PAINLEVEL_OUTOF10: 0

## 2021-10-02 NOTE — PROGRESS NOTES
Physical Therapy  Facility/Department: [facility]  Daily Treatment Note  NAME: James Mcconnell  : 1945  MRN: [id number]    Date of Service: 10/2/2021    Discharge Recommendations:  Patient would benefit from continued therapy after discharge   PT Equipment Recommendations  Equipment Needed: No    Assessment   Body structures, Functions, Activity limitations: Decreased functional mobility ; Decreased endurance;Decreased balance;Decreased posture  Assessment: Pt amb 250 ft without device and CGA. limited by decreased endurance and fatigue. WOuld benefit from continued therapy afetr d/c  Prognosis: Good  REQUIRES PT FOLLOW UP: Yes  Activity Tolerance  Activity Tolerance: Patient limited by fatigue;Patient limited by endurance     Patient Diagnosis(es): The primary encounter diagnosis was Pleural effusion. Diagnoses of Chest wall pain and Pericardial effusion were also pertinent to this visit. has a past medical history of Arthritis, Displacement of lumbar intervertebral disc without myelopathy, Hypertension, Lumbar disc disease, Personal history of TIA (transient ischemic attack), Sleep deprivation, SOB (shortness of breath), and Wears glasses. has a past surgical history that includes Wrist surgery (Left); Hysterectomy; Ankle surgery (Right); Tonsillectomy; Nerve Block (10/19/15); Nerve Block (10-26-15 ); Foot surgery (); Hip arthroscopy (Left, 2018); and pr hip arthroscopy, dx (Left, 3/14/2018). Restrictions  Restrictions/Precautions  Restrictions/Precautions: Fall Risk, Up as Tolerated, Isolation (droplet)  Required Braces or Orthoses?: No  Position Activity Restriction  Other position/activity restrictions: Hx of multiple sxs on L hip-pt reports tender on L side; Up with assistance  Subjective   General  Chart Reviewed: Yes  Response To Previous Treatment: Patient with no complaints from previous session.   Family / Caregiver Present: Yes  Subjective  Subjective: Pt resting in bed upon arrival, agreeable to PT. General Comment  Comments: Pt retired seated in recliner  Pain Screening  Patient Currently in Pain: Denies  Vital Signs  Patient Currently in Pain: Denies       Orientation  Orientation  Overall Orientation Status: Within Functional Limits  Cognition      Objective   Bed mobility  Supine to Sit: Modified independent  Transfers  Sit to Stand: Contact guard assistance  Stand to sit: Contact guard assistance  Ambulation  Ambulation?: Yes  Ambulation 1  Surface: level tile  Device: No Device  Other Apparatus: O2  Assistance: Contact guard assistance  Quality of Gait: narrow VINCE, very slow gaby, shuffles, unsteady  Gait Deviations: Slow Gaby; Increased VINCE; Decreased step length;Decreased arm swing;Decreased head and trunk rotation;Deviated path;Staggers  Distance: 250 ft  Comments: 2 standing rest breaks  Stairs/Curb  Stairs?: No     Balance  Posture: Fair  Sitting - Static: Good  Sitting - Dynamic: Good  Standing - Static: Fair  Standing - Dynamic: Fair;-  Exercises  Seated LE exercise program: Long Arc Quads, hip abduction/adduction, heel/toe raises, and marches.  Reps: x20  Goals  Short term goals  Time Frame for Short term goals: 12 visits  Short term goal 1: independent bed mobility without use of bed rails/controls  Short term goal 2: independent transfers  Short term goal 3: independent gait without device x 100'  Short term goal 4: independent stair ambulation x 3 steps with 1 HR  Patient Goals   Patient goals : feel better, go home    Plan    Plan  Times per week: 5-6 visits weekly  Times per day: Daily  Current Treatment Recommendations: Strengthening, ROM, Balance Training, Functional Mobility Training, Transfer Training, Endurance Training, Gait Training, Stair training, Safety Education & Training, Patient/Caregiver Education & Training, Positioning  Plan Comment:  (educated pt re: importance of being OOB/up in chair for improvement of breathing/counteract bed rest)  Safety Devices  Type of devices: Call light within reach, Gait belt, Patient at risk for falls, Nurse notified, Left in bed  Restraints  Initially in place: No     Therapy Time   Individual Concurrent Group Co-treatment   Time In 1000         Time Out 1040         Minutes 40         Timed Code Treatment Minutes: 109 [facility]

## 2021-10-02 NOTE — PROGRESS NOTES
Presented to bedside this afternoon. Pericardial drain removed. We will repeat limited echo on Monday morning to see evolution of pericardial effusion. Rest per cardiology note today.     Taiwo Morales MD  PGY 5, Cardiology Fellow

## 2021-10-02 NOTE — PLAN OF CARE
Problem: Pain:  Goal: Pain level will decrease  Description: Pain level will decrease  10/2/2021 0119 by Abby Garcia RN  Outcome: Ongoing  10/1/2021 1318 by Sylvia Rodrigues RN  Outcome: Ongoing  Goal: Control of acute pain  Description: Control of acute pain  10/2/2021 0119 by Abby Garcia RN  Outcome: Ongoing  10/1/2021 1318 by Sylvia Rodrigues RN  Outcome: Ongoing  Goal: Control of chronic pain  Description: Control of chronic pain  Outcome: Ongoing  Goal: Patient's pain/discomfort is manageable  Description: Patient's pain/discomfort is manageable  10/2/2021 0119 by Abby Garcia RN  Outcome: Ongoing  10/1/2021 1318 by Sylvia Rodrigues RN  Outcome: Ongoing     Problem: Falls - Risk of:  Goal: Will remain free from falls  Description: Will remain free from falls  10/2/2021 0119 by Abby Garcia RN  Outcome: Ongoing  10/1/2021 1318 by Sylvia Rodrigues RN  Outcome: Ongoing  Goal: Absence of physical injury  Description: Absence of physical injury  10/2/2021 0119 by Abby Garcia RN  Outcome: Ongoing  10/1/2021 1318 by Sylvia Rodrigues RN  Outcome: Ongoing     Problem: Infection:  Goal: Will remain free from infection  Description: Will remain free from infection  Outcome: Ongoing     Problem: Safety:  Goal: Free from accidental physical injury  Description: Free from accidental physical injury  Outcome: Ongoing  Goal: Free from intentional harm  Description: Free from intentional harm  Outcome: Ongoing     Problem: Daily Care:  Goal: Daily care needs are met  Description: Daily care needs are met  10/2/2021 0119 by Abby Garcia RN  Outcome: Ongoing  10/1/2021 1318 by Sylvia Rodrigues RN  Outcome: Ongoing     Problem: Skin Integrity:  Goal: Skin integrity will stabilize  Description: Skin integrity will stabilize  Outcome: Ongoing     Problem: Discharge Planning:  Goal: Patients continuum of care needs are met  Description: Patients continuum of care needs are met  Outcome: Ongoing     Problem: Musculor/Skeletal

## 2021-10-02 NOTE — PROGRESS NOTES
[facility]  Office: [address], DO, Marcio Gustavo, DO, Rola Trudy, DO, Jerry Barragan Blood, DO, Reagan Villarreal MD, Aicha Strauss MD, Ivan Boss MD, Judy Lawrence MD, Abdiel Randall MD, Waleska Fitzpatrick MD, Tracy Anthony MD, Wolf Porter, DO, Eduardo Mayen, DO, Archie Barger MD,  Danielle Clay, DO, Verito Thornton MD, Lenard Chairez MD, Ricky Cabrera MD, Alonzo Rodriguez MD, , Bandar Sawyer MD, Gurdeep Rivera MD, Yesy López MD, Tracie Gould [facility], [facility], [facility], Dunia Ledesma, [facility], Romi De La Torre, CNS, Gustavo Trimble, CNP, Uzma Del Rosario, CNP, Moisés Martinez, CNP, Pan Shin, CNP, Alexi Ornelas, CNP, Micaela Rao PA-C, Shara Martins [facility], Ally Scott, CNP, Genie Armstrong, CNP, Jose Antonio Amezquita, CNP, Wilbert Pena, CNP, Abdoul Bergman, CNP, Juan Carlos Linda, CNP, Harsha Herring, CNP, Samuel Blankenship, [address] [place] [address]    Progress Note    10/2/2021    2:49 PM    Name:   Jono Parra  MRN:     [id number]     [place]:      [de-identified]   Room:   [address] [place] [address] Day:  5  Admit Date:  9/27/2021  7:16 AM    PCP:   CELY Jules CNP  Code Status:  Full Code    Subjective:     C/C:   Chief Complaint   Patient presents with    Cough     Interval History Status:    Patient was evaluated with rheumatology yesterday and started on steroids for possible autoimmune cause for her recurrent pericardial and pleural effusion. This resulted in significant improvement and today the patient states that her coughing is much better. Denies any chest pain. Pericardial drain to be removed today by cardiology. No other acute issues overnight.     Brief History:     Per my colleague:Ale Renee is a 78 y.o. Non- / non  female who presents with Cough   and is admitted to the hospital for the management of <principal problem not specified>.     81-year-old female presented to [facility] with complaints of left-sided chest pain and persistent cough.  Patient has had cough for last 2 weeks, and progressively worsening.  Dry in nature, no phlegm production. She has heat intolerance. No fever or chills. Patient was discharged from ER a day ago with oral Levaquin, she returned back the next day as her symptoms were worsening.  She had poor appetite for last 4 days.  Patient has been a non-smoker and has no previous history of cardiac or lung disease. Patient was found to be having pleural effusion and pericardial effusion. She was in cardiac tamponade. She underwent thoracentesis 3 times by IR. Also underwent pericardiocentesis with pericardial drain placement. SOSA came back positive. Review of Systems:     Constitutional:  negative for chills, fevers, sweats  Respiratory: Positive for cough, positive for shortness of breath which is improving  Cardiovascular:  negative for chest pain, chest pressure/discomfort, lower extremity edema, palpitations  Gastrointestinal:  negative for abdominal pain, constipation, diarrhea, nausea, vomiting  Neurological:  negative for dizziness, headache    Medications: Allergies:     Allergies   Allergen Reactions    Bee Pollen Anaphylaxis    Erythromycin Anaphylaxis    Pcn [Penicillins] Anaphylaxis    Tetanus Toxoids Anaphylaxis    Tetracyclines & Related Anaphylaxis       Current Meds:   Scheduled Meds:    methylPREDNISolone  40 mg IntraVENous Q12H    fluticasone  1 spray Each Nostril Daily    cetirizine  10 mg Oral Daily    budesonide-formoterol  2 puff Inhalation BID    [Held by provider] furosemide  40 mg IntraVENous Daily    sodium chloride flush  5-40 mL IntraVENous 2 times per day    enoxaparin  40 mg SubCUTAneous Daily    lidocaine  1 patch TransDERmal Daily    sodium chloride flush  5-40 mL IntraVENous 2 times per day    dextromethorphan  30 mg Oral 2 times per day     Continuous Infusions:    sodium chloride      sodium chloride       PRN CO2 20 21 22   GLUCOSE 95 98 187*   BUN 16 17 14   CREATININE 0.62 0.65 0.48*   MG  --  2.5  --    ANIONGAP 9 10 12   LABGLOM >60 >60 >60   GFRAA >60 >60 >60   CALCIUM 8.0* 7.5* 7.8*   No results for input(s): PROT, LABALBU, LABA1C, H1IQVWU, M7OSHAX, FT4, TSH, AST, ALT, LDH, GGT, ALKPHOS, LABGGT, BILITOT, BILIDIR, AMMONIA, AMYLASE, LIPASE, LACTATE, CHOL, HDL, LDLCHOLESTEROL, CHOLHDLRATIO, TRIG, VLDL, UWE14SH, PHENYTOIN, PHENYF, URICACID, POCGLU in the last 72 hours. ABG:No results found for: POCPH, PHART, PH, POCPCO2, DZF2ZRD, PCO2, POCPO2, PO2ART, PO2, POCHCO3, QWS3XHC, HCO3, NBEA, PBEA, BEART, BE, THGBART, THB, YLT7KPK, YLIQ0KTM, F3NDNJEH, O2SAT, FIO2  Lab Results   Component Value Date/Time    SPECIAL NOT REPORTED 10/01/2021 01:57 PM    SPECIAL NOT REPORTED 10/01/2021 01:57 PM     Lab Results   Component Value Date/Time    CULTURE NO GROWTH 19 HOURS 10/01/2021 01:57 PM    CULTURE PENDING 10/01/2021 01:57 PM       Radiology:  XR CHEST (2 VW)    Result Date: 9/28/2021  Possible mild CHF. Small effusions. CT CHEST WO CONTRAST    Result Date: 10/1/2021  1. Pericardial drain in place with interval reduction of effusion. 2.  Right pleural effusion has increased in size compared to 09/27/2021 CT exam, moderate in size. 3.  Small residual left pleural effusion, improved. XR CHEST PORTABLE    Result Date: 9/30/2021  Worsening of bilateral pleural effusion, pulmonary vascular congestion and bilateral mid and lower lung airspace disease. XR CHEST PORTABLE    Result Date: 9/26/2021  Small bilateral pleural effusions with left mid to lower lobe infiltrate or atelectasis. Clinical correlation for pneumonia. Clinical and imaging follow-up to resolution recommended. CT CHEST PULMONARY EMBOLISM W CONTRAST    Result Date: 9/27/2021  1. No evidence for acute pulmonary embolism. 2. Moderate pericardial effusion. 3. Small right and moderate left pleural effusion with adjacent subsegmental atelectasis.      [address] THORACENTESIS Which side should the procedure be performed? Right    Result Date: 10/1/2021  Successful ultrasound guided thoracentesis. US THORACENTESIS Which side should the procedure be performed? Left    Result Date: 9/30/2021  Successful ultrasound guided thoracentesis. US THORACENTESIS Which side should the procedure be performed? Left    Result Date: 9/28/2021  Successful ultrasound guided thoracentesis. 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 Limited echo done did not show much accumulation. Discussed with cardiology and they are planning to remove pericardial drain today with repeat limited echo on Monday. Bilateral pleural effusions status post thoracentesis x3. Possible autoimmune etiology for her pleural and pericardial effusion/SOSA was positive and rheumatology consulted. Started on steroids with significant improvement in her symptoms. Follow rheumatology recommendations and appreciate them. Follow-up on the SOSA profile  Acute respiratory failure. Continue oxygen treatment  Hyponatremia-sodium improving  I had extensive discussion with patient and her significant other. Explained in detail.   Discussed with cardiology and they will discuss with rheumatology about starting colchicine possibly    Sydni Rachel MD  10/2/2021  2:49 PM

## 2021-10-02 NOTE — PROGRESS NOTES
PULMONARY & CRITICAL CARE MEDICINE PROGRESS NOTE     Patient:  Sofy Thomas  MRN: [id number]  Admit date: 9/27/2021  Primary Care Physician: CELY Whitley CNP  Consulting Physician: Fátima Tompkins MD  CODE Status: Full Code  LOS: 5     SUBJECTIVE     Chief Complaint/ Reason for consult:  Pleural Effusions and Cough x 2 weeks    Hospital Course: The patient is a 78 y.o. female with PMH of asthma, hypertension who presented to the ER at [place] on 9/12/21 for complaints of chest pain, shortness of breath after pushing multiple shopping carts. CT chest at the time was unremarkable. This was attributed to physical strain and she was discharged from the ER. She returns to the [facility] ER on 9/26 with complaints of cough, shortness of breath and difficulty sleeping where CT scan of the chest shows moderate pericardial effusion, small right and moderate left pleural effusions with adjacent subsegmental atelectasis. Patient declined admission at that time and was discharged home with albuterol inhaler, tessalon, guaifenesin and dextromethorphan. Due to worsening symptoms she returned to the ER the next day, stayed overnight and was transferred [facility]28[facility]. On my evaluation, patient is tachycardic in the 130s, tachypneic, blood pressure stable, and unable to speak due to cough. Respiratory panel was sent which showed Entero/rhinovirus positive PCR test.    Interval History:  10/02/21  Pt was seen and examined at bedside. Overnight events noted chart seen labs seen and imaging studies results seen. Overnight she is afebrile T-max of 98.1. She is hemodynamically stable heart rate is in 90s. She maintain saturation on 2 L nasal cannula saturating above 95%. She had thoracentesis done on the right side and had 550 mL of fluid withdrawn yesterday.   She was seen by rheumatology last night as her SOSA was positive she was started on Solu-Medrol 40 mg every 12 hours.  She has pericardial drain in place in last 24 hours she has 20 mL total  According to patient her cough is better she is not expectorating cough is dry shortness of breath is better than last few days. She is a status post left thoracentesis on 09/28/2021. She is status post pericardiocentesis/pericardial drain on 09/29/2021  Status post repeat left thoracentesis on 09/30/2021. Status post right thoracentesis on 10/01/2021    A repeat CT scan of the chest repeat CT scan of the chest on 09/30/2021 showed pericardial effusion is a smaller she has a small left pleural effusion and she has a moderate right pleural effusion which is increased from last CT scan of the chest on admission  Her pleural fluid on the left side is exudative by protein criteria and so far Gram stain and culture negative and AFB smear is negative. Cytology negative  Her pleural fluid on the right side is exudative by protein criteria, Gram stain and AFB smear is negative bacterial culture is negative so far. Pericardial fluid culture is negative and AB smear is negative. Cytology is pending from pericardial fluid. Review Of Systems:  Review of Systems   Constitutional: Positive for fatigue. Negative for chills, diaphoresis and fever. HENT: Negative for hearing loss, rhinorrhea, sinus pressure and sore throat. Eyes: Negative for photophobia and redness. Respiratory: Positive for cough and shortness of breath. Negative for chest tightness. Cardiovascular: Negative for chest pain. Gastrointestinal: Negative for abdominal pain, diarrhea, nausea and vomiting. Genitourinary: Negative for dysuria, frequency and urgency. Musculoskeletal: Negative for back pain and neck stiffness. Skin: Negative for rash. Neurological: Negative for seizures and speech difficulty. Psychiatric/Behavioral: Negative for agitation, confusion and decreased concentration.        OBJECTIVE     PaO2/FiO2 RATIO:  No results for input(s): POCPO2 in the last 72 hours. VITAL SIGNS:   LAST:  /62   Pulse 84   Temp 97.8 °F (36.6 °C) (Oral)   Resp 18   Ht 5' 2\" (1.575 m)   Wt 139 lb 6.4 oz (63.2 kg)   SpO2 94%   BMI 25.50 kg/m²   8-24 HR RANGE:  TEMP Temp  Av.7 °F (36.5 °C)  Min: 97.1 °F (36.2 °C)  Max: 98.1 °F (45.8 °C)   BP Systolic (59URX), :497 , Min:95 , PXV:295      Diastolic (26VQS), KQS:45, Min:51, Max:108     PULSE Pulse  Av.5  Min: 84  Max: 111   RR Resp  Av  Min: 18  Max: 18   O2 SAT SpO2  Av.3 %  Min: 93 %  Max: 95 %   OXYGEN DELIVERY O2 Flow Rate (L/min)  Avg: 3 L/min  Min: 3 L/min  Max: 3 L/min        Systemic Examination:   Physical Exam -  Constitutional:  Alert, cooperative and no distress. Mental Status:  Oriented to person, place and time and normal affect. Lungs:  Bilateral air entry present, bilateral breath sound decreased at bases. Normal effort. No expiratory wheezing and no rhonchi ts  Heart:  Regular rate and rhythm, no murmur. Abdomen:  Soft, nontender, nondistended, normal bowel sounds. Extremities:  No edema, redness, tenderness in the calves. Skin:  Warm, dry, no gross lesions or rashes.   CNS: No gross neurological deficit, cranial nerves intact  DATA REVIEW     Medications: Current Inpatient  Scheduled Meds:   methylPREDNISolone  40 mg IntraVENous Q12H    fluticasone  1 spray Each Nostril Daily    cetirizine  10 mg Oral Daily    budesonide-formoterol  2 puff Inhalation BID    [Held by provider] furosemide  40 mg IntraVENous Daily    sodium chloride flush  5-40 mL IntraVENous 2 times per day    enoxaparin  40 mg SubCUTAneous Daily    lidocaine  1 patch TransDERmal Daily    sodium chloride flush  5-40 mL IntraVENous 2 times per day    dextromethorphan  30 mg Oral 2 times per day     Continuous Infusions:   sodium chloride      sodium chloride         INPUT/OUTPUT:  In: 490 [P.O.:480; I.V.:10]  Out: 800 [Urine:800]  Date 10/02/21 0000 - 10/02/21 2359   Shift 1022-6203 4896-1386 3454-8699 24 Hour Total   INTAKE   P.O.(mL/kg/hr) 480(0.9)   480   Shift Total(mL/kg) 480(7.6)   480(7.6)   OUTPUT   Urine(mL/kg/hr) 400(0.8)   400   Shift Total(mL/kg) 400(6.3)   400(6.3)   Weight (kg) 63.2 63.2 63.2 63.2        LABS:  ABGs:   No results for input(s): POCPH, POCPCO2, POCPO2, POCHCO3, IMAE5LIP in the last 72 hours. CBC:   Recent Labs     09/30/21  0526 10/01/21  0537 10/02/21  0646   WBC 11.3 9.0 6.3   HGB 10.8* 10.5* 10.2*   HCT 33.5* 33.7* 31.6*   MCV 87.9 89.6 88.5    290 373   LYMPHOPCT 10* 14* 9*   RBC 3.81* 3.76* 3.57*   MCH 28.3 27.9 28.6   MCHC 32.2 31.2 32.3   RDW 13.3 13.4 13.2     CRP:   No results for input(s): CRP in the last 72 hours. LDH:   Recent Labs     09/29/21  1446        BMP:   Recent Labs     09/30/21  0526 10/01/21  0537 10/02/21  0646   * 127* 131*   K 4.3 3.9 5.0   CL 96* 96* 97*   CO2 20 21 22   BUN 16 17 14   CREATININE 0.62 0.65 0.48*   GLUCOSE 95 98 187*     Liver Function Test:   No results for input(s): PROT, LABALBU, ALT, AST, GGT, ALKPHOS, BILITOT in the last 72 hours. Coagulation Profile:   No results for input(s): INR, PROTIME, APTT in the last 72 hours. D-Dimer:  No results for input(s): DDIMER in the last 72 hours. Lactic Acid:  No results for input(s): LACTA in the last 72 hours. Cardiac Enzymes:  No results for input(s): CKTOTAL, CKMB, CKMBINDEX, TROPONINI in the last 72 hours. Invalid input(s): TROPONIN, HSTROP  BNP/ProBNP:   No results for input(s): BNP, PROBNP in the last 72 hours. Triglycerides:  No results for input(s): TRIG in the last 72 hours. Microbiology:  Urine Culture:  No components found for: CURINE  Blood Culture:  No components found for: CBLOOD, CFUNGUSBL  Sputum Culture:  No components found for: CSPUTUM  Recent Labs     10/01/21  1357   [facility] . PLEURAL FLUID  . PLEURAL FLUID   SPECIAL NOT REPORTED  NOT REPORTED   CULTURE NO GROWTH 19 HOURS  PENDING     Recent Labs     09/29/21  1357 09/29/21  1359 09/29/21  1937 10/01/21  1230 10/01/21  1357   SPECDESC . FLUID . PERICARDIAL FLUID   < > . PLEURAL FLUID NOT REPORTED . PLEURAL FLUID  . PLEURAL FLUID   SPECIAL NOT REPORTED  --  NOT REPORTED  --  NOT REPORTED  NOT REPORTED   CULTURE NO GROWTH 3 DAYS  --  PENDING  --  NO GROWTH 19 HOURS  PENDING    < > = values in this interval not displayed. Pathology:    Radiology Reports:  US THORACENTESIS Which side should the procedure be performed? Right   Final Result   Successful ultrasound guided thoracentesis. CT CHEST WO CONTRAST   Final Result   1. Pericardial drain in place with interval reduction of effusion. 2.  Right pleural effusion has increased in size compared to 09/27/2021 CT   exam, moderate in size. 3.  Small residual left pleural effusion, improved. US THORACENTESIS Which side should the procedure be performed? Left   Final Result   Successful ultrasound guided thoracentesis. XR CHEST PORTABLE   Final Result   Worsening of bilateral pleural effusion, pulmonary vascular congestion and   bilateral mid and lower lung airspace disease. XR CHEST (2 VW)   Final Result   Possible mild CHF. Small effusions. US THORACENTESIS Which side should the procedure be performed? Left   Final Result   Successful ultrasound guided thoracentesis. CT CHEST PULMONARY EMBOLISM W CONTRAST   Final Result   1. No evidence for acute pulmonary embolism. 2. Moderate pericardial effusion. 3. Small right and moderate left pleural effusion with adjacent subsegmental   atelectasis.               Echocardiogram:   Results for orders placed during the hospital encounter of 09/27/21    ECHO Complete 2D W Doppler W Color    Narrative  Transthoracic Echocardiography Report (TTE)    Patient Name Karen Weldon     Date of Study               09/29/2021  Delwin Prom D    Date of      1945  Gender                      Female  Birth    Age          78 year(s)  Race     Room Number  2022        Height:                     62 inch, 157.48 cm    Corporate ID [id number]    Weight:                     134 pounds, 60.8 kg  #    Patient Acct [de-identified]   BSA:          1.61 m^2      BMI:     24.51 kg/m^2  #    MR #         [id number]     Sonographer                 Oumar Abo    Accession #  [id number]  Interpreting Physician      [address] [place] [address]    Fellow                   Referring Nurse  Practitioner    Interpreting             Referring Physician         Fadumo Lynn  Fellow    Type of Study    TTE procedure:2D Echocardiogram, M-Mode, Doppler, Color Doppler. Procedure Date  Date: 09/29/2021 Start: 09:35 AM    Study Location: [facility]  Technical Quality: Adequate visualization    Indications:Pericardial effusion. History / Tech. Comments:  Procedure explained to patient. Echo completed at the bedside. PMHx:  Hypertension    Patient Status: Inpatient    Height: 62 inches Weight: 134 pounds BSA: 1.61 m^2 BMI: 24.51 kg/m^2    CONCLUSIONS    Summary  Left ventricle is normal in size, global left ventricular systolic function  is normal, calculated ejection fraction is 55%. Right atrium appears small. Moderate to large pericardial effusion. Posteriorly measuring 2.29cm, Anteriorly measuring 3.66cm. Gelatinous  material is noted throughout. Cannot rule out right ventricular collapse in the subcostal views, clinical  correlation recommended. Mitral and tricuspid inflows show physiologic mild evidence of tamponade. Pleural effusion noted.     Signature  ----------------------------------------------------------------------------  Electronically signed by Cait Mendez(Sonographer) on 09/29/2021 10:15 AM  ----------------------------------------------------------------------------    ----------------------------------------------------------------------------  Electronically signed by Kiki MartelInterpreting physician) on 09/29/2021  10:22 AM  ----------------------------------------------------------------------------  FINDINGS  Left Atrium  Left atrium is normal in size. Inter-atrial septum is intact with no evidence for an atrial septal defect,  by color doppler. Left Ventricle  Left ventricle is normal in size, global left ventricular systolic function  is normal, calculated ejection fraction is 55%. Right Atrium  Right atrium appears small. Right Ventricle  Right ventricle appears reduced in size. Mitral Valve  Normal mitral valve structure. No mitral stenosis. No evidence of mitral regurgitation. Aortic Valve  Aortic valve is trileaflet. Aortic valve is sclerotic but opens well. No aortic insufficiency. Tricuspid Valve  Tricuspid valve was not well visualized. No tricuspid regurgitation was seen. Insignificant tricuspid regurgitation, unable to estimate RVSP. Pulmonic Valve  Pulmonic valve not well visualized but Doppler velocities are normal.  No pulmonic insufficiency. Pericardial Effusion  Moderate to large pericardial effusion. Posteriorly measuring 2.29cm at largest visualized accumulation (parasternal  views.)  Anteriorly measuring 3.66cm at largest visualized accumulation (subcostal  views.)  Gelatinous material is noted throughout. Cannot rule out right ventricular collapse in the subcostal views, clinical  correlation recommended. Mitral and tricuspid inflows show physiologic mild evidence of tamponade. Pleural Effusion  Pleural effusion noted. Miscellaneous  Normal aortic root dimension. E/E' average = 10.65. IVC not well visualized.     M-mode / 2D Measurements & Calculations:    LVIDd:2.8 cm(3.7 - 5.6 cm)        Diastolic SCEOYI:52 ml  TBMM:7.8 cm(0.6 - 1.1 cm)         Aortic Root:2.8 cm(2.0 - 3.7 cm)  LVPWd:0.8 cm(0.6 - 1.1 cm)        LA Dimension: 3 cm(1.9 - 4.0 cm)  LA volume/Index: 39.98 ml /25m^2    RVDd:2.6 cm    Mitral:                                  Aortic    Valve Area (P1/2-Time): 5 cm^2 Peak Velocity: 1.48 m/s  Peak E-Wave: 0.84 m/s                    Mean Velocity: 1.04 m/s  Peak A-Wave: 1.08 m/s                    Peak Gradient: 8.76 mmHg  E/A Ratio: 0.77                          Mean Gradient: 5 mmHg  Peak Gradient: 2.8 mmHg  Mean Gradient: 2 mmHg  Deceleration Time: 169 msec              AV VTI: 26.9 cm  P1/2t: 44 msec    Mean Velocity: 0.67 m/s    Pulmonic:    Peak Velocity: 1.15 m/s  Peak Gradient: 5.29 mmHg    Diastology / Tissue Doppler  Septal Wall E' velocity:0.08 m/s  Septal Wall E/E':9.8  Lateral Wall E' velocity:0.07 m/s  Lateral Wall E/E':11.5       ASSESSMENT AND PLAN        Assessment:    // Viral syndrome  // Pericardial effusion  // Bilateral pleural effusion  // Acute on chronic cough  // SOSA positive    Plan:    Patient was started on Solu-Medrol 40 mg every 12 hours seems her cough is better although she had thoracentesis done on the right side yesterday also. Pericardial effusion/pericardial drain is not putting out much. Waiting for cardiology. Possible repeat limited echo before pericardial drainage. Pleural fluid analysis seen culture results seen and cytology seen as mentioned above. We will follow up clinically and follow-up SOSA profile. Follow-up cytology from pericardial fluid. Follow-up with cardiology and CT surgery. Currently off antibiotic per infectious disease recommendation. Continue symptomatic treatment for cough. Droplet precaution. We will use O2 to keep saturation above 92%. Ambulate in physical therapy. DVT prophylaxis. Discussed with family and updated. Discussed with rheumatology attending  Discussed with nursing staff, treatment and plan discussed. Please note that this chart was generated using voice recognition Dragon dictation software. Although every effort was made to ensure the accuracy of this automated transcription, some errors in transcription may have occurred.     Susie Mcguire MD  10/02/2021 10:56 AM

## 2021-10-02 NOTE — PROGRESS NOTES
[facility]   Progress Note                    Date:   10/2/2021  Patient name:  Cony Ceja  Date of admission:  9/27/2021  7:16 AM  MRN:   [id number]  YOB: 1945  PCP:    CELY Gaitan CNP    Reason for Admission:  Chest wall pain [R07.89]  Pericardial effusion [I31.3]  Pneumonia [J18.9]  Pleural effusion [J90]  PNA (pneumonia) [J18.9]    Subjective:      Clinical Changes / Abnormalities:    Patient seen and examined at bedside. No acute events overnight. Pericardiocentesis no longer functioning. ECHO yesterday shows small to moderate anterior pericardial effusion. CT surgery on board for possible pericardial window evaluation. Currently reporting no chest pain or shortness of breath. S/p IR guided thoracentesis on 09/30/21.     Urine output in the last 24 hours:     Intake/Output Summary (Last 24 hours) at 10/2/2021 0554  Last data filed at 10/2/2021 0300  Gross per 24 hour   Intake 850 ml   Output 1800 ml   Net -950 ml     I/O since admission: -2.8 liters    Medications:   Scheduled Meds:   methylPREDNISolone  40 mg IntraVENous Q12H    fluticasone  1 spray Each Nostril Daily    cetirizine  10 mg Oral Daily    budesonide-formoterol  2 puff Inhalation BID    [Held by provider] furosemide  40 mg IntraVENous Daily    sodium chloride flush  5-40 mL IntraVENous 2 times per day    enoxaparin  40 mg SubCUTAneous Daily    lidocaine  1 patch TransDERmal Daily    sodium chloride flush  5-40 mL IntraVENous 2 times per day    dextromethorphan  30 mg Oral 2 times per day     Continuous Infusions:   sodium chloride      sodium chloride       CBC:   Recent Labs     09/30/21  0526 10/01/21  0537   WBC 11.3 9.0   HGB 10.8* 10.5*    290     BMP:    Recent Labs     09/30/21 0526 10/01/21  0537   * 127*   K 4.3 3.9   CL 96* 96*   CO2 20 21   BUN 16 17   CREATININE 0.62 0.65   GLUCOSE 95 98     Hepatic: No results for input(s): AST, ALT, ALB, BILITOT, ALKPHOS in the last 72 hours. Troponin: No results for input(s): TROPONINI in the last 72 hours. No results for input(s): TROPONINT in the last 72 hours. BNP: No results for input(s): PROBNP in the last 72 hours. No results for input(s): BNP in the last 72 hours. Lipids: No results for input(s): CHOL, HDL in the last 72 hours. Invalid input(s): LDLCALCU  INR: No results for input(s): INR in the last 72 hours. Objective:   Vitals: /62   Pulse 86   Temp 97.8 °F (36.6 °C) (Oral)   Resp 18   Ht 5' 2\" (1.575 m)   Wt 139 lb 6.4 oz (63.2 kg)   SpO2 95%   BMI 25.50 kg/m²    General appearance: alert and cooperative with exam  HEENT: Head: Normocephalic, no lesions, without obvious abnormality. Neck: no JVD, trachea midline, no adenopathy  Lungs: Diminished throughout. Supplemental oxygen per NC. Frequent cough. Heart: Regular rate and rhythm, s1/s2 auscultated, no murmurs. Sinus tachycardia. Abdomen: soft, non-tender, bowel sounds active  Extremities: no edema  Neurologic: not done     ECHO 9/29/2021     Summary  Left ventricle is normal in size, global left ventricular systolic function  is normal, calculated ejection fraction is 55%. Right atrium appears small. Moderate to large pericardial effusion. Posteriorly measuring 2.29cm, Anteriorly measuring 3.66cm. Gelatinous  material is noted throughout. Cannot rule out right ventricular collapse in the subcostal views, clinical  correlation recommended. Mitral and tricuspid inflows show physiologic mild evidence of tamponade. Pleural effusion noted.       Assessment / Acute Cardiac Problems:   1. Pleural effusion 2/2 rhinovirus/enterovirus s/p thoracentesis on 9/30/21  2. Pericardial effusion 2/p pericardiocentesis on 9/29/21  3. H/o TIA  4. SOSA positive  5. Hyponatremia  6. HTN, on lisinorpil  7. B/l varicose veins     Plan of Treatment:   1. Pericardial and pleural effusion in setting of positive viral panel.  Possible etiology is viral vs. Rheumatological disorder given positive SOSA. Would follow up pleural and pericardial fluid c/s.  2. Had thoracentesis by IR on 9/30/21. 3. Underwent Pericardiocentesis with 200 cc drainage on 9/29/21. Limited ECHO on 9/30/21 showed small to moderate pericadical effusion. 4. Pericardiocentesis no longer functioning. CT surgery consulted for evaluation for pericardial window, appreciate recommendations. 5. K>4, Mg>2    Virgie Hagen MD  Fellow, [id number] [facility]      Attending note,   The patient was seen and examined, agree with above. CT surgery note reviewed. Will remove the pericardial drain. Will repeat limited echo on Monday. Will discuss with rheumatology possibly starting Colchicine.

## 2021-10-02 NOTE — PROGRESS NOTES
Rheumatology  Attending Progress Note      SUBJECTIVE:    Patient seen and examined. She is feeling better this morning. Her cough is much less. Her breathing is better. We started her last night on 40 mg Solu-Medrol every 12 hours and she believes that this has helped immensely. No new symptoms and no new complaints overnight. Cardiothoracic surgery does not believe that she needs a window at this stage. Her SOSA profile is still pending. She has positive SOSA and her JENI was elevated. No fractionation on this JENI yet.  Her serum complements are normal      Medications    Current Facility-Administered Medications: methylPREDNISolone sodium (SOLU-MEDROL) injection 40 mg, 40 mg, IntraVENous, Q12H  fluticasone (FLONASE) 50 MCG/ACT nasal spray 1 spray, 1 spray, Each Nostril, Daily  cetirizine (ZYRTEC) tablet 10 mg, 10 mg, Oral, Daily  budesonide-formoterol (SYMBICORT) 160-4.5 MCG/ACT inhaler 2 puff, 2 puff, Inhalation, BID  [Held by provider] furosemide (LASIX) injection 40 mg, 40 mg, IntraVENous, Daily  albuterol sulfate  (90 Base) MCG/ACT inhaler 2 puff, 2 puff, Inhalation, Q4H PRN  benzonatate (TESSALON) capsule 100 mg, 100 mg, Oral, TID PRN  sodium chloride flush 0.9 % injection 5-40 mL, 5-40 mL, IntraVENous, 2 times per day  sodium chloride flush 0.9 % injection 5-40 mL, 5-40 mL, IntraVENous, PRN  0.9 % sodium chloride infusion, 25 mL, IntraVENous, PRN  enoxaparin (LOVENOX) injection 40 mg, 40 mg, SubCUTAneous, Daily  ondansetron (ZOFRAN-ODT) disintegrating tablet 4 mg, 4 mg, Oral, Q8H PRN **OR** ondansetron (ZOFRAN) injection 4 mg, 4 mg, IntraVENous, Q6H PRN  polyethylene glycol (GLYCOLAX) packet 17 g, 17 g, Oral, Daily PRN  acetaminophen (TYLENOL) tablet 650 mg, 650 mg, Oral, Q6H PRN **OR** acetaminophen (TYLENOL) suppository 650 mg, 650 mg, Rectal, Q6H PRN  potassium chloride (KLOR-CON M) extended release tablet 40 mEq, 40 mEq, Oral, PRN **OR** potassium bicarb-citric acid (EFFER-K) effervescent tablet 40 mEq, 40 mEq, Oral, PRN **OR** potassium chloride 10 mEq/100 mL IVPB (Peripheral Line), 10 mEq, IntraVENous, PRN  magnesium sulfate 1000 mg in dextrose 5% 100 mL IVPB, 1,000 mg, IntraVENous, PRN  lidocaine 4 % external patch 1 patch, 1 patch, TransDERmal, Daily  oxyCODONE (ROXICODONE) immediate release tablet 5 mg, 5 mg, Oral, Q6H PRN  guaiFENesin-codeine (GUAIFENESIN AC) 100-10 MG/5ML liquid 5 mL, 5 mL, Oral, Q4H PRN  sodium chloride flush 0.9 % injection 5-40 mL, 5-40 mL, IntraVENous, 2 times per day  sodium chloride flush 0.9 % injection 5-40 mL, 5-40 mL, IntraVENous, PRN  0.9 % sodium chloride infusion, 25 mL, IntraVENous, PRN  dextromethorphan (DELSYM) 30 MG/5ML extended release liquid 30 mg, 30 mg, Oral, 2 times per day  diphenhydrAMINE (BENADRYL) injection 25 mg, 25 mg, IntraVENous, Q6H PRN  sodium chloride flush 0.9 % injection 10 mL, 10 mL, IntraVENous, PRN     Physical    VITALS:  /62   Pulse 84   Temp 97.8 °F (36.6 °C) (Oral)   Resp 18   Ht 5' 2\" (1.575 m)   Wt 139 lb 6.4 oz (63.2 kg)   SpO2 94%   BMI 25.50 kg/m²   Skin: no rashes  Lymph nodes: no adenopathy  HEENT: eyes clear. Nose without abnormalities. Mouth clear  Neck: supple with good ROM. Thyroid not palpable  Lungs: Decreased air entry  Heart: Normal S1 and S1. No murmurs or gallops  Abdomen: soft and nontender. No hepatosplenomegaly  No clubbing, cyanosis or edema  Joint exam:  No synovitis of the upper or lower extremities  Pulses: normal  Neuro:  Alert and oriented,  Muscle strength normal.  No focal neurologic signs.       Data    CBC with Differential:    Lab Results   Component Value Date    WBC 6.3 10/02/2021    RBC 3.57 10/02/2021    HGB 10.2 10/02/2021    HCT 31.6 10/02/2021     10/02/2021    MCV 88.5 10/02/2021    MCH 28.6 10/02/2021    MCHC 32.3 10/02/2021    RDW 13.2 10/02/2021    LYMPHOPCT 9 10/02/2021    MONOPCT 1 10/02/2021    BASOPCT 0 10/02/2021    MONOSABS 0.06 10/02/2021    LYMPHSABS 0.57 10/02/2021    EOSABS 0.00 10/02/2021    BASOSABS 0.00 10/02/2021    DIFFTYPE NOT REPORTED 10/02/2021     BMP:    Lab Results   Component Value Date     10/02/2021    K 5.0 10/02/2021    CL 97 10/02/2021    CO2 22 10/02/2021    BUN 14 10/02/2021    LABALBU 3.4 09/26/2021    CREATININE 0.48 10/02/2021    CALCIUM 7.8 10/02/2021    GFRAA >60 10/02/2021    LABGLOM >60 10/02/2021    GLUCOSE 187 10/02/2021         ASSESSMENT AND PLAN        Acute pleuropericarditis with pleural effusion and pericardial effusion  Etiology could be autoimmune especially with positive SOSA and positive JENI vs viral  She seems to be responding to the steroids  We will await the SOSA profile  Continue Solu-Medrol for now  We will follow up    Liliya Farfan M.D.  [facility]  [phone]

## 2021-10-02 NOTE — PROGRESS NOTES
[facility] - Progress Note  Today's Date and Time: 10/2/2021, 8:35 AM    Impression :   Shortness of breath  Rhinovirus or enterovirus infection  History of TIA (2012 mini stroke high blood pressure)  Bacteremia with Gram positive bacilli = Contaminant 9-28-21    Recommendations:   Monitor off antibiotics    Medical Decision Making/Summary/Discussion:10/2/2021       Infection Control Recommendations   [place] Precautions    Antimicrobial Stewardship Recommendations     Discontinuation of therapy    Coordination of Outpatient Care:   Estimated Length of IV antimicrobials:None   Patient will need Midline Catheter Insertion: No  Patient will need PICC line Insertion:No  Patient will need: Home IV , [place],  SNF,  LTAC:TBD  Patient will need outpatient wound care:No    Chief complaint/reason for consultation:   Sepsis/ pneumonia    History of Present Illness:   Michele Knox is a 78y.o.-year-old  female who was initially admitted on 9/27/2021. Patient seen at the request of Dr. Cr Generous:    Patient presented to the ED on 9/27/2021 complaining of left-sided chest wall pain and persistent cough. She presented with shortness of breath pain and tachycardic with a coughing fit. Pain is worse with movement per ED note. She was seen a day before and was offered admission but she preferred going home. Two weeks ago she was seen at an urgent care and was diagnosed with pleural effusion and was placed on amoxicillin at the time. CURRENT EVALUATION: 10/2/2021    Evaluated in the ICU    Afebrile  VS stable on 3 L NC    The patient continues to experience a cough. SOSA & JENI were elevated-Rheumatology consulted for possible autoimmune response as cause of recurrent pleural effusion and pericardial effusion. CT surgery consulted for possible pericardial window-No plans for surgical intervention at this time.     Ultrasound guided thoracentesis was upstairs\" \"can walk a city block\"    Wears glasses        Past Surgical  History:     Past Surgical History:   Procedure Laterality Date    ANKLE SURGERY Right     x 3    FOOT SURGERY  2015    left    HIP ARTHROSCOPY Left 03/14/2018    band release bursectomy    HYSTERECTOMY      NERVE BLOCK  10/19/15    caudal #1  celestone 9mg    NERVE BLOCK  10-26-15     caudal epidural steroid block #2 decadron 5 mg    AR HIP ARTHROSCOPY, DX Left 3/14/2018    LEFT HIP ARTHROSCOPY WITH  IT BAND RELEASE BURSECTOMY WITH GLUTEUS MUSCLE REPAIR performed by Melecio Acosta DO at [facility] [address] Left     x 2       Medications:      methylPREDNISolone  40 mg IntraVENous Q12H    fluticasone  1 spray Each Nostril Daily    cetirizine  10 mg Oral Daily    budesonide-formoterol  2 puff Inhalation BID    [Held by provider] furosemide  40 mg IntraVENous Daily    sodium chloride flush  5-40 mL IntraVENous 2 times per day    enoxaparin  40 mg SubCUTAneous Daily    lidocaine  1 patch TransDERmal Daily    sodium chloride flush  5-40 mL IntraVENous 2 times per day    dextromethorphan  30 mg Oral 2 times per day       Social History:     Social History     Socioeconomic History    Marital status:      Spouse name: Not on file    Number of children: Not on file    Years of education: Not on file    Highest education level: Not on file   Occupational History     Employer: [employer]     Comment: [place]   Tobacco Use    Smoking status: Never Smoker    Smokeless tobacco: Never Used   Vaping Use    Vaping Use: Never used   Substance and Sexual Activity    Alcohol use: No    Drug use: No    Sexual activity: Not on file   Other Topics Concern    Not on file   Social History Narrative    Not on file     Social Determinants of Health     Financial Resource Strain:     Difficulty of Paying Living Expenses:    Food Insecurity:     Worried About Running Out of Food in the Last Year:     Ran Out of Food in the Last Year:    Transportation Needs:     Lack of Transportation (Medical):  Lack of Transportation (Non-Medical):    Physical Activity:     Days of Exercise per Week:     Minutes of Exercise per Session:    Stress:     Feeling of Stress :    Social Connections:     Frequency of Communication with Friends and Family:     Frequency of Social Gatherings with Friends and Family:     Attends Jainism Services:     Active Member of Clubs or Organizations:     Attends Club or Organization Meetings:     Marital Status:    Intimate Partner Violence:     Fear of Current or Ex-Partner:     Emotionally Abused:     Physically Abused:     Sexually Abused:        Family History:     Family History   Problem Relation Age of Onset    Lung Cancer Mother     Lung Cancer Brother     Heart Disease Father     Heart Disease Paternal Grandfather         Allergies:   Bee pollen, Erythromycin, Pcn [penicillins], Tetanus toxoids, and Tetracyclines & related     Review of Systems:   Constitutional: No fevers or chills. No systemic complaints  Head: No headaches  Eyes: No double vision or blurry vision. No conjunctival inflammation. ENT: No sore throat or runny nose. . No hearing loss, tinnitus or vertigo. Cardiovascular: No chest pain or palpitations. No shortness of breath. No CALVILLO  Lung: No shortness of breath or cough. No sputum production  Abdomen: No nausea, vomiting, diarrhea, or abdominal pain. Cyn Fanning No cramps. Genitourinary: No increased urinary frequency, or dysuria. No hematuria. No suprapubic or CVA pain  Musculoskeletal: No muscle aches or pains. No joint effusions, swelling or deformities  Hematologic: No bleeding or bruising. Neurologic: No headache, weakness, numbness, or tingling. Integument: No rash, no ulcers. Psychiatric: No depression. Endocrine: No polyuria, no polydipsia, no polyphagia.     Physical Examination :     Patient Vitals for the past 8 hrs:   BP Temp Temp src Pulse Resp SpO2   10/02/21 0600 -- -- -- 84 -- 94 %   10/02/21 0500 -- -- -- 86 -- 95 %   10/02/21 0400 116/62 -- -- 89 -- 95 %   10/02/21 0300 95/80 97.8 °F (36.6 °C) Oral 88 18 93 %   10/02/21 0200 -- -- -- 84 -- 96 %   10/02/21 0100 -- -- -- 84 -- 96 %     General Appearance: Awake, alert, and in no apparent distress  Head:  Normocephalic, no trauma  Eyes: Pupils equal, round, reactive to light and accommodation; extraocular movements intact; sclera anicteric; conjunctivae pink. No embolic phenomena. ENT: Oropharynx clear, without erythema, exudate, or thrush. No tenderness of sinuses. Mouth/throat: mucosa pink and moist. No lesions. Dentition in good repair. Neck:Supple, without lymphadenopathy. Thyroid normal, No bruits. Pulmonary/Chest: Clear to auscultation, without wheezes, rales, or rhonchi. No dullness to percussion. Cardiovascular: Regular rate and rhythm without murmurs, rubs, or gallops. Abdomen: Soft, non tender. Bowel sounds normal. No organomegaly  All four Extremities: No cyanosis, clubbing, edema, or effusions. Neurologic: No gross sensory or motor deficits. Skin: Warm and dry with good turgor. No signs of peripheral arterial or venous insufficiency. No ulcerations. No open wounds. Medical Decision Making -Laboratory:   I have independently reviewed/ordered the following labs:    CBC with Differential:   Recent Labs     10/01/21  0537 10/02/21  0646   WBC 9.0 6.3   HGB 10.5* 10.2*   HCT 33.7* 31.6*    373   LYMPHOPCT 14* 9*   MONOPCT 8 1*     BMP:   Recent Labs     10/01/21  0537 10/02/21  0646   * 131*   K 3.9 5.0   CL 96* 97*   CO2 21 22   BUN 17 14   CREATININE 0.65 0.48*   MG 2.5  --      Hepatic Function Panel:   No results for input(s): PROT, LABALBU, BILIDIR, IBILI, BILITOT, ALKPHOS, ALT, AST in the last 72 hours. No results for input(s): RPR in the last 72 hours. No results for input(s): HIV in the last 72 hours. No results for input(s): BC in the last 72 hours.   Lab Results   Component Value Date    MUCUS NOT REPORTED 10/02/2021    RBC 3.57 10/02/2021    TRICHOMONAS NOT REPORTED 10/02/2021    WBC 6.3 10/02/2021    YEAST NOT REPORTED 10/02/2021    TURBIDITY Clear 10/02/2021     Lab Results   Component Value Date    CREATININE 0.48 10/02/2021    GLUCOSE 187 10/02/2021       Medical Decision Making-Imaging:     EXAMINATION:   TWO XRAY VIEWS OF THE CHEST       9/28/2021 4:57 pm       COMPARISON:   CT chest September 27, 2021       HISTORY:   ORDERING SYSTEM PROVIDED HISTORY: evaluate pna, pleural effusions   TECHNOLOGIST PROVIDED HISTORY:   evaluate pna, pleural effusions       FINDINGS:   Cardiomegaly.  Small left greater than right pleural effusions.  Possible   mild edema.  Mediastinum normal.  Bony thorax intact.           Impression   Possible mild CHF.  Small effusions. EXAMINATION:   CTA OF THE CHEST 9/27/2021 7:47 am       TECHNIQUE:   CTA of the chest was performed after the administration of intravenous   contrast.  Multiplanar reformatted images are provided for review.  MIP   images are provided for review.  Dose modulation, iterative reconstruction,   and/or weight based adjustment of the mA/kV was utilized to reduce the   radiation dose to as low as reasonably achievable.       COMPARISON:   09/12/2021       HISTORY:   ORDERING SYSTEM PROVIDED HISTORY: Left-sided chest pain elevated D-dimer   yesterday   TECHNOLOGIST PROVIDED HISTORY:   Left-sided chest pain elevated D-dimer yesterday   Decision Support Exception - unselect if not a suspected or confirmed   emergency medical condition->Emergency Medical Condition (MA)   Reason for Exam: chest pain   Acuity: Acute   Type of Exam: Initial   Additional signs and symptoms: elevated D-dimer   Relevant Medical/Surgical History: hx HTN and TIA       FINDINGS:   Pulmonary Arteries: Pulmonary arteries show no evidence of intraluminal   filling defect to suggest pulmonary embolism.  Main pulmonary artery is   normal in caliber.       Mediastinum: Moderate pericardial effusion.  Minor atherosclerotic disease. Normal caliber aorta.  No significant lymphadenopathy.  Thyroid gland and   esophagus grossly normal.       Lungs/pleura: Small right and moderate left pleural effusion with adjacent   compressive subsegmental atelectasis at the lung base.       No significant lung nodules or masses.  No pneumothorax.       Upper Abdomen: Limited images of the upper abdomen are unremarkable.       Soft Tissues/Bones: No acute bone or soft tissue abnormality.           Impression   1. No evidence for acute pulmonary embolism. 2. Moderate pericardial effusion. 3. Small right and moderate left pleural effusion with adjacent subsegmental   atelectasis.           Medical Decision Kxjojj-Dncynblr-Ccogs:   Results for Anayeli Bolden (MRN [id number]) as of 9/30/2021 13:10   Ref.  Range 9/27/2021 08:58 9/28/2021 10:00   Adenovirus PCR Latest Ref Range: Not Detected   Not Detected   B Pertussis by PCR Latest Ref Range: Not Detected   Not Detected   Chlamydia pneumoniae By PCR Latest Ref Range: Not Detected   Not Detected   Coronavirus 229E PCR Latest Ref Range: Not Detected   Not Detected   Coronavirus HKU1 PCR Latest Ref Range: Not Detected   Not Detected   Coronavirus NL63 PCR Latest Ref Range: Not Detected   Not Detected   Coronavirus OC Latest Ref Range: Not Detected   Not Detected   Human Metapneumovirus PCR Latest Ref Range: Not Detected   Not Detected   Influenza A by PCR Latest Ref Range: Not Detected   Not Detected   Influenza A H1 (2009) PCR Latest Ref Range: Not Detected   NOT REPORTED   Influenza A H1 PCR Latest Ref Range: Not Detected   NOT REPORTED   Influenza A H3 PCR Latest Ref Range: Not Detected   NOT REPORTED   Influenza B by PCR Latest Ref Range: Not Detected   Not Detected   MRSA DNA PROBE, NASAL Unknown  Rpt   Parainfluenza 1 PCR Latest Ref Range: Not Detected   Not Detected   Parainfluenza 2 PCR Latest Ref Range: Not Detected Not Detected   Parainfluenza 3 PCR Latest Ref Range: Not Detected   Not Detected   Parainfluenza 4 PCR Latest Ref Range: Not Detected   Not Detected   Resp Syncytial Virus PCR Latest Ref Range: Not Detected   Not Detected   Rhino/Enterovirus PCR Latest Ref Range: Not Detected   DETECTED (A)   Mycoplasma pneumo by PCR Latest Ref Range: Not Detected   Not Detected   SARS-CoV-2, PCR Latest Ref Range: Not Detected   Not Detected   SARS-CoV-2, Rapid Latest Ref Range: Not Detected  Not Detected        Medical Decision Making-Other:     Note:  Labs, medications, radiologic studies were reviewed with personal review of films  Large amounts of data were reviewed  Discussed with nursing Staff, Discharge planner  Infection Control and Prevention measures reviewed  All prior entries were reviewed  Administer medications as ordered  Prognosis: Fair  Discharge planning reviewed  Follow up as outpatient. Thank you for allowing us to participate in the care of this patient. Please call with questions. Taylor Santa, APRN - CNP     ATTESTATION:    I have discussed the case, including pertinent history and exam findings with the APRN. I have evaluated the  History, physical findings and pictures of the patient and the key elements of the encounter have been performed by me. I have reviewed the laboratory data, other diagnostic studies and discussed them with the APRN. I have updated the medical record where necessary. I agree with the assessment, plan and orders as documented by the APRN.     Pascale Ham MD.          Pager: [phone] - Office: [phone]

## 2021-10-03 LAB
ABSOLUTE EOS #: <0.03 K/UL (ref 0–0.44)
ABSOLUTE IMMATURE GRANULOCYTE: 0.1 K/UL (ref 0–0.3)
ABSOLUTE LYMPH #: 0.94 K/UL (ref 1.1–3.7)
ABSOLUTE MONO #: 0.29 K/UL (ref 0.1–1.2)
ANION GAP SERPL CALCULATED.3IONS-SCNC: 9 MMOL/L (ref 9–17)
BASOPHILS # BLD: 0 % (ref 0–2)
BASOPHILS ABSOLUTE: <0.03 K/UL (ref 0–0.2)
BUN BLDV-MCNC: 20 MG/DL (ref 8–23)
BUN/CREAT BLD: ABNORMAL (ref 9–20)
CALCIUM SERPL-MCNC: 7.9 MG/DL (ref 8.6–10.4)
CHLORIDE BLD-SCNC: 100 MMOL/L (ref 98–107)
CO2: 20 MMOL/L (ref 20–31)
CREAT SERPL-MCNC: 0.63 MG/DL (ref 0.5–0.9)
DIFFERENTIAL TYPE: ABNORMAL
EOSINOPHILS RELATIVE PERCENT: 0 % (ref 1–4)
GFR AFRICAN AMERICAN: >60 ML/MIN
GFR NON-AFRICAN AMERICAN: >60 ML/MIN
GFR SERPL CREATININE-BSD FRML MDRD: ABNORMAL ML/MIN/{1.73_M2}
GFR SERPL CREATININE-BSD FRML MDRD: ABNORMAL ML/MIN/{1.73_M2}
GLUCOSE BLD-MCNC: 193 MG/DL (ref 70–99)
HCT VFR BLD CALC: 35.4 % (ref 36.3–47.1)
HEMOGLOBIN: 10.1 G/DL (ref 11.9–15.1)
IMMATURE GRANULOCYTES: 1 %
LYMPHOCYTES # BLD: 10 % (ref 24–43)
MCH RBC QN AUTO: 27.9 PG (ref 25.2–33.5)
MCHC RBC AUTO-ENTMCNC: 28.5 G/DL (ref 28.4–34.8)
MCV RBC AUTO: 97.8 FL (ref 82.6–102.9)
MONOCYTES # BLD: 3 % (ref 3–12)
NRBC AUTOMATED: 0 PER 100 WBC
PDW BLD-RTO: 13.1 % (ref 11.8–14.4)
PLATELET # BLD: 288 K/UL (ref 138–453)
PLATELET ESTIMATE: ABNORMAL
PMV BLD AUTO: 9.7 FL (ref 8.1–13.5)
POTASSIUM SERPL-SCNC: 5.1 MMOL/L (ref 3.7–5.3)
RBC # BLD: 3.62 M/UL (ref 3.95–5.11)
RBC # BLD: ABNORMAL 10*6/UL
SEG NEUTROPHILS: 85 % (ref 36–65)
SEGMENTED NEUTROPHILS ABSOLUTE COUNT: 7.76 K/UL (ref 1.5–8.1)
SODIUM BLD-SCNC: 129 MMOL/L (ref 135–144)
WBC # BLD: 9.1 K/UL (ref 3.5–11.3)
WBC # BLD: ABNORMAL 10*3/UL

## 2021-10-03 PROCEDURE — 85025 COMPLETE CBC W/AUTO DIFF WBC: CPT

## 2021-10-03 PROCEDURE — 99233 SBSQ HOSP IP/OBS HIGH 50: CPT | Performed by: INTERNAL MEDICINE

## 2021-10-03 PROCEDURE — 80048 BASIC METABOLIC PNL TOTAL CA: CPT

## 2021-10-03 PROCEDURE — 2060000000 HC ICU INTERMEDIATE R&B

## 2021-10-03 PROCEDURE — 2700000000 HC OXYGEN THERAPY PER DAY

## 2021-10-03 PROCEDURE — 2580000003 HC RX 258: Performed by: STUDENT IN AN ORGANIZED HEALTH CARE EDUCATION/TRAINING PROGRAM

## 2021-10-03 PROCEDURE — 6360000002 HC RX W HCPCS: Performed by: STUDENT IN AN ORGANIZED HEALTH CARE EDUCATION/TRAINING PROGRAM

## 2021-10-03 PROCEDURE — 94640 AIRWAY INHALATION TREATMENT: CPT

## 2021-10-03 PROCEDURE — 99232 SBSQ HOSP IP/OBS MODERATE 35: CPT | Performed by: FAMILY MEDICINE

## 2021-10-03 PROCEDURE — 6370000000 HC RX 637 (ALT 250 FOR IP): Performed by: NURSE PRACTITIONER

## 2021-10-03 PROCEDURE — 6370000000 HC RX 637 (ALT 250 FOR IP): Performed by: STUDENT IN AN ORGANIZED HEALTH CARE EDUCATION/TRAINING PROGRAM

## 2021-10-03 PROCEDURE — 94761 N-INVAS EAR/PLS OXIMETRY MLT: CPT

## 2021-10-03 PROCEDURE — 36415 COLL VENOUS BLD VENIPUNCTURE: CPT

## 2021-10-03 PROCEDURE — 6370000000 HC RX 637 (ALT 250 FOR IP): Performed by: INTERNAL MEDICINE

## 2021-10-03 PROCEDURE — 6360000002 HC RX W HCPCS: Performed by: INTERNAL MEDICINE

## 2021-10-03 PROCEDURE — APPSS30 APP SPLIT SHARED TIME 16-30 MINUTES: Performed by: NURSE PRACTITIONER

## 2021-10-03 RX ADMIN — BUDESONIDE AND FORMOTEROL FUMARATE DIHYDRATE 2 PUFF: 160; 4.5 AEROSOL RESPIRATORY (INHALATION) at 09:27

## 2021-10-03 RX ADMIN — GUAIFENESIN AND CODEINE PHOSPHATE 5 ML: 100; 10 SOLUTION ORAL at 00:27

## 2021-10-03 RX ADMIN — BENZONATATE 100 MG: 100 CAPSULE ORAL at 17:49

## 2021-10-03 RX ADMIN — SODIUM CHLORIDE, PRESERVATIVE FREE 10 ML: 5 INJECTION INTRAVENOUS at 08:37

## 2021-10-03 RX ADMIN — Medication 30 MG: at 08:36

## 2021-10-03 RX ADMIN — GUAIFENESIN AND CODEINE PHOSPHATE 5 ML: 100; 10 SOLUTION ORAL at 21:50

## 2021-10-03 RX ADMIN — CETIRIZINE HYDROCHLORIDE 10 MG: 10 TABLET ORAL at 08:35

## 2021-10-03 RX ADMIN — SODIUM CHLORIDE, PRESERVATIVE FREE 10 ML: 5 INJECTION INTRAVENOUS at 20:29

## 2021-10-03 RX ADMIN — GUAIFENESIN AND CODEINE PHOSPHATE 5 ML: 100; 10 SOLUTION ORAL at 14:54

## 2021-10-03 RX ADMIN — SODIUM CHLORIDE, PRESERVATIVE FREE 10 ML: 5 INJECTION INTRAVENOUS at 20:30

## 2021-10-03 RX ADMIN — GUAIFENESIN AND CODEINE PHOSPHATE 5 ML: 100; 10 SOLUTION ORAL at 06:36

## 2021-10-03 RX ADMIN — METHYLPREDNISOLONE SODIUM SUCCINATE 40 MG: 40 INJECTION, POWDER, FOR SOLUTION INTRAMUSCULAR; INTRAVENOUS at 20:29

## 2021-10-03 RX ADMIN — METHYLPREDNISOLONE SODIUM SUCCINATE 40 MG: 40 INJECTION, POWDER, FOR SOLUTION INTRAMUSCULAR; INTRAVENOUS at 08:36

## 2021-10-03 RX ADMIN — Medication 30 MG: at 20:29

## 2021-10-03 RX ADMIN — BUDESONIDE AND FORMOTEROL FUMARATE DIHYDRATE 2 PUFF: 160; 4.5 AEROSOL RESPIRATORY (INHALATION) at 20:10

## 2021-10-03 RX ADMIN — ENOXAPARIN SODIUM 40 MG: 40 INJECTION SUBCUTANEOUS at 08:35

## 2021-10-03 RX ADMIN — SODIUM CHLORIDE, PRESERVATIVE FREE 10 ML: 5 INJECTION INTRAVENOUS at 08:36

## 2021-10-03 RX ADMIN — FLUTICASONE PROPIONATE 1 SPRAY: 50 SPRAY, METERED NASAL at 08:36

## 2021-10-03 ASSESSMENT — ENCOUNTER SYMPTOMS
COUGH: 1
NAUSEA: 0
SINUS PRESSURE: 0
WHEEZING: 0
CHEST TIGHTNESS: 0
BACK PAIN: 0
PHOTOPHOBIA: 0
VOMITING: 0
SHORTNESS OF BREATH: 1
DIARRHEA: 0
CONSTIPATION: 0
EYE REDNESS: 0
BLOOD IN STOOL: 0
SORE THROAT: 0
RHINORRHEA: 0
ABDOMINAL PAIN: 0

## 2021-10-03 ASSESSMENT — PAIN SCALES - GENERAL
PAINLEVEL_OUTOF10: 0

## 2021-10-03 NOTE — PLAN OF CARE
Problem: Pain:  Goal: Pain level will decrease  Description: Pain level will decrease  Outcome: Ongoing  Goal: Control of acute pain  Description: Control of acute pain  Outcome: Ongoing  Goal: Control of chronic pain  Description: Control of chronic pain  Outcome: Ongoing  Goal: Patient's pain/discomfort is manageable  Description: Patient's pain/discomfort is manageable  Outcome: Ongoing     Problem: Falls - Risk of:  Goal: Will remain free from falls  Description: Will remain free from falls  Outcome: Ongoing  Goal: Absence of physical injury  Description: Absence of physical injury  Outcome: Ongoing     Problem: Infection:  Goal: Will remain free from infection  Description: Will remain free from infection  Outcome: Ongoing     Problem: Safety:  Goal: Free from accidental physical injury  Description: Free from accidental physical injury  Outcome: Ongoing  Goal: Free from intentional harm  Description: Free from intentional harm  Outcome: Ongoing     Problem: Daily Care:  Goal: Daily care needs are met  Description: Daily care needs are met  Outcome: Ongoing     Problem: Skin Integrity:  Goal: Skin integrity will stabilize  Description: Skin integrity will stabilize  Outcome: Ongoing     Problem: Discharge Planning:  Goal: Patients continuum of care needs are met  Description: Patients continuum of care needs are met  Outcome: Ongoing     Problem: Musculor/Skeletal Functional Status  Goal: Highest potential functional level  Outcome: Ongoing  Goal: Absence of falls  Outcome: Ongoing

## 2021-10-03 NOTE — PROGRESS NOTES
[facility] - Progress Note  Today's Date and Time: 10/3/2021, 10:32 AM    Impression :   Shortness of breath  Rhinovirus or enterovirus infection  History of TIA (2012 mini stroke high blood pressure)  Bacteremia with Gram positive bacilli = Contaminant 9-28-21    Recommendations:   Monitor off antibiotics    Medical Decision Making/Summary/Discussion:10/3/2021       Infection Control Recommendations   [place] Precautions    Antimicrobial Stewardship Recommendations     Discontinuation of therapy    Coordination of Outpatient Care:   Estimated Length of IV antimicrobials:None   Patient will need Midline Catheter Insertion: No  Patient will need PICC line Insertion:No  Patient will need: Home IV , [place],  SNF,  LTAC:TBD  Patient will need outpatient wound care:No    Chief complaint/reason for consultation:   Sepsis/ pneumonia    History of Present Illness:   Michele Knox is a 78y.o.-year-old  female who was initially admitted on 9/27/2021. Patient seen at the request of Dr. Cr Generous:    Patient presented to the ED on 9/27/2021 complaining of left-sided chest wall pain and persistent cough. She presented with shortness of breath pain and tachycardic with a coughing fit. Pain is worse with movement per ED note. She was seen a day before and was offered admission but she preferred going home. Two weeks ago she was seen at an urgent care and was diagnosed with pleural effusion and was placed on amoxicillin at the time. CURRENT EVALUATION: 10/3/2021    Evaluated in the ICU    Afebrile  VS stable on 2 L NC    The patient is ambulating and alert and oriented. She continues to experience a cough. SOSA & JENI were elevated-Rheumatology consulted for possible autoimmune response as cause of recurrent pleural effusion and pericardial effusion.      CT surgery consulted for possible pericardial window-No plans for surgical intervention at this time.    Pericardiocentesis drain removed 10/2/21  Repeat echo planned 10/4/21    Ultrasound guided thoracentesis was performed on 9/28/2021.  800 mL of clear yellow fluid was obtained. Cultures were sent to lab. No growth     Cardiac catherterization performed (9/29/21) due to large pericardial effusion with some hemodynamic abnormalities. 200mL total amount of serosanguinous fluid obtained. Culture: No growth     Rhinovirus/enterovirus detected on 9/28/21 by PCR    Echocardiogram 9/30/21  Global left ventricular systolic function is normal. Calculated ejection  fraction 51% by Heart Model. Small to moderate pericardial effusion seen with gelatinous material, more  so anteriorly. No obvious RA/RV diastolic collapse. Echo was completed at bedside today (9/29/2021). Left ventricle is normal in size, global left ventricular systolic function is normal with ejection fracture is 55%. Right atrium appears small  Moderate to large pericardial effusion. Mitral and tricuspid inflow shows physiologic mild evidence of tamponade. Monitoring patient off antibiotics    Labs, X rays reviewed: 10/3/2021    BUN:17-->14-->20  Cr:0.65-->0.48-->0.63    WBC:9.0-->6.3-->9.1  Hb:10.5-->10.2-->10.1  Plat: 290-->373-->288    Cultures:  Urine:    Blood:   9-22-21: No growth    Sputum :    Wound:  9-29-21: Pericardial fluid; No growth   9-28-21: Thoracentesis: No growth      Culture with smear, acid fast bacillius: No acid fast bacilli seen 09/28/21    Discussed with patient, RN, family. I have personally reviewed the past medical history, past surgical history, medications, social history, and family history, and I have updated the database accordingly.   Past Medical History:     Past Medical History:   Diagnosis Date    Arthritis     Displacement of lumbar intervertebral disc without myelopathy 9/30/2015    Hypertension     exacerbated after last injection 10-    Lumbar disc disease     Personal history of TIA (transient ischemic attack)     2012 mini stroke high blood pressure    Sleep deprivation     SOB (shortness of breath)     \"walk to fast and going upstairs\" \"can walk a city block\"    Wears glasses        Past Surgical  History:     Past Surgical History:   Procedure Laterality Date    ANKLE SURGERY Right     x 3    FOOT SURGERY  2015    left    HIP ARTHROSCOPY Left 03/14/2018    band release bursectomy    HYSTERECTOMY      NERVE BLOCK  10/19/15    caudal #1  celestone 9mg    NERVE BLOCK  10-26-15     caudal epidural steroid block #2 decadron 5 mg    NE HIP ARTHROSCOPY, DX Left 3/14/2018    LEFT HIP ARTHROSCOPY WITH  IT BAND RELEASE BURSECTOMY WITH GLUTEUS MUSCLE REPAIR performed by Daria Candelaria DO at [facility] Left     x 2       Medications:      methylPREDNISolone  40 mg IntraVENous Q12H    fluticasone  1 spray Each Nostril Daily    cetirizine  10 mg Oral Daily    budesonide-formoterol  2 puff Inhalation BID    [Held by provider] furosemide  40 mg IntraVENous Daily    sodium chloride flush  5-40 mL IntraVENous 2 times per day    enoxaparin  40 mg SubCUTAneous Daily    lidocaine  1 patch TransDERmal Daily    sodium chloride flush  5-40 mL IntraVENous 2 times per day    dextromethorphan  30 mg Oral 2 times per day       Social History:     Social History     Socioeconomic History    Marital status:      Spouse name: Not on file    Number of children: Not on file    Years of education: Not on file    Highest education level: Not on file   Occupational History     Employer: [employer]     Comment: [employer]   Tobacco Use    Smoking status: Never Smoker    Smokeless tobacco: Never Used   Vaping Use    Vaping Use: Never used   Substance and Sexual Activity    Alcohol use: No    Drug use: No    Sexual activity: Not on file   Other Topics Concern    Not on file   Social History Narrative    Not on file     Social Determinants of Health depression. Endocrine: No polyuria, no polydipsia, no polyphagia. Physical Examination :     Patient Vitals for the past 8 hrs:   BP Temp Temp src Pulse Resp SpO2   10/03/21 0927 -- -- -- 91 18 99 %   10/03/21 0400 (!) 146/69 98.4 °F (36.9 °C) Oral 94 -- 92 %     General Appearance: Awake, alert, and in no apparent distress  Head:  Normocephalic, no trauma  Eyes: Pupils equal, round, reactive to light and accommodation; extraocular movements intact; sclera anicteric; conjunctivae pink. No embolic phenomena. ENT: Oropharynx clear, without erythema, exudate, or thrush. No tenderness of sinuses. Mouth/throat: mucosa pink and moist. No lesions. Dentition in good repair. Neck:Supple, without lymphadenopathy. Thyroid normal, No bruits. Pulmonary/Chest: Clear to auscultation, without wheezes, rales, or rhonchi. No dullness to percussion. Cardiovascular: Regular rate and rhythm without murmurs, rubs, or gallops. Abdomen: Soft, non tender. Bowel sounds normal. No organomegaly  All four Extremities: No cyanosis, clubbing, edema, or effusions. Neurologic: No gross sensory or motor deficits. Skin: Warm and dry with good turgor. No signs of peripheral arterial or venous insufficiency. No ulcerations. No open wounds. Medical Decision Making -Laboratory:   I have independently reviewed/ordered the following labs:    CBC with Differential:   Recent Labs     10/02/21  0646 10/03/21  0359   WBC 6.3 9.1   HGB 10.2* 10.1*   HCT 31.6* 35.4*    288   LYMPHOPCT 9* 10*   MONOPCT 1* 3     BMP:   Recent Labs     10/01/21  0537 10/01/21  0537 10/02/21  0646 10/03/21  0359   *   < > 131* 129*   K 3.9   < > 5.0 5.1   CL 96*   < > 97* 100   CO2 21   < > 22 20   BUN 17   < > 14 20   CREATININE 0.65   < > 0.48* 0.63   MG 2.5  --   --   --     < > = values in this interval not displayed.      Hepatic Function Panel:   No results for input(s): PROT, LABALBU, BILIDIR, IBILI, BILITOT, ALKPHOS, ALT, AST in the last 72 hours. No results for input(s): RPR in the last 72 hours. No results for input(s): HIV in the last 72 hours. No results for input(s): BC in the last 72 hours. Lab Results   Component Value Date    MUCUS NOT REPORTED 10/02/2021    RBC 3.62 10/03/2021    TRICHOMONAS NOT REPORTED 10/02/2021    WBC 9.1 10/03/2021    YEAST NOT REPORTED 10/02/2021    TURBIDITY Clear 10/02/2021     Lab Results   Component Value Date    CREATININE 0.63 10/03/2021    GLUCOSE 193 10/03/2021       Medical Decision Making-Imaging:     EXAMINATION:   TWO XRAY VIEWS OF THE CHEST       9/28/2021 4:57 pm       COMPARISON:   CT chest September 27, 2021       HISTORY:   ORDERING SYSTEM PROVIDED HISTORY: evaluate pna, pleural effusions   TECHNOLOGIST PROVIDED HISTORY:   evaluate pna, pleural effusions       FINDINGS:   Cardiomegaly.  Small left greater than right pleural effusions.  Possible   mild edema.  Mediastinum normal.  Bony thorax intact.           Impression   Possible mild CHF.  Small effusions. EXAMINATION:   CTA OF THE CHEST 9/27/2021 7:47 am       TECHNIQUE:   CTA of the chest was performed after the administration of intravenous   contrast.  Multiplanar reformatted images are provided for review.  MIP   images are provided for review.  Dose modulation, iterative reconstruction,   and/or weight based adjustment of the mA/kV was utilized to reduce the   radiation dose to as low as reasonably achievable.       COMPARISON:   09/12/2021       HISTORY:   ORDERING SYSTEM PROVIDED HISTORY: Left-sided chest pain elevated D-dimer   yesterday   TECHNOLOGIST PROVIDED HISTORY:   Left-sided chest pain elevated D-dimer yesterday   Decision Support Exception - unselect if not a suspected or confirmed   emergency medical condition->Emergency Medical Condition (MA)   Reason for Exam: chest pain   Acuity: Acute   Type of Exam: Initial   Additional signs and symptoms: elevated D-dimer   Relevant Medical/Surgical History: hx HTN and TIA     FINDINGS:   Pulmonary Arteries: Pulmonary arteries show no evidence of intraluminal   filling defect to suggest pulmonary embolism.  Main pulmonary artery is   normal in caliber.       Mediastinum: Moderate pericardial effusion.  Minor atherosclerotic disease. Normal caliber aorta.  No significant lymphadenopathy.  Thyroid gland and   esophagus grossly normal.       Lungs/pleura: Small right and moderate left pleural effusion with adjacent   compressive subsegmental atelectasis at the lung base.       No significant lung nodules or masses.  No pneumothorax.       Upper Abdomen: Limited images of the upper abdomen are unremarkable.       Soft Tissues/Bones: No acute bone or soft tissue abnormality.           Impression   1. No evidence for acute pulmonary embolism. 2. Moderate pericardial effusion. 3. Small right and moderate left pleural effusion with adjacent subsegmental   atelectasis.           Medical Decision [id number]:   Results for Dolores Li (MRN [id number]) as of 9/30/2021 13:10   Ref.  Range 9/27/2021 08:58 9/28/2021 10:00   Adenovirus PCR Latest Ref Range: Not Detected   Not Detected   B Pertussis by PCR Latest Ref Range: Not Detected   Not Detected   Chlamydia pneumoniae By PCR Latest Ref Range: Not Detected   Not Detected   Coronavirus 229E PCR Latest Ref Range: Not Detected   Not Detected   Coronavirus HKU1 PCR Latest Ref Range: Not Detected   Not Detected   Coronavirus NL63 PCR Latest Ref Range: Not Detected   Not Detected   Coronavirus OC Latest Ref Range: Not Detected   Not Detected   Human Metapneumovirus PCR Latest Ref Range: Not Detected   Not Detected   Influenza A by PCR Latest Ref Range: Not Detected   Not Detected   Influenza A H1 (2009) PCR Latest Ref Range: Not Detected   NOT REPORTED   Influenza A H1 PCR Latest Ref Range: Not Detected   NOT REPORTED   Influenza A H3 PCR Latest Ref Range: Not Detected   NOT REPORTED   Influenza B by PCR Latest Ref Range: Not Detected Not Detected   MRSA DNA PROBE, NASAL Unknown  Rpt   Parainfluenza 1 PCR Latest Ref Range: Not Detected   Not Detected   Parainfluenza 2 PCR Latest Ref Range: Not Detected   Not Detected   Parainfluenza 3 PCR Latest Ref Range: Not Detected   Not Detected   Parainfluenza 4 PCR Latest Ref Range: Not Detected   Not Detected   Resp Syncytial Virus PCR Latest Ref Range: Not Detected   Not Detected   Rhino/Enterovirus PCR Latest Ref Range: Not Detected   DETECTED (A)   Mycoplasma pneumo by PCR Latest Ref Range: Not Detected   Not Detected   SARS-CoV-2, PCR Latest Ref Range: Not Detected   Not Detected   SARS-CoV-2, Rapid Latest Ref Range: Not Detected  Not Detected        Medical Decision Making-Other:     Note:  Labs, medications, radiologic studies were reviewed with personal review of films  Large amounts of data were reviewed  Discussed with nursing Staff, Discharge planner  Infection Control and Prevention measures reviewed  All prior entries were reviewed  Administer medications as ordered  Prognosis: Fair  Discharge planning reviewed  Follow up as outpatient. Thank you for allowing us to participate in the care of this patient. Please call with questions. Elisha Ramirez, CELY - CNP     ATTESTATION:    I have discussed the case, including pertinent history and exam findings with the APRN. I have evaluated the  History, physical findings and pictures of the patient and the key elements of the encounter have been performed by me. I have reviewed the laboratory data, other diagnostic studies and discussed them with the APRN. I have updated the medical record where necessary. I agree with the assessment, plan and orders as documented by the APRN.     Garcia Ross MD.        Pager: [phone] - Office: [phone]

## 2021-10-03 NOTE — PROGRESS NOTES
TROPONINI in the last 72 hours. No results for input(s): TROPONINT in the last 72 hours. BNP: No results for input(s): PROBNP in the last 72 hours. No results for input(s): BNP in the last 72 hours. Lipids: No results for input(s): CHOL, HDL in the last 72 hours. Invalid input(s): LDLCALCU  INR: No results for input(s): INR in the last 72 hours. Objective:   Vitals: BP (!) 146/69   Pulse 94   Temp 98.4 °F (36.9 °C) (Oral)   Resp 18   Ht 5' 2\" (1.575 m)   Wt 139 lb 6.4 oz (63.2 kg)   SpO2 92%   BMI 25.50 kg/m²    General appearance: alert and cooperative with exam  HEENT: Head: Normocephalic, no lesions, without obvious abnormality. Neck: no JVD, trachea midline, no adenopathy  Lungs: Diminished throughout. Supplemental oxygen per NC. Frequent cough. Heart: Regular rate and rhythm, s1/s2 auscultated, no murmurs. Sinus tachycardia. Abdomen: soft, non-tender, bowel sounds active  Extremities: no edema  Neurologic: not done     ECHO 9/29/2021     Summary  Left ventricle is normal in size, global left ventricular systolic function  is normal, calculated ejection fraction is 55%. Right atrium appears small. Moderate to large pericardial effusion. Posteriorly measuring 2.29cm, Anteriorly measuring 3.66cm. Gelatinous  material is noted throughout. Cannot rule out right ventricular collapse in the subcostal views, clinical  correlation recommended. Mitral and tricuspid inflows show physiologic mild evidence of tamponade. Pleural effusion noted.       Assessment / Acute Cardiac Problems:   1. Pleural effusion 2/2 rhinovirus/enterovirus s/p thoracentesis on 9/30/21  2. Pericardial effusion s/p pericardiocentesis on 9/29/21  3. H/o TIA  4. SOSA positive  5. Hyponatremia  6. HTN, on lisinorpil  7. B/l varicose veins     Plan of Treatment:   1. Pericardial and pleural effusion in setting of positive viral panel. Possible etiology is viral vs. Rheumatological disorder given positive SOSA.  Follow up pleural and pericardial fluid c/s.  2. Discussed initiation of colchicine with Rheumatology team who recommend continuing solumedrol and adding colchicine or immunosuppressive agent depending on results from serology. 3. Had thoracentesis by IR on 9/30/21.   4. Underwent Pericardiocentesis with 200 cc drainage on 9/29/21. Limited ECHO on 9/30/21 showed small to moderate pericadical effusion. Plan to repeat Limited ECHO on 10/4/21 to re-evaluate pericardial effusion. 5. Pericardiocentesis drain removed yesterday afternoon (10/2/21). CT surgery consulted for evaluation for pericardial window prior to drain removal, appreciate recommendations. 6. K>4, Mg>2    Mai Parry MD  Fellow, [address]      Attending note,   Patient was seen and examined. Agree with the above evaluation and plan. No chest pain or shortness of breath. Pericardial drain removed yesterday and will follow up with limited echo tomorrow. On steroids per rheumatology. We will continue current medications. And will follow.

## 2021-10-03 NOTE — PROGRESS NOTES
PULMONARY & CRITICAL CARE MEDICINE PROGRESS NOTE     Patient:  Mendez Lopez  MRN: [id number]  Admit date: 9/27/2021  Primary Care Physician: CELY Romero CNP  Consulting Physician: Elena Valiente MD  CODE Status: Full Code  LOS: 5     SUBJECTIVE     Chief Complaint/ Reason for consult:  Pleural Effusions and Cough x 2 weeks    Hospital Course: The patient is a 78 y.o. female with PMH of asthma, hypertension who presented to the ER at [place] on 9/12/21 for complaints of chest pain, shortness of breath after pushing multiple shopping carts. CT chest at the time was unremarkable. This was attributed to physical strain and she was discharged from the ER. She returns to the [facility] ER on 9/26 with complaints of cough, shortness of breath and difficulty sleeping where CT scan of the chest shows moderate pericardial effusion, small right and moderate left pleural effusions with adjacent subsegmental atelectasis. Patient declined admission at that time and was discharged home with albuterol inhaler, tessalon, guaifenesin and dextromethorphan. Due to worsening symptoms she returned to the ER the next day, stayed overnight and was transferred to [place] for admission on 9/28/21. On my evaluation, patient is tachycardic in the 130s, tachypneic, blood pressure stable, and unable to speak due to cough. Respiratory panel was sent which showed Entero/rhinovirus positive PCR test.    Interval History:  10/02/21  Pt was seen and examined at bedside. Overnight events noted chart seen labs seen and imaging studies results seen. Afebrile overnight, hemodynamically stable. Saturating high 90s % this morning on 2 L nasal cannula. Pericardiocentesis drain removed yesterday afternoon labs reviewed. Labs reviewed. No leukocytosis  urine output 1.2 L / 24 hours  Remains on Solu-Medrol twice daily    s/p thoracentesis done on the right side and had 550 mL of fluid withdrawn 10/1.     She is a status post left thoracentesis on 09/28/2021. She is status post pericardiocentesis/pericardial drain on 09/29/2021  Status post repeat left thoracentesis on 09/30/2021. Status post right thoracentesis on 10/01/2021    A repeat CT scan of the chest repeat CT scan of the chest on 09/30/2021 showed pericardial effusion is a smaller she has a small left pleural effusion and she has a moderate right pleural effusion which is increased from last CT scan of the chest on admission  Her pleural fluid on the left side is exudative by protein criteria and so far Gram stain and culture negative and AFB smear is negative. Cytology negative  Her pleural fluid on the right side is exudative by protein criteria, Gram stain and AFB smear is negative bacterial culture is negative so far. Pericardial fluid culture is negative and AB smear is negative. Cytology is pending from pericardial fluid. Review Of Systems:  Review of Systems   Constitutional: Positive for fatigue. Negative for chills, diaphoresis and fever. HENT: Negative for hearing loss, rhinorrhea, sinus pressure and sore throat. Eyes: Negative for photophobia and redness. Respiratory: Positive for cough and shortness of breath. Negative for chest tightness. Cardiovascular: Negative for chest pain. Gastrointestinal: Negative for abdominal pain, diarrhea, nausea and vomiting. Genitourinary: Negative for dysuria, frequency and urgency. Musculoskeletal: Negative for back pain and neck stiffness. Skin: Negative for rash. Neurological: Negative for seizures and speech difficulty. Psychiatric/Behavioral: Negative for agitation, confusion and decreased concentration. OBJECTIVE     PaO2/FiO2 RATIO:  No results for input(s): POCPO2 in the last 72 hours.          VITAL SIGNS:   LAST:  /62   Pulse 84   Temp 97.8 °F (36.6 °C) (Oral)   Resp 18   Ht 5' 2\" (1.575 m)   Wt 139 lb 6.4 oz (63.2 kg)   SpO2 94%   BMI 25.50 kg/m²   8-24 HR 63.2 63.2 63.2        LABS:  ABGs:   No results for input(s): POCPH, POCPCO2, POCPO2, POCHCO3, BORM4RHY in the last 72 hours. CBC:   Recent Labs     09/30/21  0526 10/01/21  0537 10/02/21  0646   WBC 11.3 9.0 6.3   HGB 10.8* 10.5* 10.2*   HCT 33.5* 33.7* 31.6*   MCV 87.9 89.6 88.5    290 373   LYMPHOPCT 10* 14* 9*   RBC 3.81* 3.76* 3.57*   MCH 28.3 27.9 28.6   MCHC 32.2 31.2 32.3   RDW 13.3 13.4 13.2     CRP:   No results for input(s): CRP in the last 72 hours. LDH:   Recent Labs     09/29/21  1446        BMP:   Recent Labs     09/30/21  0526 10/01/21  0537 10/02/21  0646   * 127* 131*   K 4.3 3.9 5.0   CL 96* 96* 97*   CO2 20 21 22   BUN 16 17 14   CREATININE 0.62 0.65 0.48*   GLUCOSE 95 98 187*     Liver Function Test:   No results for input(s): PROT, LABALBU, ALT, AST, GGT, ALKPHOS, BILITOT in the last 72 hours. Coagulation Profile:   No results for input(s): INR, PROTIME, APTT in the last 72 hours. D-Dimer:  No results for input(s): DDIMER in the last 72 hours. Lactic Acid:  No results for input(s): LACTA in the last 72 hours. Cardiac Enzymes:  No results for input(s): CKTOTAL, CKMB, CKMBINDEX, TROPONINI in the last 72 hours. Invalid input(s): TROPONIN, HSTROP  BNP/ProBNP:   No results for input(s): BNP, PROBNP in the last 72 hours. Triglycerides:  No results for input(s): TRIG in the last 72 hours. Microbiology:  Urine Culture:  No components found for: CURINE  Blood Culture:  No components found for: CBLOOD, CFUNGUSBL  Sputum Culture:  No components found for: CSPUTUM  Recent Labs     10/01/21  1357   [facility] . PLEURAL FLUID  . PLEURAL FLUID   SPECIAL NOT REPORTED  NOT REPORTED   CULTURE NO GROWTH 19 HOURS  PENDING     Recent Labs     09/29/21  1359 09/29/21  1359 09/29/21  1937 10/01/21  1230 10/01/21  1357   SPECDESC . FLUID . PERICARDIAL FLUID   < > . PLEURAL FLUID NOT REPORTED . PLEURAL FLUID  . PLEURAL FLUID   SPECIAL NOT REPORTED  --  NOT REPORTED  --  NOT REPORTED  NOT REPORTED   CULTURE NO GROWTH 3 DAYS  --  PENDING  --  NO GROWTH 19 HOURS  PENDING    < > = values in this interval not displayed. Pathology:    Radiology Reports:  US THORACENTESIS Which side should the procedure be performed? Right   Final Result   Successful ultrasound guided thoracentesis. CT CHEST WO CONTRAST   Final Result   1. Pericardial drain in place with interval reduction of effusion. 2.  Right pleural effusion has increased in size compared to 09/27/2021 CT   exam, moderate in size. 3.  Small residual left pleural effusion, improved. US THORACENTESIS Which side should the procedure be performed? Left   Final Result   Successful ultrasound guided thoracentesis. XR CHEST PORTABLE   Final Result   Worsening of bilateral pleural effusion, pulmonary vascular congestion and   bilateral mid and lower lung airspace disease. XR CHEST (2 VW)   Final Result   Possible mild CHF. Small effusions. US THORACENTESIS Which side should the procedure be performed? Left   Final Result   Successful ultrasound guided thoracentesis. CT CHEST PULMONARY EMBOLISM W CONTRAST   Final Result   1. No evidence for acute pulmonary embolism. 2. Moderate pericardial effusion. 3. Small right and moderate left pleural effusion with adjacent subsegmental   atelectasis.               Echocardiogram:   Results for orders placed during the hospital encounter of 09/27/21    ECHO Complete 2D W Doppler W Color    Narrative  Transthoracic Echocardiography Report (TTE)    Patient Name Prasanth Woo     Date of Study               09/29/2021  Brissa JACOB    Date of      1945  Gender                      Female  Birth    Age          78 year(s)  Race                            Room Number  2022        Height:                     62 inch, 157.48 cm    Corporate ID [id number]    Weight:                     134 pounds, 60.8 kg  #    Patient Acct [de-identified]   BSA:          1.61 m^2 BMI:     24.51 kg/m^2  #    MR #         [id number]     Sonographer                 Jimenez Bal    Accession #  [id number]  Interpreting Physician      [facility]    Fellow                   Referring Nurse  Practitioner    Interpreting             Referring Physician         Luis Antonio Au  Fellow    Type of Study    TTE procedure:2D Echocardiogram, M-Mode, Doppler, Color Doppler. Procedure Date  Date: 09/29/2021 Start: 09:35 AM    Study Location: [facility]  Technical Quality: Adequate visualization    Indications:Pericardial effusion. History / Tech. Comments:  Procedure explained to patient. Echo completed at the bedside. PMHx:  Hypertension    Patient Status: Inpatient    Height: 62 inches Weight: 134 pounds BSA: 1.61 m^2 BMI: 24.51 kg/m^2    CONCLUSIONS    Summary  Left ventricle is normal in size, global left ventricular systolic function  is normal, calculated ejection fraction is 55%. Right atrium appears small. Moderate to large pericardial effusion. Posteriorly measuring 2.29cm, Anteriorly measuring 3.66cm. Gelatinous  material is noted throughout. Cannot rule out right ventricular collapse in the subcostal views, clinical  correlation recommended. Mitral and tricuspid inflows show physiologic mild evidence of tamponade. Pleural effusion noted. Signature  ----------------------------------------------------------------------------  Electronically signed by Angy BoatengSonographer) on 09/29/2021 10:15 AM  ----------------------------------------------------------------------------    ----------------------------------------------------------------------------  Electronically signed by Kiki MartelInterpreting physician) on 09/29/2021  10:22 AM  ----------------------------------------------------------------------------  FINDINGS  Left Atrium  Left atrium is normal in size. Inter-atrial septum is intact with no evidence for an atrial septal defect,  by color doppler.   Left Ventricle  Left ventricle is normal in size, global left ventricular systolic function  is normal, calculated ejection fraction is 55%. Right Atrium  Right atrium appears small. Right Ventricle  Right ventricle appears reduced in size. Mitral Valve  Normal mitral valve structure. No mitral stenosis. No evidence of mitral regurgitation. Aortic Valve  Aortic valve is trileaflet. Aortic valve is sclerotic but opens well. No aortic insufficiency. Tricuspid Valve  Tricuspid valve was not well visualized. No tricuspid regurgitation was seen. Insignificant tricuspid regurgitation, unable to estimate RVSP. Pulmonic Valve  Pulmonic valve not well visualized but Doppler velocities are normal.  No pulmonic insufficiency. Pericardial Effusion  Moderate to large pericardial effusion. Posteriorly measuring 2.29cm at largest visualized accumulation (parasternal  views.)  Anteriorly measuring 3.66cm at largest visualized accumulation (subcostal  views.)  Gelatinous material is noted throughout. Cannot rule out right ventricular collapse in the subcostal views, clinical  correlation recommended. Mitral and tricuspid inflows show physiologic mild evidence of tamponade. Pleural Effusion  Pleural effusion noted. Miscellaneous  Normal aortic root dimension. E/E' average = 10.65. IVC not well visualized.     M-mode / 2D Measurements & Calculations:    LVIDd:2.8 cm(3.7 - 5.6 cm)        Diastolic FLABSC:12 ml  DPDU:6.2 cm(0.6 - 1.1 cm)         Aortic Root:2.8 cm(2.0 - 3.7 cm)  LVPWd:0.8 cm(0.6 - 1.1 cm)        LA Dimension: 3 cm(1.9 - 4.0 cm)  LA volume/Index: 39.98 ml /25m^2    RVDd:2.6 cm    Mitral:                                  Aortic    Valve Area (P1/2-Time): 5 cm^2           Peak Velocity: 1.48 m/s  Peak E-Wave: 0.84 m/s                    Mean Velocity: 1.04 m/s  Peak A-Wave: 1.08 m/s                    Peak Gradient: 8.76 mmHg  E/A Ratio: 0.77                          Mean Gradient: 5 mmHg  Peak Gradient: 2.8 mmHg  Mean Gradient: 2 mmHg  Deceleration Time: 169 msec              AV VTI: 26.9 cm  P1/2t: 44 msec    Mean Velocity: 0.67 m/s    Pulmonic:    Peak Velocity: 1.15 m/s  Peak Gradient: 5.29 mmHg    Diastology / Tissue Doppler  Septal Wall E' velocity:0.08 m/s  Septal Wall E/E':9.8  Lateral Wall E' velocity:0.07 m/s  Lateral Wall E/E':11.5       ASSESSMENT AND PLAN        Assessment:    // Viral syndrome  // Pericardial effusion  // Bilateral pleural effusion  // Acute on chronic cough  // SOSA positive    Plan:    Patient was started on Solu-Medrol 40 mg every 12 hours seems her cough is better although she had thoracentesis done on the right side 10/1  Pericardial effusion/pericardial drain  removed yesterday. follow-up repeat echocardiogram  Pleural fluid analysis seen culture results seen and cytology seen as mentioned above. Follow-up cytology from pericardial fluid. Follow-up with cardiology and CT surgery. Currently off antibiotic per infectious disease recommendation. Continue symptomatic treatment for cough. Droplet precaution. We will use O2 to keep saturation above 92%. -Currently 2 L nasal cannula  Ambulate in physical therapy. DVT prophylaxis. Discussed with family and updated. Discussed with nursing staff, treatment and plan discussed. Please note that this chart was generated using voice recognition Dragon dictation software. Although every effort was made to ensure the accuracy of this automated transcription, some errors in transcription may have occurred. Kandice Burgess MD  Internal Medicine Resident, PGY-3  [address], [place]  10/3/2021, 11:46 AM      Attending Physician Statement  I have discussed the care of Bam Delong, including pertinent history and exam findings with the resident. I have reviewed the key elements of all parts of the encounter with the resident. I have seen and examined the patient with the resident.   I agree with the assessment and plan and status of the problem list as documented. I seen the patient during the round today, overnight events noted she is afebrile T-max is 98.6 she is hemodynamically stable. She has been on 2 L nasal cannula most of the time but she was weaned off and she was saturating 95% this morning at room air. According to patient her cough is increased today as compared to yesterday but as compared to when she came in cough is better cough is dry denies hemoptysis denies chest pain. She think that shortness of breath is improving and she is able to ambulate to bathroom she is not as short of breath. Labs show sodium 129 potassium 5.1 BUN 20 creatinine 0.63 WBC 9.1 and hemoglobin 10.1  She is seen by rheumatology today and SOSA profile is pending. She will be continued on Solu-Medrol at this time seems responding to Solu-Medrol symptomatically  She had pericardial drain removed yesterday and plan for repeat echo tomorrow by cardiology. Follow-up SOSA profile. Repeat CRP tomorrow. Repeat chest x-ray tomorrow PA lateral view. Supplemental O2 if needed    Discussed with nursing staff, treatment and plan discussed. Discussed with respiratory therapist.       Please note that this chart was generated using voice recognition Dragon dictation software. Although every effort was made to ensure the accuracy of this automated transcription, some errors in transcription may have occurred.      [address] ANA MD  10/3/2021 3:12 PM

## 2021-10-03 NOTE — PROGRESS NOTES
Daughter in room visiting, patient observed up to restroom and walked around room with no assistance. Patient does become SOB with exertion and NC on at 3L. Patient SpO2 drops into high 80s even with Oxygen on but patient recovers quickly. Patient also having persistent coughing and is able to bring some sputum up but in small amounts. Continue to monitor.

## 2021-10-03 NOTE — PLAN OF CARE
Patient states that she is free from pain and bed locked and chair locked and call light in reach patient oriented times 4 and alert.

## 2021-10-03 NOTE — PROGRESS NOTES
[facility]  Office: [address], DO, Deni Woo, DO, Joseluis [facility], DO, Sree Ogden Blood, DO, Yisel Larry MD, Marlena Fierro MD, Basil Levy MD, Kathleen Fernandez MD, Ottoniel Lewis MD, Jessica Bergman MD, Elena Valiente MD, Nima Chang, DO, Bre Moran, DO, Tanya Ladd MD,  Andrew Thomas, DO, Arabella Brewster MD, Adelina Denis MD, Anne Tolentino MD, Farheen Navarrete MD, , Logan Lynn MD, Ari Meneses MD, Cyndie Wilson MD, Kyler [facility], [facility], [facility], Carlos Marie, CNP, Artur Joseph, [facility], Rebekah Luke, CNP, Gurwinder Michael, CNP, Baron Lesch, CNP, Terri Phillips, CNP, Liat Colorado, CNP, Aura Finch PA-C, [place] Parks, [facility], Juan Cox, CNP, Vipul Cavanaugh, CNP, Anna Molina, CNP, Isaias Jonas, CNP, Cyndie Peña, CNP, Mushtaq Winslow, CNP, Katya Pickering, CNP, Hansel Correa, [address] [place] [address]    Progress Note    10/3/2021    8:01 AM    Name:   Mendez Lopez  MRN:     [id number]     Acct:      [de-identified]   Room:   [address] [place] [address] Day:  6  Admit Date:  9/27/2021  7:16 AM    PCP:   CELY Romero CNP  Code Status:  Full Code    Subjective:     C/C:   Chief Complaint   Patient presents with    Cough     Interval History Status: improved. Patient seen and examined at bedside, no acute events overnight. Continue to improve overall with better breathing and functionality. Still with cough  Drain is out yesterday  Steroids helping a lot per her report  Patient denies any chest pain, shortness of breath, chills, fevers, nausea or vomiting.   Patient vitals, labs and all providers notes were reviewed,from overnight shift and morning updates were noted and discussed with the nurse      Brief History:     Per my colleague:Ale Renee is a 78 y.o. Non- / non  female who presents with Cough   and is admitted to the hospital for the management of <principal problem not specified>.     21-year-old female presented to [facility] with complaints of left-sided chest pain and persistent cough.  Patient has had cough for last 2 weeks, and progressively worsening.  Dry in nature, no phlegm production. She has heat intolerance. No fever or chills. Patient was discharged from ER a day ago with oral Levaquin, she returned back the next day as her symptoms were worsening.  She had poor appetite for last 4 days.  Patient has been a non-smoker and has no previous history of cardiac or lung disease. Patient was found to be having pleural effusion and pericardial effusion. She was in cardiac tamponade. She underwent thoracentesis 3 times by IR. Also underwent pericardiocentesis with pericardial drain placement. SOSA came back positive. Review of Systems:     Review of Systems   Constitutional: Positive for activity change and fatigue. Negative for chills, diaphoresis and fever. HENT: Negative for congestion. Eyes: Negative for visual disturbance. Respiratory: Positive for cough and shortness of breath (improving). Negative for chest tightness and wheezing. Cardiovascular: Negative for chest pain, palpitations and leg swelling. Gastrointestinal: Negative for abdominal pain, blood in stool, constipation, diarrhea, nausea and vomiting. Genitourinary: Negative for difficulty urinating. Neurological: Negative for dizziness, weakness, light-headedness, numbness and headaches. All other systems reviewed and are negative. Medications: Allergies:     Allergies   Allergen Reactions    Bee Pollen Anaphylaxis    Erythromycin Anaphylaxis    Pcn [Penicillins] Anaphylaxis    Tetanus Toxoids Anaphylaxis    Tetracyclines & Related Anaphylaxis       Current Meds:   Scheduled Meds:    methylPREDNISolone  40 mg IntraVENous Q12H    fluticasone  1 spray Each Nostril Daily    cetirizine  10 mg Oral Daily    budesonide-formoterol  2 puff Inhalation BID    [Held by provider] furosemide  40 mg IntraVENous Daily    sodium chloride flush  5-40 mL IntraVENous 2 times per day    enoxaparin  40 mg SubCUTAneous Daily    lidocaine  1 patch TransDERmal Daily    sodium chloride flush  5-40 mL IntraVENous 2 times per day    dextromethorphan  30 mg Oral 2 times per day     Continuous Infusions:    sodium chloride      sodium chloride       PRN Meds: albuterol sulfate HFA, benzonatate, sodium chloride flush, sodium chloride, ondansetron **OR** ondansetron, polyethylene glycol, acetaminophen **OR** acetaminophen, potassium chloride **OR** potassium alternative oral replacement **OR** potassium chloride, magnesium sulfate, oxyCODONE, guaiFENesin-codeine, sodium chloride flush, sodium chloride, diphenhydrAMINE, sodium chloride flush    Data:     Past Medical History:   has a past medical history of Arthritis, Displacement of lumbar intervertebral disc without myelopathy, Hypertension, Lumbar disc disease, Personal history of TIA (transient ischemic attack), Sleep deprivation, SOB (shortness of breath), and Wears glasses. Social History:   reports that she has never smoked. She has never used smokeless tobacco. She reports that she does not drink alcohol and does not use drugs. Family History:   Family History   Problem Relation Age of Onset    Lung Cancer Mother     Lung Cancer Brother     Heart Disease Father     Heart Disease Paternal Grandfather        Vitals:  BP (!) 146/69   Pulse 94   Temp 98.4 °F (36.9 °C) (Oral)   Resp 18   Ht 5' 2\" (1.575 m)   Wt 139 lb 6.4 oz (63.2 kg)   SpO2 92%   BMI 25.50 kg/m²   Temp (24hrs), Av °F (36.7 °C), Min:97.5 °F (36.4 °C), Max:98.4 °F (36.9 °C)    No results for input(s): POCGLU in the last 72 hours. I/O (24Hr):     Intake/Output Summary (Last 24 hours) at 10/3/2021 0801  Last data filed at 10/3/2021 0000  Gross per 24 hour   Intake 144 ml   Output 950 ml   Net -806 ml       Labs:  Hematology:  Recent Labs 10/01/21  0537 10/02/21  0646 10/03/21  0359   WBC 9.0 6.3 9.1   RBC 3.76* 3.57* 3.62*   HGB 10.5* 10.2* 10.1*   HCT 33.7* 31.6* 35.4*   MCV 89.6 88.5 97.8   MCH 27.9 28.6 27.9   MCHC 31.2 32.3 28.5   RDW 13.4 13.2 13.1    373 288   MPV 10.0 10.3 9.7     Chemistry:  Recent Labs     10/01/21  0537 10/02/21  0646 10/03/21  0359   * 131* 129*   K 3.9 5.0 5.1   CL 96* 97* 100   CO2 21 22 20   GLUCOSE 98 187* 193*   BUN 17 14 20   CREATININE 0.65 0.48* 0.63   MG 2.5  --   --    ANIONGAP 10 12 9   LABGLOM >60 >60 >60   GFRAA >60 >60 >60   CALCIUM 7.5* 7.8* 7.9*   No results for input(s): PROT, LABALBU, LABA1C, A0KJHXX, W7RDUMD, FT4, TSH, AST, ALT, LDH, GGT, ALKPHOS, LABGGT, BILITOT, BILIDIR, AMMONIA, AMYLASE, LIPASE, LACTATE, CHOL, HDL, LDLCHOLESTEROL, CHOLHDLRATIO, TRIG, VLDL, KTB80ZR, PHENYTOIN, PHENYF, URICACID, POCGLU in the last 72 hours. ABG:No results found for: POCPH, PHART, PH, POCPCO2, IVI9ZWG, PCO2, POCPO2, PO2ART, PO2, POCHCO3, YCF9BRK, HCO3, NBEA, PBEA, BEART, BE, THGBART, THB, KUG8LVC, TTBE8HGH, P8OZZSTV, O2SAT, FIO2  Lab Results   Component Value Date/Time    SPECIAL NOT REPORTED 10/01/2021 01:57 PM    SPECIAL NOT REPORTED 10/01/2021 01:57 PM     Lab Results   Component Value Date/Time    CULTURE NO GROWTH 19 HOURS 10/01/2021 01:57 PM    CULTURE PENDING 10/01/2021 01:57 PM       Radiology:  XR CHEST (2 VW)    Result Date: 9/28/2021  Possible mild CHF. Small effusions. CT CHEST WO CONTRAST    Result Date: 10/1/2021  1. Pericardial drain in place with interval reduction of effusion. 2.  Right pleural effusion has increased in size compared to 09/27/2021 CT exam, moderate in size. 3.  Small residual left pleural effusion, improved. XR CHEST PORTABLE    Result Date: 9/30/2021  Worsening of bilateral pleural effusion, pulmonary vascular congestion and bilateral mid and lower lung airspace disease.      XR CHEST PORTABLE    Result Date: 9/26/2021  Small bilateral pleural effusions with left mid to lower lobe infiltrate or atelectasis. Clinical correlation for pneumonia. Clinical and imaging follow-up to resolution recommended. CT CHEST PULMONARY EMBOLISM W CONTRAST    Result Date: 9/27/2021  1. No evidence for acute pulmonary embolism. 2. Moderate pericardial effusion. 3. Small right and moderate left pleural effusion with adjacent subsegmental atelectasis. US THORACENTESIS Which side should the procedure be performed? Right    Result Date: 10/1/2021  Successful ultrasound guided thoracentesis. US THORACENTESIS Which side should the procedure be performed? Left    Result Date: 9/30/2021  Successful ultrasound guided thoracentesis. US THORACENTESIS Which side should the procedure be performed? Left    Result Date: 9/28/2021  Successful ultrasound guided thoracentesis. Physical Examination:        Physical Exam  Vitals and nursing note reviewed. Constitutional:       General: She is not in acute distress. HENT:      Head: Normocephalic and atraumatic. Eyes:      Conjunctiva/sclera: Conjunctivae normal.      Pupils: Pupils are equal, round, and reactive to light. Cardiovascular:      Rate and Rhythm: Normal rate and regular rhythm. Heart sounds: No murmur heard. Pulmonary:      Effort: Pulmonary effort is normal. No accessory muscle usage or respiratory distress. Breath sounds: No stridor. Examination of the right-lower field reveals decreased breath sounds. Examination of the left-lower field reveals decreased breath sounds. Decreased breath sounds present. No wheezing, rhonchi or rales. Abdominal:      General: Bowel sounds are normal. There is no distension. Palpations: Abdomen is soft. Abdomen is not rigid. Tenderness: There is no abdominal tenderness. There is no guarding. Musculoskeletal:         General: No tenderness. Skin:     General: Skin is warm and dry. Findings: No erythema, lesion or rash.    Neurological:      Mental Status: She is alert and oriented to person, place, and time. Cranial Nerves: No cranial nerve deficit. Motor: No seizure activity. Psychiatric:         Speech: Speech normal.         Behavior: Behavior normal. Behavior is cooperative. Assessment:        Hospital Problems         Last Modified POA    * (Principal) Cardiac tamponade 10/2/2021 Yes    PNA (pneumonia) 9/27/2021 Yes    Pneumonia 9/27/2021 Yes    Sinus tachycardia 9/28/2021 Yes    Viral sepsis ([address] 9/28/2021 Yes    Poor appetite 9/28/2021 Yes    Pericardial effusion 9/28/2021 Yes    Pleural effusion 9/28/2021 Yes    Hyponatremia 9/28/2021 Yes    Essential hypertension 9/28/2021 Yes    Chest wall pain 9/29/2021 Yes          Plan:        Acute pleuropericarditis : viral vs autoimmune     Cardiac tamponade-s/p pericardial drain placement.  Limited echo done did not show much accumulation.  Discussed with cardiology and they are planning to remove pericardial drain today with repeat limited echo in a.m.     Bilateral pleural effusions status post thoracentesis x3 , last was on 10/1 with improvement in her symptoms  Fluid drained on 9/28 were negative for malignant cells    Persistent cough: Per patient it is been ongoing for years might be related to her ACE which was stopped and complicated by her current condition, continue cough medications    Possible autoimmune etiology for her pleural and pericardial effusion/SOSA was positive and rheumatology consulted.  Started on steroids with significant improvement in her symptoms. [facility] rheumatology recommendations and appreciate them.  Follow-up on the SOSA profile  Per rheumatology pending SOSA profile decision would be immunosuppressive agents versus colchicine    Acute respiratory failure.  Continue oxygen treatment    Hyponatremia-sodium improving    Continue DVT prophylaxis    PT/OT    Discussed with the patient at bedside      Cade Stevens MD  10/3/2021  8:01 AM

## 2021-10-03 NOTE — PROGRESS NOTES
Rheumatology  Attending Progress Note      SUBJECTIVE:    Patient seen and examined. There is more cough today. No significant worsening in the breathing. The pericardial drain is out. She is scheduled for echocardiogram tomorrow. Her serology is still pending. No new complaints.   No fever or chills    Medications    Current Facility-Administered Medications: methylPREDNISolone sodium (SOLU-MEDROL) injection 40 mg, 40 mg, IntraVENous, Q12H  fluticasone (FLONASE) 50 MCG/ACT nasal spray 1 spray, 1 spray, Each Nostril, Daily  cetirizine (ZYRTEC) tablet 10 mg, 10 mg, Oral, Daily  budesonide-formoterol (SYMBICORT) 160-4.5 MCG/ACT inhaler 2 puff, 2 puff, Inhalation, BID  [Held by provider] furosemide (LASIX) injection 40 mg, 40 mg, IntraVENous, Daily  albuterol sulfate  (90 Base) MCG/ACT inhaler 2 puff, 2 puff, Inhalation, Q4H PRN  benzonatate (TESSALON) capsule 100 mg, 100 mg, Oral, TID PRN  sodium chloride flush 0.9 % injection 5-40 mL, 5-40 mL, IntraVENous, 2 times per day  sodium chloride flush 0.9 % injection 5-40 mL, 5-40 mL, IntraVENous, PRN  0.9 % sodium chloride infusion, 25 mL, IntraVENous, PRN  enoxaparin (LOVENOX) injection 40 mg, 40 mg, SubCUTAneous, Daily  ondansetron (ZOFRAN-ODT) disintegrating tablet 4 mg, 4 mg, Oral, Q8H PRN **OR** ondansetron (ZOFRAN) injection 4 mg, 4 mg, IntraVENous, Q6H PRN  polyethylene glycol (GLYCOLAX) packet 17 g, 17 g, Oral, Daily PRN  acetaminophen (TYLENOL) tablet 650 mg, 650 mg, Oral, Q6H PRN **OR** acetaminophen (TYLENOL) suppository 650 mg, 650 mg, Rectal, Q6H PRN  potassium chloride (KLOR-CON M) extended release tablet 40 mEq, 40 mEq, Oral, PRN **OR** potassium bicarb-citric acid (EFFER-K) effervescent tablet 40 mEq, 40 mEq, Oral, PRN **OR** potassium chloride 10 mEq/100 mL IVPB (Peripheral Line), 10 mEq, IntraVENous, PRN  magnesium sulfate 1000 mg in dextrose 5% 100 mL IVPB, 1,000 mg, IntraVENous, PRN  lidocaine 4 % external patch 1 patch, 1 patch, TransDERmal, Daily  oxyCODONE (ROXICODONE) immediate release tablet 5 mg, 5 mg, Oral, Q6H PRN  guaiFENesin-codeine (GUAIFENESIN AC) 100-10 MG/5ML liquid 5 mL, 5 mL, Oral, Q4H PRN  sodium chloride flush 0.9 % injection 5-40 mL, 5-40 mL, IntraVENous, 2 times per day  sodium chloride flush 0.9 % injection 5-40 mL, 5-40 mL, IntraVENous, PRN  0.9 % sodium chloride infusion, 25 mL, IntraVENous, PRN  dextromethorphan (DELSYM) 30 MG/5ML extended release liquid 30 mg, 30 mg, Oral, 2 times per day  diphenhydrAMINE (BENADRYL) injection 25 mg, 25 mg, IntraVENous, Q6H PRN  sodium chloride flush 0.9 % injection 10 mL, 10 mL, IntraVENous, PRN     Physical    VITALS:  BP (!) 146/69   Pulse 91   Temp 98.4 °F (36.9 °C) (Oral)   Resp 18   Ht 5' 2\" (1.575 m)   Wt 139 lb 6.4 oz (63.2 kg)   SpO2 99%   BMI 25.50 kg/m²   Skin: no rashes  Lymph nodes: no adenopathy  HEENT: eyes clear. Nose without abnormalities. Mouth clear  Neck: supple with good ROM. Thyroid not palpable  Lungs: Decreased air entry  Heart: Normal S1 and S1. No murmurs  No clubbing, cyanosis or edema  Joint exam:  No synovitis of the upper or lower extremities  Pulses: normal  Neuro:  Alert and oriented,  Muscle strength normal.  No focal neurologic signs.     Data    CBC with Differential:    Lab Results   Component Value Date    WBC 9.1 10/03/2021    RBC 3.62 10/03/2021    HGB 10.1 10/03/2021    HCT 35.4 10/03/2021     10/03/2021    MCV 97.8 10/03/2021    MCH 27.9 10/03/2021    MCHC 28.5 10/03/2021    RDW 13.1 10/03/2021    LYMPHOPCT 10 10/03/2021    MONOPCT 3 10/03/2021    BASOPCT 0 10/03/2021    MONOSABS 0.29 10/03/2021    LYMPHSABS 0.94 10/03/2021    EOSABS <0.03 10/03/2021    BASOSABS <0.03 10/03/2021    DIFFTYPE NOT REPORTED 10/03/2021     BMP:    Lab Results   Component Value Date     10/03/2021    K 5.1 10/03/2021     10/03/2021    CO2 20 10/03/2021    BUN 20 10/03/2021    LABALBU 3.4 09/26/2021    CREATININE 0.63 10/03/2021    CALCIUM 7.9 10/03/2021    GFRAA >60 10/03/2021    LABGLOM >60 10/03/2021    GLUCOSE 193 10/03/2021         ASSESSMENT AND PLAN        Acute pleuropericarditis with pleural effusion and pericardial effusion  Etiology could be autoimmune especially with positive SOSA and positive JENI vs viral infection with positive rhinovirus/enterovirus  She seems to be responding to the steroids. We will decide on additional medications based on the SOSA profile and that might include immunosuppressive agents versus colchicine  We will await the SOSA profile  Continue Solu-Medrol for now  We will follow up  Jesenia Jenkins M.D.  [facility] [place] of [address] [facility]  [phone]

## 2021-10-04 ENCOUNTER — APPOINTMENT (OUTPATIENT)
Dept: GENERAL RADIOLOGY | Age: 76
DRG: 871 | End: 2021-10-04
Payer: MEDICARE

## 2021-10-04 LAB
ABSOLUTE EOS #: <0.03 K/UL (ref 0–0.44)
ABSOLUTE IMMATURE GRANULOCYTE: 0.14 K/UL (ref 0–0.3)
ABSOLUTE LYMPH #: 0.96 K/UL (ref 1.1–3.7)
ABSOLUTE MONO #: 0.34 K/UL (ref 0.1–1.2)
ANION GAP SERPL CALCULATED.3IONS-SCNC: 11 MMOL/L (ref 9–17)
ANTI JO-1 IGG: <0.4 U/ML
ANTI SSA: 233 U/ML
ANTI SSB: 0.3 U/ML
ANTI-CENTROMERE: <0.4 U/ML
ANTI-RNP70: <0.3 U/ML
ANTI-SCLERODERMA: <0.6 U/ML
ANTI-SMITH: 4.2 U/ML
ANTI-U1RNP: 1 U/ML
BASOPHILS # BLD: 0 % (ref 0–2)
BASOPHILS ABSOLUTE: <0.03 K/UL (ref 0–0.2)
BUN BLDV-MCNC: 20 MG/DL (ref 8–23)
BUN/CREAT BLD: ABNORMAL (ref 9–20)
C-REACTIVE PROTEIN: 29.6 MG/L (ref 0–5)
CALCIUM SERPL-MCNC: 8.1 MG/DL (ref 8.6–10.4)
CHLORIDE BLD-SCNC: 100 MMOL/L (ref 98–107)
CO2: 23 MMOL/L (ref 20–31)
CREAT SERPL-MCNC: 0.66 MG/DL (ref 0.5–0.9)
CULTURE: ABNORMAL
CULTURE: NORMAL
DIFFERENTIAL TYPE: ABNORMAL
DIRECT EXAM: ABNORMAL
EOSINOPHILS RELATIVE PERCENT: 0 % (ref 1–4)
GFR AFRICAN AMERICAN: >60 ML/MIN
GFR NON-AFRICAN AMERICAN: >60 ML/MIN
GFR SERPL CREATININE-BSD FRML MDRD: ABNORMAL ML/MIN/{1.73_M2}
GFR SERPL CREATININE-BSD FRML MDRD: ABNORMAL ML/MIN/{1.73_M2}
GLUCOSE BLD-MCNC: 165 MG/DL (ref 70–99)
HCT VFR BLD CALC: 32.8 % (ref 36.3–47.1)
HEMOGLOBIN: 10.2 G/DL (ref 11.9–15.1)
IMMATURE GRANULOCYTES: 2 %
LYMPHOCYTES # BLD: 11 % (ref 24–43)
Lab: ABNORMAL
Lab: NORMAL
MCH RBC QN AUTO: 28.3 PG (ref 25.2–33.5)
MCHC RBC AUTO-ENTMCNC: 31.1 G/DL (ref 28.4–34.8)
MCV RBC AUTO: 90.9 FL (ref 82.6–102.9)
MONOCYTES # BLD: 4 % (ref 3–12)
NRBC AUTOMATED: 0 PER 100 WBC
PDW BLD-RTO: 13.2 % (ref 11.8–14.4)
PLATELET # BLD: 358 K/UL (ref 138–453)
PLATELET ESTIMATE: ABNORMAL
PMV BLD AUTO: 10.1 FL (ref 8.1–13.5)
POTASSIUM SERPL-SCNC: 4.9 MMOL/L (ref 3.7–5.3)
RBC # BLD: 3.61 M/UL (ref 3.95–5.11)
RBC # BLD: ABNORMAL 10*6/UL
SEG NEUTROPHILS: 83 % (ref 36–65)
SEGMENTED NEUTROPHILS ABSOLUTE COUNT: 7.14 K/UL (ref 1.5–8.1)
SODIUM BLD-SCNC: 134 MMOL/L (ref 135–144)
SPECIMEN DESCRIPTION: ABNORMAL
SPECIMEN DESCRIPTION: NORMAL
SURGICAL PATHOLOGY REPORT: NORMAL
WBC # BLD: 8.6 K/UL (ref 3.5–11.3)
WBC # BLD: ABNORMAL 10*3/UL

## 2021-10-04 PROCEDURE — 6370000000 HC RX 637 (ALT 250 FOR IP): Performed by: STUDENT IN AN ORGANIZED HEALTH CARE EDUCATION/TRAINING PROGRAM

## 2021-10-04 PROCEDURE — 97116 GAIT TRAINING THERAPY: CPT

## 2021-10-04 PROCEDURE — 97110 THERAPEUTIC EXERCISES: CPT

## 2021-10-04 PROCEDURE — 99231 SBSQ HOSP IP/OBS SF/LOW 25: CPT | Performed by: PHYSICIAN ASSISTANT

## 2021-10-04 PROCEDURE — 6370000000 HC RX 637 (ALT 250 FOR IP): Performed by: FAMILY MEDICINE

## 2021-10-04 PROCEDURE — 2060000000 HC ICU INTERMEDIATE R&B

## 2021-10-04 PROCEDURE — 94640 AIRWAY INHALATION TREATMENT: CPT

## 2021-10-04 PROCEDURE — 80048 BASIC METABOLIC PNL TOTAL CA: CPT

## 2021-10-04 PROCEDURE — 99232 SBSQ HOSP IP/OBS MODERATE 35: CPT | Performed by: FAMILY MEDICINE

## 2021-10-04 PROCEDURE — 99232 SBSQ HOSP IP/OBS MODERATE 35: CPT | Performed by: INTERNAL MEDICINE

## 2021-10-04 PROCEDURE — 6360000002 HC RX W HCPCS: Performed by: INTERNAL MEDICINE

## 2021-10-04 PROCEDURE — 99233 SBSQ HOSP IP/OBS HIGH 50: CPT | Performed by: INTERNAL MEDICINE

## 2021-10-04 PROCEDURE — 94761 N-INVAS EAR/PLS OXIMETRY MLT: CPT

## 2021-10-04 PROCEDURE — 6370000000 HC RX 637 (ALT 250 FOR IP): Performed by: INTERNAL MEDICINE

## 2021-10-04 PROCEDURE — 71046 X-RAY EXAM CHEST 2 VIEWS: CPT

## 2021-10-04 PROCEDURE — 2580000003 HC RX 258: Performed by: STUDENT IN AN ORGANIZED HEALTH CARE EDUCATION/TRAINING PROGRAM

## 2021-10-04 PROCEDURE — 93308 TTE F-UP OR LMTD: CPT

## 2021-10-04 PROCEDURE — 36415 COLL VENOUS BLD VENIPUNCTURE: CPT

## 2021-10-04 PROCEDURE — 6360000002 HC RX W HCPCS: Performed by: STUDENT IN AN ORGANIZED HEALTH CARE EDUCATION/TRAINING PROGRAM

## 2021-10-04 PROCEDURE — 93320 DOPPLER ECHO COMPLETE: CPT

## 2021-10-04 PROCEDURE — 6370000000 HC RX 637 (ALT 250 FOR IP): Performed by: NURSE PRACTITIONER

## 2021-10-04 PROCEDURE — 85025 COMPLETE CBC W/AUTO DIFF WBC: CPT

## 2021-10-04 PROCEDURE — 86140 C-REACTIVE PROTEIN: CPT

## 2021-10-04 RX ORDER — COLCHICINE 0.6 MG/1
0.6 TABLET ORAL 2 TIMES DAILY
Status: DISCONTINUED | OUTPATIENT
Start: 2021-10-04 | End: 2021-10-05 | Stop reason: HOSPADM

## 2021-10-04 RX ORDER — CODEINE PHOSPHATE AND GUAIFENESIN 10; 100 MG/5ML; MG/5ML
10 SOLUTION ORAL EVERY 4 HOURS PRN
Status: DISCONTINUED | OUTPATIENT
Start: 2021-10-04 | End: 2021-10-05 | Stop reason: HOSPADM

## 2021-10-04 RX ORDER — BENZONATATE 100 MG/1
200 CAPSULE ORAL 3 TIMES DAILY PRN
Status: DISCONTINUED | OUTPATIENT
Start: 2021-10-04 | End: 2021-10-05 | Stop reason: HOSPADM

## 2021-10-04 RX ORDER — METHYLPREDNISOLONE SODIUM SUCCINATE 40 MG/ML
40 INJECTION, POWDER, LYOPHILIZED, FOR SOLUTION INTRAMUSCULAR; INTRAVENOUS EVERY 12 HOURS
Status: COMPLETED | OUTPATIENT
Start: 2021-10-04 | End: 2021-10-04

## 2021-10-04 RX ORDER — PREDNISONE 20 MG/1
40 TABLET ORAL DAILY
Status: DISCONTINUED | OUTPATIENT
Start: 2021-10-05 | End: 2021-10-05 | Stop reason: HOSPADM

## 2021-10-04 RX ADMIN — BENZONATATE 100 MG: 100 CAPSULE ORAL at 06:21

## 2021-10-04 RX ADMIN — BENZONATATE 100 MG: 100 CAPSULE ORAL at 13:03

## 2021-10-04 RX ADMIN — BENZONATATE 200 MG: 100 CAPSULE ORAL at 20:55

## 2021-10-04 RX ADMIN — BUDESONIDE AND FORMOTEROL FUMARATE DIHYDRATE 2 PUFF: 160; 4.5 AEROSOL RESPIRATORY (INHALATION) at 09:44

## 2021-10-04 RX ADMIN — ENOXAPARIN SODIUM 40 MG: 40 INJECTION SUBCUTANEOUS at 09:44

## 2021-10-04 RX ADMIN — FLUTICASONE PROPIONATE 1 SPRAY: 50 SPRAY, METERED NASAL at 09:43

## 2021-10-04 RX ADMIN — SODIUM CHLORIDE, PRESERVATIVE FREE 10 ML: 5 INJECTION INTRAVENOUS at 09:44

## 2021-10-04 RX ADMIN — COLCHICINE 0.6 MG: 0.6 TABLET, FILM COATED ORAL at 22:16

## 2021-10-04 RX ADMIN — SODIUM CHLORIDE, PRESERVATIVE FREE 10 ML: 5 INJECTION INTRAVENOUS at 21:04

## 2021-10-04 RX ADMIN — Medication 30 MG: at 20:53

## 2021-10-04 RX ADMIN — METHYLPREDNISOLONE SODIUM SUCCINATE 40 MG: 40 INJECTION, POWDER, FOR SOLUTION INTRAMUSCULAR; INTRAVENOUS at 21:04

## 2021-10-04 RX ADMIN — GUAIFENESIN AND CODEINE PHOSPHATE 5 ML: 100; 10 SOLUTION ORAL at 12:26

## 2021-10-04 RX ADMIN — GUAIFENESIN AND CODEINE PHOSPHATE 10 ML: 100; 10 SOLUTION ORAL at 17:12

## 2021-10-04 RX ADMIN — GUAIFENESIN AND CODEINE PHOSPHATE 5 ML: 100; 10 SOLUTION ORAL at 03:18

## 2021-10-04 RX ADMIN — CETIRIZINE HYDROCHLORIDE 10 MG: 10 TABLET ORAL at 09:44

## 2021-10-04 RX ADMIN — METHYLPREDNISOLONE SODIUM SUCCINATE 40 MG: 40 INJECTION, POWDER, FOR SOLUTION INTRAMUSCULAR; INTRAVENOUS at 09:44

## 2021-10-04 RX ADMIN — BUDESONIDE AND FORMOTEROL FUMARATE DIHYDRATE 2 PUFF: 160; 4.5 AEROSOL RESPIRATORY (INHALATION) at 19:45

## 2021-10-04 RX ADMIN — Medication 30 MG: at 09:43

## 2021-10-04 ASSESSMENT — ENCOUNTER SYMPTOMS
BACK PAIN: 0
ABDOMINAL PAIN: 0
VOMITING: 0
NAUSEA: 0
BLOOD IN STOOL: 0
SHORTNESS OF BREATH: 1
DIARRHEA: 0
SINUS PRESSURE: 0
RHINORRHEA: 0
CHEST TIGHTNESS: 0
COUGH: 1
PHOTOPHOBIA: 0
EYE REDNESS: 0
CONSTIPATION: 0
WHEEZING: 0
SORE THROAT: 0

## 2021-10-04 ASSESSMENT — PAIN SCALES - GENERAL
PAINLEVEL_OUTOF10: 0

## 2021-10-04 ASSESSMENT — PAIN SCALES - WONG BAKER

## 2021-10-04 NOTE — PROGRESS NOTES
PULMONARY & CRITICAL CARE MEDICINE PROGRESS NOTE     Patient:  Alfonso Jasso  MRN: [id number]  Admit date: 9/27/2021  Primary Care Physician: CELY Abreu - CNP  Consulting Physician: Deysi Lama MD  CODE Status: Full Code  LOS: 5     SUBJECTIVE     Chief Complaint/ Reason for consult:  Pleural Effusions and Cough x 2 weeks    Hospital Course: The patient is a 78 y.o. female with PMH of asthma, hypertension who presented to the ER at [place] on 9/12/21 for complaints of chest pain, shortness of breath after pushing multiple shopping carts. CT chest at the time was unremarkable. This was attributed to physical strain and she was discharged from the ER. She returns to the [facility] ER on 9/26 with complaints of cough, shortness of breath and difficulty sleeping where CT scan of the chest shows moderate pericardial effusion, small right and moderate left pleural effusions with adjacent subsegmental atelectasis. Patient declined admission at that time and was discharged home with albuterol inhaler, tessalon, guaifenesin and dextromethorphan. Due to worsening symptoms she returned to the ER the next day, stayed overnight and was [place] [facility] [place]. On my evaluation, patient is tachycardic in the 130s, tachypneic, blood pressure stable, and unable to speak due to cough. Respiratory panel was sent which showed Entero/rhinovirus positive PCR test.    Interval History:  10/02/21  Pt was seen and examined at bedside. Overnight events noted chart seen labs seen and imaging studies results seen. Afebrile overnight, hemodynamically stable. Saturating 93-98% on RA. No chest pain or SOB. Off all supplemental O2 today. Labs reviewed. No leukocytosis. Good UO 2700 mL/24hr  Remains on IV solumedrol  Repeat echo with EF 55%, small pericardial effusion anteriorly. Similar to echo on 9/30. No signs of tamponade.     s/p thoracentesis done on the right side and had 550 mL of fluid withdrawn 10/1. She is a status post left thoracentesis on 09/28/2021. She is status post pericardiocentesis/pericardial drain on 09/29/2021  Status post repeat left thoracentesis on 09/30/2021. Status post right thoracentesis on 10/01/2021    A repeat CT scan of the chest repeat CT scan of the chest on 09/30/2021 showed pericardial effusion is a smaller she has a small left pleural effusion and she has a moderate right pleural effusion which is increased from last CT scan of the chest on admission  Her pleural fluid on the left side is exudative by protein criteria and so far Gram stain and culture negative and AFB smear is negative. Cytology negative  Her pleural fluid on the right side is exudative by protein criteria, Gram stain and AFB smear is negative bacterial culture is negative so far. Pericardial fluid culture is negative and AB smear is negative. Cytology is pending from pericardial fluid. Review Of Systems:  Review of Systems   Constitutional: Positive for fatigue. Negative for chills, diaphoresis and fever. HENT: Positive for congestion. Negative for hearing loss, rhinorrhea, sinus pressure and sore throat. Eyes: Negative for photophobia and redness. Respiratory: Positive for cough and shortness of breath. Negative for chest tightness. Cardiovascular: Negative for chest pain. Gastrointestinal: Negative for abdominal pain, diarrhea, nausea and vomiting. Genitourinary: Negative for dysuria, frequency and urgency. Musculoskeletal: Negative for back pain and neck stiffness. Skin: Negative for rash. Neurological: Negative for seizures and speech difficulty. Psychiatric/Behavioral: Negative for agitation, confusion and decreased concentration. OBJECTIVE     PaO2/FiO2 RATIO:  No results for input(s): POCPO2 in the last 72 hours.          VITAL SIGNS:   LAST:  /62   Pulse 84   Temp 97.8 °F (36.6 °C) (Oral)   Resp 18   Ht 5' 2\" (1.575 m)   Wt 139 lb 6.4 oz (63.2 kg)   SpO2 94%   BMI 25.50 kg/m²   8-24 HR RANGE:  TEMP Temp  Av.7 °F (36.5 °C)  Min: 97.1 °F (36.2 °C)  Max: 98.1 °F (84.5 °C)   BP Systolic (49KXD), KJZ:685 , Min:95 , KPF:122      Diastolic (60ZAH), PDN:09, Min:51, Max:108     PULSE Pulse  Av.5  Min: 84  Max: 111   RR Resp  Av  Min: 18  Max: 18   O2 SAT SpO2  Av.3 %  Min: 93 %  Max: 95 %   OXYGEN DELIVERY O2 Flow Rate (L/min)  Avg: 3 L/min  Min: 3 L/min  Max: 3 L/min        Systemic Examination:   Physical Exam -  Constitutional:  Alert, cooperative and no distress. Mental Status:  Oriented to person, place and time and normal affect. Lungs:  Bilateral air entry present, bilateral breath sound decreased at bases. Normal effort. No expiratory wheezing and no rhonchi ts  Heart:  Regular rate and rhythm, no murmur. Abdomen:  Soft, nontender, nondistended, normal bowel sounds. Extremities:  No edema, redness, tenderness in the calves. Skin:  Warm, dry, no gross lesions or rashes.   CNS: No gross neurological deficit, cranial nerves intact  DATA REVIEW     Medications: Current Inpatient  Scheduled Meds:   methylPREDNISolone  40 mg IntraVENous Q12H    fluticasone  1 spray Each Nostril Daily    cetirizine  10 mg Oral Daily    budesonide-formoterol  2 puff Inhalation BID    [Held by provider] furosemide  40 mg IntraVENous Daily    sodium chloride flush  5-40 mL IntraVENous 2 times per day    enoxaparin  40 mg SubCUTAneous Daily    lidocaine  1 patch TransDERmal Daily    sodium chloride flush  5-40 mL IntraVENous 2 times per day    dextromethorphan  30 mg Oral 2 times per day     Continuous Infusions:   sodium chloride      sodium chloride         INPUT/OUTPUT:  In: 490 [P.O.:480; I.V.:10]  Out: 800 [Urine:800]  Date 10/02/21 0000 - 10/02/21 235   Shift 2778-6478 7401-0670 4124-8950 24 Hour Total   INTAKE   P.O.(mL/kg/hr) 480(0.9)   480   Shift Total(mL/kg) 480(7.6)   480(7.6)   OUTPUT   Urine(mL/kg/hr) 400(0.8) 400   Shift Total(mL/kg) 400(6.3)   400(6.3)   Weight (kg) 63.2 63.2 63.2 63.2        LABS:  ABGs:   No results for input(s): POCPH, POCPCO2, POCPO2, POCHCO3, [id number] in the last 72 hours. CBC:   Recent Labs     09/30/21  0526 10/01/21  0537 10/02/21  0646   WBC 11.3 9.0 6.3   HGB 10.8* 10.5* 10.2*   HCT 33.5* 33.7* 31.6*   MCV 87.9 89.6 88.5    290 373   LYMPHOPCT 10* 14* 9*   RBC 3.81* 3.76* 3.57*   MCH 28.3 27.9 28.6   MCHC 32.2 31.2 32.3   RDW 13.3 13.4 13.2     CRP:   No results for input(s): CRP in the last 72 hours. LDH:   Recent Labs     09/29/21  1446        BMP:   Recent Labs     09/30/21  0526 10/01/21  0537 10/02/21  0646   * 127* 131*   K 4.3 3.9 5.0   CL 96* 96* 97*   CO2 20 21 22   BUN 16 17 14   CREATININE 0.62 0.65 0.48*   GLUCOSE 95 98 187*     Liver Function Test:   No results for input(s): PROT, LABALBU, ALT, AST, GGT, ALKPHOS, BILITOT in the last 72 hours. Coagulation Profile:   No results for input(s): INR, PROTIME, APTT in the last 72 hours. D-Dimer:  No results for input(s): DDIMER in the last 72 hours. Lactic Acid:  No results for input(s): LACTA in the last 72 hours. Cardiac Enzymes:  No results for input(s): CKTOTAL, CKMB, CKMBINDEX, TROPONINI in the last 72 hours. Invalid input(s): TROPONIN, HSTROP  BNP/ProBNP:   No results for input(s): BNP, PROBNP in the last 72 hours. Triglycerides:  No results for input(s): TRIG in the last 72 hours. Microbiology:  Urine Culture:  No components found for: CURINE  Blood Culture:  No components found for: CBLOOD, CFUNGUSBL  Sputum Culture:  No components found for: CSPUTUM  Recent Labs     10/01/21  1357   [facility] . PLEURAL FLUID  . PLEURAL FLUID   SPECIAL NOT REPORTED  NOT REPORTED   CULTURE NO GROWTH 19 HOURS  PENDING     Recent Labs     09/29/21  1359 09/29/21  1359 09/29/21  1937 10/01/21  1230 10/01/21  1357   SPECDESC . FLUID . PERICARDIAL FLUID   < > . PLEURAL FLUID NOT REPORTED . PLEURAL FLUID  . PLEURAL FLUID SPECIAL NOT REPORTED  --  NOT REPORTED  --  NOT REPORTED  NOT REPORTED   CULTURE NO GROWTH 3 DAYS  --  PENDING  --  NO GROWTH 19 HOURS  PENDING    < > = values in this interval not displayed. Pathology:    Radiology Reports:  US THORACENTESIS Which side should the procedure be performed? Right   Final Result   Successful ultrasound guided thoracentesis. CT CHEST WO CONTRAST   Final Result   1. Pericardial drain in place with interval reduction of effusion. 2.  Right pleural effusion has increased in size compared to 09/27/2021 CT   exam, moderate in size. 3.  Small residual left pleural effusion, improved. US THORACENTESIS Which side should the procedure be performed? Left   Final Result   Successful ultrasound guided thoracentesis. XR CHEST PORTABLE   Final Result   Worsening of bilateral pleural effusion, pulmonary vascular congestion and   bilateral mid and lower lung airspace disease. XR CHEST (2 VW)   Final Result   Possible mild CHF. Small effusions. US THORACENTESIS Which side should the procedure be performed? Left   Final Result   Successful ultrasound guided thoracentesis. CT CHEST PULMONARY EMBOLISM W CONTRAST   Final Result   1. No evidence for acute pulmonary embolism. 2. Moderate pericardial effusion. 3. Small right and moderate left pleural effusion with adjacent subsegmental   atelectasis.               Echocardiogram:   Results for orders placed during the hospital encounter of 09/27/21    ECHO Complete 2D W Doppler W Color    Narrative  Transthoracic Echocardiography Report (TTE)    Patient Name Maritza Hi     Date of Study               09/29/2021  Fracisco JACOB    Date of      1945  Gender                      Female  Birth    Age          78 year(s)  Race                            Room Number  2022        Height:                     62 inch, 157.48 cm    Corporate ID [id number]    Weight:                     134 pounds, 60.8 kg  #    Patient Acct [de-identified]   BSA:          1.61 m^2      BMI:     24.51 kg/m^2  #    MR #         [id number]     Sonographer                 Rigoberto Jain    Accession #  [id number]  Interpreting Physician      [id number]    Fellow                   Referring Nurse  Practitioner    Interpreting             Referring Physician         Sara Villalba  Fellow    Type of Study    TTE procedure:2D Echocardiogram, M-Mode, Doppler, Color Doppler. Procedure Date  Date: 09/29/2021 Start: 09:35 AM    Study Location: [facility]  Technical Quality: Adequate visualization    Indications:Pericardial effusion. History / Tech. Comments:  Procedure explained to patient. Echo completed at the bedside. PMHx:  Hypertension    Patient Status: Inpatient    Height: 62 inches Weight: 134 pounds BSA: 1.61 m^2 BMI: 24.51 kg/m^2    CONCLUSIONS    Summary  Left ventricle is normal in size, global left ventricular systolic function  is normal, calculated ejection fraction is 55%. Right atrium appears small. Moderate to large pericardial effusion. Posteriorly measuring 2.29cm, Anteriorly measuring 3.66cm. Gelatinous  material is noted throughout. Cannot rule out right ventricular collapse in the subcostal views, clinical  correlation recommended. Mitral and tricuspid inflows show physiologic mild evidence of tamponade. Pleural effusion noted. Signature  ----------------------------------------------------------------------------  Electronically signed by Cait Barbosa(Sonographer) on 09/29/2021 10:15 AM  ----------------------------------------------------------------------------    ----------------------------------------------------------------------------  Electronically signed by Yessy Martel physician) on 09/29/2021  10:22 AM  ----------------------------------------------------------------------------  FINDINGS  Left Atrium  Left atrium is normal in size.   Inter-atrial septum is intact with no evidence for an atrial septal defect,  by color doppler. Left Ventricle  Left ventricle is normal in size, global left ventricular systolic function  is normal, calculated ejection fraction is 55%. Right Atrium  Right atrium appears small. Right Ventricle  Right ventricle appears reduced in size. Mitral Valve  Normal mitral valve structure. No mitral stenosis. No evidence of mitral regurgitation. Aortic Valve  Aortic valve is trileaflet. Aortic valve is sclerotic but opens well. No aortic insufficiency. Tricuspid Valve  Tricuspid valve was not well visualized. No tricuspid regurgitation was seen. Insignificant tricuspid regurgitation, unable to estimate RVSP. Pulmonic Valve  Pulmonic valve not well visualized but Doppler velocities are normal.  No pulmonic insufficiency. Pericardial Effusion  Moderate to large pericardial effusion. Posteriorly measuring 2.29cm at largest visualized accumulation (parasternal  views.)  Anteriorly measuring 3.66cm at largest visualized accumulation (subcostal  views.)  Gelatinous material is noted throughout. Cannot rule out right ventricular collapse in the subcostal views, clinical  correlation recommended. Mitral and tricuspid inflows show physiologic mild evidence of tamponade. Pleural Effusion  Pleural effusion noted. Miscellaneous  Normal aortic root dimension. E/E' average = 10.65. IVC not well visualized.     M-mode / 2D Measurements & Calculations:    LVIDd:2.8 cm(3.7 - 5.6 cm)        Diastolic HFRGMH:30 ml  GIEP:0.1 cm(0.6 - 1.1 cm)         Aortic Root:2.8 cm(2.0 - 3.7 cm)  LVPWd:0.8 cm(0.6 - 1.1 cm)        LA Dimension: 3 cm(1.9 - 4.0 cm)  LA volume/Index: 39.98 ml /25m^2    RVDd:2.6 cm    Mitral:                                  Aortic    Valve Area (P1/2-Time): 5 cm^2           Peak Velocity: 1.48 m/s  Peak E-Wave: 0.84 m/s                    Mean Velocity: 1.04 m/s  Peak A-Wave: 1.08 m/s                    Peak Gradient: 8.76 mmHg  E/A Ratio: 0.77                          Mean Gradient: 5 mmHg  Peak Gradient: 2.8 mmHg  Mean Gradient: 2 mmHg  Deceleration Time: 169 msec              AV VTI: 26.9 cm  P1/2t: 44 msec    Mean Velocity: 0.67 m/s    Pulmonic:    Peak Velocity: 1.15 m/s  Peak Gradient: 5.29 mmHg    Diastology / Tissue Doppler  Septal Wall E' velocity:0.08 m/s  Septal Wall E/E':9.8  Lateral Wall E' velocity:0.07 m/s  Lateral Wall E/E':11.5       ASSESSMENT AND PLAN        Assessment:    // Viral syndrome  // Pericardial effusion  // Bilateral pleural effusion  // Acute on chronic cough  // SOSA positive    Plan:    continue Solu-Medrol 40 mg every 12 hours seems her cough is better although she had thoracentesis done on the right side 10/1  Pericardial effusion/pericardial drain  removed 10/2. Repeat echo with EF 55%, small pericardial effusion anteriorly. Similar to echo on 9/30. No signs of tamponade. Supplemental O2 as needed to keep sats above 92%. Currently on room air. Pleural fluid analysis seen culture results seen and cytology seen as mentioned above. Follow-up cytology from pericardial fluid. Follow-up with cardiology and CT surgery. Currently off antibiotic per infectious disease recommendation. Continue symptomatic treatment for cough. Droplet precaution. Ambulate in physical therapy. DVT prophylaxis. Discussed with family and updated. Discussed with nursing staff, treatment and plan discussed. Please note that this chart was generated using voice recognition Dragon dictation software. Although every effort was made to ensure the accuracy of this automated transcription, some errors in transcription may have occurred. Briana Espinal MD  Internal Medicine Resident, PGY-3  [facility]; [place], [place]  10/4/2021, 4:26 PM       Attending Physician Statement  I have discussed the care of Michele Knox, including pertinent history and exam findings with the resident.  I have reviewed the key elements of all parts of the encounter with the resident. I have seen and examined the patient with the resident. I agree with the assessment and plan and status of the problem list as documented. Overnight events noted, chart reviewed, labs and medications reviewed. Patient is clinically better patient is able to ambulate to bathroom she has much less shortness of breath she is afebrile and she is maintaining saturation 94 to 96% on room air. She is hemodynamically stable. According to patient she has cough cough is continued dry cough but denies any worsening of cough as compared to yesterday. She denies sputum production chest pain or hemoptysis. She is on Solu-Medrol 40 mg every 12 hours per rheumatology. SOSA profile was still not reported. Chest x-ray was done today and I have reviewed the chest x-ray shows significant improvement in right pleural effusion. Left pleural effusion was also improved after last thoracentesis and a small residual pleural effusion left side pulmonary venous congestion is also better at this time. 2D echocardiogram was ordered by cardiology/CT surgery today. His steroids to continue per rheumatology. Discussed with nursing staff, treatment and plan discussed. Please note that this chart was generated using voice recognition Dragon dictation software. Although every effort was made to ensure the accuracy of this automated transcription, some errors in transcription may have occurred.      Yosi Frost MD  10/4/2021 7:10 PM

## 2021-10-04 NOTE — PROGRESS NOTES
Nutrition Assessment     Type and Reason for Visit: Initial (LOS Day 7)    Nutrition Recommendations/Plan:   -Continue regular diet w/ 1000 mL FR   -Continue ensure enlive supplements BID     Nutrition Assessment:   Pt admitted d/t Cardiac tamponade. Pt s/p pericardial drain placement. Pt has had a good appetite this week consuming % of her meals. No wounds noted. Pt noted w/ 20 lb wt gain over 1 mo per EMR. Pt deemed to be low-risk. Full nutrition assessment not needed at this time. Will monitor for changes in nutritional status. Malnutrition Assessment:  Malnutrition Status: No malnutrition    Nutrition Related Findings:        Current Nutrition Therapies:    Adult Oral Nutrition Supplement; Standard High Calorie/High Protein Oral Supplement  ADULT DIET;  Regular; 1000 ml    Nutrition Diagnosis:   No nutrition diagnosis at this time     Nutrition Interventions:   Food and/or Nutrient Delivery:  Continue Current Diet, Continue Oral Nutrition Supplement  Nutrition Education/Counseling:  Education not indicated   Coordination of Nutrition Care:  Continue to monitor while inpatient    Nutrition Monitoring and Evaluation:   Food/Nutrient Intake Outcomes:  Food and Nutrient Intake, Supplement Intake  Physical Signs/Symptoms Outcomes:  Biochemical Data, Nutrition Focused Physical Findings, Skin, Weight, GI Status, Fluid Status or Edema     Discharge Planning:    Continue current diet     Electronically signed by Ezekiel Montez RD, LD on 10/4/21 at 1:02 PM EDT    Contact: [phone]

## 2021-10-04 NOTE — PLAN OF CARE
Problem: Pain:  Goal: Pain level will decrease  Description: Pain level will decrease  10/4/2021 0142 by Alia Arredondo RN  Outcome: Ongoing  10/3/2021 1156 by Clovis Wells RN  Outcome: Met This Shift  Goal: Control of acute pain  Description: Control of acute pain  10/4/2021 0142 by Alia Arredondo RN  Outcome: Ongoing  10/3/2021 1156 by Clovis Wells RN  Outcome: Met This Shift  Goal: Control of chronic pain  Description: Control of chronic pain  10/4/2021 0142 by Alia Arredondo RN  Outcome: Ongoing  10/3/2021 1156 by Clovis Wells RN  Outcome: Met This Shift  Goal: Patient's pain/discomfort is manageable  Description: Patient's pain/discomfort is manageable  10/4/2021 0142 by Alia Arredondo RN  Outcome: Ongoing  10/3/2021 1156 by Clovis Wells RN  Outcome: Met This Shift     Problem: Falls - Risk of:  Goal: Will remain free from falls  Description: Will remain free from falls  Outcome: Ongoing  Goal: Absence of physical injury  Description: Absence of physical injury  Outcome: Ongoing     Problem: Infection:  Goal: Will remain free from infection  Description: Will remain free from infection  10/4/2021 0142 by Alia Arredondo RN  Outcome: Ongoing  10/3/2021 1156 by Clovis Wells RN  Outcome: Met This Shift     Problem: Safety:  Goal: Free from accidental physical injury  Description: Free from accidental physical injury  Outcome: Ongoing  Goal: Free from intentional harm  Description: Free from intentional harm  Outcome: Ongoing     Problem: Daily Care:  Goal: Daily care needs are met  Description: Daily care needs are met  Outcome: Ongoing     Problem: Skin Integrity:  Goal: Skin integrity will stabilize  Description: Skin integrity will stabilize  Outcome: Ongoing     Problem: Discharge Planning:  Goal: Patients continuum of care needs are met  Description: Patients continuum of care needs are met  Outcome: Ongoing     Problem: Musculor/Skeletal Functional Status  Goal: Highest

## 2021-10-04 NOTE — CARE COORDINATION
Transitional planning-talked with patient and family, plan is to go home with her S.O., has ride. Questioning home care. Wants Nebulizer-P.S. Dr. Julia Espinal.

## 2021-10-04 NOTE — PLAN OF CARE
continuum of care needs are met  Description: Patients continuum of care needs are met  10/4/2021 0142 by Casi Gonzalez RN  Outcome: Ongoing     Problem: Musculor/Skeletal Functional Status  Goal: Highest potential functional level  10/4/2021 0142 by Casi Gonzalez RN  Outcome: Ongoing  Goal: Absence of falls  10/4/2021 0142 by Casi Gonzalez RN  Outcome: Ongoing  Questions and concerns addressed with pt and family at bedside as needed.

## 2021-10-04 NOTE — PROGRESS NOTES
[facility]   Progress Note                    Date:   10/4/2021  Patient name:  Daryle Duke  Date of admission:  9/27/2021  7:16 AM  MRN:   [id number]  YOB: 1945  PCP:    CELY Coronado CNP    Reason for Admission:  Chest wall pain [R07.89]  Pericardial effusion [I31.3]  Pneumonia [J18.9]  Pleural effusion [J90]  PNA (pneumonia) [J18.9]    Subjective:      Clinical Changes / Abnormalities:    Patient seen and examined at bedside. No acute events overnight. No chest pain or shortness of breath. Pericardiocentesis drain removed on Saturday  Urine output in the last 24 hours:     Intake/Output Summary (Last 24 hours) at 10/4/2021 0809  Last data filed at 10/4/2021 0600  Gross per 24 hour   Intake 720 ml   Output 2700 ml   Net -1980 ml     I/O since admission: -3.9 liters    Medications:   Scheduled Meds:   methylPREDNISolone  40 mg IntraVENous Q12H    fluticasone  1 spray Each Nostril Daily    cetirizine  10 mg Oral Daily    budesonide-formoterol  2 puff Inhalation BID    [Held by provider] furosemide  40 mg IntraVENous Daily    sodium chloride flush  5-40 mL IntraVENous 2 times per day    enoxaparin  40 mg SubCUTAneous Daily    lidocaine  1 patch TransDERmal Daily    sodium chloride flush  5-40 mL IntraVENous 2 times per day    dextromethorphan  30 mg Oral 2 times per day     Continuous Infusions:   sodium chloride      sodium chloride       CBC:   Recent Labs     10/02/21  0646 10/03/21  0359 10/04/21  0354   WBC 6.3 9.1 8.6   HGB 10.2* 10.1* 10.2*    288 358     BMP:    Recent Labs     10/02/21  0646 10/03/21  0359 10/04/21  0354   * 129* 134*   K 5.0 5.1 4.9   CL 97* 100 100   CO2 22 20 23   BUN 14 20 20   CREATININE 0.48* 0.63 0.66   GLUCOSE 187* 193* 165*     Hepatic: No results for input(s): AST, ALT, ALB, BILITOT, ALKPHOS in the last 72 hours. Troponin: No results for input(s): TROPONINI in the last 72 hours.     No results for input(s): TROPONINT in the last 72 hours. BNP: No results for input(s): PROBNP in the last 72 hours. No results for input(s): BNP in the last 72 hours. Lipids: No results for input(s): CHOL, HDL in the last 72 hours. Invalid input(s): LDLCALCU  INR: No results for input(s): INR in the last 72 hours. Objective:   Vitals: BP (!) 148/83   Pulse 70   Temp 97.5 °F (36.4 °C) (Oral)   Resp 18   Ht 5' 2\" (1.575 m)   Wt 140 lb 10.5 oz (63.8 kg)   SpO2 94%   BMI 25.73 kg/m²    General appearance: alert and cooperative with exam  HEENT: Head: Normocephalic, no lesions, without obvious abnormality. Neck: no JVD, trachea midline, no adenopathy  Lungs: Diminished throughout. Supplemental oxygen per NC. Frequent cough. Heart: Regular rate and rhythm, s1/s2 auscultated, no murmurs. Sinus tachycardia. Abdomen: soft, non-tender, bowel sounds active  Extremities: no edema  Neurologic: not done     ECHO 9/29/2021     Summary  Left ventricle is normal in size, global left ventricular systolic function  is normal, calculated ejection fraction is 55%. Right atrium appears small. Moderate to large pericardial effusion. Posteriorly measuring 2.29cm, Anteriorly measuring 3.66cm. Gelatinous  material is noted throughout. Cannot rule out right ventricular collapse in the subcostal views, clinical  correlation recommended. Mitral and tricuspid inflows show physiologic mild evidence of tamponade. Pleural effusion noted.       Assessment / Acute Cardiac Problems:   1. Pleural effusion 2/2 rhinovirus/enterovirus s/p thoracentesis on 9/30/21  2. Pericardial effusion s/p pericardiocentesis on 9/29/21  3. Rhinovirus or enterovirus infection  4. H/o TIA  5. SOSA positive  6. Hyponatremia  7. HTN, on lisinorpil  8. B/l varicose veins     Plan of Treatment:   1. Pericardial and pleural effusion in setting of positive viral panel. Possible etiology is viral vs. Rheumatological disorder given positive SOSA.  Follow up pleural and pericardial fluid c/s.  2. Discussed initiation of colchicine with Rheumatology team who recommend continuing solumedrol and adding colchicine or immunosuppressive agent depending on results from serology. 3. Had thoracentesis by IR on 9/30/21.   4. Underwent Pericardiocentesis with 200 cc drainage on 9/29/21. Limited ECHO on 9/30/21 showed small to moderate pericadical effusion. Plan to repeat Limited ECHO on 10/4/21 to re-evaluate pericardial effusion. 5. Pericardiocentesis on 10/2/2021. CT surgery was consulted for pericardial window. No plan for any surgical intervention,  6. Rheumatology following. Joan Delgado MD  Fellow, [address]      I performed a history and physical examination of the patient and discussed management with the resident. I reviewed the residents note and agree with the documented findings and plan of care. Any areas of disagreement are noted on the chart. I was personally present for the key portions of any procedures. I have documented in the chart those procedures where I was not present during the key portions. I have personally evaluated this patient and have completed at least one if not all key elements of the E/M (history, physical exam, and MDM). Additional findings are as noted. TTE limited today. If stable, than repeat TTE in 2 weeks. Rheumatology on board. Rheumatology has started solumedrol. Cardiology recommend colchicine. Rheumatology to consider colchicine.   Lonnie Stover MD

## 2021-10-04 NOTE — PROGRESS NOTES
BP (!) 148/83   Pulse 70   Temp 97.5 °F (36.4 °C) (Oral)   Resp 18   Ht 5' 2\" (1.575 m)   Wt 140 lb 10.5 oz (63.8 kg)   SpO2 94%   BMI 25.73 kg/m²     Continue current management. ECHO today.     DAKOTAH Ballesteros

## 2021-10-04 NOTE — PROGRESS NOTES
Physical Therapy  Facility/Department: [facility]  Daily Treatment Note  NAME: Michele Knox  : 1945  MRN: [id number]    Date of Service: 10/4/2021    Discharge Recommendations:  Patient would benefit from continued therapy after discharge     PT Equipment Recommendations  Equipment Needed: No    Assessment     Body structures, Functions, Activity limitations: Decreased functional mobility ; Decreased endurance;Decreased balance;Decreased posture  Assessment: Pt amb 250 ft without device and CGA. limited by decreased endurance and fatigue. Would benefit from continued therapy after d/c  Prognosis: Good  REQUIRES PT FOLLOW UP: Yes  Activity Tolerance  Activity Tolerance: Patient limited by fatigue;Patient limited by endurance         Patient Diagnosis(es): The primary encounter diagnosis was Pleural effusion. Diagnoses of Chest wall pain and Pericardial effusion were also pertinent to this visit. has a past medical history of Arthritis, Displacement of lumbar intervertebral disc without myelopathy, Hypertension, Lumbar disc disease, Personal history of TIA (transient ischemic attack), Sleep deprivation, SOB (shortness of breath), and Wears glasses. has a past surgical history that includes Wrist surgery (Left); Hysterectomy; Ankle surgery (Right); Tonsillectomy; Nerve Block (10/19/15); Nerve Block (10-26-15 ); Foot surgery (); Hip arthroscopy (Left, 2018); and pr hip arthroscopy, dx (Left, 3/14/2018). Restrictions  Restrictions/Precautions  Restrictions/Precautions: Fall Risk, Up as Tolerated, Isolation (droplet)  Required Braces or Orthoses?: No  Position Activity Restriction  Other position/activity restrictions: Hx of multiple sxs on L hip-pt reports tender on L side; Up with assistance  Subjective   Pt sitting up in her chair. Pt's  and daughter are present. Pt states she's been up all night. Pt has no c/o pain.       Orientation    Overall Orientation Status: Within Functional Limits    Objective     Transfers  Sit to Stand: Contact guard assistance  Stand to sit: Contact guard assistance  Ambulation  Ambulation?: Yes  Ambulation 1  Surface: level tile  Device: No Device  Assistance: Contact guard assistance  Quality of Gait: narrow VINCE, very slow gaby, shuffles, unsteady  Gait Deviations: Slow Gaby; Increased VINCE; Decreased step length;Decreased arm swing;Decreased head and trunk rotation;Deviated path;Staggers  Distance: 250 ft  Stairs/Curb  Stairs?: No  Balance  Posture: Fair  Sitting - Static: Good  Sitting - Dynamic: Good  Standing - Static: Fair  Standing - Dynamic: Fair;-  Exercises  Seated LE exercise program: Long Arc Quads, hip abduction/adduction, heel/toe raises, and marches. Reps: x20 with 2 lb wt on B LE's.    Goals  Short term goals  Time Frame for Short term goals: 12 visits  Short term goal 1: independent bed mobility without use of bed rails/controls  Short term goal 2: independent transfers  Short term goal 3: independent gait without device x 100'  Short term goal 4: independent stair ambulation x 3 steps with 1 HR  Patient Goals   Patient goals : feel better, go home    Plan    Plan  Times per week: 5-6 visits weekly  Times per day: Daily  Current Treatment Recommendations: Strengthening, ROM, Balance Training, Functional Mobility Training, Transfer Training, Endurance Training, Gait Training, Stair training, Safety Education & Training, Patient/Caregiver Education & Training, Positioning  Plan Comment:  (educated pt re: importance of being OOB/up in chair for improvement of breathing/counteract bed rest)  Safety Devices  Type of devices: Call light within reach, Gait belt, Patient at risk for falls, Nurse notified, Left up in chair  Restraints  Initially in place: No     Therapy Time   Individual Concurrent Group Co-treatment   Time In  930         Time Out  1000         Minutes  [id number] [facility] [phone], PTA

## 2021-10-04 NOTE — PROGRESS NOTES
Rheumatology Progress Note  10/4/2021 11:21 AM  Subjective:   Admit Date: 9/27/2021  PCP: CELY Mars CNP    The patient is a 78 y.o. female with significant past medical history of hypertension who presents with chronic cough with progressive shortness of breath. This has been getting worse over the past couple of weeks. She was found to have pleural effusion and pericardial effusion. She underwent pericardiocentesis as well as paracentesis. The fluid came back as exudative. No evidence for infection. Negative fluid cultures. Negative blood cultures except for culture that might have been contaminated. Her laboratory work-up showed positive SOSA with positive JENI. We were consulted to evaluate for underlying autoimmune disease. I have to admit the patient is a poor historian. She, however denies any previous history of lupus or any other connective tissue disorder. She denies any joint pain, skin rash or photosensitivity. No Raynaud's phenomenon. No previous history of pleurisy or pericarditis. No history of blood clots. No family history of connective tissue disorder. She denies any significant dryness in her eyes or mouth. No lymphadenopathy or parotid gland swelling. Her viral testing showed negative COVID-19 testing with positive enterovirus/rhinovirus in the viral panel. Today 10/04/21:    Patient was evaluated at bedside. Vitals demonstrated patient to be afebrile and hemodynamically stable saturating 93 to 95% on room air. Labs demonstrated CRP 29.6, C3 150, C4 40, SOSA profile pending. Sitting up at bedside on evaluation. No overnight events per patient or nursing staff. Underwent Pericardiocentesis 10/2/2021 with CT Surgery consultation for pericardial window. No plan for surgical intervention at this time.    Awaiting repeat echocardiogram today to re-evaluate pericardial effusion.     Diet: Adult Oral Nutrition Supplement; Standard High Calorie/High Protein Oral Supplement  ADULT DIET; Regular; 1000 ml  Medications:   Scheduled Meds:   methylPREDNISolone  40 mg IntraVENous Q12H    fluticasone  1 spray Each Nostril Daily    cetirizine  10 mg Oral Daily    budesonide-formoterol  2 puff Inhalation BID    [Held by provider] furosemide  40 mg IntraVENous Daily    sodium chloride flush  5-40 mL IntraVENous 2 times per day    enoxaparin  40 mg SubCUTAneous Daily    lidocaine  1 patch TransDERmal Daily    sodium chloride flush  5-40 mL IntraVENous 2 times per day    dextromethorphan  30 mg Oral 2 times per day     Continuous Infusions:   sodium chloride      sodium chloride       CBC:   Recent Labs     10/02/21  0646 10/03/21  0359 10/04/21  0354   WBC 6.3 9.1 8.6   HGB 10.2* 10.1* 10.2*    288 358     BMP:    Recent Labs     10/02/21  0646 10/03/21  0359 10/04/21  0354   * 129* 134*   K 5.0 5.1 4.9   CL 97* 100 100   CO2 22 20 23   BUN 14 20 20   CREATININE 0.48* 0.63 0.66   GLUCOSE 187* 193* 165*     Hepatic: No results for input(s): AST, ALT, ALB, BILITOT, ALKPHOS in the last 72 hours. Troponin: No results for input(s): TROPONINI in the last 72 hours. BNP: No results for input(s): BNP in the last 72 hours. Lipids: No results for input(s): CHOL, HDL in the last 72 hours. Invalid input(s): LDLCALCU  INR: No results for input(s): INR in the last 72 hours. Objective:   Vitals: BP (!) 148/83   Pulse 98   Temp 97.5 °F (36.4 °C) (Oral)   Resp 16   Ht 5' 2\" (1.575 m)   Wt 140 lb 10.5 oz (63.8 kg)   SpO2 98%   BMI 25.73 kg/m²   General appearance: alert and cooperative with exam  HEENT: Head: Normocephalic, no lesions, without obvious abnormality. Neck: no adenopathy, no carotid bruit, no JVD, supple, symmetrical, trachea midline and thyroid not enlarged, symmetric, no tenderness/mass/nodules  Lungs: Diminished bilaterally. Patient coughing throughout examination.    Heart: regular rate and rhythm, S1, S2 normal, no murmur, click, rub or gallop  Abdomen: soft, non-tender; bowel sounds normal; no masses,  no organomegaly  Extremities: extremities normal, atraumatic, no cyanosis or edema  Neurologic: Mental status: Alert, oriented, thought content appropriate  Musculoskeletal:  normal range of motion, no joint swelling, deformity or tenderness     Problem List:  Patient Active Problem List:     Displacement of lumbar intervertebral disc without myelopathy     PNA (pneumonia)     Pneumonia     Sinus tachycardia     Viral sepsis (HCC)     Poor appetite     Pericardial effusion     Pleural effusion     Hyponatremia     Essential hypertension     Listeria sepsis (HCC)     Chest wall pain     Cardiac tamponade    Assessment and Plan:   1. Pericardial and Pleural effusion.  -Etiology could be autoimmune as patient had positive SOSA and positive JENI versus infectious secondary to positive rhinovirus/enterovirus viral panel.  -Continue Solu-Medrol 40 mg every 12 hours.  -We will continue to await SOSA profile. -May include immunosuppressive agents versus colchicine pending profile.  -We will continue to follow the patient. Adam Lucas DO, MD  10/4/2021   11:21 AM   Rheumatic and immunologic diseases  [facility] [address] [place] [address]  [phone]    Attending Note:  I have discussed the care of the patient including pertinent history and exam findings, with Dr. Kaveh Hoffman. I have seen and examined the patient and the key elements of all parts of the encounter have been performed by me. I agree with the assessment, plan and orders as documented by Dr. Kaveh Hoffman. She continues to complain of cough. There is some shortness of breath upon ambulation. The repeated echo showed minimal pericardial effusion. Chest x-ray from today shows minimal pleural effusion. Her serology showed strongly positive anti-SSA. I believe that the pleural/pericardial effusion is autoimmune in nature and mostly secondary to lupus.   We will switch her to oral prednisone 40 mg daily starting tomorrow  We will add colchicine 0.6 mg twice daily. Patient understands that she can cut back to once daily if there is diarrhea  No objection to discharge  We need to see her in the office in 2 to 3 weeks to adjust her prednisone  This plan was discussed thoroughly with the patient with the nurse at the bedside    Jeannie Akins M.D.  [facility] [address] [place] [address]  [phone]

## 2021-10-04 NOTE — PROGRESS NOTES
[facility]  Office: [address], DO, Kaylee [place], DO, Larry Aquino, DO, Elizabeth Sabins Blood, DO, Austin Jonas MD, Tammie Pathak MD, Sushant Garcia MD, Piotr Herrera MD, Hattie Cardoza MD, Braxton Navarro MD, Sam Uribe MD, Mitul Hicks, DO, Nicki Herman, DO, Jenny Henry MD,  Sarika Friday, DO, Susan Noyola MD, Asad Al MD, Gay Vann MD, Jane Diop MD, , Rebeca Grant MD, Adeline Goldmann, MD, Clarita Olivares MD, Paty Lowry, [facility], [facility] Steff, CNP, Sydnie Simpson, CNP, Scott Singleton, CNS, Kendal Palomino, CNP, Aurelia Garcia, CNP, Manasa Cedeno, CNP, Alejandra Suarez, CNP, Netta Cunha, CNP, Constantino Page PA-C, Lanny Baker, KAT, Grisel Del Valle, CNP, Milka Thayeror, CNP, Mason Villalta, CNP, Radha Ruvalcaba, CNP, Melissa Squibb, CNP, Lalit Montoya, CNP, Isauro Cespedes, CNP, [facility], [address] [place] [address]    Progress Note    10/4/2021    8:51 AM    Name:   Lane Cox  MRN:     [id number]     Acct:      [de-identified]   Room:   [address] [place] [address] Day:  7  Admit Date:  9/27/2021  7:16 AM    PCP:   CELY Chirinos CNP  Code Status:  Full Code    Subjective:     C/C:   Chief Complaint   Patient presents with    Cough     Interval History Status: improved. Patient seen and examined at bedside, no acute events overnight. Continue to improve overall with better breathing and functionality.   Still with bothersome cough  Drain is out yesterday  Repeat echo stable  Repeat chest x-ray stable  Patient her  and her son were still concerned and had a lot of questions and clarification regarding her condition  Patient vitals, labs and all providers notes were reviewed,from overnight shift and morning updates were noted and discussed with the nurse      Brief History:     Per my colleague:Ale Renee is a 78 y.o. Non- / non  female who presents with Cough   and is admitted to the hospital for the management of <principal problem not specified>.     60-year-old female presented to [facility] with complaints of left-sided chest pain and persistent cough.  Patient has had cough for last 2 weeks, and progressively worsening.  Dry in nature, no phlegm production. She has heat intolerance. No fever or chills. Patient was discharged from ER a day ago with oral Levaquin, she returned back the next day as her symptoms were worsening.  She had poor appetite for last 4 days.  Patient has been a non-smoker and has no previous history of cardiac or lung disease. Patient was found to be having pleural effusion and pericardial effusion. She was in cardiac tamponade. She underwent thoracentesis 3 times by IR. Also underwent pericardiocentesis with pericardial drain placement. SOSA came back positive. Review of Systems:     Review of Systems   Constitutional: Positive for activity change and fatigue. Negative for chills, diaphoresis and fever. HENT: Negative for congestion. Eyes: Negative for visual disturbance. Respiratory: Positive for cough. Negative for chest tightness and wheezing. Cardiovascular: Negative for chest pain, palpitations and leg swelling. Gastrointestinal: Negative for abdominal pain, blood in stool, constipation, diarrhea, nausea and vomiting. Genitourinary: Negative for difficulty urinating. Neurological: Negative for dizziness, weakness, light-headedness, numbness and headaches. All other systems reviewed and are negative. Medications: Allergies:     Allergies   Allergen Reactions    Bee Pollen Anaphylaxis    Erythromycin Anaphylaxis    Pcn [Penicillins] Anaphylaxis    Tetanus Toxoids Anaphylaxis    Tetracyclines & Related Anaphylaxis       Current Meds:   Scheduled Meds:    methylPREDNISolone  40 mg IntraVENous Q12H    fluticasone  1 spray Each Nostril Daily    cetirizine  10 mg Oral Daily    budesonide-formoterol  2 puff Labs:  Hematology:  Recent Labs     10/02/21  0646 10/03/21  0359 10/04/21  0354   WBC 6.3 9.1 8.6   RBC 3.57* 3.62* 3.61*   HGB 10.2* 10.1* 10.2*   HCT 31.6* 35.4* 32.8*   MCV 88.5 97.8 90.9   MCH 28.6 27.9 28.3   MCHC 32.3 28.5 31.1   RDW 13.2 13.1 13.2    288 358   MPV 10.3 9.7 10.1   CRP  --   --  29.6*     Chemistry:  Recent Labs     10/02/21  0646 10/03/21  0359 10/04/21  0354   * 129* 134*   K 5.0 5.1 4.9   CL 97* 100 100   CO2 22 20 23   GLUCOSE 187* 193* 165*   BUN 14 20 20   CREATININE 0.48* 0.63 0.66   ANIONGAP 12 9 11   LABGLOM >60 >60 >60   GFRAA >60 >60 >60   CALCIUM 7.8* 7.9* 8.1*   No results for input(s): PROT, LABALBU, LABA1C, Q4FRYCG, X9OAGYY, FT4, TSH, AST, ALT, LDH, GGT, ALKPHOS, LABGGT, BILITOT, BILIDIR, AMMONIA, AMYLASE, LIPASE, LACTATE, CHOL, HDL, LDLCHOLESTEROL, CHOLHDLRATIO, TRIG, VLDL, FLT82ND, PHENYTOIN, PHENYF, URICACID, POCGLU in the last 72 hours. ABG:No results found for: POCPH, PHART, PH, POCPCO2, JHS7TLG, PCO2, POCPO2, PO2ART, PO2, POCHCO3, BRG6XDS, HCO3, NBEA, PBEA, BEART, BE, THGBART, THB, UTQ1OYU, KYSK0IYO, V3HNTTLA, O2SAT, FIO2  Lab Results   Component Value Date/Time    SPECIAL NOT REPORTED 10/01/2021 01:57 PM    SPECIAL NOT REPORTED 10/01/2021 01:57 PM     Lab Results   Component Value Date/Time    CULTURE NO GROWTH 3 DAYS 10/01/2021 01:57 PM    CULTURE PENDING 10/01/2021 01:57 PM       Radiology:  XR CHEST (2 VW)    Result Date: 9/28/2021  Possible mild CHF. Small effusions. CT CHEST WO CONTRAST    Result Date: 10/1/2021  1. Pericardial drain in place with interval reduction of effusion. 2.  Right pleural effusion has increased in size compared to 09/27/2021 CT exam, moderate in size. 3.  Small residual left pleural effusion, improved. XR CHEST PORTABLE    Result Date: 9/30/2021  Worsening of bilateral pleural effusion, pulmonary vascular congestion and bilateral mid and lower lung airspace disease.      XR CHEST PORTABLE    Result Date: 9/26/2021  Small bilateral pleural effusions with left mid to lower lobe infiltrate or atelectasis. Clinical correlation for pneumonia. Clinical and imaging follow-up to resolution recommended. CT CHEST PULMONARY EMBOLISM W CONTRAST    Result Date: 9/27/2021  1. No evidence for acute pulmonary embolism. 2. Moderate pericardial effusion. 3. Small right and moderate left pleural effusion with adjacent subsegmental atelectasis. US THORACENTESIS Which side should the procedure be performed? Right    Result Date: 10/1/2021  Successful ultrasound guided thoracentesis. US THORACENTESIS Which side should the procedure be performed? Left    Result Date: 9/30/2021  Successful ultrasound guided thoracentesis. US THORACENTESIS Which side should the procedure be performed? Left    Result Date: 9/28/2021  Successful ultrasound guided thoracentesis. Physical Examination:        Physical Exam  Vitals and nursing note reviewed. Constitutional:       General: She is not in acute distress. HENT:      Head: Normocephalic and atraumatic. Eyes:      Conjunctiva/sclera: Conjunctivae normal.      Pupils: Pupils are equal, round, and reactive to light. Cardiovascular:      Rate and Rhythm: Normal rate and regular rhythm. Heart sounds: No murmur heard. Pulmonary:      Effort: Pulmonary effort is normal. No accessory muscle usage or respiratory distress. Breath sounds: No stridor. Examination of the right-lower field reveals decreased breath sounds. Examination of the left-lower field reveals decreased breath sounds. Decreased breath sounds present. No wheezing, rhonchi or rales. Abdominal:      General: Bowel sounds are normal. There is no distension. Palpations: Abdomen is soft. Abdomen is not rigid. Tenderness: There is no abdominal tenderness. There is no guarding. Musculoskeletal:         General: No tenderness. Skin:     General: Skin is warm and dry.       Findings: No erythema, lesion or rash. Neurological:      Mental Status: She is alert and oriented to person, place, and time. Cranial Nerves: No cranial nerve deficit. Motor: No seizure activity. Psychiatric:         Speech: Speech normal.         Behavior: Behavior normal. Behavior is cooperative.          Assessment:        Hospital Problems         Last Modified POA    * (Principal) Cardiac tamponade 10/2/2021 Yes    PNA (pneumonia) 9/27/2021 Yes    Pneumonia 9/27/2021 Yes    Sinus tachycardia 9/28/2021 Yes    Viral sepsis ([address] 9/28/2021 Yes    Poor appetite 9/28/2021 Yes    Pericardial effusion 9/28/2021 Yes    Pleural effusion 9/28/2021 Yes    Hyponatremia 9/28/2021 Yes    Essential hypertension 9/28/2021 Yes    Chest wall pain 9/29/2021 Yes          Plan:        Acute pleuropericarditis : viral vs autoimmune [SOSA positive]    Cardiac tamponade-s/p pericardial drain placement.  Limited echo done did not show much accumulation.  Discussed with cardiology and they are planning to remove pericardial drain today with repeat limited echo today is stable    Bilateral pleural effusions status post thoracentesis x3 , last was on 10/1 with improvement in her symptoms  Fluid drained on 9/28 were negative for malignant cells   CXR from this am is stable     Persistent cough: Per patient it is been ongoing for years might be related to her ACE which was stopped and complicated by her current condition, continue cough medications    Possible autoimmune etiology for her pleural and pericardial effusion/SOSA was positive and rheumatology consulted.  Started on steroids with significant improvement in her symptoms. [facility] rheumatology recommendations and appreciate them.  Follow-up on the SOSA profile  Per rheumatology pending SOSA profile decision would be immunosuppressive agents versus colchicine    Acute respiratory failure : improving Continue oxygen treatment    Hyponatremia-sodium improving    Continue DVT prophylaxis    PT/OT    Had a detailed discussion with the patient and her family regarding condition stable echo and chest x-ray are reassuring we await rheumatologist recommendations and input regarding treatment on discharge. Patient is well did not feel she is ready today given her persistent cough, will switch her cough medications around reassess in a.m.   Hopefully discharge in the next 24 hours if plan formulated by rheumatology and the patient continued to improve  Discussed with the nurse as well       Balbir Padilla MD  10/4/2021  8:51 AM

## 2021-10-04 NOTE — PROGRESS NOTES
[facility] - Progress Note  Today's Date and Time: 10/4/2021, 10:31 AM    Impression :   Shortness of breath  Rhinovirus or enterovirus infection  History of TIA (2012 mini stroke high blood pressure)  Bacteremia with Gram positive bacilli = Contaminant 9-28-21  Positive SOSA     Recommendations:   Monitor off antibiotics  ID service will sign off at this point    Medical Decision Making/Summary/Discussion:10/4/2021       Infection Control Recommendations   [place] Precautions    Antimicrobial Stewardship Recommendations     Discontinuation of therapy    Coordination of Outpatient Care:   Estimated Length of IV antimicrobials:None   Patient will need Midline Catheter Insertion: No  Patient will need PICC line Insertion:No  Patient will need: Home IV , [place],  SNF,  LTAC:TBD  Patient will need outpatient wound care:No    Chief complaint/reason for consultation:   Sepsis/ pneumonia    History of Present Illness:   Courtney Burden is a 78y.o.-year-old  female who was initially admitted on 9/27/2021. Patient seen at the request of Dr. Tamie Gregorio. INITIAL HISTORY:    Patient presented to the ED on 9/27/2021 complaining of left-sided chest wall pain and persistent cough. She presented with shortness of breath pain and tachycardic with a coughing fit. Pain is worse with movement per ED note. She was seen a day before and was offered admission but she preferred going home. Two weeks ago she was seen at an urgent care and was diagnosed with pleural effusion and was placed on amoxicillin at the time. CURRENT EVALUATION: 10/4/2021    Evaluated in the ICU    Afebrile  VS stable, breathing on room air. The patient is ambulating, is alert and oriented. She continues to experience a worsening cough. She is using incentive spirometry as tolerated. She states her cough kept her up most of the night. She has clear sputum production.      She has some mild left ankle/foot swelling that she noticed overnight. No warmth, erythema to the foot. Will continue to monitor. Scheduled for repeat ECHO today. SOSA & JENI were elevated-Rheumatology consulted for possible autoimmune response as cause of recurrent pleural effusion and pericardial effusion. She seems to be responding to Solu-Medrol currently and her SOSA profile is currently pending. Rheumatology is monitoring. CT surgery consulted for possible pericardial window-No plans for surgical intervention at this time. Pericardiocentesis drain removed 10/2/21  Repeat echo planned 10/4/21     Ultrasound guided thoracentesis was performed on 9/28/2021.  800 mL of clear yellow fluid was obtained. Cultures were sent to lab. No growth     Cardiac catherterization performed (9/29/21) due to large pericardial effusion with some hemodynamic abnormalities. 200mL total amount of serosanguinous fluid obtained. Culture: No growth     Rhinovirus/enterovirus detected on 9/28/21 by PCR    Echocardiogram 9/30/21  Global left ventricular systolic function is normal. Calculated ejection  fraction 51% by Heart Model. Small to moderate pericardial effusion seen with gelatinous material, more  so anteriorly. No obvious RA/RV diastolic collapse. Echo was completed at bedside today (9/29/2021). Left ventricle is normal in size, global left ventricular systolic function is normal with ejection fracture is 55%. Right atrium appears small  Moderate to large pericardial effusion. Mitral and tricuspid inflow shows physiologic mild evidence of tamponade. Monitoring patient off antibiotics     Labs, X rays reviewed: 10/4/2021    BUN:17-->14-->20  Cr:0.65-->0.48-->0.63    WBC:9.0-->6.3-->9.1  Hb:10.5-->10.2-->10.1  Plat: 290-->373-->288    Cultures:  Urine:    Blood:   9-22-21: No growth    Sputum :    Wound:  9-29-21: Pericardial fluid; No growth   9-28-21: Thoracentesis: No growth      Culture with smear, acid fast bacillius: No acid fast bacilli seen 09/28/21    Discussed with patient, RN, family. I have personally reviewed the past medical history, past surgical history, medications, social history, and family history, and I have updated the database accordingly.   Past Medical History:     Past Medical History:   Diagnosis Date    Arthritis     Displacement of lumbar intervertebral disc without myelopathy 9/30/2015    Hypertension     exacerbated after last injection 10-    Lumbar disc disease     Personal history of TIA (transient ischemic attack)     2012 mini stroke high blood pressure    Sleep deprivation     SOB (shortness of breath)     \"walk to fast and going upstairs\" \"can walk a city block\"    Wears glasses        Past Surgical  History:     Past Surgical History:   Procedure Laterality Date    ANKLE SURGERY Right     x 3    FOOT SURGERY  2015    left    HIP ARTHROSCOPY Left 03/14/2018    band release bursectomy    HYSTERECTOMY      NERVE BLOCK  10/19/15    caudal #1  celestone 9mg    NERVE BLOCK  10-26-15     caudal epidural steroid block #2 decadron 5 mg    [place] HIP ARTHROSCOPY, DX Left 3/14/2018    LEFT HIP ARTHROSCOPY WITH  IT BAND RELEASE BURSECTOMY WITH GLUTEUS MUSCLE REPAIR performed by Fonda Lanes, DO at [facility] Left     x 2       Medications:      methylPREDNISolone  40 mg IntraVENous Q12H    fluticasone  1 spray Each Nostril Daily    cetirizine  10 mg Oral Daily    budesonide-formoterol  2 puff Inhalation BID    [Held by provider] furosemide  40 mg IntraVENous Daily    sodium chloride flush  5-40 mL IntraVENous 2 times per day    enoxaparin  40 mg SubCUTAneous Daily    lidocaine  1 patch TransDERmal Daily    sodium chloride flush  5-40 mL IntraVENous 2 times per day    dextromethorphan  30 mg Oral 2 times per day       Social History:     Social History     Socioeconomic History    Marital status:      Spouse name: Not on file    Number of children: shortness of breath. No CALVILLO  Lung: No shortness of breath. No sputum production. Positive for cough. Abdomen: No nausea, vomiting, diarrhea, or abdominal pain. [place] No cramps. Genitourinary: No increased urinary frequency, or dysuria. No hematuria. No suprapubic or CVA pain  Musculoskeletal: No muscle aches or pains. No joint effusions,deformities. Positive for swelling. Hematologic: No bleeding or bruising. Neurologic: No headache, weakness, numbness, or tingling. Integument: No rash, no ulcers. Psychiatric: No depression. Endocrine: No polyuria, no polydipsia, no polyphagia. Physical Examination :     Patient Vitals for the past 8 hrs:   BP Temp Temp src Pulse Resp SpO2 Weight   10/04/21 0944 -- -- -- 98 16 98 % --   10/04/21 0500 -- -- -- 70 -- 94 % 140 lb 10.5 oz (63.8 kg)   10/04/21 0400 -- -- -- 84 -- 94 % --   10/04/21 0322 (!) 148/83 97.5 °F (36.4 °C) Oral 82 18 94 % --   10/04/21 0300 -- -- -- 66 17 95 % --     General Appearance: Awake, alert, and in no apparent distress  Head:  Normocephalic, no trauma  Eyes: Pupils equal, round, reactive to light and accommodation; extraocular movements intact; sclera anicteric; conjunctivae pink. No embolic phenomena. ENT: Oropharynx clear, without erythema, exudate, or thrush. No tenderness of sinuses. Mouth/throat: mucosa pink and moist. No lesions. Dentition in good repair. Neck:Supple, without lymphadenopathy. Thyroid normal, No bruits. Pulmonary/Chest: Clear to auscultation, without wheezes, rales, or rhonchi. No dullness to percussion. Cardiovascular: Regular rate and rhythm without murmurs, rubs, or gallops. Abdomen: Soft, non tender. Bowel sounds normal. No organomegaly  All four Extremities: No cyanosis, clubbing, edema, or effusions. Mild edema to the left foot. No erythema, warmth or wound. Neurologic: No gross sensory or motor deficits. Skin: Warm and dry with good turgor. No signs of peripheral arterial or venous insufficiency.  No ulcerations. No open wounds. Medical Decision Making -Laboratory:   I have independently reviewed/ordered the following labs:    CBC with Differential:   Recent Labs     10/03/21  0359 10/04/21  0354   WBC 9.1 8.6   HGB 10.1* 10.2*   HCT 35.4* 32.8*    358   LYMPHOPCT 10* 11*   MONOPCT 3 4     BMP:   Recent Labs     10/03/21  0359 10/04/21  0354   * 134*   K 5.1 4.9    100   CO2 20 23   BUN 20 20   CREATININE 0.63 0.66     Hepatic Function Panel:   No results for input(s): PROT, LABALBU, BILIDIR, IBILI, BILITOT, ALKPHOS, ALT, AST in the last 72 hours. No results for input(s): RPR in the last 72 hours. No results for input(s): HIV in the last 72 hours. No results for input(s): BC in the last 72 hours. Lab Results   Component Value Date    MUCUS NOT REPORTED 10/02/2021    RBC 3.61 10/04/2021    TRICHOMONAS NOT REPORTED 10/02/2021    WBC 8.6 10/04/2021    YEAST NOT REPORTED 10/02/2021    TURBIDITY Clear 10/02/2021     Lab Results   Component Value Date    CREATININE 0.66 10/04/2021    GLUCOSE 165 10/04/2021       Medical Decision Making-Imaging:     EXAMINATION:   TWO XRAY VIEWS OF THE CHEST       9/28/2021 4:57 pm       COMPARISON:   CT chest September 27, 2021       HISTORY:   ORDERING SYSTEM PROVIDED HISTORY: evaluate pna, pleural effusions   TECHNOLOGIST PROVIDED HISTORY:   evaluate pna, pleural effusions       FINDINGS:   Cardiomegaly.  Small left greater than right pleural effusions.  Possible   mild edema.  Mediastinum normal.  Bony thorax intact.           Impression   Possible mild CHF.  Small effusions. EXAMINATION:   CTA OF THE CHEST 9/27/2021 7:47 am       TECHNIQUE:   CTA of the chest was performed after the administration of intravenous   contrast.  Multiplanar reformatted images are provided for review.  MIP   images are provided for review.  Dose modulation, iterative reconstruction,   and/or weight based adjustment of the mA/kV was utilized to reduce the   radiation dose to as low as reasonably achievable.       COMPARISON:   09/12/2021       HISTORY:   ORDERING SYSTEM PROVIDED HISTORY: Left-sided chest pain elevated D-dimer   yesterday   TECHNOLOGIST PROVIDED HISTORY:   Left-sided chest pain elevated D-dimer yesterday   Decision Support Exception - unselect if not a suspected or confirmed   emergency medical condition->Emergency Medical Condition (MA)   Reason for Exam: chest pain   Acuity: Acute   Type of Exam: Initial   Additional signs and symptoms: elevated D-dimer   Relevant Medical/Surgical History: hx HTN and TIA       FINDINGS:   Pulmonary Arteries: Pulmonary arteries show no evidence of intraluminal   filling defect to suggest pulmonary embolism.  Main pulmonary artery is   normal in caliber.       Mediastinum: Moderate pericardial effusion.  Minor atherosclerotic disease. Normal caliber aorta.  No significant lymphadenopathy.  Thyroid gland and   esophagus grossly normal.       Lungs/pleura: Small right and moderate left pleural effusion with adjacent   compressive subsegmental atelectasis at the lung base.       No significant lung nodules or masses.  No pneumothorax.       Upper Abdomen: Limited images of the upper abdomen are unremarkable.       Soft Tissues/Bones: No acute bone or soft tissue abnormality.           Impression   1. No evidence for acute pulmonary embolism. 2. Moderate pericardial effusion. 3. Small right and moderate left pleural effusion with adjacent subsegmental   atelectasis.           Medical Decision [id number]:   Results for Fani Mcdonnelltler (MRN [id number]) as of 9/30/2021 13:10   Ref.  Range 9/27/2021 08:58 9/28/2021 10:00   Adenovirus PCR Latest Ref Range: Not Detected   Not Detected   B Pertussis by PCR Latest Ref Range: Not Detected   Not Detected   Chlamydia pneumoniae By PCR Latest Ref Range: Not Detected   Not Detected   Coronavirus 229E PCR Latest Ref Range: Not Detected   Not Detected   Coronavirus HKU1 PCR Latest Ref Range: Not Detected   Not Detected   Coronavirus NL63 PCR Latest Ref Range: Not Detected   Not Detected   Coronavirus OC Latest Ref Range: Not Detected   Not Detected   Human Metapneumovirus PCR Latest Ref Range: Not Detected   Not Detected   Influenza A by PCR Latest Ref Range: Not Detected   Not Detected   Influenza A H1 (2009) PCR Latest Ref Range: Not Detected   NOT REPORTED   Influenza A H1 PCR Latest Ref Range: Not Detected   NOT REPORTED   Influenza A H3 PCR Latest Ref Range: Not Detected   NOT REPORTED   Influenza B by PCR Latest Ref Range: Not Detected   Not Detected   MRSA DNA PROBE, NASAL Unknown  Rpt   Parainfluenza 1 PCR Latest Ref Range: Not Detected   Not Detected   Parainfluenza 2 PCR Latest Ref Range: Not Detected   Not Detected   Parainfluenza 3 PCR Latest Ref Range: Not Detected   Not Detected   Parainfluenza 4 PCR Latest Ref Range: Not Detected   Not Detected   Resp Syncytial Virus PCR Latest Ref Range: Not Detected   Not Detected   Rhino/Enterovirus PCR Latest Ref Range: Not Detected   DETECTED (A)   Mycoplasma pneumo by PCR Latest Ref Range: Not Detected   Not Detected   SARS-CoV-2, PCR Latest Ref Range: Not Detected   Not Detected   SARS-CoV-2, Rapid Latest Ref Range: Not Detected  Not Detected        Medical Decision Making-Other:     Note:  Labs, medications, radiologic studies were reviewed with personal review of films  Large amounts of data were reviewed  Discussed with nursing Staff, Discharge planner  Infection Control and Prevention measures reviewed  All prior entries were reviewed  Administer medications as ordered  Prognosis: Fair  Discharge planning reviewed  Follow up as outpatient. Thank you for allowing us to participate in the care of this patient. Please call with questions. Parviz Velez, MS3, participated in the evaluation of the patient under my direct supervision.       ATTESTATION:    I have discussed the case, including pertinent history and exam findings with the residents and students. I have seen and examined the patient and the key elements of the encounter have been performed by me. I was present when the student obtained his information or examined the patient. I have reviewed the laboratory data, other diagnostic studies and discussed them with the residents. I have updated the medical record where necessary. I agree with the assessment, plan and orders as documented by the resident/ student.     Jonathon Porter MD.          Pager: [phone] - Office: [phone]

## 2021-10-05 ENCOUNTER — TELEPHONE (OUTPATIENT)
Dept: PULMONOLOGY | Age: 76
End: 2021-10-05

## 2021-10-05 VITALS
WEIGHT: 140.65 LBS | RESPIRATION RATE: 18 BRPM | SYSTOLIC BLOOD PRESSURE: 163 MMHG | DIASTOLIC BLOOD PRESSURE: 68 MMHG | HEIGHT: 62 IN | BODY MASS INDEX: 25.88 KG/M2 | HEART RATE: 75 BPM | TEMPERATURE: 98.1 F | OXYGEN SATURATION: 95 %

## 2021-10-05 PROBLEM — D64.9 ANEMIA: Status: ACTIVE | Noted: 2021-10-05

## 2021-10-05 PROBLEM — R63.0 POOR APPETITE: Status: RESOLVED | Noted: 2021-09-28 | Resolved: 2021-10-05

## 2021-10-05 PROBLEM — I31.4 CARDIAC TAMPONADE: Status: RESOLVED | Noted: 2021-10-02 | Resolved: 2021-10-05

## 2021-10-05 PROBLEM — J18.9 PNA (PNEUMONIA): Status: RESOLVED | Noted: 2021-09-27 | Resolved: 2021-10-05

## 2021-10-05 PROBLEM — A41.89 VIRAL SEPSIS (HCC): Status: RESOLVED | Noted: 2021-09-28 | Resolved: 2021-10-05

## 2021-10-05 PROBLEM — J18.9 PNEUMONIA: Status: RESOLVED | Noted: 2021-09-27 | Resolved: 2021-10-05

## 2021-10-05 PROBLEM — R00.0 SINUS TACHYCARDIA: Status: RESOLVED | Noted: 2021-09-28 | Resolved: 2021-10-05

## 2021-10-05 PROBLEM — E87.1 HYPONATREMIA: Status: RESOLVED | Noted: 2021-09-28 | Resolved: 2021-10-05

## 2021-10-05 PROBLEM — R76.8 POSITIVE ANA (ANTINUCLEAR ANTIBODY): Status: ACTIVE | Noted: 2021-10-05

## 2021-10-05 PROBLEM — B97.89 VIRAL SEPSIS (HCC): Status: RESOLVED | Noted: 2021-09-28 | Resolved: 2021-10-05

## 2021-10-05 LAB
ABSOLUTE EOS #: <0.03 K/UL (ref 0–0.44)
ABSOLUTE IMMATURE GRANULOCYTE: 0.35 K/UL (ref 0–0.3)
ABSOLUTE LYMPH #: 1.04 K/UL (ref 1.1–3.7)
ABSOLUTE MONO #: 0.2 K/UL (ref 0.1–1.2)
ANION GAP SERPL CALCULATED.3IONS-SCNC: 8 MMOL/L (ref 9–17)
ANTI DNA DOUBLE STRANDED: 1 IU/ML
ANTI-NUCLEAR ANTIBODY (ANA): POSITIVE
ANTICARDIOLIPIN IGA ANTIBODY: 1 APL (ref 0–14)
ANTICARDIOLIPIN IGG ANTIBODY: 3.4 GPL (ref 0–10)
APPEARANCE FLUID: NORMAL
BASO FLUID: NORMAL %
BASOPHILS # BLD: 0 % (ref 0–2)
BASOPHILS ABSOLUTE: <0.03 K/UL (ref 0–0.2)
BETA 2 GLYCOPROT.1 IGA AB: 0.8 ELISA U/ML (ref 0–7)
BETA 2 GLYCOPROT.1 IGG AB: <0.6 ELISA U/ML (ref 0–7)
BETA 2 GLYCOPROT.1 IGM AB: <0.9 ELISA U/ML (ref 0–7)
BUN BLDV-MCNC: 20 MG/DL (ref 8–23)
BUN/CREAT BLD: ABNORMAL (ref 9–20)
CALCIUM SERPL-MCNC: 8.2 MG/DL (ref 8.6–10.4)
CARDIOLIPIN AB IGM: <0.8 MPL (ref 0–10)
CHLORIDE BLD-SCNC: 103 MMOL/L (ref 98–107)
CO2: 24 MMOL/L (ref 20–31)
COLOR FLUID: NORMAL
CREAT SERPL-MCNC: 0.64 MG/DL (ref 0.5–0.9)
CULTURE: ABNORMAL
DIFFERENTIAL TYPE: ABNORMAL
DIRECT EXAM: ABNORMAL
ENA ANTIBODIES SCREEN: 5.3 U/ML
EOSINOPHIL FLUID: NORMAL %
EOSINOPHILS RELATIVE PERCENT: 0 % (ref 1–4)
FLUID DIFF COMMENT: NORMAL
GFR AFRICAN AMERICAN: >60 ML/MIN
GFR NON-AFRICAN AMERICAN: >60 ML/MIN
GFR SERPL CREATININE-BSD FRML MDRD: ABNORMAL ML/MIN/{1.73_M2}
GFR SERPL CREATININE-BSD FRML MDRD: ABNORMAL ML/MIN/{1.73_M2}
GLUCOSE BLD-MCNC: 191 MG/DL (ref 70–99)
HCT VFR BLD CALC: 33.8 % (ref 36.3–47.1)
HEMOGLOBIN: 10.6 G/DL (ref 11.9–15.1)
IMMATURE GRANULOCYTES: 5 %
LYMPHOCYTES # BLD: 15 % (ref 24–43)
LYMPHOCYTES, BODY FLUID: 12 %
Lab: ABNORMAL
MCH RBC QN AUTO: 28.6 PG (ref 25.2–33.5)
MCHC RBC AUTO-ENTMCNC: 31.4 G/DL (ref 28.4–34.8)
MCV RBC AUTO: 91.4 FL (ref 82.6–102.9)
MONOCYTE, FLUID: NORMAL %
MONOCYTES # BLD: 3 % (ref 3–12)
NEUTROPHIL, FLUID: 40 %
NRBC AUTOMATED: 0 PER 100 WBC
OTHER CELLS FLUID: NORMAL %
PDW BLD-RTO: 13.2 % (ref 11.8–14.4)
PLATELET # BLD: 287 K/UL (ref 138–453)
PLATELET ESTIMATE: ABNORMAL
PMV BLD AUTO: 9.4 FL (ref 8.1–13.5)
POTASSIUM SERPL-SCNC: 5 MMOL/L (ref 3.7–5.3)
RBC # BLD: 3.7 M/UL (ref 3.95–5.11)
RBC # BLD: ABNORMAL 10*6/UL
RBC FLUID: <3000 /MM3
SEG NEUTROPHILS: 77 % (ref 36–65)
SEGMENTED NEUTROPHILS ABSOLUTE COUNT: 5.57 K/UL (ref 1.5–8.1)
SODIUM BLD-SCNC: 135 MMOL/L (ref 135–144)
SPECIMEN DESCRIPTION: ABNORMAL
SPECIMEN TYPE: NORMAL
WBC # BLD: 7.2 K/UL (ref 3.5–11.3)
WBC # BLD: ABNORMAL 10*3/UL
WBC FLUID: 832 /MM3

## 2021-10-05 PROCEDURE — 6370000000 HC RX 637 (ALT 250 FOR IP): Performed by: FAMILY MEDICINE

## 2021-10-05 PROCEDURE — 99232 SBSQ HOSP IP/OBS MODERATE 35: CPT | Performed by: INTERNAL MEDICINE

## 2021-10-05 PROCEDURE — 94640 AIRWAY INHALATION TREATMENT: CPT

## 2021-10-05 PROCEDURE — 2580000003 HC RX 258: Performed by: STUDENT IN AN ORGANIZED HEALTH CARE EDUCATION/TRAINING PROGRAM

## 2021-10-05 PROCEDURE — 97116 GAIT TRAINING THERAPY: CPT

## 2021-10-05 PROCEDURE — 80048 BASIC METABOLIC PNL TOTAL CA: CPT

## 2021-10-05 PROCEDURE — 99231 SBSQ HOSP IP/OBS SF/LOW 25: CPT | Performed by: PHYSICIAN ASSISTANT

## 2021-10-05 PROCEDURE — 6370000000 HC RX 637 (ALT 250 FOR IP): Performed by: INTERNAL MEDICINE

## 2021-10-05 PROCEDURE — 6370000000 HC RX 637 (ALT 250 FOR IP): Performed by: NURSE PRACTITIONER

## 2021-10-05 PROCEDURE — 97110 THERAPEUTIC EXERCISES: CPT

## 2021-10-05 PROCEDURE — 6370000000 HC RX 637 (ALT 250 FOR IP): Performed by: STUDENT IN AN ORGANIZED HEALTH CARE EDUCATION/TRAINING PROGRAM

## 2021-10-05 PROCEDURE — 6360000002 HC RX W HCPCS: Performed by: STUDENT IN AN ORGANIZED HEALTH CARE EDUCATION/TRAINING PROGRAM

## 2021-10-05 PROCEDURE — 85025 COMPLETE CBC W/AUTO DIFF WBC: CPT

## 2021-10-05 PROCEDURE — 36415 COLL VENOUS BLD VENIPUNCTURE: CPT

## 2021-10-05 PROCEDURE — 99239 HOSP IP/OBS DSCHRG MGMT >30: CPT | Performed by: FAMILY MEDICINE

## 2021-10-05 PROCEDURE — 97530 THERAPEUTIC ACTIVITIES: CPT

## 2021-10-05 RX ORDER — ALBUTEROL SULFATE 90 UG/1
2 AEROSOL, METERED RESPIRATORY (INHALATION) 4 TIMES DAILY PRN
Qty: 18 G | Refills: 0 | Status: ON HOLD | OUTPATIENT
Start: 2021-10-05 | End: 2022-02-03 | Stop reason: ALTCHOICE

## 2021-10-05 RX ORDER — BENZONATATE 200 MG/1
200 CAPSULE ORAL 3 TIMES DAILY PRN
Qty: 21 CAPSULE | Refills: 0 | Status: SHIPPED | OUTPATIENT
Start: 2021-10-05 | End: 2021-10-12

## 2021-10-05 RX ORDER — BUDESONIDE AND FORMOTEROL FUMARATE DIHYDRATE 160; 4.5 UG/1; UG/1
2 AEROSOL RESPIRATORY (INHALATION) 2 TIMES DAILY
Qty: 10.2 G | Refills: 0 | Status: CANCELLED | OUTPATIENT
Start: 2021-10-05

## 2021-10-05 RX ORDER — PREDNISONE 20 MG/1
40 TABLET ORAL DAILY
Qty: 60 TABLET | Refills: 0 | Status: SHIPPED | OUTPATIENT
Start: 2021-10-06 | End: 2021-11-05

## 2021-10-05 RX ORDER — BUDESONIDE AND FORMOTEROL FUMARATE DIHYDRATE 160; 4.5 UG/1; UG/1
2 AEROSOL RESPIRATORY (INHALATION) 2 TIMES DAILY
Qty: 10.2 G | Refills: 0 | Status: ON HOLD | OUTPATIENT
Start: 2021-10-05 | End: 2022-02-03 | Stop reason: ALTCHOICE

## 2021-10-05 RX ORDER — ALBUTEROL SULFATE 90 UG/1
2 AEROSOL, METERED RESPIRATORY (INHALATION) EVERY 6 HOURS PRN
Qty: 18 G | Refills: 0 | Status: ON HOLD | OUTPATIENT
Start: 2021-10-05 | End: 2022-02-03 | Stop reason: ALTCHOICE

## 2021-10-05 RX ORDER — COLCHICINE 0.6 MG/1
0.6 TABLET ORAL 2 TIMES DAILY
Qty: 30 TABLET | Refills: 3 | Status: ON HOLD | OUTPATIENT
Start: 2021-10-05 | End: 2022-02-03

## 2021-10-05 RX ORDER — CODEINE PHOSPHATE AND GUAIFENESIN 10; 100 MG/5ML; MG/5ML
10 SOLUTION ORAL EVERY 4 HOURS PRN
Qty: 420 ML | Refills: 0 | Status: SHIPPED | OUTPATIENT
Start: 2021-10-05 | End: 2021-10-08

## 2021-10-05 RX ORDER — AMLODIPINE BESYLATE 5 MG/1
5 TABLET ORAL DAILY
Qty: 30 TABLET | Refills: 1 | Status: ON HOLD
Start: 2021-10-05 | End: 2022-02-05 | Stop reason: HOSPADM

## 2021-10-05 RX ADMIN — COLCHICINE 0.6 MG: 0.6 TABLET, FILM COATED ORAL at 09:52

## 2021-10-05 RX ADMIN — BENZONATATE 200 MG: 100 CAPSULE ORAL at 09:38

## 2021-10-05 RX ADMIN — SODIUM CHLORIDE, PRESERVATIVE FREE 10 ML: 5 INJECTION INTRAVENOUS at 09:42

## 2021-10-05 RX ADMIN — GUAIFENESIN AND CODEINE PHOSPHATE 10 ML: 100; 10 SOLUTION ORAL at 05:11

## 2021-10-05 RX ADMIN — CETIRIZINE HYDROCHLORIDE 10 MG: 10 TABLET ORAL at 09:49

## 2021-10-05 RX ADMIN — ENOXAPARIN SODIUM 40 MG: 40 INJECTION SUBCUTANEOUS at 09:48

## 2021-10-05 RX ADMIN — FLUTICASONE PROPIONATE 1 SPRAY: 50 SPRAY, METERED NASAL at 09:47

## 2021-10-05 RX ADMIN — BUDESONIDE AND FORMOTEROL FUMARATE DIHYDRATE 2 PUFF: 160; 4.5 AEROSOL RESPIRATORY (INHALATION) at 07:26

## 2021-10-05 RX ADMIN — BENZONATATE 200 MG: 100 CAPSULE ORAL at 14:43

## 2021-10-05 RX ADMIN — SODIUM CHLORIDE, PRESERVATIVE FREE 10 ML: 5 INJECTION INTRAVENOUS at 09:39

## 2021-10-05 RX ADMIN — PREDNISONE 40 MG: 20 TABLET ORAL at 09:41

## 2021-10-05 RX ADMIN — GUAIFENESIN AND CODEINE PHOSPHATE 10 ML: 100; 10 SOLUTION ORAL at 16:58

## 2021-10-05 RX ADMIN — DIPHENHYDRAMINE HYDROCHLORIDE 25 MG: 50 INJECTION, SOLUTION INTRAMUSCULAR; INTRAVENOUS at 00:01

## 2021-10-05 RX ADMIN — Medication 30 MG: at 12:11

## 2021-10-05 ASSESSMENT — PAIN SCALES - GENERAL: PAINLEVEL_OUTOF10: 0

## 2021-10-05 ASSESSMENT — ENCOUNTER SYMPTOMS
DIARRHEA: 0
ABDOMINAL PAIN: 0
EYE REDNESS: 0
RHINORRHEA: 0
SINUS PRESSURE: 0
CHEST TIGHTNESS: 0
PHOTOPHOBIA: 0
COUGH: 1
SORE THROAT: 0
VOMITING: 0
SHORTNESS OF BREATH: 1
NAUSEA: 0
BACK PAIN: 0

## 2021-10-05 NOTE — PROGRESS NOTES
[facility]  Office: [address] [facility], DO, [facility], DO, Bayron Downs, DO, Grisel Wilebrt Blood, DO, Ct Lance MD, Luisa Quinones MD, Jeyson Beth MD, Jolynn Paredes MD, Real Wayne MD, Nayla Arzate MD, Lazarus Moats, MD, Luis Alberto Lee, DO, Zari Lynn, DO, Leonora eBlle MD,  Estevan Perrin, DO, Sylvia Ma MD, Luzmaria Padron MD, Tito Patel MD, Kiran Sosa MD, , Aminta Salinas MD, Everton Stanton MD, Jo Ann Gibson MD, Mercedes Torres, [facility], [facility], CNP, Ramona Bernard, CNP, Crissy Gonsales, CNS, Jaya Romero, Yamilete Sickle, CNP, Rachna Cave, CNP, Concha Gorman, CNP, Wu Cuellar, CNP, Jaclyn Silva, PA-C, Arelis Santana, [facility], [facility], CNP, Paulette Adames, CNP, Catrachito Delgado, CNP, Jimmy De Paz, CNP, Verónica Gómez, [facility], Negrito Sen, CNP, Florencia Cottrell, CNP, Zoë Rivera, [address] [place] [address]    Progress Note    10/5/2021    11:24 AM    Name:   James Mcconnell  MRN:     [id number]     Acct:      [de-identified]   Room:   [address] [place] [address] Day:  8  Admit Date:  9/27/2021  7:16 AM    PCP:   CELY Schaeffer CNP  Code Status:  Full Code    Subjective:     C/C:   Chief Complaint   Patient presents with    Cough     Interval History Status: Improved. Pt seen and evaluated this morning. She reports that she is doing much better. Her breathing is markedly improved. Her cough persists, but is better. She has no new complaints. Denying fever, chest pain. Brief History:     James Mcconnell is a 78 y.o. Non- / non  female who presents with Cough     79-year-old female presented to [facility] with complaints of left-sided chest pain and persistent cough. Patient has had cough for last 2 weeks, and progressively worsening. Dry in nature, no phlegm production. She has heat intolerance. No fever or chills.  Patient was discharged from ER a day ago with oral Levaquin, she returned back the next day as her symptoms were worsening. She had poor appetite for last 4 days. Patient has been a non-smoker and has no previous history of cardiac or lung disease.     In the ER patient was noted to be afebrile, tachycardic with pulse rate of 121, blood pressure 129/72, saturating well on room air around 95%. She was noted to have hyponatremia with sodium of 124, chloride 92, creatinine 0.4, glucose 129, pro-Clay 0.13. Elevated leukocytosis with WBC 14.2, 13.4. Rapid Covid was negative twice. Patient had CT chest which showed small right and moderate left pleural effusion with adjacent subsegmental atelectasis. CT scan was also concerning for moderate pericardial effusion. Review of Systems:     Constitutional:  negative for chills, fevers, sweats  Respiratory:  Positive for cough, dyspnea on exertion, shortness of breath. Negative for wheezing  Cardiovascular:  negative for chest pain, chest pressure/discomfort, lower extremity edema, palpitations  Gastrointestinal:  negative for abdominal pain, constipation, diarrhea, nausea, vomiting  Neurological:  negative for dizziness, headache    Medications: Allergies:     Allergies   Allergen Reactions    Bee Pollen Anaphylaxis    Erythromycin Anaphylaxis    Pcn [Penicillins] Anaphylaxis    Tetanus Toxoids Anaphylaxis    Tetracyclines & Related Anaphylaxis    Lisinopril Other (See Comments)     cough       Current Meds:   Scheduled Meds:    predniSONE  40 mg Oral Daily    colchicine  0.6 mg Oral BID    fluticasone  1 spray Each Nostril Daily    cetirizine  10 mg Oral Daily    budesonide-formoterol  2 puff Inhalation BID    [Held by provider] furosemide  40 mg IntraVENous Daily    sodium chloride flush  5-40 mL IntraVENous 2 times per day    enoxaparin  40 mg SubCUTAneous Daily    lidocaine  1 patch TransDERmal Daily    sodium chloride flush  5-40 mL IntraVENous 2 times per day    dextromethorphan  30 mg Oral 2 times per day     Continuous Infusions:    sodium chloride      sodium chloride       PRN Meds: guaiFENesin-codeine, benzonatate, albuterol sulfate HFA, sodium chloride flush, sodium chloride, ondansetron **OR** ondansetron, polyethylene glycol, acetaminophen **OR** acetaminophen, potassium chloride **OR** potassium alternative oral replacement **OR** potassium chloride, magnesium sulfate, oxyCODONE, sodium chloride flush, sodium chloride, diphenhydrAMINE, sodium chloride flush    Data:     Past Medical History:   has a past medical history of Arthritis, Displacement of lumbar intervertebral disc without myelopathy, Hypertension, Lumbar disc disease, Personal history of TIA (transient ischemic attack), Sleep deprivation, SOB (shortness of breath), and Wears glasses. Social History:   reports that she has never smoked. She has never used smokeless tobacco. She reports that she does not drink alcohol and does not use drugs. Family History:   Family History   Problem Relation Age of Onset    Lung Cancer Mother     Lung Cancer Brother     Heart Disease Father     Heart Disease Paternal Grandfather        Vitals:  BP (!) 165/69   Pulse 74   Temp 97.7 °F (36.5 °C) (Oral)   Resp 18   Ht 5' 2\" (1.575 m)   Wt 140 lb 10.5 oz (63.8 kg)   SpO2 93%   BMI 25.73 kg/m²   Temp (24hrs), Av.5 °F (36.4 °C), Min:96.6 °F (35.9 °C), Max:97.9 °F (36.6 °C)    No results for input(s): POCGLU in the last 72 hours. I/O (24Hr):     Intake/Output Summary (Last 24 hours) at 10/5/2021 1124  Last data filed at 10/5/2021 0000  Gross per 24 hour   Intake 555 ml   Output 1525 ml   Net -970 ml       Labs:  Hematology:  Recent Labs     10/03/21  0359 10/04/21  0354 10/05/21  0454   WBC 9.1 8.6 7.2   RBC 3.62* 3.61* 3.70*   HGB 10.1* 10.2* 10.6*   HCT 35.4* 32.8* 33.8*   MCV 97.8 90.9 91.4   MCH 27.9 28.3 28.6   MCHC 28.5 31.1 31.4   RDW 13.1 13.2 13.2    358 287   MPV 9.7 10.1 9.4   CRP  --  29.6*  --      Chemistry:  Recent Labs 10/03/21  0359 10/04/21  0354 10/05/21  0454   * 134* 135   K 5.1 4.9 5.0    100 103   CO2 20 23 24   GLUCOSE 193* 165* 191*   BUN 20 20 20   CREATININE 0.63 0.66 0.64   ANIONGAP 9 11 8*   LABGLOM >60 >60 >60   GFRAA >60 >60 >60   CALCIUM 7.9* 8.1* 8.2*     No results for input(s): PROT, LABALBU, LABA1C, X0VOXMR, G9DZPIG, FT4, TSH, AST, ALT, LDH, GGT, ALKPHOS, LABGGT, BILITOT, BILIDIR, AMMONIA, AMYLASE, LIPASE, LACTATE, CHOL, HDL, LDLCHOLESTEROL, CHOLHDLRATIO, TRIG, VLDL, ECV59SX, PHENYTOIN, PHENYF, URICACID, POCGLU in the last 72 hours. Lab Results   Component Value Date/Time    SPECIAL NOT REPORTED 10/01/2021 01:57 PM    SPECIAL NOT REPORTED 10/01/2021 01:57 PM     Lab Results   Component Value Date/Time    CULTURE NO GROWTH 4 DAYS 10/01/2021 01:57 PM    CULTURE NO GROWTH 2 DAYS 10/01/2021 01:57 PM       Radiology:  XR CHEST (2 VW)    Result Date: 9/28/2021  Possible mild CHF. Small effusions. XR CHEST PORTABLE    Result Date: 9/26/2021  Small bilateral pleural effusions with left mid to lower lobe infiltrate or atelectasis. Clinical correlation for pneumonia. Clinical and imaging follow-up to resolution recommended. CT CHEST PULMONARY EMBOLISM W CONTRAST    Result Date: 9/27/2021  1. No evidence for acute pulmonary embolism. 2. Moderate pericardial effusion. 3. Small right and moderate left pleural effusion with adjacent subsegmental atelectasis. US THORACENTESIS Which side should the procedure be performed? Left    Result Date: 9/28/2021  Successful ultrasound guided thoracentesis. Physical Examination:        General appearance:  alert, cooperative, no apparent distress  Mental Status:  oriented to person, place and time and normal affect  Lungs: Lungs are clear to auscultation bilaterally  Heart: regular rate, rhythm.  No murmur, rub, gallop  Abdomen:  soft, nontender, nondistended, normal bowel sounds, no masses, hepatomegaly, splenomegaly  Extremities:  no edema, redness, tenderness in the calves  Skin:  no gross lesions, rashes, induration    Assessment:        Hospital Problems         Last Modified POA    * (Principal) Cardiac tamponade 10/2/2021 Yes    PNA (pneumonia) 9/27/2021 Yes    Pneumonia 9/27/2021 Yes    Sinus tachycardia 9/28/2021 Yes    Viral sepsis ([address] 9/28/2021 Yes    Poor appetite 9/28/2021 Yes    Pericardial effusion 9/28/2021 Yes    Pleural effusion 9/28/2021 Yes    Hyponatremia 9/28/2021 Yes    Essential hypertension 9/28/2021 Yes    Chest wall pain 9/29/2021 Yes          Plan:        Cardiac tamponade:   - pericardial drain removed on 10/2/21. Repeat echocardiogram from 10/4 reviewed and showing small effusion. Effusion likely autoimmune in nature. Pt remains on prednisone 40 mg daily. Colchicine 0.6 mg bid. Will need repeat echo in two weeks. Will need follow-up with rheumatology in 2-3 weeks. Bilateral pleural effusions:   - s/p thoracentesis on the left x 2. Right x 1. Fluid studies consistent with exudate. Effusions thought to be related to underlying autoimmune process. SOSA found to be positive. Rheumatology following. Continue prednisone, colchicine. Hypertension  - discontinue lisinopril, given cough. Start amlodipine 5 mg daily. F/U with PCP in 1-2 weeks. Hyperglycemia  - sugars naturally elevated while on prednisone. Pt likely will require long term steroid therapy. PCP to monitor blood glucose.  Recommend diabetes screening (hemoglobin A1C as outpatient)    Rhinovirus positive      Michelle Mayers PA-C  10/5/2021  11:24 AM

## 2021-10-05 NOTE — CARE COORDINATION
Notified by Remberto Biggs RN stating patient OON. Notified Alejandra Salcido PA-C, plan is to discharge patient this afternoon.   Patient updated, plan is home with , requesting nebulizer    [facility] Case Management Department  Written by: Ameena Ruff RN    Patient Name: Yesika Hahn  Attending Provider: Max Mejia MD  Admit Date: 2021  7:16 AM  MRN: [id number]  Account: [de-identified]                     : 1945  Discharge Date:  10/5/2021        Disposition: home    Ameena Ruff RN

## 2021-10-05 NOTE — PROGRESS NOTES
Limits    Objective     Transfers  Sit to Stand: SBA  Stand to sit: SBA  Ambulation  Ambulation?: Yes  Ambulation 1  Surface: level tile  Device: No Device  Assistance: Contact guard assistance  Quality of Gait: narrow VINCE, very slow gaby, shuffles, unsteady  Gait Deviations: Slow Gaby; Increased VINCE; Decreased step length;Decreased arm swing;Decreased head and trunk rotation;Deviated path;Staggers  Distance: 300 ft  Stairs/Curb  Stairs?: Yes,  Pt ascended/descended 4 steps using one HR with a reciprocal pattern. CGA. Balance  Posture: Fair  Sitting - Static: Good  Sitting - Dynamic: Good  Standing - Static: Good  Standing - Dynamic: Fair;+  Exercises  Standing exercise program: Heel/toe raises, hip flexion, hip abduction, mini squats, hip extension, and hamstring curls. Reps: 10 with 2 lb wt on B LE's.    Goals  Short term goals  Time Frame for Short term goals: 12 visits  Short term goal 1: independent bed mobility without use of bed rails/controls  Short term goal 2: independent transfers  Short term goal 3: independent gait without device x 100'  Short term goal 4: independent stair ambulation x 3 steps with 1 HR  Patient Goals   Patient goals : feel better, go home    Plan    Plan  Times per week: 5-6 visits weekly  Times per day: Daily  Current Treatment Recommendations: Strengthening, ROM, Balance Training, Functional Mobility Training, Transfer Training, Endurance Training, Gait Training, Stair training, Safety Education & Training, Patient/Caregiver Education & Training, Positioning  Plan Comment:  (educated pt re: importance of being OOB/up in chair for improvement of breathing/counteract bed rest)  Safety Devices  Type of devices: Call light within reach, Gait belt, Patient at risk for falls, Nurse notified, Left up in chair  Restraints  Initially in place: No     Therapy Time   Individual Concurrent Group Co-treatment   Time In   900         Time Out   940         Minutes   Lyn PRITCHARD

## 2021-10-05 NOTE — PROGRESS NOTES
CLINICAL PHARMACY NOTE: MEDS TO BEDS    Total # of Prescriptions Filled: 5   The following medications were delivered to the patient:  · Symbicort 160  · Benzonatate 200mg  · Prednisone 20mg  · Colchicine 0.6mg  · Virtussin A/C 100-10mg/5ml    Additional Documentation: delivered to patient in room 1001 10/5 at 5:26pm. Co-pay paid with credit on [employer] ($103.14).

## 2021-10-05 NOTE — TELEPHONE ENCOUNTER
----- Message from Jose Antonio Amezquita sent at 10/5/2021  1:34 PM EDT -----  Garden, please see message from Dr Kathryn Raphael and call to schedule. Thank you.   ----- Message -----  From: Yara Ag MD  Sent: 10/5/2021   1:29 PM EDT  To: Verito Thornton MD, #    Please schedule follow up in clinic with Dr Norman Gowers in 2 weeks .    Thank you   Elsie Jaime MD

## 2021-10-05 NOTE — PROGRESS NOTES
Taken per w/c to private vehicle after giving verbal and written discharge instructions. Meds provided by hospital out pt. Pharmacy prior to d/c. Instructed on f/u appointments and plan for antihypertensive med to be called in to pharmacy to be picked up tomorrow by pt. Left with all personal belongings.

## 2021-10-05 NOTE — PROGRESS NOTES
PULMONARY & CRITICAL CARE MEDICINE PROGRESS NOTE     Patient:  Bam Delong  MRN: [id number]  Admit date: 9/27/2021  Primary Care Physician: CELY Ruiz - CNP  Consulting Physician: Esvin Carter MD  CODE Status: Full Code  LOS: 5     SUBJECTIVE     Chief Complaint/ Reason for consult:  Pleural Effusions and Cough x 2 weeks    Hospital Course: The patient is a 78 y.o. female with PMH of asthma, hypertension who presented to the ER at [facility] on 9/12/21 for complaints of chest pain, shortness of breath after pushing multiple shopping carts. CT chest at the time was unremarkable. This was attributed to physical strain and she was discharged from the ER. She returns to the [facility] ER on 9/26 with complaints of cough, shortness of breath and difficulty sleeping where CT scan of the chest shows moderate pericardial effusion, small right and moderate left pleural effusions with adjacent subsegmental atelectasis. Patient declined admission at that time and was discharged home with albuterol inhaler, tessalon, guaifenesin and dextromethorphan. Due to worsening symptoms she returned to the ER the next day, stayed overnight and was transferred to [facility] for admission on 9/28/21. On my evaluation, patient is tachycardic in the 130s, tachypneic, blood pressure stable, and unable to speak due to cough. Respiratory panel was sent which showed Entero/rhinovirus positive PCR test.    Interval History:  10/5/2021   No acute events   Has cough with minimal sputum   No fever   Sob improved   Cytology of pleural fluid negative for malignancy         s/p thoracentesis done on the right side and had 550 mL of fluid withdrawn 10/1. She is a status post left thoracentesis on 09/28/2021. She is status post pericardiocentesis/pericardial drain on 09/29/2021  Status post repeat left thoracentesis on 09/30/2021.   Status post right thoracentesis on 10/01/2021    A repeat CT scan of the chest repeat CT scan of the chest on 2021 showed pericardial effusion is a smaller she has a small left pleural effusion and she has a moderate right pleural effusion which is increased from last CT scan of the chest on admission  Her pleural fluid on the left side is exudative by protein criteria and so far Gram stain and culture negative and AFB smear is negative. Cytology negative  Her pleural fluid on the right side is exudative by protein criteria, Gram stain and AFB smear is negative bacterial culture is negative so far. Pericardial fluid culture is negative and AB smear is negative. Cytology is pending from pericardial fluid. Review Of Systems:  Review of Systems   Constitutional: Positive for fatigue. Negative for chills, diaphoresis and fever. HENT: Positive for congestion. Negative for hearing loss, rhinorrhea, sinus pressure and sore throat. Eyes: Negative for photophobia and redness. Respiratory: Positive for cough and shortness of breath. Negative for chest tightness. Cardiovascular: Negative for chest pain. Gastrointestinal: Negative for abdominal pain, diarrhea, nausea and vomiting. Genitourinary: Negative for dysuria, frequency and urgency. Musculoskeletal: Negative for back pain and neck stiffness. Skin: Negative for rash. Neurological: Negative for seizures and speech difficulty. Psychiatric/Behavioral: Negative for agitation, confusion and decreased concentration. OBJECTIVE     PaO2/FiO2 RATIO:  No results for input(s): POCPO2 in the last 72 hours.          VITAL SIGNS:   LAST:  /62   Pulse 84   Temp 97.8 °F (36.6 °C) (Oral)   Resp 18   Ht 5' 2\" (1.575 m)   Wt 139 lb 6.4 oz (63.2 kg)   SpO2 94%   BMI 25.50 kg/m²   8-24 HR RANGE:  TEMP Temp  Av.7 °F (36.5 °C)  Min: 97.1 °F (36.2 °C)  Max: 98.1 °F (88.5 °C)   BP Systolic (63NTS), NLK:919 , Min:95 , PRE:072      Diastolic (32ZBI), PPL:48, Min:51, Max:108     PULSE Pulse  Av.5  Min: 84  Max: 111   RR Resp Av  Min: 18  Max: 18   O2 SAT SpO2  Av.3 %  Min: 93 %  Max: 95 %   OXYGEN DELIVERY O2 Flow Rate (L/min)  Avg: 3 L/min  Min: 3 L/min  Max: 3 L/min        Systemic Examination:   Physical Exam -  Constitutional:  Alert, cooperative and no distress. Mental Status:  Oriented to person, place and time and normal affect. Lungs:  Bilateral air entry present, bilateral breath sound decreased at bases. Normal effort. No expiratory wheezing and no rhonchi ts  Heart:  Regular rate and rhythm, no murmur. Abdomen:  Soft, nontender, nondistended, normal bowel sounds. Extremities:  No edema, redness, tenderness in the calves. Skin:  Warm, dry, no gross lesions or rashes. CNS: No gross neurological deficit, cranial nerves intact  DATA REVIEW     Medications: Current Inpatient  Scheduled Meds:   methylPREDNISolone  40 mg IntraVENous Q12H    fluticasone  1 spray Each Nostril Daily    cetirizine  10 mg Oral Daily    budesonide-formoterol  2 puff Inhalation BID    [Held by provider] furosemide  40 mg IntraVENous Daily    sodium chloride flush  5-40 mL IntraVENous 2 times per day    enoxaparin  40 mg SubCUTAneous Daily    lidocaine  1 patch TransDERmal Daily    sodium chloride flush  5-40 mL IntraVENous 2 times per day    dextromethorphan  30 mg Oral 2 times per day     Continuous Infusions:   sodium chloride      sodium chloride         INPUT/OUTPUT:  In: 490 [P.O.:480; I.V.:10]  Out: 800 [Urine:800]  Date 10/02/21 0000 - 10/02/21 2359   Shift 3310-5922 6602-2398 7442-1916 24 Hour Total   INTAKE   P.O.(mL/kg/hr) 480(0.9)   480   Shift Total(mL/kg) 480(7.6)   480(7.6)   OUTPUT   Urine(mL/kg/hr) 400(0.8)   400   Shift Total(mL/kg) 400(6.3)   400(6.3)   Weight (kg) 63.2 63.2 63.2 63.2        LABS:  ABGs:   No results for input(s): POCPH, POCPCO2, POCPO2, POCHCO3, GBIO0OOM in the last 72 hours.   CBC:   Recent Labs     21  0526 10/01/21  0537 10/02/21  0646   WBC 11.3 9.0 6.3   HGB 10.8* 10.5* 10.2*   HCT 33.5* 33.7* 31.6*   MCV 87.9 89.6 88.5    290 373   LYMPHOPCT 10* 14* 9*   RBC 3.81* 3.76* 3.57*   MCH 28.3 27.9 28.6   MCHC 32.2 31.2 32.3   RDW 13.3 13.4 13.2     CRP:   No results for input(s): CRP in the last 72 hours. LDH:   Recent Labs     09/29/21  1446        BMP:   Recent Labs     09/30/21  0526 10/01/21  0537 10/02/21  0646   * 127* 131*   K 4.3 3.9 5.0   CL 96* 96* 97*   CO2 20 21 22   BUN 16 17 14   CREATININE 0.62 0.65 0.48*   GLUCOSE 95 98 187*     Liver Function Test:   No results for input(s): PROT, LABALBU, ALT, AST, GGT, ALKPHOS, BILITOT in the last 72 hours. Coagulation Profile:   No results for input(s): INR, PROTIME, APTT in the last 72 hours. D-Dimer:  No results for input(s): DDIMER in the last 72 hours. Lactic Acid:  No results for input(s): LACTA in the last 72 hours. Cardiac Enzymes:  No results for input(s): CKTOTAL, CKMB, CKMBINDEX, TROPONINI in the last 72 hours. Invalid input(s): TROPONIN, HSTROP  BNP/ProBNP:   No results for input(s): BNP, PROBNP in the last 72 hours. Triglycerides:  No results for input(s): TRIG in the last 72 hours. Microbiology:  Urine Culture:  No components found for: CURINE  Blood Culture:  No components found for: CBLOOD, CFUNGUSBL  Sputum Culture:  No components found for: CSPUTUM  Recent Labs     10/01/21  1357   [facility] . PLEURAL FLUID  . PLEURAL FLUID   SPECIAL NOT REPORTED  NOT REPORTED   CULTURE NO GROWTH 19 HOURS  PENDING     Recent Labs     09/29/21  1359 09/29/21  1359 09/29/21  1937 10/01/21  1230 10/01/21  1357   SPECDESC . FLUID . PERICARDIAL FLUID   < > . PLEURAL FLUID NOT REPORTED . PLEURAL FLUID  . PLEURAL FLUID   SPECIAL NOT REPORTED  --  NOT REPORTED  --  NOT REPORTED  NOT REPORTED   CULTURE NO GROWTH 3 DAYS  --  PENDING  --  NO GROWTH 19 HOURS  PENDING    < > = values in this interval not displayed. Pathology:    Radiology Reports:  US THORACENTESIS Which side should the procedure be performed?  Right Final Result   Successful ultrasound guided thoracentesis. CT CHEST WO CONTRAST   Final Result   1. Pericardial drain in place with interval reduction of effusion. 2.  Right pleural effusion has increased in size compared to 09/27/2021 CT   exam, moderate in size. 3.  Small residual left pleural effusion, improved. US THORACENTESIS Which side should the procedure be performed? Left   Final Result   Successful ultrasound guided thoracentesis. XR CHEST PORTABLE   Final Result   Worsening of bilateral pleural effusion, pulmonary vascular congestion and   bilateral mid and lower lung airspace disease. XR CHEST (2 VW)   Final Result   Possible mild CHF. Small effusions. US THORACENTESIS Which side should the procedure be performed? Left   Final Result   Successful ultrasound guided thoracentesis. CT CHEST PULMONARY EMBOLISM W CONTRAST   Final Result   1. No evidence for acute pulmonary embolism. 2. Moderate pericardial effusion. 3. Small right and moderate left pleural effusion with adjacent subsegmental   atelectasis.               Echocardiogram:   Results for orders placed during the hospital encounter of 09/27/21    ECHO Complete 2D W Doppler W Color    Narrative  Transthoracic Echocardiography Report (TTE)    Patient Name Jere Escobar     Date of Study               09/29/2021  [facility]    Date of      1945  Gender                      Female  Birth    Age          78 year(s)  Race                            Room Number  2022        Height:                     62 inch, 157.48 cm    Corporate ID [id number]    Weight:                     134 pounds, 60.8 kg  #    Patient Acct [de-identified]   BSA:          1.61 m^2      BMI:     24.51 kg/m^2  #    MR #         [id number]     Sonographer                 Trevon Roche    Accession #  [id number]  Interpreting Physician      [address] [place] [address]    Fellow                   Referring Nurse  Practitioner    Interpreting Referring Physician         Madison Herrera    Type of Study    TTE procedure:2D Echocardiogram, M-Mode, Doppler, Color Doppler. Procedure Date  Date: 09/29/2021 Start: 09:35 AM    Study Location: [facility]  Technical Quality: Adequate visualization    Indications:Pericardial effusion. History / Tech. Comments:  Procedure explained to patient. Echo completed at the bedside. PMHx:  Hypertension    Patient Status: Inpatient    Height: 62 inches Weight: 134 pounds BSA: 1.61 m^2 BMI: 24.51 kg/m^2    CONCLUSIONS    Summary  Left ventricle is normal in size, global left ventricular systolic function  is normal, calculated ejection fraction is 55%. Right atrium appears small. Moderate to large pericardial effusion. Posteriorly measuring 2.29cm, Anteriorly measuring 3.66cm. Gelatinous  material is noted throughout. Cannot rule out right ventricular collapse in the subcostal views, clinical  correlation recommended. Mitral and tricuspid inflows show physiologic mild evidence of tamponade. Pleural effusion noted. Signature  ----------------------------------------------------------------------------  Electronically signed by Cait Recinos(Sonographer) on 09/29/2021 10:15 AM  ----------------------------------------------------------------------------    ----------------------------------------------------------------------------  Electronically signed by Patrice Martel(Interpreting physician) on 09/29/2021  10:22 AM  ----------------------------------------------------------------------------  FINDINGS  Left Atrium  Left atrium is normal in size. Inter-atrial septum is intact with no evidence for an atrial septal defect,  by color doppler. Left Ventricle  Left ventricle is normal in size, global left ventricular systolic function  is normal, calculated ejection fraction is 55%. Right Atrium  Right atrium appears small.   Right Ventricle  Right ventricle appears reduced in size.  Mitral Valve  Normal mitral valve structure. No mitral stenosis. No evidence of mitral regurgitation. Aortic Valve  Aortic valve is trileaflet. Aortic valve is sclerotic but opens well. No aortic insufficiency. Tricuspid Valve  Tricuspid valve was not well visualized. No tricuspid regurgitation was seen. Insignificant tricuspid regurgitation, unable to estimate RVSP. Pulmonic Valve  Pulmonic valve not well visualized but Doppler velocities are normal.  No pulmonic insufficiency. Pericardial Effusion  Moderate to large pericardial effusion. Posteriorly measuring 2.29cm at largest visualized accumulation (parasternal  views.)  Anteriorly measuring 3.66cm at largest visualized accumulation (subcostal  views.)  Gelatinous material is noted throughout. Cannot rule out right ventricular collapse in the subcostal views, clinical  correlation recommended. Mitral and tricuspid inflows show physiologic mild evidence of tamponade. Pleural Effusion  Pleural effusion noted. Miscellaneous  Normal aortic root dimension. E/E' average = 10.65. IVC not well visualized.     M-mode / 2D Measurements & Calculations:    LVIDd:2.8 cm(3.7 - 5.6 cm)        Diastolic XEUSHU:69 ml  RXHX:3.3 cm(0.6 - 1.1 cm)         Aortic Root:2.8 cm(2.0 - 3.7 cm)  LVPWd:0.8 cm(0.6 - 1.1 cm)        LA Dimension: 3 cm(1.9 - 4.0 cm)  LA volume/Index: 39.98 ml /25m^2    RVDd:2.6 cm    Mitral:                                  Aortic    Valve Area (P1/2-Time): 5 cm^2           Peak Velocity: 1.48 m/s  Peak E-Wave: 0.84 m/s                    Mean Velocity: 1.04 m/s  Peak A-Wave: 1.08 m/s                    Peak Gradient: 8.76 mmHg  E/A Ratio: 0.77                          Mean Gradient: 5 mmHg  Peak Gradient: 2.8 mmHg  Mean Gradient: 2 mmHg  Deceleration Time: 169 msec              AV VTI: 26.9 cm  P1/2t: 44 msec    Mean Velocity: 0.67 m/s    Pulmonic:    Peak Velocity: 1.15 m/s  Peak Gradient: 5.29 mmHg    Diastology / Tissue Doppler  Septal Wall E' velocity:0.08 m/s  Septal Wall E/E':9.8  Lateral Wall E' velocity:0.07 m/s  Lateral Wall E/E':11.5       ASSESSMENT AND PLAN        Assessment:    // Viral syndrome  // Pericardial effusion  // Bilateral pleural effusion  // Acute on chronic cough  // SOSA positive    Plan:    Continue steroids and colchicine per rheumatology .   Ok to [facility]   Follow up Dr Skye Leary in 2 weeks         Dafne Lin MD  10/5/2021 1:21 PM

## 2021-10-05 NOTE — PLAN OF CARE
Problem: Pain:  Goal: Pain level will decrease  Description: Pain level will decrease  10/5/2021 0047 by Soto Wilde RN  Outcome: Met This Shift  10/4/2021 1314 by Meenakshi Mcnally RN  Outcome: Met This Shift  Goal: Control of acute pain  Description: Control of acute pain  10/5/2021 0047 by Soto Wilde RN  Outcome: Met This Shift  10/4/2021 1314 by Meenakshi Mcnally RN  Outcome: Met This Shift  Goal: Control of chronic pain  Description: Control of chronic pain  10/4/2021 1314 by Meenakshi Mcnally RN  Outcome: Met This Shift  Goal: Patient's pain/discomfort is manageable  Description: Patient's pain/discomfort is manageable  10/4/2021 1314 by Meenakshi Mcnally RN  Outcome: Met This Shift     Round on patient hourly. Address any toileting or pain needs patient has. Encourage patient to reposition self to avoid skin breakdown.   Soto Wilde RN

## 2021-10-05 NOTE — DISCHARGE SUMMARY
[facility]  Office: [address], DO, Deni Woo, DO, [facility], DO, Sree Ogden Blood, DO, Yisel Larry MD, Marlena Fierro MD, Basil Levy MD, Kathleen Fernandez MD, Ottoniel Lewis MD, Jessica Bergman MD, Elena Valiente MD, Nima Chang, DO, Bre Moran, DO, Tanya Ladd MD,  Andrew Thomas DO, Arabella Brewster MD, Adelina Denis MD, Anne Tolentino MD, Farheen Navarrete MD, , Logan Lynn MD, Ari Meneses MD, Cyndie Wilson MD, Kyler [facility], [facility], [facility], Carlos Marie, CNP, Artur Joseph, [facility], Rebekah Luke, CNP, Gurwinder Michael, CNP, Baron Lesch, CNP, Terri Phillips, CNP, Liat Colorado, CNP, Aura Finch PA-C, Denver Parks, [facility], Juan Cox, CNP, Vipul Cavanaugh, CNP, Anna Molina, CNP, Isaias Jonas, CNP, Cyndie Peña, CNP, Mushtaq Winslow, CNP, Katya Pickering, CNP, Hansel Correa, [address] [place] [address]    Discharge Summary     Patient ID: Mendez Lopez  :  1945   MRN: [id number]     ACCOUNT:  [de-identified]   Patient's PCP: CELY Romero CNP  Admit Date: 2021   Discharge Date: 10/5/2021  Length of Stay: 8  Code Status:  Full Code  Admitting Physician: Kathleen Fernandez MD  Discharge Physician: Dipak Spence PA-C     Active Discharge Diagnoses:     Hospital Problem Lists:  Principal Problem (Resolved):    Cardiac tamponade  Active Problems:    Pericardial effusion    Pleural effusion    Essential hypertension    Positive SOSA (antinuclear antibody)    Anemia  Resolved Problems:    PNA (pneumonia)    Pneumonia    Sinus tachycardia    Viral sepsis (HCC)    Poor appetite    Hyponatremia    Chest wall pain      Admission Condition:  poor     Discharged Condition: good    Hospital Stay:     Hospital Course:     Salvador Renee is a 78 y.o. Non- / non  female who presents with Cough     60-year-old female presented to [facility] ER with complaints of left-sided chest pain and persistent cough.  Patient has had cough for last 2 weeks, and progressively worsening.  Dry in nature, no phlegm production. She has heat intolerance. No fever or chills. Patient was discharged from ER a day ago with oral Levaquin, she returned back the next day as her symptoms were worsening.  She had poor appetite for last 4 days.  Patient has been a non-smoker and has no previous history of cardiac or lung disease.     In the ER patient was noted to be afebrile, tachycardic with pulse rate of 121, blood pressure 129/72, saturating well on room air around 95%.  She was noted to have hyponatremia with sodium of 124, chloride 92, creatinine 0.4, glucose 129, pro-Clay 0. 13.  Elevated leukocytosis with WBC 14.2, 13.4.  Rapid Covid was negative twice.  Patient had CT chest which showed small right and moderate left pleural effusion with adjacent subsegmental atelectasis.  CT scan was also concerning for moderate pericardial effusion. Significant therapeutic interventions:     Cardiac tamponade:   - pericardial drain removed on 10/2/21. Repeat echocardiogram from 10/4 reviewed and showing small effusion. Effusion likely autoimmune in nature. Pt remains on prednisone 40 mg daily. Colchicine 0.6 mg bid. Will need repeat echo in two weeks. Will need follow-up with rheumatology in 2-3 weeks.      Bilateral pleural effusions:   - s/p thoracentesis on the left x 2. Right x 1. Fluid studies consistent with exudate. Effusions thought to be related to underlying autoimmune process. SOSA found to be positive. Rheumatology following. Continue prednisone, colchicine.      Hypertension  - discontinue lisinopril, given cough. Start amlodipine 5 mg daily. F/U with PCP in 1-2 weeks.      Hyperglycemia  - sugars naturally elevated while on prednisone. Pt likely will require long term steroid therapy. PCP to monitor blood glucose.  Recommend diabetes screening (hemoglobin A1C as outpatient)     Rhinovirus positive    Significant Diagnostic Studies:   Labs / Micro:  CBC:   Lab Results   Component Value Date    WBC 7.2 10/05/2021    RBC 3.70 10/05/2021    HGB 10.6 10/05/2021    HCT 33.8 10/05/2021    MCV 91.4 10/05/2021    MCH 28.6 10/05/2021    MCHC 31.4 10/05/2021    RDW 13.2 10/05/2021     10/05/2021     BMP:    Lab Results   Component Value Date    GLUCOSE 191 10/05/2021     10/05/2021    K 5.0 10/05/2021     10/05/2021    CO2 24 10/05/2021    ANIONGAP 8 10/05/2021    BUN 20 10/05/2021    CREATININE 0.64 10/05/2021    BUNCRER NOT REPORTED 10/05/2021    CALCIUM 8.2 10/05/2021    LABGLOM >60 10/05/2021    GFRAA >60 10/05/2021    GFR      10/05/2021    GFR NOT REPORTED 10/05/2021     HFP:    Lab Results   Component Value Date    PROT 5.9 09/28/2021     CMP:    Lab Results   Component Value Date    GLUCOSE 191 10/05/2021     10/05/2021    K 5.0 10/05/2021     10/05/2021    CO2 24 10/05/2021    BUN 20 10/05/2021    CREATININE 0.64 10/05/2021    ANIONGAP 8 10/05/2021    ALKPHOS 90 09/26/2021    ALT 8 09/26/2021    AST 9 09/26/2021    BILITOT 0.72 09/26/2021    LABALBU 3.4 09/26/2021    ALBUMIN 1.3 09/26/2021    LABGLOM >60 10/05/2021    GFRAA >60 10/05/2021    GFR      10/05/2021    GFR NOT REPORTED 10/05/2021    PROT 5.9 09/28/2021    CALCIUM 8.2 10/05/2021     PT/INR:    Lab Results   Component Value Date    PROTIME 10.5 09/26/2021    INR 1.0 09/26/2021     PTT:   Lab Results   Component Value Date    APTT 26.1 09/26/2021     U/A:    Lab Results   Component Value Date    COLORU Yellow 10/02/2021    TURBIDITY Clear 10/02/2021    SPECGRAV 1.007 10/02/2021    HGBUR SMALL 10/02/2021    PHUR 5.5 10/02/2021    PROTEINU NEGATIVE 10/02/2021    GLUCOSEU NEGATIVE 10/02/2021    KETUA NEGATIVE 10/02/2021    BILIRUBINUR NEGATIVE 10/02/2021    UROBILINOGEN Normal 10/02/2021    NITRU NEGATIVE 10/02/2021    LEUKOCYTESUR NEGATIVE 10/02/2021     TSH:    Lab Results   Component Value Date    TSH 1.24 09/28/2021     Radiology:  XR CHEST (2 VW)    Result Date: 10/4/2021  1. Small residual right pleural effusion following interval thoracentesis. 2.  Similar appearance of small left effusion and basilar atelectasis. 3.  Interval improvement of vascular congestion. XR CHEST (2 VW)    Result Date: 9/28/2021  Possible mild CHF. Small effusions. CT CHEST WO CONTRAST    Result Date: 10/1/2021  1. Pericardial drain in place with interval reduction of effusion. 2.  Right pleural effusion has increased in size compared to 09/27/2021 CT exam, moderate in size. 3.  Small residual left pleural effusion, improved. XR CHEST PORTABLE    Result Date: 9/30/2021  Worsening of bilateral pleural effusion, pulmonary vascular congestion and bilateral mid and lower lung airspace disease. US THORACENTESIS Which side should the procedure be performed? Right    Result Date: 10/1/2021  Successful ultrasound guided thoracentesis. US THORACENTESIS Which side should the procedure be performed? Left    Result Date: 9/30/2021  Successful ultrasound guided thoracentesis. US THORACENTESIS Which side should the procedure be performed? Left    Result Date: 9/28/2021  Successful ultrasound guided thoracentesis. Consultations:    Consults:     Final Specialist Recommendations/Findings:   IP CONSULT TO CARDIOLOGY  IP CONSULT TO PULMONOLOGY  IP CONSULT TO INFECTIOUS DISEASES  IP CONSULT TO RHEUMATOLOGY      The patient was seen and examined on day of discharge and this discharge summary is in conjunction with any daily progress note from day of discharge.     Discharge plan:     Disposition: Home    Physician Follow Up:   Nancy Merchant MD  [place] [id number]  [address]  [phone]    In 2 weeks  Lung doctor, office will call    Wyatt Leonard [address] [place] [address] [facility]   [address]  [facility] [place] [address]  [phone]    Echo -- Friday, October 22 at 9:45 at [facility] office    [address] [place] [address] [id number] [facility]  [place] [facility] [address].  [phone]      Cardiology -- Thursday, October 28 at 11:15 at [facility] office     Diet: regular diet    Activity: As tolerated    Discharge Medications:      Medication List      START taking these medications    budesonide-formoterol 160-4.5 MCG/ACT Aero  Commonly known as: SYMBICORT  Inhale 2 puffs into the lungs 2 times daily     colchicine 0.6 MG tablet  Commonly known as: COLCRYS  Take 1 tablet by mouth 2 times daily     guaiFENesin-codeine 100-10 MG/5ML liquid  Commonly known as: GUAIFENESIN AC  Take 10 mLs by mouth every 4 hours as needed for Cough for up to 3 days. predniSONE 20 MG tablet  Commonly known as: DELTASONE  Take 2 tablets by mouth daily  Start taking on: October 6, 2021        CHANGE how you take these medications    * albuterol sulfate  (90 Base) MCG/ACT inhaler  Inhale 2 puffs into the lungs every 4 hours as needed for Wheezing or Shortness of Breath  What changed: Another medication with the same name was added. Make sure you understand how and when to take each. * albuterol sulfate  (90 Base) MCG/ACT inhaler  Commonly known as: Ventolin HFA  Inhale 2 puffs into the lungs 4 times daily as needed for Wheezing  What changed: You were already taking a medication with the same name, and this prescription was added. Make sure you understand how and when to take each. * albuterol sulfate  (90 Base) MCG/ACT inhaler  Commonly known as: Ventolin HFA  Inhale 2 puffs into the lungs every 6 hours as needed for Wheezing or Shortness of Breath  What changed: You were already taking a medication with the same name, and this prescription was added. Make sure you understand how and when to take each.      benzonatate 200 MG capsule  Commonly known as: TESSALON  Take 1 capsule by mouth 3 times daily as needed for Cough  What changed:   medication strength  how much to take         * This list has 3 medication(s) that are the same as other medications prescribed for you. Read the directions carefully, and ask your doctor or other care provider to review them with you.             CONTINUE taking these medications    ALLEVESS EX     guaiFENesin 600 MG extended release tablet  Commonly known as: MUCINEX  Take 1 tablet by mouth 2 times daily for 10 days     guaiFENesin-dextromethorphan 100-10 MG/5ML syrup  Commonly known as: ROBITUSSIN DM  Take 5 mLs by mouth 4 times daily as needed for Cough        STOP taking these medications    levoFLOXacin 500 MG tablet  Commonly known as: LEVAQUIN     lisinopril 10 MG tablet  Commonly known as: PRINIVIL;ZESTRIL           Where to Get Your Medications      These medications were sent to [facility] [address] [place] [address], [address] [address]    Phone: [phone]   albuterol sulfate  (90 Base) MCG/ACT inhaler  albuterol sulfate  (90 Base) MCG/ACT inhaler  benzonatate 200 MG capsule  budesonide-formoterol 160-4.5 MCG/ACT Aero  colchicine 0.6 MG tablet  predniSONE 20 MG tablet     You can get these medications from any pharmacy    Bring a paper prescription for each of these medications  guaiFENesin-codeine 100-10 MG/5ML liquid         Discharge Procedure Orders   NESTOR Richards MD, Rheumatology, [place]   Referral Priority: Routine Referral Type: Eval and Treat   Referral Reason: Specialty Services Required   Referred to Provider: Marlena Ferrera Requested Specialty: Rheumatology   Number of Visits Requested: Ramiro Mcwilliams MD, Pulmonology, [place]   Referral Priority: Routine Referral Type: Eval and Treat   Referral Reason: Specialty Services Required   Referred to Provider: Henny Delgado Requested Specialty: Pulmonary Disease   Number of Visits Requested: 1     NESTOR Martel [address], DO, Cardiology, [facility]   Referral Priority: Routine Referral Type: Eval and Treat   Referral Reason: Specialty Services Required   Referred to Provider: Elisa Crystal Specialty: Cardiology   Number of Visits Requested: 1       Time Spent on discharge is  37 mins in patient examination, evaluation, counseling as well as medication reconciliation, prescriptions for required medications, discharge plan and follow up. Electronically signed by   Berto Jackson PA-C  10/5/2021  4:04 PM      Thank you Dr. Kait Graham, APRN - CNP for the opportunity to be involved in this patient's care.

## 2021-10-05 NOTE — PROGRESS NOTES
PULMONARY & CRITICAL CARE MEDICINE PROGRESS NOTE     Patient:  Terese Vivar  MRN: [id number]  Admit date: 9/27/2021  Primary Care Physician: CELY Villarreal - CNP  Consulting Physician: Andrea Mart MD  CODE Status: Full Code  LOS: 5     SUBJECTIVE     Chief Complaint/ Reason for consult:  Pleural Effusions and Cough x 2 weeks    Hospital Course: The patient is a 78 y.o. female with PMH of asthma, hypertension who presented to the ER at [place] on 9/12/21 for complaints of chest pain, shortness of breath after pushing multiple shopping carts. CT chest at the time was unremarkable. This was attributed to physical strain and she was discharged from the ER. She returns to the [facility] ER on 9/26 with complaints of cough, shortness of breath and difficulty sleeping where CT scan of the chest shows moderate pericardial effusion, small right and moderate left pleural effusions with adjacent subsegmental atelectasis. Patient declined admission at that time and was discharged home with albuterol inhaler, tessalon, guaifenesin and dextromethorphan. Due to worsening symptoms she returned to the ER the next day, stayed overnight and was [place] [facility] [place]. On my evaluation, patient is tachycardic in the 130s, tachypneic, blood pressure stable, and unable to speak due to cough. Respiratory panel was sent which showed Entero/rhinovirus positive PCR test.    Interval History:  10/05/21  Pt was seen and examined at bedside. Overnight events noted chart seen labs seen and imaging studies results seen. Patient resting comfortably in chair with family at bedside. Afebrile, hemodynamically stable. Saturating high 90s on room air. Labs reviewed, glucose elevated to 191, calcium 8.1. No leukocytosis. Urine output 1.8 L / 24 hours. No longer requiring Solu-Medrol. Patient showing clinical improvement. Able to ambulate in the room.      S/p thoracentesis done on the right side and had 550 mL of fluid withdrawn 10/1. S/p left thoracentesis on 2021. S/p pericardiocentesis/pericardial drain on 2021  S/p repeat left thoracentesis on 2021. S/p right thoracentesis on 10/01/2021. CXR (10/04): Residual right pleural effusion and small left effusion with interval improvement of vascular congestion. Review Of Systems:  Review of Systems   Constitutional: Negative for chills, diaphoresis and fever. HENT: Negative for hearing loss, rhinorrhea, sinus pressure and sore throat. Eyes: Negative for photophobia and redness. Respiratory: Positive for cough. Negative for chest tightness. Cardiovascular: Negative for chest pain. Gastrointestinal: Negative for abdominal pain, diarrhea, nausea and vomiting. Genitourinary: Negative for dysuria, frequency and urgency. Musculoskeletal: Negative for back pain and neck stiffness. Skin: Negative for rash. Neurological: Negative for seizures and speech difficulty. Psychiatric/Behavioral: Negative for agitation, confusion and decreased concentration. OBJECTIVE     PaO2/FiO2 RATIO:  No results for input(s): POCPO2 in the last 72 hours. VITAL SIGNS:   LAST:  /62   Pulse 84   Temp 97.8 °F (36.6 °C) (Oral)   Resp 18   Ht 5' 2\" (1.575 m)   Wt 139 lb 6.4 oz (63.2 kg)   SpO2 94%   BMI 25.50 kg/m²   8-24 HR RANGE:  TEMP Temp  Av.7 °F (36.5 °C)  Min: 97.1 °F (36.2 °C)  Max: 98.1 °F (98.4 °C)   BP Systolic (82ODN), OSJ:812 , Min:95 , INDRA:803      Diastolic (77DLO), YY, Min:51, Max:108     PULSE Pulse  Av.5  Min: 84  Max: 111   RR Resp  Av  Min: 18  Max: 18   O2 SAT SpO2  Av.3 %  Min: 93 %  Max: 95 %   OXYGEN DELIVERY O2 Flow Rate (L/min)  Avg: 3 L/min  Min: 3 L/min  Max: 3 L/min        Systemic Examination:   Physical Exam -  Constitutional:  Alert, cooperative and no distress. Mental Status:  Oriented to person, place and time and normal affect.   Lungs:  Bilateral air entry present, bilateral breath sound decreased at bases. Normal effort. No expiratory wheezing and no rhonchi ts  Heart:  Regular rate and rhythm, no murmur. Abdomen:  Soft, nontender, nondistended, normal bowel sounds. Extremities:  No edema, redness, tenderness in the calves. Skin:  Warm, dry, no gross lesions or rashes. CNS: No gross neurological deficit, cranial nerves intact  DATA REVIEW     Medications: Current Inpatient  Scheduled Meds:   methylPREDNISolone  40 mg IntraVENous Q12H    fluticasone  1 spray Each Nostril Daily    cetirizine  10 mg Oral Daily    budesonide-formoterol  2 puff Inhalation BID    [Held by provider] furosemide  40 mg IntraVENous Daily    sodium chloride flush  5-40 mL IntraVENous 2 times per day    enoxaparin  40 mg SubCUTAneous Daily    lidocaine  1 patch TransDERmal Daily    sodium chloride flush  5-40 mL IntraVENous 2 times per day    dextromethorphan  30 mg Oral 2 times per day     Continuous Infusions:   sodium chloride      sodium chloride         INPUT/OUTPUT:  In: 490 [P.O.:480; I.V.:10]  Out: 800 [Urine:800]  Date 10/02/21 0000 - 10/02/21 2359   Shift 4921-5306 3426-6759 6619-5406 24 Hour Total   INTAKE   P.O.(mL/kg/hr) 480(0.9)   480   Shift Total(mL/kg) 480(7.6)   480(7.6)   OUTPUT   Urine(mL/kg/hr) 400(0.8)   400   Shift Total(mL/kg) 400(6.3)   400(6.3)   Weight (kg) 63.2 63.2 63.2 63.2        LABS:  ABGs:   No results for input(s): POCPH, POCPCO2, POCPO2, POCHCO3, SFQA5ZCM in the last 72 hours. CBC:   Recent Labs     09/30/21  0526 10/01/21  0537 10/02/21  0646   WBC 11.3 9.0 6.3   HGB 10.8* 10.5* 10.2*   HCT 33.5* 33.7* 31.6*   MCV 87.9 89.6 88.5    290 373   LYMPHOPCT 10* 14* 9*   RBC 3.81* 3.76* 3.57*   MCH 28.3 27.9 28.6   MCHC 32.2 31.2 32.3   RDW 13.3 13.4 13.2     CRP:   No results for input(s): CRP in the last 72 hours.   LDH:   Recent Labs     09/29/21  1446        BMP:   Recent Labs     09/30/21  0526 10/01/21  0537 US THORACENTESIS Which side should the procedure be performed? Left   Final Result   Successful ultrasound guided thoracentesis. XR CHEST PORTABLE   Final Result   Worsening of bilateral pleural effusion, pulmonary vascular congestion and   bilateral mid and lower lung airspace disease. XR CHEST (2 VW)   Final Result   Possible mild CHF. Small effusions. US THORACENTESIS Which side should the procedure be performed? Left   Final Result   Successful ultrasound guided thoracentesis. CT CHEST PULMONARY EMBOLISM W CONTRAST   Final Result   1. No evidence for acute pulmonary embolism. 2. Moderate pericardial effusion. 3. Small right and moderate left pleural effusion with adjacent subsegmental   atelectasis. Echocardiogram:   Results for orders placed during the hospital encounter of 09/27/21    ECHO Complete 2D W Doppler W Color    Narrative  Transthoracic Echocardiography Report (TTE)    Patient Name Karen Weldon     Date of Study               09/29/2021  Delgriselda Alex JACOB    Date of      1945  Gender                      Female  Birth    Age          78 year(s)  Race                            Room Number  2022        Height:                     62 inch, 157.48 cm    Corporate ID [id number]    Weight:                     134 pounds, 60.8 kg  #    Patient Acct [de-identified]   BSA:          1.61 m^2      BMI:     24.51 kg/m^2  #    MR #         [id number]     Sonographer                 Jonetta Angelucci    Accession #  [id number]  Interpreting Physician      [facility]    Fellow                   Referring Nurse  Practitioner    Interpreting             Referring Physician         Catalina Martini  Fellow    Type of Study    TTE procedure:2D Echocardiogram, M-Mode, Doppler, Color Doppler. Procedure Date  Date: 09/29/2021 Start: 09:35 AM    Study Location: [facility]  Technical Quality: Adequate visualization    Indications:Pericardial effusion. History / Tech. regurgitation, unable to estimate RVSP. Pulmonic Valve  Pulmonic valve not well visualized but Doppler velocities are normal.  No pulmonic insufficiency. Pericardial Effusion  Moderate to large pericardial effusion. Posteriorly measuring 2.29cm at largest visualized accumulation (parasternal  views.)  Anteriorly measuring 3.66cm at largest visualized accumulation (subcostal  views.)  Gelatinous material is noted throughout. Cannot rule out right ventricular collapse in the subcostal views, clinical  correlation recommended. Mitral and tricuspid inflows show physiologic mild evidence of tamponade. Pleural Effusion  Pleural effusion noted. Miscellaneous  Normal aortic root dimension. E/E' average = 10.65. IVC not well visualized. M-mode / 2D Measurements & Calculations:    LVIDd:2.8 cm(3.7 - 5.6 cm)        Diastolic IBIQYD:57 ml  STEN:0.7 cm(0.6 - 1.1 cm)         Aortic Root:2.8 cm(2.0 - 3.7 cm)  LVPWd:0.8 cm(0.6 - 1.1 cm)        LA Dimension: 3 cm(1.9 - 4.0 cm)  LA volume/Index: 39.98 ml /25m^2    RVDd:2.6 cm    Mitral:                                  Aortic    Valve Area (P1/2-Time): 5 cm^2           Peak Velocity: 1.48 m/s  Peak E-Wave: 0.84 m/s                    Mean Velocity: 1.04 m/s  Peak A-Wave: 1.08 m/s                    Peak Gradient: 8.76 mmHg  E/A Ratio: 0.77                          Mean Gradient: 5 mmHg  Peak Gradient: 2.8 mmHg  Mean Gradient: 2 mmHg  Deceleration Time: 169 msec              AV VTI: 26.9 cm  P1/2t: 44 msec    Mean Velocity: 0.67 m/s    Pulmonic:    Peak Velocity: 1.15 m/s  Peak Gradient: 5.29 mmHg    Diastology / Tissue Doppler  Septal Wall E' velocity:0.08 m/s  Septal Wall E/E':9.8  Lateral Wall E' velocity:0.07 m/s  Lateral Wall E/E':11.5       ASSESSMENT AND PLAN        Assessment:    // Viral syndrome  // Pericardial effusion  // Bilateral pleural effusion  // Acute on chronic cough  // SOSA positive    Plan:  -Patient no longer requiring Solu-Medrol.  S/p multiple b/l thoracentesis. -Currently saturating >94% on room air. Supplemental O2 as needed.   -Pericardial effusion/pericardial drain removed. -Repeat ECHO (10/04) showed small pericardial fluid, no VF function, normal wall motion.  -Cytology from pericardial fluid grew moderate neutrophils with no organisms. -SOSA positive. Rheumatology following.   -No plan for pericardial window with CT surgery.  -Currently off antibiotisc per infectious disease recommendation.  -Continue symptomatic treatment for cough. -CRP 29.6.  -Droplet precaution.  -Ambulate with physical therapy. -DVT prophylaxis with Lovenox. Please note that this chart was generated using voice recognition Dragon dictation software. Although every effort was made to ensure the accuracy of this automated transcription, some errors in transcription may have occurred.       Ritika Braden  10/5/2021 7:40 AM

## 2021-10-05 NOTE — PROGRESS NOTES
No new complaints    BP (!) 180/73   Pulse 73   Temp 97.7 °F (36.5 °C) (Oral)   Resp 18   Ht 5' 2\" (1.575 m)   Wt 140 lb 10.5 oz (63.8 kg)   SpO2 92%   BMI 25.73 kg/m²     ECHO small pericardial effusion no signs of tamponade. No surgical intervention. Will sign off. Call if needed.     DAKOTAH Valadez

## 2021-10-05 NOTE — PROGRESS NOTES
[facility]   Progress Note                    Date:   10/5/2021  Patient name:  Daryle Duke  Date of admission:  9/27/2021  7:16 AM  MRN:   [id number]  YOB: 1945  PCP:    CELY Coronado CNP    Reason for Admission:  Chest wall pain [R07.89]  Pericardial effusion [I31.3]  Pneumonia [J18.9]  Pleural effusion [J90]  PNA (pneumonia) [J18.9]    Subjective:      Clinical Changes / Abnormalities:    Patient seen and examined at bedside. No acute events overnight. No chest pain or shortness of breath. Was seen by Rheumatology. Pericardial effusion likely Autoimmune in nature  Was started on Colchicine 0.6 mg BID     Intake/Output Summary (Last 24 hours) at 10/5/2021 1024  Last data filed at 10/5/2021 0000  Gross per 24 hour   Intake 555 ml   Output 1525 ml   Net -970 ml     I/O since admission: -3.9 liters    Medications:   Scheduled Meds:   predniSONE  40 mg Oral Daily    colchicine  0.6 mg Oral BID    fluticasone  1 spray Each Nostril Daily    cetirizine  10 mg Oral Daily    budesonide-formoterol  2 puff Inhalation BID    [Held by provider] furosemide  40 mg IntraVENous Daily    sodium chloride flush  5-40 mL IntraVENous 2 times per day    enoxaparin  40 mg SubCUTAneous Daily    lidocaine  1 patch TransDERmal Daily    sodium chloride flush  5-40 mL IntraVENous 2 times per day    dextromethorphan  30 mg Oral 2 times per day     Continuous Infusions:   sodium chloride      sodium chloride       CBC:   Recent Labs     10/03/21  0359 10/04/21  0354 10/05/21  0454   WBC 9.1 8.6 7.2   HGB 10.1* 10.2* 10.6*    358 287     BMP:    Recent Labs     10/03/21  0359 10/04/21  0354 10/05/21  0454   * 134* 135   K 5.1 4.9 5.0    100 103   CO2 20 23 24   BUN 20 20 20   CREATININE 0.63 0.66 0.64   GLUCOSE 193* 165* 191*     Hepatic: No results for input(s): AST, ALT, ALB, BILITOT, ALKPHOS in the last 72 hours.   Troponin: No results for input(s): TROPONINI in the last 72 hours. No results for input(s): TROPONINT in the last 72 hours. BNP: No results for input(s): PROBNP in the last 72 hours. No results for input(s): BNP in the last 72 hours. Lipids: No results for input(s): CHOL, HDL in the last 72 hours. Invalid input(s): LDLCALCU  INR: No results for input(s): INR in the last 72 hours. Objective:   Vitals: BP (!) 165/69   Pulse 74   Temp 97.7 °F (36.5 °C) (Oral)   Resp 18   Ht 5' 2\" (1.575 m)   Wt 140 lb 10.5 oz (63.8 kg)   SpO2 93%   BMI 25.73 kg/m²    General appearance: alert and cooperative with exam  HEENT: Head: Normocephalic, no lesions, without obvious abnormality. Neck: no JVD, trachea midline, no adenopathy  Lungs: Diminished throughout. Supplemental oxygen per NC. Frequent cough. Heart: Regular rate and rhythm, s1/s2 auscultated, no murmurs. Sinus tachycardia. Abdomen: soft, non-tender, bowel sounds active  Extremities: no edema  Neurologic: not done     ECHO 9/29/2021     Summary  Left ventricle is normal in size, global left ventricular systolic function  is normal, calculated ejection fraction is 55%. Right atrium appears small. Moderate to large pericardial effusion. Posteriorly measuring 2.29cm, Anteriorly measuring 3.66cm. Gelatinous  material is noted throughout. Cannot rule out right ventricular collapse in the subcostal views, clinical  correlation recommended. Mitral and tricuspid inflows show physiologic mild evidence of tamponade. Pleural effusion noted. TTE 10/4/21  Summary  Limited study  Small pericardial effusion anterior and infero posterior  No tamponade physiology  Normal VF function, normal wall motions       Assessment / Acute Cardiac Problems:   1. Pleural effusion 2/2 rhinovirus/enterovirus s/p thoracentesis on 9/30/21  2. Pericardial effusion s/p pericardiocentesis on 9/29/21  3. Rhinovirus or enterovirus infection  4. H/o TIA  5. SOSA positive  6. Hyponatremia  7. HTN, on lisinorpil  8.  B/l varicose veins     Plan of Treatment:   1. Pericardial effusion likely autoimmune. Positive SOSA. 2. Started on Colchicine 0.6 mg BID by Rheumatology. 3. Had thoracentesis by IR on 9/30/21. 4. Repeat ECHO on 10/04/2021 Small pericardial effusion anterior and infero posterior. No plan for further intervention as per CT surgery. CTS signed off. Jenni Galarza MD  Fellow, [address]      I performed a history and physical examination of the patient and discussed management with the resident. I reviewed the residents note and agree with the documented findings and plan of care. Any areas of disagreement are noted on the chart. I was personally present for the key portions of any procedures. I have documented in the chart those procedures where I was not present during the key portions. I have personally evaluated this patient and have completed at least one if not all key elements of the E/M (history, physical exam, and MDM). Additional findings are as noted. No increase in pericardial effusion. Small pericardial effusion. Repeat TTE in 2 weeks. Rheumatology on board. On prednisone and colchicine per rheumatology. Call cardiology with questions.     Kenny Allen MD

## 2021-10-05 NOTE — DISCHARGE INSTR - COC
Continuity of Care Form    Patient Name: Bryan Alcocer   :  1945  MRN:  [id number]    [facility] date:  2021  Discharge date:  ***    Code Status Order: Full Code   Advance Directives:     Admitting Physician:  Henry Galeano MD  PCP: CELY Loredo CNP    Discharging Nurse: [facility] Unit/Room#: 1001/1001-01  Discharging Unit Phone Number: ***    Emergency Contact:   Extended Emergency Contact Information  Primary Emergency Contact: [address] of [address] [place] [address] Phone: [phone]  Work Phone: [phone]  Mobile Phone: [phone]  Relation: Child  Secondary Emergency Contact: [place] of [address] [place] [address] Phone: [phone]  Relation: Other    Past Surgical History:  Past Surgical History:   Procedure Laterality Date    ANKLE SURGERY Right     x 3    FOOT SURGERY  2015    left    HIP ARTHROSCOPY Left 2018    band release bursectomy    HYSTERECTOMY      NERVE BLOCK  10/19/15    caudal #1  celestone 9mg    NERVE BLOCK  10-26-15     caudal epidural steroid block #2 decadron 5 mg    MI HIP ARTHROSCOPY, DX Left 3/14/2018    LEFT HIP ARTHROSCOPY WITH  IT BAND RELEASE BURSECTOMY WITH GLUTEUS MUSCLE REPAIR performed by Janes Arzate DO at [address] Left     x 2       Immunization History: There is no immunization history on file for this patient.     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    Positive SOSA (antinuclear antibody) R76.8    Anemia D64.9       Isolation/Infection:   Isolation          Droplet        Patient Infection Status     Infection Onset Added Last Indicated Last Indicated By Review Planned Expiration Resolved Resolved By    None active    Resolved    COVID-19 Rule Out 21 Respiratory Panel, Molecular, with COVID-19 (Restricted: peds pts or suitable admitted adults) (Ordered)   21 Rule-Out Test Resulted    COVID-19 Rule Out 21 COVID-19, Rapid (Ordered)   21 Rule-Out Test Resulted          Nurse Assessment:  Last Vital Signs: BP (!) 163/68   Pulse 75   Temp 98.1 °F (36.7 °C) (Oral)   Resp 18   Ht 5' 2\" (1.575 m)   Wt 140 lb 10.5 oz (63.8 kg)   SpO2 95%   BMI 25.73 kg/m²     Last documented pain score (0-10 scale): Pain Level: 0  Last Weight:   Wt Readings from Last 1 Encounters:   10/04/21 140 lb 10.5 oz (63.8 kg)     Mental Status:  {IP PT MENTAL STATUS:93235}    IV Access:  { CARLEE IV ACCESS:782542363}    Nursing Mobility/ADLs:  Walking   {P DME GOHS:546769043}  Transfer  {P DME ISTD:686109887}  Bathing  {CHP DME NDQJ:170787685}  Dressing  {CHP DME AKOM:586384450}  Toileting  {P DME GNCN:934233907}  Feeding  {P DME NQPF:796240074}  Med Admin  {P DME FJEV:603454416}  Med Delivery   { CARLEE MED Delivery:083803076}    Wound Care Documentation and Therapy:        Elimination:  Continence:   · Bowel: {YES / V}  · Bladder: {YES / QJ:90626}  Urinary Catheter: {Urinary Catheter:354472010}   Colostomy/Ileostomy/Ileal Conduit: {YES / GV:66169}       Date of Last BM: ***    Intake/Output Summary (Last 24 hours) at 10/5/2021 1648  Last data filed at 10/5/2021 1636  Gross per 24 hour   Intake 120 ml   Output 3450 ml   Net -3330 ml     I/O last 3 completed shifts:   In: 46 [P.O.:345]  Out: 2950 [Urine:2950]    Safety Concerns:     [address] CARLEE Safety Concerns:638308284}    Impairments/Disabilities:      [address] CARLEE Impairments/Disabilities:672049312}    Nutrition Therapy:  Current Nutrition Therapy:   [address] CARLEE Diet List:901748409}    Routes of Feeding: {CHP DME Other Feedings:619184504}  Liquids: {Slp liquid thickness:56721}  Daily Fluid Restriction: {CHP DME Yes amt example:691019850}  Last Modified Barium Swallow with Video (Video Swallowing Test): {Done Not Done RVIW:261275264}    Treatments at the Time of Hospital Discharge:   Respiratory Treatments: ***  Oxygen Therapy:  {Therapy; copd oxygen:40030}  Ventilator:    { CC Vent ZEPZ:748294530}    Rehab Therapies: {THERAPEUTIC INTERVENTION:2948208669}  Weight Bearing Status/Restrictions: { CC Weight Bearin}  Other Medical Equipment (for information only, NOT a DME order):  {EQUIPMENT:930040405}  Other Treatments: ***    Patient's personal belongings (please select all that are sent with patient):  {[facility] DME Belongings:701617013}    RN SIGNATURE:  {Esignature:601155979}    CASE MANAGEMENT/SOCIAL WORK SECTION    Inpatient Status Date: ***    Readmission Risk Assessment Score:  Readmission Risk              Risk of Unplanned Readmission:  21           Discharging to Facility/ Agency   · Name:   · Address:  · Phone:  · Fax:    Dialysis Facility (if applicable)   · Name:  · Address:  · Dialysis Schedule:  · Phone:  · Fax:    / signature: {Esignature:404408229}    PHYSICIAN SECTION    Prognosis: {Prognosis:0573387194}    Condition at Discharge: [address] Patient Condition:786171376}    Rehab Potential (if transferring to Rehab): {Prognosis:2188198934}    Recommended Labs or Other Treatments After Discharge: ***    Physician Certification: I certify the above information and transfer of Zachery Akers  is necessary for the continuing treatment of the diagnosis listed and that she requires {Admit to Appropriate Level of Care:32620} for {GREATER/LESS:020451489} 30 days.      Update Admission H&P: {P DME Changes in SXSTH:986674454}    PHYSICIAN SIGNATURE:  {Esignature:511180183}

## 2021-10-06 ENCOUNTER — TELEPHONE (OUTPATIENT)
Dept: PULMONOLOGY | Age: 76
End: 2021-10-06

## 2021-10-06 LAB
ANTI JO-1 IGG: <0.4 U/ML
ANTI SSA: 185 U/ML
ANTI SSB: <0.3 U/ML
ANTI-CENTROMERE: <0.4 U/ML
ANTI-RNP70: <0.3 U/ML
ANTI-SCLERODERMA: <0.6 U/ML
ANTI-SMITH: 1.5 U/ML
ANTI-U1RNP: 0.9 U/ML

## 2021-10-06 NOTE — TELEPHONE ENCOUNTER
----- Message from Becky Glez sent at 10/6/2021  1:11 PM EDT -----  Zeina Gomez, please see message from Dr Veronica Mitchell and call to schedule. Thank you.   ----- Message -----  From: Christos Juan MD  Sent: 10/6/2021   1:08 PM EDT  To: [facility] Respiratory Spec Clinical Staff    Please schedule appointment in [facility].   She lives in [facility] schedule appointment with either Dr. Deborah Paula or Dr. Emily Pond  ----- Message -----  From: Roopa Young MD  Sent: 10/5/2021  11:19 PM EDT  To: Christos Juan MD

## 2021-10-07 LAB
CULTURE: ABNORMAL
DIRECT EXAM: ABNORMAL
Lab: ABNORMAL
SPECIMEN DESCRIPTION: ABNORMAL

## 2021-11-05 ENCOUNTER — OFFICE VISIT (OUTPATIENT)
Dept: PULMONOLOGY | Age: 76
End: 2021-11-05
Payer: MEDICARE

## 2021-11-05 VITALS
BODY MASS INDEX: 22.26 KG/M2 | WEIGHT: 121 LBS | RESPIRATION RATE: 16 BRPM | HEART RATE: 87 BPM | TEMPERATURE: 94.5 F | HEIGHT: 62 IN | DIASTOLIC BLOOD PRESSURE: 72 MMHG | SYSTOLIC BLOOD PRESSURE: 114 MMHG | OXYGEN SATURATION: 98 %

## 2021-11-05 DIAGNOSIS — M32.19 SYSTEMIC LUPUS ERYTHEMATOSUS WITH OTHER ORGAN INVOLVEMENT, UNSPECIFIED SLE TYPE (HCC): ICD-10-CM

## 2021-11-05 DIAGNOSIS — R05.3 CHRONIC COUGH: ICD-10-CM

## 2021-11-05 DIAGNOSIS — J90 PLEURAL EFFUSION EXUDATIVE: Primary | ICD-10-CM

## 2021-11-05 DIAGNOSIS — I31.39 PERICARDIAL EFFUSION: ICD-10-CM

## 2021-11-05 PROCEDURE — 99214 OFFICE O/P EST MOD 30 MIN: CPT | Performed by: INTERNAL MEDICINE

## 2021-11-05 RX ORDER — HYDROXYCHLOROQUINE SULFATE 200 MG/1
200 TABLET, FILM COATED ORAL DAILY
COMMUNITY

## 2021-11-05 NOTE — PROGRESS NOTES
OUTPATIENT PULMONARY PROGRESS NOTE      Patient:  Jono Parra  MRN: [id number]    Consulting Physician: CELY Jules CNP  Reason for Consult: Bilateral exudative pleural effusion  Primacy Care Physician: CELY Jules CNP    HISTORY OF PRESENT ILLNESS:   The patient is a 78 y.o. 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 Since she was discharged from the hospital according patient she is doing better she does not complain of shortness of breath although she is not active she feels fatigue and tired. According to patient around 12 noon and 1:00 she does get so tired that she feels like sleeping. She had a history of chronic cough apparently attributed to ACE inhibitor's which she was taking for many years cough was therefore 2 to 3 years. Recent man were discontinued. When she was in the hospital cough was secondary to pleuropericarditis which was worse when she developed symptoms of pleuropericarditis but cough had resolved completely and she denies any cough at this time. According to patient she does not have shortness of breath or shortness of breath is improved she is able to do regular activities at home if it is not because of weakness and tiredness. She denies orthopnea PND or pedal edema had completely resolved she is not on diuretics after she was discharged from the hospital.  Her weight has been stable she denies weight loss since she was discharged, appetite is good  She does not complain of fever she sometimes have left-sided pain going into her axilla. She does not complain of sputum production. She denies any hemoptysis does not complain of skin rash joint swelling in joints pain.   She does not complain of snoring waking up with snoring gasping or choking. Since she was discharged from the hospital when she was in the hospital she was treated with IV steroids on discharge she was on prednisone. She was seen by rheumatology and her prednisone was on 30 mg. According to patient she had more work-up done by rheumatology and she was told that her lupus test were going up. She was told that to taper down the prednisone from 30mg to 20 mg in a week and then to follow-up with rheumatology. She was started on Plaquenil also. Hospitalization summary summary from 09/27/2021 to 10/05/21    PMH of chronic cough/possible asthma, hypertension who presented to the ER at [place] on 9/12/21 for complaints of chest pain, shortness of breath after pushing multiple shopping carts. CT chest at the time was unremarkable. This was attributed to physical strain and she was discharged from the ER. She returns to the [facility] ER on 9/26 with complaints of cough, shortness of breath and difficulty sleeping where CT scan of the chest shows moderate pericardial effusion, small right and moderate left pleural effusions with adjacent subsegmental atelectasis. Patient declined admission at that time and was discharged home with albuterol inhaler, tessalon, guaifenesin and dextromethorphan. Due to worsening symptoms she returned to the ER the next day, stayed overnight and [place] [facility] [place]. On my evaluation, patient is tachycardic in the 130s, tachypneic, blood pressure stable, and unable to speak due to cough. Respiratory panel was sent which showed Entero/rhinovirus positive PCR test.  She had thoracentesis done bilaterally when she was in the hospital was negative for culture AFB and and cytology were negative. Echocardiogram shows pericardial effusion she had pericardiocentesis done by cardiology and had a pericardial drain which later was removed.   Connective tissue disease work-up shows positive SOSA, elevated JENI antibody and positive anti-SSA. She was seen by rheumatology she was started on IV steroids by rheumatology her symptoms gradually improved on steroids her cough and shortness of breath improved she was weaned off oxygen. She was discharged home on prednisone to be followed up by rheumatology cardiology. And our office    Work-up in hospital  She is status post left thoracentesis on 09/28/2021. She is status post pericardiocentesis/pericardial drain on 09/29/2021  Status post repeat left thoracentesis on 09/30/2021. Status post right thoracentesis on 10/01/2021     A repeat CT scan of the chest on 09/30/2021 showed pericardial effusion is a smaller she has a small left pleural effusion and she has a moderate right pleural effusion which is increased from last CT scan of the chest on admission  Her pleural fluid on the left side was exudative by protein criteria and Gram stain and culture negative and AFB smear is negative. Cytology negative  Her pleural fluid on the right side was exudative by protein criteria, Gram stain and AFB smear negative bacterial culture is negative. Pericardial fluid culture is negative and AB smear is negative. Repeat echo with EF 55%, small pericardial effusion anteriorly. Similar to echo on 9/30. No signs of tamponade.     Past Medical History:        Diagnosis Date    Arthritis     Displacement of lumbar intervertebral disc without myelopathy 9/30/2015    Hypertension     exacerbated after last injection 10-    Lumbar disc disease     Personal history of TIA (transient ischemic attack)     2012 mini stroke high blood pressure    Sleep deprivation     SOB (shortness of breath)     \"walk to fast and going upstairs\" \"can walk a city block\"    Wears glasses        Past Surgical History:        Procedure Laterality Date    ANKLE SURGERY Right     x 3    FOOT SURGERY  2015    left    HIP ARTHROSCOPY Left 03/14/2018    band release bursectomy    HYSTERECTOMY      NERVE BLOCK  10/19/15    caudal #1  celestone 9mg    NERVE BLOCK  10-26-15     caudal epidural steroid block #2 decadron 5 mg    LA HIP ARTHROSCOPY, DX Left 3/14/2018    LEFT HIP ARTHROSCOPY WITH  IT BAND RELEASE BURSECTOMY WITH GLUTEUS MUSCLE REPAIR performed by Fonda Lanes, DO at [facility] Left     x 2       Allergies: Allergies   Allergen Reactions    Bee Pollen Anaphylaxis    Erythromycin Anaphylaxis    Pcn [Penicillins] Anaphylaxis    Tetanus Toxoids Anaphylaxis    Tetracyclines & Related Anaphylaxis    Lisinopril Other (See Comments)     cough         Home Meds:   Outpatient Encounter Medications as of 11/5/2021   Medication Sig Dispense Refill    hydroxychloroquine (PLAQUENIL) 200 MG tablet Take 200 mg by mouth daily Take 1 and 1/2 tablet daily      predniSONE (DELTASONE) 20 MG tablet Take 2 tablets by mouth daily 60 tablet 0    colchicine (COLCRYS) 0.6 MG tablet Take 1 tablet by mouth 2 times daily 30 tablet 3    budesonide-formoterol (SYMBICORT) 160-4.5 MCG/ACT AERO Inhale 2 puffs into the lungs 2 times daily 10.2 g 0    albuterol sulfate HFA (VENTOLIN HFA) 108 (90 Base) MCG/ACT inhaler Inhale 2 puffs into the lungs 4 times daily as needed for Wheezing (Patient not taking: Reported on 11/5/2021) 18 g 0    albuterol sulfate HFA (VENTOLIN HFA) 108 (90 Base) MCG/ACT inhaler Inhale 2 puffs into the lungs every 6 hours as needed for Wheezing or Shortness of Breath (Patient not taking: Reported on 11/5/2021) 18 g 0    amLODIPine (NORVASC) 5 MG tablet Take 1 tablet by mouth daily 30 tablet 1    albuterol sulfate  (90 Base) MCG/ACT inhaler Inhale 2 puffs into the lungs every 4 hours as needed for Wheezing or Shortness of Breath 1 each 0    Capsaicin-Menthol (ALLEVESS EX) Apply topically (Patient not taking: Reported on 11/5/2021)       No facility-administered encounter medications on file as of 11/5/2021.        Social History:   TOBACCO:   reports that she has never smoked. She has never used smokeless tobacco.  ETOH:   reports no history of alcohol use. OCCUPATION:      Family History:       Problem Relation Age of Onset    Lung Cancer Mother     Lung Cancer Brother     Heart Disease Father     Heart Disease Paternal Grandfather        Immunizations: There is no immunization history on file for this patient.       REVIEW OF SYSTEMS:  CONSTITUTIONAL: Positive for fatigue and tiredness negative for  fevers, chills, sweats, anorexia, and weight loss  EYES:  negative for  double vision, blurred vision, dry eyes, eye discharge, visual disturbance, redness, and icterus  HEENT:  negative for  hearing loss, tinnitus, ear drainage, earaches, nasal congestion, epistaxis, sore throat, hoarseness, voice change, and postnasal drip  RESPIRATORY:  negative for  dry cough, cough with sputum, dyspnea, wheezing, hemoptysis, chest pain, and pleuritic pain  CARDIOVASCULAR: Occasional left-sided chest pain, negative for dyspnea, palpitations, orthopnea, PND, exertional chest pressure/discomfort, fatigue, edema, syncope  GASTROINTESTINAL:  negative for nausea, vomiting, diarrhea, constipation, abdominal pain, abdominal mass, abdominal distention, jaundice, dysphagia, reflux, odynophagia, hematemesis, and hemtochezia  GENITOURINARY:  negative for frequency, dysuria, nocturia, and hematuria  HEMATOLOGIC/LYMPHATIC:  negative for easy bruising, bleeding, lymphadenopathy, and petechiae  ALLERGIC/IMMUNOLOGIC:  negative for recurrent infections, urticaria, hay fever, angioedema, anaphylaxis, and drug reactions  ENDOCRINE:  negative for heat intolerance, cold intolerance, tremor, and weight changes  MUSCULOSKELETAL:  negative for  myalgias, arthralgias, joint swelling, stiff joints, and muscle weakness  NEUROLOGICAL:  negative for headaches, dizziness, seizures, memory problems, speech problems, visual disturbance, gait problems, tremor, dysphagia, weakness, numbness, syncope, and tingling  BEHAVIOR/PSYCH:  negative for decreased sleep, decreased energy level, increased energy level, poor concentration, depressed mood, and anxiety          Physical Exam:    Vitals: /72 (Site: Left Upper Arm)   Pulse 87   Temp 94.5 °F (34.7 °C)   Resp 16   Ht 5' 2\" (1.575 m)   Wt 121 lb (54.9 kg)   SpO2 98% Comment: Room air at rest  BMI 22.13 kg/m²   Last 3 weights: Wt Readings from Last 3 Encounters:   11/05/21 121 lb (54.9 kg)   10/04/21 140 lb 10.5 oz (63.8 kg)   09/26/21 120 lb (54.4 kg)     Body mass index is 22.13 kg/m². Physical Examination:   General appearance - alert, well appearing, and in no distress, normal appearing weight and acyanotic, in no respiratory distress  Mental status - alert, oriented to person, place, and time  Eyes - pupils equal and reactive, extraocular eye movements intact, sclera anicteric  Ears - right ear normal, left ear normal  Nose - normal and patent, no erythema, discharge or polyps  Mouth - mucous membranes moist, pharynx normal without lesions  Neck - supple, no significant adenopathy  Chest - no tachypnea, retractions or cyanosis bilateral symmetrical chest movement, normal resonance on percussion, air entry is present bilaterally and symmetrical, she has minimal crackles at bases, no expiratory wheezing rhonchi.   Heart - normal rate, regular rhythm, normal S1, S2, no murmurs, rubs, clicks or gallops  Abdomen - soft, nontender, nondistended, no masses or organomegaly  Neurological - alert, oriented, normal speech, no focal findings or movement disorder noted}  Extremities - peripheral pulses normal, no pedal edema, no clubbing or cyanosis  Skin - normal coloration and turgor, no rashes, no suspicious skin lesions noted       LABS:    CBC:   WBC   Date Value Ref Range Status   10/05/2021 7.2 3.5 - 11.3 k/uL Final   10/04/2021 8.6 3.5 - 11.3 k/uL Final   10/03/2021 9.1 3.5 - 11.3 k/uL Final     Hemoglobin   Date Value Ref Range Status   10/05/2021 10.6 (L) 11.9 - 15.1 g/dL Final   10/04/2021 10.2 (L) 11.9 - 15.1 g/dL Final   10/03/2021 10.1 (L) 11.9 - 15.1 g/dL Final     Platelets   Date Value Ref Range Status   10/05/2021 287 138 - 453 k/uL Final   10/04/2021 358 138 - 453 k/uL Final   10/03/2021 288 138 - 453 k/uL Final     BMP:   Sodium   Date Value Ref Range Status   10/05/2021 135 135 - 144 mmol/L Final   10/04/2021 134 (L) 135 - 144 mmol/L Final   10/03/2021 129 (L) 135 - 144 mmol/L Final     Potassium   Date Value Ref Range Status   10/05/2021 5.0 3.7 - 5.3 mmol/L Final   10/04/2021 4.9 3.7 - 5.3 mmol/L Final   10/03/2021 5.1 3.7 - 5.3 mmol/L Final     Chloride   Date Value Ref Range Status   10/05/2021 103 98 - 107 mmol/L Final   10/04/2021 100 98 - 107 mmol/L Final   10/03/2021 100 98 - 107 mmol/L Final     CO2   Date Value Ref Range Status   10/05/2021 24 20 - 31 mmol/L Final   10/04/2021 23 20 - 31 mmol/L Final   10/03/2021 20 20 - 31 mmol/L Final     BUN   Date Value Ref Range Status   10/05/2021 20 8 - 23 mg/dL Final   10/04/2021 20 8 - 23 mg/dL Final   10/03/2021 20 8 - 23 mg/dL Final     CREATININE   Date Value Ref Range Status   10/05/2021 0.64 0.50 - 0.90 mg/dL Final   10/04/2021 0.66 0.50 - 0.90 mg/dL Final   10/03/2021 0.63 0.50 - 0.90 mg/dL Final     Glucose   Date Value Ref Range Status   10/05/2021 191 (H) 70 - 99 mg/dL Final   10/04/2021 165 (H) 70 - 99 mg/dL Final   10/03/2021 193 (H) 70 - 99 mg/dL Final     Hepatic:     Amylase: No results found for: AMYLASE  Lipase:   Lipase   Date Value Ref Range Status   09/11/2021 25 13 - 60 U/L Final     CARDIAC ENZYMES: No results found for: CKTOTAL, CKMB, CKMBINDEX, TROPONINI  BNP: No results found for: BNP  Lipids: No results found for: CHOL, HDL    INR:     Thyroid:   TSH   Date Value Ref Range Status   09/28/2021 1.24 0.30 - 5.00 mIU/L Final     Urinalysis:     Cultures:-  -----------------------------------------------------------------    ABGs: No results found for: PHART, PO2ART, QMO3VRV    Pulmonary Functions Testing Results:    No results found for: FEV1, FVC, XDS8FZQ, TLC, DLCO    CXR  10/04/2021. 1.  Small residual right pleural effusion following interval thoracentesis.       2.  Similar appearance of small left effusion and basilar atelectasis.       3.  Interval improvement of vascular congestion         CT Scans    CT chest 10/01/2021. 1.  Pericardial drain in place with interval reduction of effusion.       2.  Right pleural effusion has increased in size compared to 09/27/2021 CT   exam, moderate in size.       3.  Small residual left pleural effusion, improved. CT chest 09/27/2021.  1. No evidence for acute pulmonary embolism. 2. Moderate pericardial effusion. 3. Small right and moderate left pleural effusion with adjacent subsegmental   atelectasis. CT chest 09/12/2021. Mildly motion limited evaluation with no evidence of acute pulmonary embolus   to the segmental level.  No acute process in the chest.       ECHO:      Assessment and Plan       ICD-10-CM    1. Pleural effusion exudative  J90    2. Pericardial effusion  I31.3    3. Chronic cough  R05.3    4. Systemic lupus erythematosus with other organ involvement, unspecified SLE type ([facility] [address]  M32.19    5. ACE inhibitor induced cough      Assessment:    Patient had bilateral exudative pleural effusion and pericardial effusion pleuropericarditis likely related to autoimmune disease/possible lupus less likely to be viral etiology with positive SOSA, JENI and SSA antibody. Patient has been followed by rheumatology and she is on prednisone currently on gradually smaller doses currently she is on 30 mg and she was started on Plaquenil. Symptomatically and clinically she is much better she does not have cough or shortness of breath improved her pedal edema also improved but her complaint at this time is fatigue and tiredness which could be related to autoimmune process.   She does not complain of fever changes skin rash she does not have chest pain but sometimes she complains of pain in her left side going to shoulder. She has bilateral breath sound present doubt that she has significant pleural effusion present but will get repeat chest x-ray PA lateral view. She was recently seen by cardiology and had echocardiogram done in she was told that she did not have pericardial effusion anymore. I have advised her to continue follow-up with rheumatology for steroids and immunosuppressive drug if needed and continue with Plaquenil per rheumatology. Plan and recommendation:    Chest x-ray PA lateral view next week. Ultimately when symptoms improve we will get pulmonary function test.  Follow-up with rheumatology. Prednisone dosing and Plaquenil per rheumatology. Patient did not have Covid vaccination COVID vaccine recommended patient does not want Covid vaccination. Flu vaccination recommended she did not want flu vaccine. Up to date with vaccinations from pulm perspective   Maintain an active lifestyle     Although she does not have symptoms of sleep apnea but if she continues to complain of tiredness may screen for sleep study. Follow good sleep hygeine instructions  Increase activity and exercise as tolerated  Questions answered pertaining to diagnosis and management explained importance of compliance with therapy       RTC 6 weeks    It was my pleasure to evaluate Terese Vivar today. Please call with questions. Christos Juan MD, MD             11/5/2021, 1:54 PM    Please note that this chart was generated using voice recognition Dragon dictation software. Although every effort was made to ensure the accuracy of this automated transcription, some errors in transcription may have occurred.

## 2021-11-08 ENCOUNTER — HOSPITAL ENCOUNTER (OUTPATIENT)
Dept: GENERAL RADIOLOGY | Age: 76
Discharge: HOME | End: 2021-11-10
Payer: MEDICARE

## 2021-11-08 ENCOUNTER — HOSPITAL ENCOUNTER (OUTPATIENT)
Age: 76
Discharge: HOME | End: 2021-11-10
Payer: MEDICARE

## 2021-11-08 DIAGNOSIS — J90 PLEURAL EFFUSION EXUDATIVE: ICD-10-CM

## 2021-11-08 LAB
CULTURE: NORMAL
Lab: NORMAL
SPECIMEN DESCRIPTION: NORMAL

## 2021-11-08 PROCEDURE — 71046 X-RAY EXAM CHEST 2 VIEWS: CPT

## 2021-11-15 LAB
CULTURE: NORMAL
DIRECT EXAM: NORMAL
Lab: NORMAL
SPECIMEN DESCRIPTION: NORMAL

## 2021-11-18 ENCOUNTER — HOSPITAL ENCOUNTER (EMERGENCY)
Age: 76
Discharge: HOME | End: 2021-11-18
Attending: SPECIALIST
Payer: MEDICARE

## 2021-11-18 ENCOUNTER — APPOINTMENT (OUTPATIENT)
Dept: CT IMAGING | Age: 76
End: 2021-11-18
Payer: MEDICARE

## 2021-11-18 VITALS
RESPIRATION RATE: 18 BRPM | OXYGEN SATURATION: 91 % | DIASTOLIC BLOOD PRESSURE: 52 MMHG | HEIGHT: 62 IN | HEART RATE: 106 BPM | TEMPERATURE: 98.1 F | BODY MASS INDEX: 22.26 KG/M2 | WEIGHT: 121 LBS | SYSTOLIC BLOOD PRESSURE: 112 MMHG

## 2021-11-18 DIAGNOSIS — J21.9 ACUTE BRONCHIOLITIS DUE TO UNSPECIFIED ORGANISM: Primary | ICD-10-CM

## 2021-11-18 LAB
ABSOLUTE EOS #: 0.1 K/UL (ref 0–0.4)
ABSOLUTE IMMATURE GRANULOCYTE: ABNORMAL K/UL (ref 0–0.3)
ABSOLUTE LYMPH #: 1.9 K/UL (ref 1–4.8)
ABSOLUTE MONO #: 0.5 K/UL (ref 0.1–1.2)
ALBUMIN SERPL-MCNC: 3.8 G/DL (ref 3.5–5.2)
ALBUMIN/GLOBULIN RATIO: 1.7 (ref 1–2.5)
ALP BLD-CCNC: 95 U/L (ref 35–104)
ALT SERPL-CCNC: 39 U/L (ref 5–33)
ANION GAP SERPL CALCULATED.3IONS-SCNC: 11 MMOL/L (ref 9–17)
AST SERPL-CCNC: 19 U/L
BASOPHILS # BLD: 1 % (ref 0–2)
BASOPHILS ABSOLUTE: 0.1 K/UL (ref 0–0.2)
BILIRUB SERPL-MCNC: 0.55 MG/DL (ref 0.3–1.2)
BNP INTERPRETATION: NORMAL
BUN BLDV-MCNC: 15 MG/DL (ref 8–23)
BUN/CREAT BLD: ABNORMAL (ref 9–20)
CALCIUM SERPL-MCNC: 8.7 MG/DL (ref 8.6–10.4)
CHLORIDE BLD-SCNC: 101 MMOL/L (ref 98–107)
CO2: 25 MMOL/L (ref 20–31)
CREAT SERPL-MCNC: 0.69 MG/DL (ref 0.5–0.9)
D-DIMER QUANTITATIVE: 0.91 MG/L FEU
DIFFERENTIAL TYPE: ABNORMAL
EKG ATRIAL RATE: 98 BPM
EKG P AXIS: 28 DEGREES
EKG P-R INTERVAL: 154 MS
EKG Q-T INTERVAL: 334 MS
EKG QRS DURATION: 94 MS
EKG QTC CALCULATION (BAZETT): 426 MS
EKG R AXIS: -14 DEGREES
EKG T AXIS: 117 DEGREES
EKG VENTRICULAR RATE: 98 BPM
EOSINOPHILS RELATIVE PERCENT: 1 % (ref 1–4)
GFR AFRICAN AMERICAN: >60 ML/MIN
GFR NON-AFRICAN AMERICAN: >60 ML/MIN
GFR SERPL CREATININE-BSD FRML MDRD: ABNORMAL ML/MIN/{1.73_M2}
GFR SERPL CREATININE-BSD FRML MDRD: ABNORMAL ML/MIN/{1.73_M2}
GLUCOSE BLD-MCNC: 90 MG/DL (ref 70–99)
HCT VFR BLD CALC: 40.8 % (ref 36–46)
HEMOGLOBIN: 13.7 G/DL (ref 12–16)
IMMATURE GRANULOCYTES: ABNORMAL %
LACTIC ACID, SEPSIS WHOLE BLOOD: ABNORMAL MMOL/L (ref 0.5–1.9)
LACTIC ACID, SEPSIS WHOLE BLOOD: ABNORMAL MMOL/L (ref 0.5–1.9)
LACTIC ACID, SEPSIS: 2 MMOL/L (ref 0.5–1.9)
LACTIC ACID, SEPSIS: 3 MMOL/L (ref 0.5–1.9)
LYMPHOCYTES # BLD: 19 % (ref 24–44)
MAGNESIUM: 2 MG/DL (ref 1.6–2.6)
MCH RBC QN AUTO: 28.7 PG (ref 26–34)
MCHC RBC AUTO-ENTMCNC: 33.5 G/DL (ref 31–37)
MCV RBC AUTO: 85.6 FL (ref 80–100)
MONOCYTES # BLD: 5 % (ref 2–11)
NRBC AUTOMATED: ABNORMAL PER 100 WBC
PDW BLD-RTO: 17.3 % (ref 12.5–15.4)
PLATELET # BLD: 181 K/UL (ref 140–450)
PLATELET ESTIMATE: ABNORMAL
PMV BLD AUTO: 8.3 FL (ref 6–12)
POTASSIUM SERPL-SCNC: 3.2 MMOL/L (ref 3.7–5.3)
PRO-BNP: 257 PG/ML
RBC # BLD: 4.77 M/UL (ref 4–5.2)
RBC # BLD: ABNORMAL 10*6/UL
SARS-COV-2, RAPID: NOT DETECTED
SEG NEUTROPHILS: 74 % (ref 36–66)
SEGMENTED NEUTROPHILS ABSOLUTE COUNT: 7.3 K/UL (ref 1.8–7.7)
SODIUM BLD-SCNC: 137 MMOL/L (ref 135–144)
SPECIMEN DESCRIPTION: NORMAL
TOTAL PROTEIN: 6.1 G/DL (ref 6.4–8.3)
TROPONIN INTERP: NORMAL
TROPONIN T: NORMAL NG/ML
TROPONIN, HIGH SENSITIVITY: 14 NG/L (ref 0–14)
WBC # BLD: 9.9 K/UL (ref 3.5–11)
WBC # BLD: ABNORMAL 10*3/UL

## 2021-11-18 PROCEDURE — 83880 ASSAY OF NATRIURETIC PEPTIDE: CPT

## 2021-11-18 PROCEDURE — 83605 ASSAY OF LACTIC ACID: CPT

## 2021-11-18 PROCEDURE — 99284 EMERGENCY DEPT VISIT MOD MDM: CPT

## 2021-11-18 PROCEDURE — 85379 FIBRIN DEGRADATION QUANT: CPT

## 2021-11-18 PROCEDURE — 2580000003 HC RX 258: Performed by: SPECIALIST

## 2021-11-18 PROCEDURE — 6370000000 HC RX 637 (ALT 250 FOR IP): Performed by: EMERGENCY MEDICINE

## 2021-11-18 PROCEDURE — 84484 ASSAY OF TROPONIN QUANT: CPT

## 2021-11-18 PROCEDURE — 96375 TX/PRO/DX INJ NEW DRUG ADDON: CPT

## 2021-11-18 PROCEDURE — 96365 THER/PROPH/DIAG IV INF INIT: CPT

## 2021-11-18 PROCEDURE — 6370000000 HC RX 637 (ALT 250 FOR IP): Performed by: SPECIALIST

## 2021-11-18 PROCEDURE — 93005 ELECTROCARDIOGRAM TRACING: CPT | Performed by: SPECIALIST

## 2021-11-18 PROCEDURE — 85025 COMPLETE CBC W/AUTO DIFF WBC: CPT

## 2021-11-18 PROCEDURE — 83735 ASSAY OF MAGNESIUM: CPT

## 2021-11-18 PROCEDURE — 71260 CT THORAX DX C+: CPT

## 2021-11-18 PROCEDURE — 80053 COMPREHEN METABOLIC PANEL: CPT

## 2021-11-18 PROCEDURE — 36415 COLL VENOUS BLD VENIPUNCTURE: CPT

## 2021-11-18 PROCEDURE — 87635 SARS-COV-2 COVID-19 AMP PRB: CPT

## 2021-11-18 PROCEDURE — 6360000002 HC RX W HCPCS: Performed by: EMERGENCY MEDICINE

## 2021-11-18 PROCEDURE — 6360000004 HC RX CONTRAST MEDICATION: Performed by: SPECIALIST

## 2021-11-18 RX ORDER — BENZONATATE 100 MG/1
100 CAPSULE ORAL ONCE
Status: COMPLETED | OUTPATIENT
Start: 2021-11-18 | End: 2021-11-18

## 2021-11-18 RX ORDER — PREDNISONE 10 MG/1
10 TABLET ORAL SEE ADMIN INSTRUCTIONS
Qty: 17 TABLET | Refills: 0 | Status: SHIPPED | OUTPATIENT
Start: 2021-11-18 | End: 2021-11-24

## 2021-11-18 RX ORDER — LEVOFLOXACIN 5 MG/ML
500 INJECTION, SOLUTION INTRAVENOUS ONCE
Status: COMPLETED | OUTPATIENT
Start: 2021-11-18 | End: 2021-11-18

## 2021-11-18 RX ORDER — IPRATROPIUM BROMIDE AND ALBUTEROL SULFATE 2.5; .5 MG/3ML; MG/3ML
1 SOLUTION RESPIRATORY (INHALATION) ONCE
Status: COMPLETED | OUTPATIENT
Start: 2021-11-18 | End: 2021-11-18

## 2021-11-18 RX ORDER — LEVOFLOXACIN 500 MG/1
500 TABLET, FILM COATED ORAL DAILY
Qty: 7 TABLET | Refills: 0 | Status: SHIPPED | OUTPATIENT
Start: 2021-11-18 | End: 2021-11-25

## 2021-11-18 RX ORDER — SODIUM CHLORIDE 0.9 % (FLUSH) 0.9 %
10 SYRINGE (ML) INJECTION PRN
Status: DISCONTINUED | OUTPATIENT
Start: 2021-11-18 | End: 2021-11-18 | Stop reason: HOSPADM

## 2021-11-18 RX ORDER — DEXAMETHASONE SODIUM PHOSPHATE 10 MG/ML
10 INJECTION INTRAMUSCULAR; INTRAVENOUS ONCE
Status: COMPLETED | OUTPATIENT
Start: 2021-11-18 | End: 2021-11-18

## 2021-11-18 RX ORDER — 0.9 % SODIUM CHLORIDE 0.9 %
80 INTRAVENOUS SOLUTION INTRAVENOUS ONCE
Status: COMPLETED | OUTPATIENT
Start: 2021-11-18 | End: 2021-11-18

## 2021-11-18 RX ADMIN — DEXAMETHASONE SODIUM PHOSPHATE 10 MG: 10 INJECTION INTRAMUSCULAR; INTRAVENOUS at 10:19

## 2021-11-18 RX ADMIN — IPRATROPIUM BROMIDE AND ALBUTEROL SULFATE 1 AMPULE: .5; 3 SOLUTION RESPIRATORY (INHALATION) at 10:44

## 2021-11-18 RX ADMIN — LEVOFLOXACIN 500 MG: 5 INJECTION, SOLUTION INTRAVENOUS at 10:19

## 2021-11-18 RX ADMIN — IOPAMIDOL 75 ML: 755 INJECTION, SOLUTION INTRAVENOUS at 07:55

## 2021-11-18 RX ADMIN — SODIUM CHLORIDE 80 ML: 9 INJECTION, SOLUTION INTRAVENOUS at 07:56

## 2021-11-18 RX ADMIN — SODIUM CHLORIDE, PRESERVATIVE FREE 10 ML: 5 INJECTION INTRAVENOUS at 07:56

## 2021-11-18 RX ADMIN — BENZONATATE 100 MG: 100 CAPSULE ORAL at 06:57

## 2021-11-18 ASSESSMENT — PAIN SCALES - GENERAL: PAINLEVEL_OUTOF10: 7

## 2021-11-18 ASSESSMENT — PAIN DESCRIPTION - PAIN TYPE: TYPE: ACUTE PAIN

## 2021-11-18 ASSESSMENT — PAIN DESCRIPTION - LOCATION: LOCATION: CHEST

## 2021-11-18 NOTE — ED PROVIDER NOTES
[id number] [facility]  [id number] [facility] [place] [address]  Phone: [phone]  Fax: [phone]        ADDENDUM:      Care of this patient was assumed from Dr. Ana Esparza. The patient was seen for Leg Swelling (Pt c/o bilat leg swelling for 3 days) and Cough (Pt c/o ongoing cough for 3 weeks.)  . The patient's initial evaluation and plan have been discussed with the prior provider who initially evaluated the patient. Nursing Notes, Past Medical Hx, Past Surgical Hx, Allergies, were all reviewed. PAST MEDICAL HISTORY    has a past medical history of Arthritis, Displacement of lumbar intervertebral disc without myelopathy, Hypertension, Lumbar disc disease, Personal history of TIA (transient ischemic attack), Sleep deprivation, SOB (shortness of breath), and Wears glasses. SURGICAL HISTORY      has a past surgical history that includes Wrist surgery (Left); Hysterectomy; Ankle surgery (Right); Tonsillectomy; Nerve Block (10/19/15); Nerve Block (10-26-15 ); Foot surgery (2015); Hip arthroscopy (Left, 03/14/2018); and pr hip arthroscopy, dx (Left, 3/14/2018).     CURRENT MEDICATIONS       Previous Medications    ALBUTEROL SULFATE HFA (VENTOLIN HFA) 108 (90 BASE) MCG/ACT INHALER    Inhale 2 puffs into the lungs 4 times daily as needed for Wheezing    ALBUTEROL SULFATE HFA (VENTOLIN HFA) 108 (90 BASE) MCG/ACT INHALER    Inhale 2 puffs into the lungs every 6 hours as needed for Wheezing or Shortness of Breath    ALBUTEROL SULFATE  (90 BASE) MCG/ACT INHALER    Inhale 2 puffs into the lungs every 4 hours as needed for Wheezing or Shortness of Breath    AMLODIPINE (NORVASC) 5 MG TABLET    Take 1 tablet by mouth daily    BUDESONIDE-FORMOTEROL (SYMBICORT) 160-4.5 MCG/ACT AERO    Inhale 2 puffs into the lungs 2 times daily    CAPSAICIN-MENTHOL (ALLEVESS EX)    Apply topically    COLCHICINE (COLCRYS) 0.6 MG TABLET    Take 1 tablet by mouth 2 times daily    HYDROXYCHLOROQUINE (PLAQUENIL) 200 MG TABLET    Take 200 mg by mouth daily Take 1 and 1/2 tablet daily       ALLERGIES     is allergic to bee pollen, erythromycin, pcn [penicillins], tetanus toxoids, tetracyclines & related, and lisinopril. Diagnostic Results       LABS:   Results for orders placed or performed during the hospital encounter of 11/18/21   COVID-19, Rapid    Specimen: Nasopharyngeal Swab   Result Value Ref Range    Specimen Description . NASOPHARYNGEAL SWAB     SARS-CoV-2, Rapid Not Detected Not Detected   CBC Auto Differential   Result Value Ref Range    WBC 9.9 3.5 - 11.0 k/uL    RBC 4.77 4.0 - 5.2 m/uL    Hemoglobin 13.7 12.0 - 16.0 g/dL    Hematocrit 40.8 36 - 46 %    MCV 85.6 80 - 100 fL    MCH 28.7 26 - 34 pg    MCHC 33.5 31 - 37 g/dL    RDW 17.3 (H) 12.5 - 15.4 %    Platelets 472 466 - 887 k/uL    MPV 8.3 6.0 - 12.0 fL    NRBC Automated NOT REPORTED per 100 WBC    Differential Type NOT REPORTED     Seg Neutrophils 74 (H) 36 - 66 %    Lymphocytes 19 (L) 24 - 44 %    Monocytes 5 2 - 11 %    Eosinophils % 1 1 - 4 %    Basophils 1 0 - 2 %    Immature Granulocytes NOT REPORTED 0 %    Segs Absolute 7.30 1.8 - 7.7 k/uL    Absolute Lymph # 1.90 1.0 - 4.8 k/uL    Absolute Mono # 0.50 0.1 - 1.2 k/uL    Absolute Eos # 0.10 0.0 - 0.4 k/uL    Basophils Absolute 0.10 0.0 - 0.2 k/uL    Absolute Immature Granulocyte NOT REPORTED 0.00 - 0.30 k/uL    WBC Morphology NOT REPORTED     RBC Morphology NOT REPORTED     Platelet Estimate NOT REPORTED    Comprehensive Metabolic Panel w/ Reflex to MG   Result Value Ref Range    Glucose 90 70 - 99 mg/dL    BUN 15 8 - 23 mg/dL    CREATININE 0.69 0.50 - 0.90 mg/dL    Bun/Cre Ratio NOT REPORTED 9 - 20    Calcium 8.7 8.6 - 10.4 mg/dL    Sodium 137 135 - 144 mmol/L    Potassium 3.2 (L) 3.7 - 5.3 mmol/L    Chloride 101 98 - 107 mmol/L    CO2 25 20 - 31 mmol/L    Anion Gap 11 9 - 17 mmol/L    Alkaline Phosphatase 95 35 - 104 U/L    ALT 39 (H) 5 - 33 U/L    AST 19 <32 U/L    Total Bilirubin 0.55 0.3 - 1.2 mg/dL Total Protein 6.1 (L) 6.4 - 8.3 g/dL    Albumin 3.8 3.5 - 5.2 g/dL    Albumin/Globulin Ratio 1.7 1.0 - 2.5    GFR Non-African American >60 >60 mL/min    GFR African American >60 >60 mL/min    GFR Comment          GFR Staging NOT REPORTED    Troponin   Result Value Ref Range    Troponin, High Sensitivity 14 0 - 14 ng/L    Troponin T NOT REPORTED <0.03 ng/mL    Troponin Interp NOT REPORTED    Brain Natriuretic Peptide   Result Value Ref Range    Pro- <300 pg/mL    BNP Interpretation NOT REPORTED    D-Dimer, Quantitative   Result Value Ref Range    D-Dimer, Quant 0.91 mg/L FEU   Lactate, Sepsis   Result Value Ref Range    Lactic Acid, Sepsis 2.0 (H) 0.5 - 1.9 mmol/L    Lactic Acid, Sepsis, Whole Blood NOT REPORTED 0.5 - 1.9 mmol/L   Lactate, Sepsis   Result Value Ref Range    Lactic Acid, Sepsis 3.0 (H) 0.5 - 1.9 mmol/L    Lactic Acid, Sepsis, Whole Blood NOT REPORTED 0.5 - 1.9 mmol/L   Magnesium   Result Value Ref Range    Magnesium 2.0 1.6 - 2.6 mg/dL   EKG 12 Lead   Result Value Ref Range    Ventricular Rate 98 BPM    Atrial Rate 98 BPM    P-R Interval 154 ms    QRS Duration 94 ms    Q-T Interval 334 ms    QTc Calculation (Bazett) 426 ms    P Axis 28 degrees    R Axis -14 degrees    T Axis 117 degrees       RADIOLOGY:  CT CHEST PULMONARY EMBOLISM W CONTRAST   Final Result   1. Negative for pulmonary embolus. 2. Central airways disease with mild bibasilar atypical infectious versus   inflammatory bronchiolitis.              RECENT VITALS:  BP: 136/64, Temp: 98.1 °F (36.7 °C), Pulse: 106, Resp: 18     ED Course     The patient was given the following medications:  Orders Placed This Encounter   Medications    benzonatate (TESSALON) capsule 100 mg    0.9 % sodium chloride bolus    sodium chloride flush 0.9 % injection 10 mL    iopamidol (ISOVUE-370) 76 % injection 75 mL    dexamethasone (DECADRON) injection 10 mg    levoFLOXacin (LEVAQUIN) 500 MG/100ML infusion 500 mg     Order Specific Question: Antimicrobial Indications     Answer:   Upper Respiratory Infection    ipratropium-albuterol (DUONEB) nebulizer solution 1 ampule     Order Specific Question:   Initiate RT Bronchodilator Protocol     Answer: Yes    levoFLOXacin (LEVAQUIN) 500 MG tablet     Sig: Take 1 tablet by mouth daily for 7 days     Dispense:  7 tablet     Refill:  0    predniSONE (DELTASONE) 10 MG tablet     Sig: Take 1 tablet by mouth See Admin Instructions for 6 days Take 4 tablets by mouth daily for days 1-2. Take 3 tablets by mouth daily for days 3-4. Take 2 tablets by mouth daily day 5. Take 1 tablet by mouth on day 6     Dispense:  17 tablet     Refill:  0       Medical Decision Making      ED Course as of 11/18/21 1014   Thu Nov 18, 2021   1000 Patient's repeat lactic acid a little bit higher than her first 1. Laboratory results were otherwise unremarkable, CBC is grossly normal, CMP revealed a mildly low potassium but otherwise no significant abnormalities. Troponin negative. Her D-dimer was elevated and she did have a CT scan. BNP normal.  Covid test negative. [TS]   1000 CT scan shows no PE, it does show evidence of a bronchiolitis/atypical upper respiratory infection [TS]   1001 Patient satting 96% on room air [TS]   1001 I am going to order Decadron IV, Rocephin IV, will discharge her on azithromycin and prednisone with rapid PCP follow-up [TS]   1001 Other interesting notes from CT scan: There is scattered endobronchial mucous plugging within the  lung bases with associated micro nodularity which exhibits a tree-in-bud  configuration. Bilateral pleural effusions have resolved. There is no  pneumothorax. [TS]   1012 Did discuss results with patient, she is resting comfortably, she still having some fits of coughing so I did actually order 1 DuoNeb in addition to the doxycycline because of her allergies and Decadron prior to discharge.   They are can have close follow-up with PCP, will return if worse [TS]   1012 At this are mis-transcribed.)    Lily Bosworth,   Attending Emergency Medicine Physician                      Lily Bosworth,   11/18/21 1011

## 2021-11-18 NOTE — ED PROVIDER NOTES
[facility] ENCOUNTER      Pt Name: Zachery Akers  MRN: [id number]  [place] 1945  Date of evaluation: 11/18/21      CHIEF COMPLAINT       Chief Complaint   Patient presents with    Leg Swelling    Cough         HISTORY OF PRESENT ILLNESS    Zachery Akers is a 78 y.o. female who presents to the emergency department for evaluation of cough with dark yellowish-green-colored sputum production for last 3 weeks associated shortness of breath, occasional wheezing and chest pain mostly with the cough. She is also noticed some swelling in the feet. She denies any fever or chills. She denies any sick or ill contacts or recent travels. Patient is not vaccinated for Covid infection. Patient was admitted recently on September 27, 2021 for bilateral pleural effusion, pericardial effusion and required thoracentesis with removal of 2400 cc of fluid. She had positive SOSA test and patient states that she was diagnosed to have lupus on September 26, 2021. She denies any history of asthma or COPD and does not use any oxygen at home. She also denies any history of coronary artery disease or congestive heart failure. She has never been smoker. There are no exacerbating or relieving factors and patient has not taken any medications for cough or recently at this time. REVIEW OF SYSTEMS       Review of Systems    All systems reviewed and negative unless noted in HPI. The patient denies fever or constitutional symptoms. Denies vision change. Denies any sore throat or rhinorrhea. Denies any neck pain or stiffness. Presents with cough, shortness of breath and occasional wheezing  No nausea,  vomiting or diarrhea. Denies any dysuria. Denies urinary frequency or hematuria. Denies musculoskeletal injury or pain. Denies any weakness, numbness or focal neurologic deficit. Denies any skin rash or edema. No recent psychiatric issues. No easy bruising or bleeding. Denies any polyuria, polydypsia       PAST MEDICAL HISTORY    has a past medical history of Arthritis, Displacement of lumbar intervertebral disc without myelopathy, Hypertension, Lumbar disc disease, Personal history of TIA (transient ischemic attack), Sleep deprivation, SOB (shortness of breath), and Wears glasses. SURGICAL HISTORY      has a past surgical history that includes Wrist surgery (Left); Hysterectomy; Ankle surgery (Right); Tonsillectomy; Nerve Block (10/19/15); Nerve Block (10-26-15 ); Foot surgery (2015); Hip arthroscopy (Left, 03/14/2018); and pr hip arthroscopy, dx (Left, 3/14/2018). CURRENT MEDICATIONS       Previous Medications    ALBUTEROL SULFATE HFA (VENTOLIN HFA) 108 (90 BASE) MCG/ACT INHALER    Inhale 2 puffs into the lungs 4 times daily as needed for Wheezing    ALBUTEROL SULFATE HFA (VENTOLIN HFA) 108 (90 BASE) MCG/ACT INHALER    Inhale 2 puffs into the lungs every 6 hours as needed for Wheezing or Shortness of Breath    ALBUTEROL SULFATE  (90 BASE) MCG/ACT INHALER    Inhale 2 puffs into the lungs every 4 hours as needed for Wheezing or Shortness of Breath    AMLODIPINE (NORVASC) 5 MG TABLET    Take 1 tablet by mouth daily    BUDESONIDE-FORMOTEROL (SYMBICORT) 160-4.5 MCG/ACT AERO    Inhale 2 puffs into the lungs 2 times daily    CAPSAICIN-MENTHOL (ALLEVESS EX)    Apply topically    COLCHICINE (COLCRYS) 0.6 MG TABLET    Take 1 tablet by mouth 2 times daily    HYDROXYCHLOROQUINE (PLAQUENIL) 200 MG TABLET    Take 200 mg by mouth daily Take 1 and 1/2 tablet daily       ALLERGIES     is allergic to bee pollen, erythromycin, pcn [penicillins], tetanus toxoids, tetracyclines & related, and lisinopril. FAMILY HISTORY     She indicated that the status of her mother is unknown. She indicated that the status of her father is unknown. She indicated that the status of her brother is unknown.  She indicated that the status of her paternal grandfather is unknown.     family history includes Heart Disease in her father and paternal grandfather; Sullivan Piggs in her brother and mother. SOCIAL HISTORY      reports that she has never smoked. She has never used smokeless tobacco. She reports that she does not drink alcohol and does not use drugs. PHYSICAL EXAM     INITIAL VITALS:  height is 5' 2\" (1.575 m) and weight is 54.9 kg (121 lb). Her oral temperature is 98.1 °F (36.7 °C). Her blood pressure is 152/74 (abnormal) and her pulse is 106. Her respiration is 18 and oxygen saturation is 94%. Physical Exam  Vitals and nursing note reviewed. Constitutional:       Appearance: She is well-developed. HENT:      Head: Normocephalic and atraumatic. Nose: Nose normal.   Eyes:      Extraocular Movements: Extraocular movements intact. Pupils: Pupils are equal, round, and reactive to light. Cardiovascular:      Rate and Rhythm: Normal rate and regular rhythm. Heart sounds: Normal heart sounds. No murmur heard. Pulmonary:      Effort: Pulmonary effort is normal. No respiratory distress. Breath sounds: Decreased breath sounds present. Abdominal:      General: Bowel sounds are normal. There is no distension. Palpations: Abdomen is soft. Tenderness: There is no abdominal tenderness. Musculoskeletal:      Cervical back: Normal range of motion and neck supple. Comments: Patient has trace bilateral ankle edema. There is no calf tenderness. Skin:     General: Skin is warm and dry. Neurological:      General: No focal deficit present. Mental Status: She is alert and oriented to person, place, and time.            DIFFERENTIAL DIAGNOSIS/ MDM:     Bronchitis, pneumonia, COPD exacerbation, CHF, ACS, Asthma, PE, Anxiety, Pneumothorax, Pulmonary Fibrosis    DIAGNOSTIC RESULTS     EKG: All EKG's are interpreted by the Emergency Department Physician who either signs or Co-signs this chart in the absence of a cardiologist.    EKG Interpretation    Interpreted by emergency department physician    Rhythm: normal sinus   Rate: normal  Axis: normal  Ectopy: none  Conduction: normal  ST Segments: no acute change  T Waves: no acute change  Q Waves: nonspecific    Clinical Impression: non-specific EKG and left ventricular hypertrophy    Yusef Merino MD    RADIOLOGY:   Interpretation per the Radiologist below, if available at the time of this note:    No results found. LABS:  No results found for this visit on 11/18/21. Lab results pending at this time    EMERGENCY DEPARTMENT COURSE:   Vitals:    Vitals:    11/18/21 0651   BP: (!) 152/74   Pulse: 106   Resp: 18   Temp: 98.1 °F (36.7 °C)   TempSrc: Oral   SpO2: 94%   Weight: 54.9 kg (121 lb)   Height: 5' 2\" (1.575 m)     -------------------------  BP: (!) 152/74, Temp: 98.1 °F (36.7 °C), Pulse: 106, Resp: 18    Orders Placed This Encounter   Medications    benzonatate (TESSALON) capsule 100 mg         During the ED course, patient was put on the monitor and given Tessalon Perles orally. Her lab results and CT chest are pending at this time. Case is turned over to Dr. WILLIS [facility] at the end of my shift for further care and disposition. PROCEDURES:  None    FINAL IMPRESSION    No diagnosis found. DISPOSITION/PLAN       PATIENT REFERRED TO:  No follow-up provider specified. DISCHARGE MEDICATIONS:  New Prescriptions    No medications on file       (Please note that portions of this note were completed with a voice recognition program.  Efforts were made to edit the dictations but occasionally words are mis-transcribed.)    Yusef Merino MD,, MD, F.A.C.E.P.   Attending Emergency Medicine Physician      Yusef Merino MD  11/18/21 [id number] [facility] Soniya Cunha MD  11/20/21 5415

## 2021-12-19 ENCOUNTER — APPOINTMENT (OUTPATIENT)
Dept: GENERAL RADIOLOGY | Age: 76
DRG: 948 | End: 2021-12-19
Payer: MEDICARE

## 2021-12-19 ENCOUNTER — HOSPITAL ENCOUNTER (INPATIENT)
Age: 76
LOS: 1 days | Discharge: HOME | DRG: 948 | End: 2021-12-20
Attending: EMERGENCY MEDICINE | Admitting: EMERGENCY MEDICINE
Payer: MEDICARE

## 2021-12-19 DIAGNOSIS — R07.9 CHEST PAIN, UNSPECIFIED TYPE: ICD-10-CM

## 2021-12-19 DIAGNOSIS — M79.89 LEG SWELLING: Primary | ICD-10-CM

## 2021-12-19 LAB
ABSOLUTE EOS #: 0.18 K/UL (ref 0–0.44)
ABSOLUTE IMMATURE GRANULOCYTE: 0.36 K/UL (ref 0–0.3)
ABSOLUTE LYMPH #: 2.03 K/UL (ref 1.1–3.7)
ABSOLUTE MONO #: 0.56 K/UL (ref 0.1–1.2)
ANION GAP SERPL CALCULATED.3IONS-SCNC: 12 MMOL/L (ref 9–17)
BASOPHILS # BLD: 1 % (ref 0–2)
BASOPHILS ABSOLUTE: 0.06 K/UL (ref 0–0.2)
BNP INTERPRETATION: NORMAL
BUN BLDV-MCNC: 19 MG/DL (ref 8–23)
BUN/CREAT BLD: ABNORMAL (ref 9–20)
CALCIUM SERPL-MCNC: 8.7 MG/DL (ref 8.6–10.4)
CHLORIDE BLD-SCNC: 111 MMOL/L (ref 98–107)
CO2: 22 MMOL/L (ref 20–31)
CREAT SERPL-MCNC: 0.62 MG/DL (ref 0.5–0.9)
DIFFERENTIAL TYPE: ABNORMAL
EOSINOPHILS RELATIVE PERCENT: 2 % (ref 1–4)
GFR AFRICAN AMERICAN: >60 ML/MIN
GFR NON-AFRICAN AMERICAN: >60 ML/MIN
GFR SERPL CREATININE-BSD FRML MDRD: ABNORMAL ML/MIN/{1.73_M2}
GFR SERPL CREATININE-BSD FRML MDRD: ABNORMAL ML/MIN/{1.73_M2}
GLUCOSE BLD-MCNC: 101 MG/DL (ref 70–99)
HCT VFR BLD CALC: 39.5 % (ref 36.3–47.1)
HEMOGLOBIN: 12.9 G/DL (ref 11.9–15.1)
IMMATURE GRANULOCYTES: 4 %
LYMPHOCYTES # BLD: 25 % (ref 24–43)
MCH RBC QN AUTO: 29.1 PG (ref 25.2–33.5)
MCHC RBC AUTO-ENTMCNC: 32.7 G/DL (ref 28.4–34.8)
MCV RBC AUTO: 89.2 FL (ref 82.6–102.9)
MONOCYTES # BLD: 7 % (ref 3–12)
NRBC AUTOMATED: 0 PER 100 WBC
PDW BLD-RTO: 16.5 % (ref 11.8–14.4)
PLATELET # BLD: 142 K/UL (ref 138–453)
PLATELET ESTIMATE: ABNORMAL
PMV BLD AUTO: 11 FL (ref 8.1–13.5)
POTASSIUM SERPL-SCNC: 3.5 MMOL/L (ref 3.7–5.3)
PRO-BNP: 278 PG/ML
RBC # BLD: 4.43 M/UL (ref 3.95–5.11)
RBC # BLD: ABNORMAL 10*6/UL
SEG NEUTROPHILS: 61 % (ref 36–65)
SEGMENTED NEUTROPHILS ABSOLUTE COUNT: 4.92 K/UL (ref 1.5–8.1)
SODIUM BLD-SCNC: 145 MMOL/L (ref 135–144)
TROPONIN INTERP: NORMAL
TROPONIN T: NORMAL NG/ML
TROPONIN, HIGH SENSITIVITY: 11 NG/L (ref 0–14)
WBC # BLD: 8.1 K/UL (ref 3.5–11.3)
WBC # BLD: ABNORMAL 10*3/UL

## 2021-12-19 PROCEDURE — 93005 ELECTROCARDIOGRAM TRACING: CPT | Performed by: STUDENT IN AN ORGANIZED HEALTH CARE EDUCATION/TRAINING PROGRAM

## 2021-12-19 PROCEDURE — 71046 X-RAY EXAM CHEST 2 VIEWS: CPT

## 2021-12-19 PROCEDURE — 83880 ASSAY OF NATRIURETIC PEPTIDE: CPT

## 2021-12-19 PROCEDURE — 83735 ASSAY OF MAGNESIUM: CPT

## 2021-12-19 PROCEDURE — 80048 BASIC METABOLIC PNL TOTAL CA: CPT

## 2021-12-19 PROCEDURE — 85025 COMPLETE CBC W/AUTO DIFF WBC: CPT

## 2021-12-19 PROCEDURE — 84484 ASSAY OF TROPONIN QUANT: CPT

## 2021-12-19 PROCEDURE — 99285 EMERGENCY DEPT VISIT HI MDM: CPT

## 2021-12-20 VITALS
TEMPERATURE: 97.6 F | BODY MASS INDEX: 23.92 KG/M2 | SYSTOLIC BLOOD PRESSURE: 118 MMHG | HEIGHT: 62 IN | RESPIRATION RATE: 27 BRPM | WEIGHT: 130 LBS | OXYGEN SATURATION: 97 % | DIASTOLIC BLOOD PRESSURE: 70 MMHG | HEART RATE: 77 BPM

## 2021-12-20 PROBLEM — R60.0 LOWER EXTREMITY EDEMA: Status: ACTIVE | Noted: 2021-12-20

## 2021-12-20 LAB
EKG ATRIAL RATE: 68 BPM
EKG P AXIS: 36 DEGREES
EKG P-R INTERVAL: 172 MS
EKG Q-T INTERVAL: 410 MS
EKG QRS DURATION: 84 MS
EKG QTC CALCULATION (BAZETT): 435 MS
EKG R AXIS: 18 DEGREES
EKG T AXIS: 89 DEGREES
EKG VENTRICULAR RATE: 68 BPM
MAGNESIUM: 2.2 MG/DL (ref 1.6–2.6)
SARS-COV-2, RAPID: NOT DETECTED
SPECIMEN DESCRIPTION: NORMAL
TROPONIN INTERP: NORMAL
TROPONIN T: NORMAL NG/ML
TROPONIN, HIGH SENSITIVITY: 10 NG/L (ref 0–14)

## 2021-12-20 PROCEDURE — 84484 ASSAY OF TROPONIN QUANT: CPT

## 2021-12-20 PROCEDURE — 6360000002 HC RX W HCPCS: Performed by: STUDENT IN AN ORGANIZED HEALTH CARE EDUCATION/TRAINING PROGRAM

## 2021-12-20 PROCEDURE — 93308 TTE F-UP OR LMTD: CPT

## 2021-12-20 PROCEDURE — 93010 ELECTROCARDIOGRAM REPORT: CPT | Performed by: INTERNAL MEDICINE

## 2021-12-20 PROCEDURE — 2580000003 HC RX 258: Performed by: STUDENT IN AN ORGANIZED HEALTH CARE EDUCATION/TRAINING PROGRAM

## 2021-12-20 PROCEDURE — 87635 SARS-COV-2 COVID-19 AMP PRB: CPT

## 2021-12-20 PROCEDURE — 94761 N-INVAS EAR/PLS OXIMETRY MLT: CPT

## 2021-12-20 PROCEDURE — 6370000000 HC RX 637 (ALT 250 FOR IP): Performed by: STUDENT IN AN ORGANIZED HEALTH CARE EDUCATION/TRAINING PROGRAM

## 2021-12-20 PROCEDURE — 1200000000 HC SEMI PRIVATE

## 2021-12-20 RX ORDER — FUROSEMIDE 20 MG/1
20 TABLET ORAL DAILY PRN
Qty: 60 TABLET | Refills: 3 | Status: ON HOLD | OUTPATIENT
Start: 2021-12-20 | End: 2022-02-05 | Stop reason: SDUPTHER

## 2021-12-20 RX ORDER — BUDESONIDE AND FORMOTEROL FUMARATE DIHYDRATE 160; 4.5 UG/1; UG/1
2 AEROSOL RESPIRATORY (INHALATION) 2 TIMES DAILY
Status: DISCONTINUED | OUTPATIENT
Start: 2021-12-20 | End: 2021-12-20 | Stop reason: HOSPADM

## 2021-12-20 RX ORDER — HYDROXYCHLOROQUINE SULFATE 200 MG/1
200 TABLET, FILM COATED ORAL DAILY
Status: DISCONTINUED | OUTPATIENT
Start: 2021-12-20 | End: 2021-12-20 | Stop reason: HOSPADM

## 2021-12-20 RX ORDER — ACETAMINOPHEN 325 MG/1
650 TABLET ORAL EVERY 4 HOURS PRN
Status: DISCONTINUED | OUTPATIENT
Start: 2021-12-20 | End: 2021-12-20 | Stop reason: HOSPADM

## 2021-12-20 RX ORDER — ONDANSETRON 2 MG/ML
4 INJECTION INTRAMUSCULAR; INTRAVENOUS EVERY 6 HOURS PRN
Status: DISCONTINUED | OUTPATIENT
Start: 2021-12-20 | End: 2021-12-20 | Stop reason: HOSPADM

## 2021-12-20 RX ORDER — COLCHICINE 0.6 MG/1
0.6 TABLET ORAL 2 TIMES DAILY
Status: DISCONTINUED | OUTPATIENT
Start: 2021-12-20 | End: 2021-12-20 | Stop reason: HOSPADM

## 2021-12-20 RX ORDER — SODIUM CHLORIDE 9 MG/ML
25 INJECTION, SOLUTION INTRAVENOUS PRN
Status: DISCONTINUED | OUTPATIENT
Start: 2021-12-20 | End: 2021-12-20 | Stop reason: HOSPADM

## 2021-12-20 RX ORDER — AMLODIPINE BESYLATE 10 MG/1
5 TABLET ORAL DAILY
Status: DISCONTINUED | OUTPATIENT
Start: 2021-12-20 | End: 2021-12-20 | Stop reason: HOSPADM

## 2021-12-20 RX ORDER — SODIUM CHLORIDE 0.9 % (FLUSH) 0.9 %
5-40 SYRINGE (ML) INJECTION PRN
Status: DISCONTINUED | OUTPATIENT
Start: 2021-12-20 | End: 2021-12-20 | Stop reason: HOSPADM

## 2021-12-20 RX ORDER — ONDANSETRON 4 MG/1
4 TABLET, ORALLY DISINTEGRATING ORAL EVERY 8 HOURS PRN
Status: DISCONTINUED | OUTPATIENT
Start: 2021-12-20 | End: 2021-12-20 | Stop reason: HOSPADM

## 2021-12-20 RX ORDER — SODIUM CHLORIDE 0.9 % (FLUSH) 0.9 %
5-40 SYRINGE (ML) INJECTION EVERY 12 HOURS SCHEDULED
Status: DISCONTINUED | OUTPATIENT
Start: 2021-12-20 | End: 2021-12-20 | Stop reason: HOSPADM

## 2021-12-20 RX ORDER — FUROSEMIDE 20 MG/1
20 TABLET ORAL DAILY PRN
Status: DISCONTINUED | OUTPATIENT
Start: 2021-12-20 | End: 2021-12-20 | Stop reason: HOSPADM

## 2021-12-20 RX ORDER — PREDNISONE 20 MG/1
20 TABLET ORAL ONCE
Status: DISCONTINUED | OUTPATIENT
Start: 2021-12-21 | End: 2021-12-20 | Stop reason: HOSPADM

## 2021-12-20 RX ADMIN — ENOXAPARIN SODIUM 40 MG: 100 INJECTION SUBCUTANEOUS at 08:37

## 2021-12-20 RX ADMIN — AMLODIPINE BESYLATE 5 MG: 10 TABLET ORAL at 08:36

## 2021-12-20 RX ADMIN — SODIUM CHLORIDE, PRESERVATIVE FREE 5 ML: 5 INJECTION INTRAVENOUS at 08:42

## 2021-12-20 RX ADMIN — COLCHICINE 0.6 MG: 0.6 TABLET, FILM COATED ORAL at 08:37

## 2021-12-20 RX ADMIN — HYDROXYCHLOROQUINE SULFATE 200 MG: 200 TABLET ORAL at 08:37

## 2021-12-20 ASSESSMENT — PAIN SCALES - GENERAL
PAINLEVEL_OUTOF10: 0

## 2021-12-20 ASSESSMENT — ENCOUNTER SYMPTOMS
BACK PAIN: 0
VOMITING: 0
ABDOMINAL PAIN: 0
SORE THROAT: 0
NAUSEA: 0
SHORTNESS OF BREATH: 1
COUGH: 0

## 2021-12-20 NOTE — PROGRESS NOTES
CLINICAL PHARMACY NOTE: MEDS TO BEDS    Total # of Prescriptions Filled: 1   The following medications were delivered to the patient:  · LASIX 20    Additional Documentation:  DELIVERED MED TO PT IN ROOM 241 PAID CASH

## 2021-12-20 NOTE — ED PROVIDER NOTES
[address] [facility]     Emergency Department     Faculty Attestation    I performed a history and physical examination of the patient and discussed management with the resident. I reviewed the residents note and agree with the documented findings and plan of care. Any areas of disagreement are noted on the chart. I was personally present for the key portions of any procedures. I have documented in the chart those procedures where I was not present during the key portions. I have reviewed the emergency nurses triage note. I agree with the chief complaint, past medical history, past surgical history, allergies, medications, social and family history as documented unless otherwise noted below. For Physician Assistant/ Nurse Practitioner cases/documentation I have personally evaluated this patient and have completed at least one if not all key elements of the E/M (history, physical exam, and MDM). Additional findings are as noted. I have personally seen and evaluated the patient. I find the patient's history and physical exam are consistent with the NP/PA documentation. I agree with the care provided, treatment rendered, disposition and follow-up plan. 60-year-old female with a history of recently diagnosed lupus on 20 mg of prednisone daily, admission in September for pericardial and pleural effusion requiring pericardial drain and thoracentesis presenting with 9 pound weight gain in 2 days and significant shortness of breath with exertion. No recent change to medications. Eating and drinking normally. Normal urination. Exam:  General: Laying on the bed, awake, alert and in no acute distress  CV: normal rate, regular rhythm and Distant heart sounds. Trace edema in the bilateral lower extremities.   Lungs: Breathing comfortably on room air with no tachypnea, hypoxia, or increased work of breathing    Plan:  Cardiac work-up including CBC, electrolytes, troponin, BNP  Bedside ultrasound to rule out cardiac tamponade -trace fluid, no tamponade physiology visualized  Will place in observation for further cardiac evaluation and formal echo          Jenelle Acosta MD   Attending Emergency  Physician    (Please note that portions of this note were completed with a voice recognition program. Efforts were made to edit the dictations but occasionally words are mis-transcribed.)              Jenelle Acosta MD  12/20/21 5886

## 2021-12-20 NOTE — CONSULTS
Attending Physician Statement:    I have discussed the care of  Kuldeep So , including pertinent history and exam findings, with the Cardiology fellow/resident. I have seen and examined the patient and the key elements of all parts of the encounter have been performed by me. I agree with the assessment, plan and orders as documented by the fellow/resident, after I modified exam findings and plan of treatments, and the final version is my approved version of the assessment. Additional Comments:   LE edema- resolved. BNP normal. CXR clear. Doubt CHF  Weight gain- ? Due to steroids   Lupus  H/o Pericardial effusion- most recent limited echo revealed small effusion  - add Lasix PRN for swelling / edema / weight gain  - check limited echo to re eval effusion    Dispo: if Echo stable, then no further inpatient workup, follow up in 2 weeks. Omid Mendoza DO, [facility], [address], [address], [place] [address] [facility]  [facility]. [facility]  [phone]     [facility] Cardiology Cardiology    Consult / H&P               Today's Date: 12/20/2021  Patient Name: Kuldeep So  Date of admission: 12/19/2021 10:52 PM  Patient's age: 78 y.o., 1945  Admission Dx: Lower extremity edema [R60.0]    Reason for Consult:  Cardiac evaluation    Requesting Physician: Bari Oden MD    CHIEF COMPLAINT:  Edema, SOB    History Obtained From:  patient    HISTORY OF PRESENT ILLNESS:      The patient is a 78 y. o.female with a PMH of HTN, Pleural effusion and Pericardial effusion, who is admitted to the hospital for bilateral lower extremity edema and SOB. Pt states that over the last few days she has gained 9 lbs. In October the patient was treated for bilateral pleural effusions thought to be secondary to lupus with colchicine and prednisone. She was told to come back if she developed leg swelling. She is negative for fever, chills, chest pain, and decrease in urination.  Troponin levels are 11-> 10 BNP: 278 Crt: 0.62.    Past Medical History:   has a past medical history of Arthritis, Displacement of lumbar intervertebral disc without myelopathy, Hypertension, Lumbar disc disease, Personal history of TIA (transient ischemic attack), Sleep deprivation, SOB (shortness of breath), and Wears glasses. Past Surgical History:   has a past surgical history that includes Wrist surgery (Left); Hysterectomy; Ankle surgery (Right); Tonsillectomy; Nerve Block (10/19/15); Nerve Block (10-26-15 ); Foot surgery (2015); Hip arthroscopy (Left, 03/14/2018); and pr hip arthroscopy, dx (Left, 3/14/2018). Home Medications:    Prior to Admission medications    Medication Sig Start Date End Date Taking?  Authorizing Provider   hydroxychloroquine (PLAQUENIL) 200 MG tablet Take 200 mg by mouth daily Take 1 and 1/2 tablet daily   Yes Historical Provider, MD   colchicine (COLCRYS) 0.6 MG tablet Take 1 tablet by mouth 2 times daily 10/5/21  Yes Hank Sales PA-C   budesonide-formoterol [facility]) 160-4.5 MCG/ACT AERO Inhale 2 puffs into the lungs 2 times daily 10/5/21  Yes Hank Sales PA-C   albuterol sulfate HFA (VENTOLIN HFA) 108 (90 Base) MCG/ACT inhaler Inhale 2 puffs into the lungs 4 times daily as needed for Wheezing 10/5/21  Yes Hank Sales PA-C   albuterol sulfate HFA (VENTOLIN HFA) 108 (90 Base) MCG/ACT inhaler Inhale 2 puffs into the lungs every 6 hours as needed for Wheezing or Shortness of Breath 10/5/21  Yes Hank Sales PA-C   Capsaicin-Menthol (ALLEVESS EX) Apply topically    Yes Historical Provider, MD   amLODIPine (NORVASC) 5 MG tablet Take 1 tablet by mouth daily 10/5/21 11/4/21  Hank Sales PA-C   albuterol sulfate  (90 Base) MCG/ACT inhaler Inhale 2 puffs into the lungs every 4 hours as needed for Wheezing or Shortness of Breath 9/26/21 10/3/21  Helen Teixeira MD       Allergies:  Bee pollen, Erythromycin, Pcn [penicillins], Tetanus toxoids, Tetracyclines & related, and Lisinopril    Social History:   reports that she has never smoked. She has never used smokeless tobacco. She reports that she does not drink alcohol and does not use drugs. Family History: family history includes Heart Disease in her father and paternal grandfather; Nadine Pickup in her brother and mother. No h/o sudden cardiac death. No for premature CAD    REVIEW OF SYSTEMS:    Constitutional: Decrease in energy level, and  activity level. Eyes: No visual changes   ENT: No Headaches  Cardiovascular: HTN   Respiratory: Pulmonary hx as mentioned above  Gastrointestinal: No abdominal pain. No change in bowel or bladder habits. Genitourinary: No dysuria, trouble voiding, or hematuria. Musculoskeletal:  No gait disturbance, No weakness or joint complaints. Integumentary: No rash   Neurological: No headache, diplopia, change in muscle strength, numbness or tingling. No change in gait, balance, coordination, mood, affect, memory, mentation, behavior. Psychiatric: No anxiety, or depression. Endocrine: No temperature intolerance. No excessive thirst, fluid intake, or urination. No tremor. Hematologic/Lymphatic: No abnormal bruising or bleeding, blood clots or swollen lymph nodes. Allergic/Immunologic: No nasal congestion or hives. PHYSICAL EXAM:      /60   Pulse 76   Temp 97.8 °F (36.6 °C) (Oral)   Resp 19   Ht 5' 2\" (1.575 m)   Wt 130 lb (59 kg)   SpO2 98%   BMI 23.78 kg/m²    Constitutional and General Appearance: alert, cooperative, no distress and appears stated age  HEENT: PERRL, no cervical lymphadenopathy. No masses palpable. Normal oral mucosa  Respiratory:  Normal excursion and expansion without use of accessory muscles  Resp Auscultation: Good respiratory effort. No for increased work of breathing. On auscultation: clear to auscultation bilaterally  Cardiovascular:  Heart tones are crisp and normal. regular S1 and S2.   The carotid upstroke is normal in amplitude and contour without delay or bruit  Peripheral pulses are symmetrical and full   Abdomen:   No masses or tenderness  Bowel sounds present  Extremities:   No Cyanosis   Lower extremity edema: Yes BL lower extremities    Skin: Warm and dry  Neurological:  Alert and oriented. Moves all extremities well  No abnormalities of mood, affect, memory, mentation, or behavior are noted    DATA:    Diagnostics:      EKG:   Sinus Rhythm     ECHO: 9/29/21  Summary  Left ventricle is normal in size, global left ventricular systolic function  is normal, calculated ejection fraction is 55%. Right atrium appears small. Moderate to large pericardial effusion. Posteriorly measuring 2.29cm, Anteriorly measuring 3.66cm. Gelatinous  material is noted throughout. Cannot rule out right ventricular collapse in the subcostal views, clinical  correlation recommended. Mitral and tricuspid inflows show physiologic mild evidence of tamponade. Pleural effusion noted. Labs:     CBC:   Recent Labs     12/19/21  2314   WBC 8.1   HGB 12.9   HCT 39.5        BMP:   Recent Labs     12/19/21  2314   *   K 3.5*   CO2 22   BUN 19   CREATININE 0.62   LABGLOM >60   GLUCOSE 101*     BNP: No results for input(s): BNP in the last 72 hours. PT/INR: No results for input(s): PROTIME, INR in the last 72 hours. APTT:No results for input(s): APTT in the last 72 hours. CARDIAC ENZYMES:No results for input(s): CKTOTAL, CKMB, CKMBINDEX, TROPONINI in the last 72 hours. FASTING LIPID PANEL:No results found for: HDL, LDLDIRECT, LDLCALC, TRIG  LIVER PROFILE:No results for input(s): AST, ALT, LABALBU in the last 72 hours.     IMPRESSION:    LE Edema   HTN  Lupus       Patient Active Problem List   Diagnosis    Displacement of lumbar intervertebral disc without myelopathy    Pericardial effusion    Pleural effusion    Essential hypertension    Listeria sepsis (HCC)    Positive SOSA (antinuclear antibody)    Anemia    Lower extremity edema       RECOMMENDATIONS:  Limited ECHO. If normal, patient can be discharged from cardiology stand oint  Consider switching to an alternative instead of amlodipine  Compression stockings  Lasix 20 mg PRN         Sonu Marin  Cardiology Service  Internal Medicine Residency Program, PGY-1  Chepe Salazar MD  CV Fellow

## 2021-12-20 NOTE — ED PROVIDER NOTES
[facility]  Emergency Department Encounter  EmergencyMedicine Resident     Pt Name:Ale Pretty  MRN: [id number]  [place] 1945  Date of evaluation: 12/19/21  PCP:  CELY Nelson CNP    CHIEF COMPLAINT       Chief Complaint   Patient presents with    Leg Swelling    Shortness of Breath    Weight Gain     pt stated 9lb weight        HISTORY OF PRESENT ILLNESS  (Location/Symptom, Timing/Onset, Context/Setting, Quality, Duration, Modifying Factors, Severity.)      Yesika Hahn is a 78 y.o. female who presents with 9 pound weight gain in 2 days along with shortness of breath. Patient has a past medical history of cardiac tamponade and associated bilateral pleural effusions that was diagnosed in October of this year. Thought to be secondary to lupus. Was discharged on prednisone and colchicine. She states she was told to come back to the emergency room if started to have leg swelling, she reports leg swelling and weight gain of 9 pounds of the past few days. Denies any fevers, chills, chest pain, decreased urination. No reported history of DVT or PE. Patient was also seen last month for similar complaint, CT PE study negative. Discharged home on antibiotics for atypical pneumonia. PAST MEDICAL / SURGICAL / SOCIAL / FAMILY HISTORY      has a past medical history of Arthritis, Displacement of lumbar intervertebral disc without myelopathy, Hypertension, Lumbar disc disease, Personal history of TIA (transient ischemic attack), Sleep deprivation, SOB (shortness of breath), and Wears glasses. has a past surgical history that includes Wrist surgery (Left); Hysterectomy; Ankle surgery (Right); Tonsillectomy; Nerve Block (10/19/15); Nerve Block (10-26-15 ); Foot surgery (2015); Hip arthroscopy (Left, 03/14/2018); and pr hip arthroscopy, dx (Left, 3/14/2018).     Social History     Socioeconomic History    Marital status:      Spouse name: Not on file    Number of children: Not on file    Years of education: Not on file    Highest education level: Not on file   Occupational History     Employer: [employer]     Comment: [employer]   Tobacco Use    Smoking status: Never Smoker    Smokeless tobacco: Never Used   Vaping Use    Vaping Use: Never used   Substance and Sexual Activity    Alcohol use: No    Drug use: No    Sexual activity: Not on file   Other Topics Concern    Not on file   Social History Narrative    Not on file     Social Determinants of Health     Financial Resource Strain:     Difficulty of Paying Living Expenses: Not on file   Food Insecurity:     Worried About Running Out of Food in the Last Year: Not on file    Paz of Food in the Last Year: Not on file   Transportation Needs:     Lack of Transportation (Medical): Not on file    Lack of Transportation (Non-Medical):  Not on file   Physical Activity:     Days of Exercise per Week: Not on file    Minutes of Exercise per Session: Not on file   Stress:     Feeling of Stress : Not on file   Social Connections:     Frequency of Communication with Friends and Family: Not on file    Frequency of Social Gatherings with Friends and Family: Not on file    Attends Rastafarian Services: Not on file    Active Member of [address] [place] [address] [email or web address] or Organizations: Not on file    Attends Club or Organization Meetings: Not on file    Marital Status: Not on file   Intimate Partner Violence:     Fear of Current or Ex-Partner: Not on file    Emotionally Abused: Not on file    Physically Abused: Not on file    Sexually Abused: Not on file   Housing Stability:     Unable to Pay for Housing in the Last Year: Not on file    Number of [place] in the Last Year: Not on file    Unstable Housing in the Last Year: Not on file       Family History   Problem Relation Age of Onset    Lung Cancer Mother     Lung Cancer Brother     Heart Disease Father     Heart Disease Paternal Grandfather        Allergies:  Bee pollen, Erythromycin, Pcn [penicillins], Tetanus toxoids, Tetracyclines & related, and Lisinopril    Home Medications:  Prior to Admission medications    Medication Sig Start Date End Date Taking? Authorizing Provider   hydroxychloroquine (PLAQUENIL) 200 MG tablet Take 200 mg by mouth daily Take 1 and 1/2 tablet daily   Yes Historical Provider, MD   colchicine (COLCRYS) 0.6 MG tablet Take 1 tablet by mouth 2 times daily 10/5/21  Yes Lana May PA-C   budesonide-formoterol [facility]) 160-4.5 MCG/ACT AERO Inhale 2 puffs into the lungs 2 times daily 10/5/21  Yes Lana May PA-C   albuterol sulfate HFA (VENTOLIN HFA) 108 (90 Base) MCG/ACT inhaler Inhale 2 puffs into the lungs 4 times daily as needed for Wheezing 10/5/21  Yes Lana May PA-C   albuterol sulfate HFA (VENTOLIN HFA) 108 (90 Base) MCG/ACT inhaler Inhale 2 puffs into the lungs every 6 hours as needed for Wheezing or Shortness of Breath 10/5/21  Yes Lana May PA-C   Capsaicin-Menthol (ALLEVESS EX) Apply topically    Yes Historical Provider, MD   amLODIPine (NORVASC) 5 MG tablet Take 1 tablet by mouth daily 10/5/21 11/4/21  Sussy Vallejo PA-C   albuterol sulfate  (90 Base) MCG/ACT inhaler Inhale 2 puffs into the lungs every 4 hours as needed for Wheezing or Shortness of Breath 9/26/21 10/3/21  Tess Odonnell MD       REVIEW OF SYSTEMS    (2-9 systems for level 4, 10 or more for level 5)      Review of Systems   Constitutional: Negative for chills and fever. HENT: Negative for congestion and sore throat. Respiratory: Positive for shortness of breath. Negative for cough. Cardiovascular: Positive for leg swelling. Negative for chest pain. Gastrointestinal: Negative for abdominal pain, nausea and vomiting. Endocrine: Positive for polyuria. Genitourinary: Negative for dysuria and frequency. Musculoskeletal: Negative for back pain and neck stiffness. Skin: Negative for rash.    Neurological: Negative for weakness and headaches. PHYSICAL EXAM   (up to 7 for level 4, 8 or more for level 5)      INITIAL VITALS:   /60   Pulse 65   Temp 97.8 °F (36.6 °C) (Oral)   Resp 20   Ht 5' 2\" (1.575 m)   Wt 130 lb (59 kg)   SpO2 98%   BMI 23.78 kg/m²      Vitals:    12/20/21 0002 12/20/21 0200 12/20/21 0245 12/20/21 0246   BP: (!) 157/76 131/60     Pulse: 70 67 68 65   Resp: 27 21 20    Temp:  97.8 °F (36.6 °C)     TempSrc:  Oral     SpO2: 97% 96% 98%    Weight:   130 lb (59 kg)    Height:   5' 2\" (1.575 m)         Physical Exam  Vitals reviewed. Constitutional:       General: She is not in acute distress. Appearance: She is well-developed. She is not ill-appearing. HENT:      Head: Normocephalic and atraumatic. Eyes:      Pupils: Pupils are equal, round, and reactive to light. Cardiovascular:      Rate and Rhythm: Normal rate and regular rhythm. Comments: Distant heart sounds  Pulmonary:      Effort: Pulmonary effort is normal.      Breath sounds: Normal breath sounds. No decreased breath sounds or rales. Abdominal:      General: There is no distension. Palpations: Abdomen is soft. Tenderness: There is no abdominal tenderness. Musculoskeletal:         General: Normal range of motion. Cervical back: Normal range of motion and neck supple. Right lower leg: Edema present. Left lower leg: Edema present. Comments: +1 pitting edema up to the proximal tibia. Skin:     General: Skin is warm and dry. Neurological:      Mental Status: She is alert and oriented to person, place, and time. DIFFERENTIAL  DIAGNOSIS     PLAN (LABS / IMAGING / EKG):  Orders Placed This Encounter   Procedures    COVID-19, Rapid    XR CHEST (2 VW)    CBC Auto Differential    Basic Metabolic Panel w/ Reflex to MG    Troponin    Brain Natriuretic Peptide    Troponin    Magnesium    ADULT DIET;  Regular    Vital signs per unit routine    Notify physician    [facility] physician  Up as tolerated    Telemetry monitoring - continuous duration    Full Code    Inpatient consult to Cardiology    EKG 12 Lead    PATIENT STATUS (FROM ED OR OR/PROCEDURAL) Inpatient       MEDICATIONS ORDERED:  Orders Placed This Encounter   Medications    colchicine (COLCRYS) tablet 0.6 mg    budesonide-formoterol (SYMBICORT) 160-4.5 MCG/ACT inhaler 2 puff    amLODIPine (NORVASC) tablet 5 mg    hydroxychloroquine (PLAQUENIL) tablet 200 mg    predniSONE (DELTASONE) tablet 20 mg    sodium chloride flush 0.9 % injection 5-40 mL    sodium chloride flush 0.9 % injection 5-40 mL    0.9 % sodium chloride infusion    enoxaparin (LOVENOX) injection 40 mg    acetaminophen (TYLENOL) tablet 650 mg    OR Linked Order Group     ondansetron (ZOFRAN-ODT) disintegrating tablet 4 mg     ondansetron (ZOFRAN) injection 4 mg       DIAGNOSTIC RESULTS / EMERGENCY DEPARTMENT COURSE / MDM   LAB RESULTS:  Results for orders placed or performed during the hospital encounter of 12/19/21   COVID-19, Rapid    Specimen: Nasopharyngeal Swab   Result Value Ref Range    Specimen Description . NASOPHARYNGEAL SWAB     SARS-CoV-2, Rapid Not Detected Not Detected   CBC Auto Differential   Result Value Ref Range    WBC 8.1 3.5 - 11.3 k/uL    RBC 4.43 3.95 - 5.11 m/uL    Hemoglobin 12.9 11.9 - 15.1 g/dL    Hematocrit 39.5 36.3 - 47.1 %    MCV 89.2 82.6 - 102.9 fL    MCH 29.1 25.2 - 33.5 pg    MCHC 32.7 28.4 - 34.8 g/dL    RDW 16.5 (H) 11.8 - 14.4 %    Platelets 622 616 - 930 k/uL    MPV 11.0 8.1 - 13.5 fL    NRBC Automated 0.0 0.0 per 100 WBC    Differential Type NOT REPORTED     Seg Neutrophils 61 36 - 65 %    Lymphocytes 25 24 - 43 %    Monocytes 7 3 - 12 %    Eosinophils % 2 1 - 4 %    Basophils 1 0 - 2 %    Immature Granulocytes 4 (H) 0 %    Segs Absolute 4.92 1.50 - 8.10 k/uL    Absolute Lymph # 2.03 1.10 - 3.70 k/uL    Absolute Mono # 0.56 0.10 - 1.20 k/uL    Absolute Eos # 0.18 0.00 - 0.44 k/uL    Basophils Absolute 0.06 0.00 - 0.20 k/uL    Absolute Immature Granulocyte 0.36 (H) 0.00 - 0.30 k/uL    WBC Morphology NOT REPORTED     RBC Morphology ANISOCYTOSIS PRESENT     Platelet Estimate NOT REPORTED    Basic Metabolic Panel w/ Reflex to MG   Result Value Ref Range    Glucose 101 (H) 70 - 99 mg/dL    BUN 19 8 - 23 mg/dL    CREATININE 0.62 0.50 - 0.90 mg/dL    Bun/Cre Ratio NOT REPORTED 9 - 20    Calcium 8.7 8.6 - 10.4 mg/dL    Sodium 145 (H) 135 - 144 mmol/L    Potassium 3.5 (L) 3.7 - 5.3 mmol/L    Chloride 111 (H) 98 - 107 mmol/L    CO2 22 20 - 31 mmol/L    Anion Gap 12 9 - 17 mmol/L    GFR Non-African American >60 >60 mL/min    GFR African American >60 >60 mL/min    GFR Comment          GFR Staging NOT REPORTED    Troponin   Result Value Ref Range    Troponin, High Sensitivity 11 0 - 14 ng/L    Troponin T NOT REPORTED <0.03 ng/mL    Troponin Interp NOT REPORTED    Brain Natriuretic Peptide   Result Value Ref Range    Pro- <300 pg/mL    BNP Interpretation NOT REPORTED    Troponin   Result Value Ref Range    Troponin, High Sensitivity 10 0 - 14 ng/L    Troponin T NOT REPORTED <0.03 ng/mL    Troponin Interp NOT REPORTED    Magnesium   Result Value Ref Range    Magnesium 2.2 1.6 - 2.6 mg/dL         RADIOLOGY:  XR CHEST (2 VW)   Final Result   No acute cardiopulmonary process              EKG  EKG Interpretation    Interpreted by me    Rhythm: normal sinus   Rate: normal  Axis: normal  Ectopy: none  Conduction: normal  ST Segments: Nonspecific T wave flattening  T Waves: no acute change  Q Waves: none    Clinical Impression: Abnormal EKG, when compared to ECG on September 11, 2021 T wave flattening is new but no other significant changes. All EKG's are interpreted by the Emergency Department Physician who either signs or Co-signs this chart in the absence of a cardiologist.    [facility]:    Patient arrives for evaluation of lower extremity edema and weight gain.   Appears well sitting in bed in no acute distress physical exam showing slight edema but no respiratory symptoms. A bedside ultrasound there is a trace pericardial effusion that is only seen on the parasternal long axis view. While laying her flat she developed chest pain that was subsequently relieved when sitting up. Possible pericarditis secondary to lupus. She has no infectious symptoms. Troponins and BNP nonelevated. Chest x-ray unremarkable. Consulted cardiology will see patient tomorrow possible repeat ECHO. Plan to admit to observation unit. ED Course as of 12/20/21 0504   Sun Dec 19, 2021   2346 Pro-BNP: 278 [CS]   2346 XR CHEST (2 VW)  Negative for pleural effusion. [CS]   Mon Dec 20, 2021   0024 Bedside echo is somewhat limited however does not appear to have cardiac tamponade there is a trace effusion. When lying the patient flat she started breathing in the 30s complaining of chest pain. Upon sitting her back up her chest pain resolved. Concern for possible pericarditis. She does report compliance of prednisone and colchicine. Troponin not significantly elevated do not think myocarditis. No signs of ST elevation on EKG. BNP not elevated. We will touch base with cardiology plan for admission to observation unit. [CS]   [id number] Cardiology page. Will see patient tomorrow. [CS]      ED Course User Index  [CS] Renetta Crowell DO         PROCEDURES:      CONSULTS:  IP CONSULT TO CARDIOLOGY    CRITICAL CARE:  Please see attending note    FINAL IMPRESSION      1. Leg swelling    2. Chest pain, unspecified type          DISPOSITION / PLAN     DISPOSITION Admitted 12/20/2021 01:11:14 AM      PATIENT REFERRED TO:  No follow-up provider specified.     DISCHARGE MEDICATIONS:  Current Discharge Medication List          Renetta Crowell DO  Emergency Medicine Resident    (Please note that portions of thisnote were completed with a voice recognition program.  Efforts were made to edit the dictations but occasionally words are mis-transcribed.)        Tomas Wagner DO  Resident  12/20/21 104

## 2021-12-20 NOTE — PLAN OF CARE
No acute events. Patient discharged this shift. All education reviewed. Safety maintained. Everything reviewed with family at bedside.    Problem: OXYGENATION/RESPIRATORY FUNCTION  Goal: Patient will maintain patent airway  12/20/2021 1619 by Mohit Jung RN  Outcome: Met This Shift  12/20/2021 0324 by Jemma Juan RN  Outcome: Ongoing  Goal: Patient will achieve/maintain normal respiratory rate/effort  Description: Respiratory rate and effort will be within normal limits for the patient  12/20/2021 1619 by Mohit Jung RN  Outcome: Met This Shift  12/20/2021 0324 by Jemma Juan RN  Outcome: Ongoing     Problem: HEMODYNAMIC STATUS  Goal: Patient has stable vital signs and fluid balance  12/20/2021 1619 by Mohit Jung RN  Outcome: Met This Shift  12/20/2021 0324 by Jemma Juan RN  Outcome: Ongoing     Problem: FLUID AND ELECTROLYTE IMBALANCE  Goal: Fluid and electrolyte balance are achieved/maintained  12/20/2021 1619 by Mohit Jung RN  Outcome: Met This Shift  12/20/2021 0324 by Jemma Juan RN  Outcome: Ongoing     Problem: ACTIVITY INTOLERANCE/IMPAIRED MOBILITY  Goal: Mobility/activity is maintained at optimum level for patient  12/20/2021 1619 by Mohit Jung RN  Outcome: Met This Shift  12/20/2021 0324 by Jemma Juan RN  Outcome: Ongoing     Problem: Falls - Risk of:  Goal: Will remain free from falls  Description: Will remain free from falls  12/20/2021 1619 by Mohit Jung RN  Outcome: Met This Shift  12/20/2021 0324 by Jemma Juan RN  Outcome: Ongoing  Goal: Absence of physical injury  Description: Absence of physical injury  12/20/2021 1619 by Mohit Jung RN  Outcome: Met This Shift  12/20/2021 0324 by Jemma Juan RN  Outcome: Ongoing

## 2021-12-20 NOTE — DISCHARGE INSTR - COC
Continuity of Care Form    Patient Name: Natalie Kimbrough   :  1945  MRN:  [id number]    [facility] date:  2021  Discharge date:  ***    Code Status Order: Full Code   Advance Directives:      Admitting Physician:  Indy Roth MD  PCP: CELY Tello CNP    Discharging Nurse: [facility] Unit/Room#: 3164/9005-25  Discharging Unit Phone Number: ***    Emergency Contact:   Extended Emergency Contact Information  Primary Emergency Contact: [address] of [address] [place] [address] Phone: [phone]  Work Phone: [phone]  Mobile Phone: [phone]  Relation: Child  Secondary Emergency Contact: Elissa Unger [place] of [address] [place] [address] Phone: [phone]  Relation: Other    Past Surgical History:  Past Surgical History:   Procedure Laterality Date    ANKLE SURGERY Right     x 3    FOOT SURGERY  2015    left    HIP ARTHROSCOPY Left 2018    band release bursectomy    HYSTERECTOMY      NERVE BLOCK  10/19/15    caudal #1  celestone 9mg    NERVE BLOCK  10-26-15     caudal epidural steroid block #2 decadron 5 mg    AL HIP ARTHROSCOPY, DX Left 3/14/2018    LEFT HIP ARTHROSCOPY WITH  IT BAND RELEASE BURSECTOMY WITH GLUTEUS MUSCLE REPAIR performed by Tripp You DO at [address] Left     x 2       Immunization History: There is no immunization history on file for this patient.     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    Positive SOSA (antinuclear antibody) R76.8    Anemia D64.9    Lower extremity edema R60.0       Isolation/Infection:   Isolation            No Isolation          Patient Infection Status       Infection Onset Added Last Indicated Last Indicated By Review Planned Expiration Resolved Resolved By    None active    Resolved    COVID-19 (Rule Out) 11/18/21 11/18/21 11/18/21 COVID-19, Rapid (Ordered)   11/18/21 Rule-Out Test Resulted    COVID-19 (Rule Out) 09/28/21 09/28/21 09/28/21 Respiratory Panel, Molecular, with COVID-19 (Restricted: peds pts or suitable admitted adults) (Ordered)   09/28/21 Rule-Out Test Resulted    COVID-19 (Rule Out) 09/27/21 09/27/21 09/27/21 COVID-19, Rapid (Ordered)   09/27/21 Rule-Out Test Resulted            Nurse Assessment:  Last Vital Signs: /70   Pulse 77   Temp 97.6 °F (36.4 °C) (Oral)   Resp 27   Ht 5' 2\" (1.575 m)   Wt 130 lb (59 kg)   SpO2 97%   BMI 23.78 kg/m²     Last documented pain score (0-10 scale): Pain Level: 0  Last Weight:   Wt Readings from Last 1 Encounters:   12/20/21 130 lb (59 kg)     Mental Status:  {IP PT MENTAL STATUS:20030}    IV Access:  { CARLEE IV ACCESS:595115598}    Nursing Mobility/ADLs:  Walking   {CHP DME XVJO:583259459}  Transfer  {CHP DME KNFQ:416182895}  Bathing  {CHP DME UFXN:048025161}  Dressing  {CHP DME UJWE:697159206}  Toileting  {CHP DME QMAJ:243495456}  Feeding  {P DME GPBQ:426162215}  Med Admin  {P DME AFFX:928909851}  Med Delivery   { CARLEE MED Delivery:088517953}    Wound Care Documentation and Therapy:        Elimination:  Continence:    Bowel: {YES / PZ:85514}  Bladder: {YES / UU:54437}  Urinary Catheter: {Urinary Catheter:547186755}   Colostomy/Ileostomy/Ileal Conduit: {YES / German:33151}       Date of Last BM: ***    Intake/Output Summary (Last 24 hours) at 12/20/2021 1550  Last data filed at 12/20/2021 0644  Gross per 24 hour   Intake --   Output 1 ml   Net -1 ml     I/O last 3 completed shifts:  In: -   Out: 1 [Urine:1]    Safety Concerns:     [address] CARLEE Safety Concerns:648697618}    Impairments/Disabilities:      [address] BEJARANO Impairments/Disabilities:177406816}    Nutrition Therapy:  Current Nutrition Therapy:   [address] BEJARANO Diet List:136534795}    Routes of Feeding: {CHP DME Other Feedings:684972474}  Liquids: {Slp liquid thickness:08659}  Daily Fluid Restriction: {CHP DME Yes amt example:448990685}  Last Modified Barium Swallow with Video (Video Swallowing Test): {Done Not Done EMLA:457032070}    Treatments at the Time of Hospital Discharge:   Respiratory Treatments: ***  Oxygen Therapy:  {Therapy; copd oxygen:02462}  Ventilator:    { CC Vent CLWF:725236425}    Rehab Therapies: {THERAPEUTIC INTERVENTION:6291656500}  Weight Bearing Status/Restrictions: { CC Weight Bearin}  Other Medical Equipment (for information only, NOT a DME order):  {EQUIPMENT:865109314}  Other Treatments: ***    Patient's personal belongings (please select all that are sent with patient):  {CHP DME Belongings:471086494}    RN SIGNATURE:  {Esignature:796630545}    CASE MANAGEMENT/SOCIAL WORK SECTION    Inpatient Status Date: ***    Readmission Risk Assessment Score:  Readmission Risk              Risk of Unplanned Readmission:  15           Discharging to Facility/ Agency   Name:   Address:  Phone:  Fax:    Dialysis Facility (if applicable)   Name:  Address:  Dialysis Schedule:  Phone:  Fax:    / signature: {Esignature:085408784}    PHYSICIAN SECTION    Prognosis: {Prognosis:7693310075}    Condition at Discharge: [address] [place] [address] Patient Condition:111264955}    Rehab Potential (if transferring to Rehab): {Prognosis:8293851527}    Recommended Labs or Other Treatments After Discharge: ***    Physician Certification: I certify the above information and transfer of Bam Delong  is necessary for the continuing treatment of the diagnosis listed and that she requires {Admit to Appropriate Level of Care:84474} for {GREATER/LESS:619620368} 30 days.      Update Admission H&P: {CHP DME Changes in LQOVV:111400767}    PHYSICIAN SIGNATURE:  {Esignature:013029202}

## 2021-12-20 NOTE — PROGRESS NOTES
[facility]  CDU / OBSERVATION eNCOUnter  Resident Note     Pt Name: Beck Lynch  MRN: [id number]  [place] 1945  Date of evaluation: 12/20/21  Patient's PCP is :  CELY Rogers - [facility]    CHIEF COMPLAINT       Chief Complaint   Patient presents with    Leg Swelling    Shortness of Breath    Weight Gain     pt stated 9lb weight          HISTORY OF PRESENT ILLNESS    Beck Lynch is a 78 y.o. female who presents with lower extremity edema and weight gain. Patient reports that she has a history of lupus and that she was told if she were to gain 3 pounds in 1 day to return to the emergency department. She reports that yesterday she gained 9 pounds and was starting to have some shortness of breath. Her lupus was diagnosed in October and she was discharged on prednisone and colchicine. 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     Location/Symptom: Weight gain, lower extremity edema  Timing/Onset: 3 days  Provocation: Lupus  Quality: Worsening shortness of breath, worsening lower extremity edema  Radiation: None radiated  Severity: Patient reports severe anxiety about this, not currently experiencing any pain  Timing/Duration: Progressively worsening, gradual  Modifying Factors: Steroids have improved this in the past    REVIEW OF SYSTEMS     Bold positive  General ROS - No fevers, No malaise   Ophthalmic ROS - No discharge, No changes in vision  ENT ROS -  No sore throat, No rhinorrhea,   Respiratory ROS - no shortness of breath, no cough, no  wheezing  Cardiovascular ROS - No chest pain, no dyspnea on exertion, leg edema  Gastrointestinal ROS - No abdominal pain, no nausea or vomiting, no change in bowel habits, no black or bloody stools  Genito-Urinary ROS - No dysuria, trouble voiding, or hematuria  Musculoskeletal ROS - No myalgias, No arthalgias  Neurological ROS - No headache, no dizziness/lightheadedness, No focal weakness, no loss of sensation  Dermatological ROS - No lesions, No rash     (PQRS) Advance directives on face sheet per hospital policy. No change unless specifically mentioned in chart    [address]    has a past medical history of Arthritis, Displacement of lumbar intervertebral disc without myelopathy, Hypertension, Lumbar disc disease, Personal history of TIA (transient ischemic attack), Sleep deprivation, SOB (shortness of breath), and Wears glasses. I have reviewed the past medical history with the patient and it is pertinent to this complaint. SURGICAL HISTORY      has a past surgical history that includes Wrist surgery (Left); Hysterectomy; Ankle surgery (Right); Tonsillectomy; Nerve Block (10/19/15); Nerve Block (10-26-15 ); Foot surgery (2015); Hip arthroscopy (Left, 03/14/2018); and pr hip arthroscopy, dx (Left, 3/14/2018). I have reviewed and agree with Surgical History entered and it is pertinent to this complaint. CURRENT MEDICATIONS     colchicine (COLCRYS) tablet 0.6 mg, BID  budesonide-formoterol (SYMBICORT) 160-4.5 MCG/ACT inhaler 2 puff, BID  amLODIPine (NORVASC) tablet 5 mg, Daily  hydroxychloroquine (PLAQUENIL) tablet 200 mg, Daily  [START ON 12/21/2021] predniSONE (DELTASONE) tablet 20 mg, Once  sodium chloride flush 0.9 % injection 5-40 mL, 2 times per day  sodium chloride flush 0.9 % injection 5-40 mL, PRN  0.9 % sodium chloride infusion, PRN  enoxaparin (LOVENOX) injection 40 mg, Daily  acetaminophen (TYLENOL) tablet 650 mg, Q4H PRN  ondansetron (ZOFRAN-ODT) disintegrating tablet 4 mg, Q8H PRN   Or  ondansetron (ZOFRAN) injection 4 mg, Q6H PRN  furosemide (LASIX) tablet 20 mg, Daily PRN        All medication charted and reviewed. ALLERGIES     is allergic to bee pollen, erythromycin, pcn [penicillins], tetanus toxoids, tetracyclines & related, and lisinopril.       FAMILY HISTORY She indicated that the status of her mother is unknown. She indicated that the status of her father is unknown. She indicated that the status of her brother is unknown. She indicated that the status of her paternal grandfather is unknown.     family history includes Heart Disease in her father and paternal grandfather; [place] in her brother and mother. The patient denies any pertinent family history. I have reviewed and agree with the family history entered. I have reviewed the Family History and it is not significant to the case    SOCIAL HISTORY      reports that she has never smoked. She has never used smokeless tobacco. She reports that she does not drink alcohol and does not use drugs. I have reviewed and agree with all Social.  There are no concerns for substance abuse/use. PHYSICAL EXAM     INITIAL VITALS:  height is 5' 2\" (1.575 m) and weight is 130 lb (59 kg). Her oral temperature is 98.1 °F (36.7 °C). Her blood pressure is 128/67 and her pulse is 69. Her respiration is 26 and oxygen saturation is 97%. CONSTITUTIONAL: AOx4, no apparent distress, appears stated age    HEAD: normocephalic, atraumatic   EYES: PERRLA, EOMI    ENT: moist mucous membranes, uvula midline   NECK: supple, symmetric   BACK: symmetric   LUNGS: clear to auscultation bilaterally   CARDIOVASCULAR: regular rate and rhythm, no murmurs, rubs or gallops   ABDOMEN: soft, non-tender, non-distended with normal active bowel sounds   NEUROLOGIC:  MAEx4, no focal sensory or motor deficits   MUSCULOSKELETAL: no clubbing, cyanosis. No pitting edema of bilateral lower extremities.    SKIN: no rash or wounds       DIFFERENTIAL DIAGNOSIS/MDM:   Shortness of breath  Differential includes heart failure, lupus, pericarditis, pleuritis, asthma, pneumonia, COVID-19    DIAGNOSTIC RESULTS     EKG: All EKG's are interpreted by the Observation Physician who either signs or Co-signs this chart in the absence of a cardiologist.    EKG Interpretation    Interpreted by observation physician    Rhythm: normal sinus   Rate: normal  Axis: normal  Ectopy: none  Conduction: normal  ST Segments: no acute change  T Waves: flattening\Q Waves: none    Clinical Impression: non-specific EKG    Lebron Sesay DO      RADIOLOGY:   I directly visualized the following  images and reviewed the radiologist interpretations:    XR CHEST (2 VW)    Result Date: 12/19/2021  EXAMINATION: TWO XRAY VIEWS OF THE CHEST 12/19/2021 11:37 pm COMPARISON: CT chest [id number] HISTORY: ORDERING SYSTEM PROVIDED HISTORY: SHortness of breath. recent pericardiocentesis TECHNOLOGIST PROVIDED HISTORY: SHortness of breath. recent pericardiocentesis FINDINGS: The cardiomediastinal silhouette is mildly enlarged. In size and contour. The lungs are clear. No pleural effusion or pneumothorax is present. Degenerate change     No acute cardiopulmonary process       LABS:  I have reviewed and interpreted all available lab results.   Labs Reviewed   CBC WITH AUTO DIFFERENTIAL - Abnormal; Notable for the following components:       Result Value    RDW 16.5 (*)     Immature Granulocytes 4 (*)     Absolute Immature Granulocyte 0.36 (*)     All other components within normal limits   BASIC METABOLIC PANEL W/ REFLEX TO MG FOR LOW K - Abnormal; Notable for the following components:    Glucose 101 (*)     Sodium 145 (*)     Potassium 3.5 (*)     Chloride 111 (*)     All other components within normal limits   COVID-19, RAPID   TROPONIN   BRAIN NATRIURETIC PEPTIDE   TROPONIN   MAGNESIUM       SCREENING TOOLS:    HEART Risk Score for Chest Pain Patients   History and Physical Exam Suspicion Level  (Nausea, Vomiting, Diaphoresis, Radiation, Exertion)   Slightly Suspicious (0 pts)   Moderately Suspicious (1 pt)   Highly Suspicious (2 pts)   EKG Interpretation   Normal (0 pts)   Non-Specific Repolarization Disturbance (1 pt)   Significant ST-Depression (2 pts)   Age of Patient (in years)   = 47 (0

## 2021-12-20 NOTE — PROGRESS NOTES
CDU Daily Progress Note  Attending Physician       Pt Name: Terese Vivar  MRN: [id number]  [place] 1945  Date of evaluation: 12/20/21    I performed a history and physical examination of the patient and discussed management with the resident. I reviewed the residents note and agree with the documented findings and plan of care. Any areas of disagreement are noted on the chart. I was personally present for the key portions of any procedures. I have documented in the chart those procedures where I was not present during the key portions. I have reviewed the emergency nurses triage note. I agree with the chief complaint, past medical history, past surgical history, allergies, medications, social and family history as documented unless otherwise noted below. Documentation of the HPI, Physical Exam and Medical Decision Making performed by medical students or scribes is based on my personal performance of the HPI, PE and MDM. For Physician Assistant/ Nurse Practitioner cases/documentation I have personally evaluated this patient and have completed at least one if not all key elements of the E/M (history, physical exam, and MDM). Additional findings are as noted. The Family History, Social History and Review of Systems are unchanged from the ED. Awaiting cardiology input and recommendations. Patient relates to myself as well as my resident that the symptoms are generally related to her lupus. She was admitted in October for bilateral pleural effusions thought to be secondary to her lupus. She was told to come back if she developed leg swelling. She relates the only pain that she experienced was when there was some swelling in the legs. Otherwise she denies any pain and has no shortness of breath.     Didier Porter MD,  MD  Attending Physician  Critical Decision Unit

## 2021-12-20 NOTE — ED NOTES
Report given to [facility] on [facility]. All questions answered.      Milka Angelo RN  12/20/21 4066

## 2021-12-21 NOTE — DISCHARGE SUMMARY
CDU Discharge Summary        Patient:  Etelvina Perry  YOB: 1945    MRN: [id number]   Acct: [de-identified]    Primary Care Physician: CELY Jackson CNP    Admit date:  12/19/2021 10:52 PM  Discharge date: 12/20/2021  4:42 PM    Discharge Diagnoses:     Acute chest pain due to unknown etiology possibly lupus  Improved with Lasix    Follow-up:  Call today/tomorrow for a follow up appointment with CELY Amaro CNP , or return to the Emergency Room with worsening symptoms    Stressed to patient the importance of following up with primary care doctor for further workup/management of symptoms. Pt verbalizes understanding and agrees with plan. Discharge Medications:  Changes to medications :           Medication List      START taking these medications    furosemide 20 MG tablet  Commonly known as: LASIX  Take 1 tablet by mouth daily as needed (swelling)        CHANGE how you take these medications    * albuterol sulfate  (90 Base) MCG/ACT inhaler  Commonly known as: Ventolin HFA  Inhale 2 puffs into the lungs 4 times daily as needed for Wheezing  What changed: Another medication with the same name was removed. Continue taking this medication, and follow the directions you see here. * albuterol sulfate  (90 Base) MCG/ACT inhaler  Commonly known as: Ventolin HFA  Inhale 2 puffs into the lungs every 6 hours as needed for Wheezing or Shortness of Breath  What changed: Another medication with the same name was removed. Continue taking this medication, and follow the directions you see here. * This list has 2 medication(s) that are the same as other medications prescribed for you. Read the directions carefully, and ask your doctor or other care provider to review them with you.             CONTINUE taking these medications    ALLEVESS EX     amLODIPine 5 MG tablet  Commonly known as: NORVASC  Take 1 tablet by mouth daily     budesonide-formoterol 160-4.5 MCG/ACT Aero  Commonly known as: SYMBICORT  Inhale 2 puffs into the lungs 2 times daily     colchicine 0.6 MG tablet  Commonly known as: COLCRYS  Take 1 tablet by mouth 2 times daily     hydroxychloroquine 200 MG tablet  Commonly known as: PLAQUENIL           Where to Get Your Medications      These medications were sent to [facility] [address] [facility], [address]  [address], [address] [address]    Phone: [phone]   · furosemide 20 MG tablet         Diet:  No diet orders on file , Advance as tolerated     Activity:  As tolerated    Consultants: IP CONSULT TO CARDIOLOGY    Procedures:      Diagnostic Test:   Results for orders placed or performed during the hospital encounter of 12/19/21   COVID-19, Rapid    Specimen: Nasopharyngeal Swab   Result Value Ref Range    Specimen Description . NASOPHARYNGEAL SWAB     SARS-CoV-2, Rapid Not Detected Not Detected   CBC Auto Differential   Result Value Ref Range    WBC 8.1 3.5 - 11.3 k/uL    RBC 4.43 3.95 - 5.11 m/uL    Hemoglobin 12.9 11.9 - 15.1 g/dL    Hematocrit 39.5 36.3 - 47.1 %    MCV 89.2 82.6 - 102.9 fL    MCH 29.1 25.2 - 33.5 pg    MCHC 32.7 28.4 - 34.8 g/dL    RDW 16.5 (H) 11.8 - 14.4 %    Platelets 379 772 - 523 k/uL    MPV 11.0 8.1 - 13.5 fL    NRBC Automated 0.0 0.0 per 100 WBC    Differential Type NOT REPORTED     Seg Neutrophils 61 36 - 65 %    Lymphocytes 25 24 - 43 %    Monocytes 7 3 - 12 %    Eosinophils % 2 1 - 4 %    Basophils 1 0 - 2 %    Immature Granulocytes 4 (H) 0 %    Segs Absolute 4.92 1.50 - 8.10 k/uL    Absolute Lymph # 2.03 1.10 - 3.70 k/uL    Absolute Mono # 0.56 0.10 - 1.20 k/uL    Absolute Eos # 0.18 0.00 - 0.44 k/uL    Basophils Absolute 0.06 0.00 - 0.20 k/uL    Absolute Immature Granulocyte 0.36 (H) 0.00 - 0.30 k/uL    WBC Morphology NOT REPORTED     RBC Morphology ANISOCYTOSIS PRESENT     Platelet Estimate NOT REPORTED    Basic Metabolic Panel w/ Reflex to MG   Result Value Ref Range Glucose 101 (H) 70 - 99 mg/dL    BUN 19 8 - 23 mg/dL    CREATININE 0.62 0.50 - 0.90 mg/dL    Bun/Cre Ratio NOT REPORTED 9 - 20    Calcium 8.7 8.6 - 10.4 mg/dL    Sodium 145 (H) 135 - 144 mmol/L    Potassium 3.5 (L) 3.7 - 5.3 mmol/L    Chloride 111 (H) 98 - 107 mmol/L    CO2 22 20 - 31 mmol/L    Anion Gap 12 9 - 17 mmol/L    GFR Non-African American >60 >60 mL/min    GFR African American >60 >60 mL/min    GFR Comment          GFR Staging NOT REPORTED    Troponin   Result Value Ref Range    Troponin, High Sensitivity 11 0 - 14 ng/L    Troponin T NOT REPORTED <0.03 ng/mL    Troponin Interp NOT REPORTED    Brain Natriuretic Peptide   Result Value Ref Range    Pro- <300 pg/mL    BNP Interpretation NOT REPORTED    Troponin   Result Value Ref Range    Troponin, High Sensitivity 10 0 - 14 ng/L    Troponin T NOT REPORTED <0.03 ng/mL    Troponin Interp NOT REPORTED    Magnesium   Result Value Ref Range    Magnesium 2.2 1.6 - 2.6 mg/dL   EKG 12 Lead   Result Value Ref Range    Ventricular Rate 68 BPM    Atrial Rate 68 BPM    P-R Interval 172 ms    QRS Duration 84 ms    Q-T Interval 410 ms    QTc Calculation (Bazett) 435 ms    P Axis 36 degrees    R Axis 18 degrees    T Axis 89 degrees     Echocardiogram Limited 2D Adult    Result Date: 12/20/2021  Transthoracic Echocardiography Report (TTE)  Patient Name LAVINIA     Date of Study                 12/20/2021               Fracisco Loomis CECIL   Date of      1945  Gender                        Female  Birth   Age          78 year(s)  Race                             Room Number  0241        Height:                       62 inch, 157.48 cm   Corporate ID [id number]    Weight:                       130 pounds, 59 kg  #   Patient Acct [de-identified]   BSA:           1.59 m^2       BMI:      23.78  #                                                                kg/m^2   MR #         [id number]     Sonographer                   Whitney Harmon   Accession #  [id number] Interpreting Physician        [address] [place] [address]   Fellow                   Referring Nurse Practitioner   Interpreting             Referring Physician           Ariana Fofana  Fellow  Type of Study   TTE procedure:2D Echocardiogram, Limited Echo. Procedure Date Date: 12/20/2021 Start: 11:47 AM Study Location: [facility] Technical Quality: Fair visualization Indications:Pericardial effusion and Edema. History / Tech. Comments: Procedure explained to patient. Limited echo to re-evaluate pericardial effusion Patient Status: Inpatient Height: 62 inches Weight: 130 pounds BSA: 1.59 m^2 BMI: 23.78 kg/m^2 HR: 72 bpm CONCLUSIONS Summary Limited Echo Global left ventricular systolic function is normal. Estimated ejection fraction is 55 % . Calculated EF via 3D Heart Model is 54 %. No significant pericardial effusion is seen. Small anterior echo free space suggestive of adipose tissue/fat pad vs. old residual clotted effusion. Signature ----------------------------------------------------------------------------  Electronically signed by Melani Mas(Sonographer) on 12/20/2021  02:23 PM ---------------------------------------------------------------------------- ----------------------------------------------------------------------------  Electronically signed by Patrice Martel(Interpreting physician) on 12/20/2021  02:27 PM ---------------------------------------------------------------------------- FINDINGS Left Ventricle Global left ventricular systolic function is normal. Estimated ejection fraction is 55 % . Calculated EF via 3D Heart Model is 54 %. Right Ventricle Right ventricular function appears normal . Pericardial Effusion No significant pericardial effusion is seen. Small anterior echo free space suggestive of adipose tissue/fat pad vs. old residual clotted effusion.     XR CHEST (2 VW)    Result Date: 12/19/2021  EXAMINATION: TWO XRAY VIEWS OF THE CHEST 12/19/2021 11:37 pm COMPARISON: CT chest [id number] HISTORY: ORDERING SYSTEM PROVIDED HISTORY: SHortness of breath. recent pericardiocentesis TECHNOLOGIST PROVIDED HISTORY: SHortness of breath. recent pericardiocentesis FINDINGS: The cardiomediastinal silhouette is mildly enlarged. In size and contour. The lungs are clear. No pleural effusion or pneumothorax is present. Degenerate change     No acute cardiopulmonary process           Physical Exam:    General appearance - NAD, AOx 3   Lungs -CTAB, no R/R/R  Heart - RRR, no M/R/G  Abdomen - Soft, NT/ND  Neurological:  MAEx4, No focal motor deficit, sensory loss  Extremities - Cap refil <2 sec in all ext., no edema  Skin -warm, dry      Hospital Course:  Clinical course has improved, labs and imaging reviewed. Christa Gilmore originally presented to the hospital on 12/19/2021 10:52 PM. with chest pain. At that time it was determined that She required further observation and cardiology evaluation. She was admitted and labs and imaging were followed daily. Imaging results as above. She is medically stable to be discharged. Disposition: Home    Patient stated that they will not drive themselves home from the hospital if they have gotten pain killers/ narcotics earlier that day and that they will arrange for transportation on their own or work with the  for a ride. Patient counseled NOT to drive while under the influence of narcotics/ pain killers. Condition: Good    Patient stable and ready for discharge home. I have discussed plan of care with patient and they are in understanding. They were instructed to read discharge paperwork. All of their questions and concerns were addressed. Time Spent: 1 day      --  Rashida Delgado DO  Emergency Medicine Resident Physician    This dictation was generated by voice recognition computer software. Although all attempts are made to edit the dictation for accuracy, there may be errors in the transcription that are not intended.

## 2021-12-23 NOTE — H&P
This note is copied back into the chart because it was mislabeled as a progress note initially. This is the work product of Dr. Renata velasquez  CDU / Nikhil Perez eNCOUnter  Resident Note     Pt Name: Fitz Solitario  MRN: [id number]  [place] 1945  Date of evaluation: 12/20/21  Patient's PCP is :  CELY Vargas - CNP    CHIEF COMPLAINT       Chief Complaint   Patient presents with    Leg Swelling    Shortness of Breath    Weight Gain     pt stated 9lb weight          HISTORY OF PRESENT ILLNESS    Fitz Solitario is a 78 y.o. female who presents with lower extremity edema and weight gain. Patient reports that she has a history of lupus and that she was told if she were to gain 3 pounds in 1 day to return to the emergency department. She reports that yesterday she gained 9 pounds and was starting to have some shortness of breath. Her lupus was diagnosed in October and she was discharged on prednisone and colchicine. 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     Location/Symptom: Weight gain, lower extremity edema  Timing/Onset: 3 days  Provocation: Lupus  Quality: Worsening shortness of breath, worsening lower extremity edema  Radiation: None radiated  Severity: Patient reports severe anxiety about this, not currently experiencing any pain  Timing/Duration: Progressively worsening, gradual  Modifying Factors: Steroids have improved this in the past    REVIEW OF SYSTEMS     Bold positive  General ROS - No fevers, No malaise   Ophthalmic ROS - No discharge, No changes in vision  ENT ROS -  No sore throat, No rhinorrhea,   Respiratory ROS - no shortness of breath, no cough, no  wheezing  Cardiovascular ROS - No chest pain, no dyspnea on exertion, leg edema  Gastrointestinal ROS - No abdominal pain, no nausea or vomiting, no change in bowel habits, no black or bloody stools  Genito-Urinary ROS - No dysuria, trouble voiding, or hematuria  Musculoskeletal ROS - No myalgias, No arthalgias  Neurological ROS - No headache, no dizziness/lightheadedness, No focal weakness, no loss of sensation  Dermatological ROS - No lesions, No rash     (PQRS) Advance directives on face sheet per hospital policy. No change unless specifically mentioned in chart    [address]    has a past medical history of Arthritis, Displacement of lumbar intervertebral disc without myelopathy, Hypertension, Lumbar disc disease, Personal history of TIA (transient ischemic attack), Sleep deprivation, SOB (shortness of breath), and Wears glasses. I have reviewed the past medical history with the patient and it is pertinent to this complaint. SURGICAL HISTORY      has a past surgical history that includes Wrist surgery (Left); Hysterectomy; Ankle surgery (Right); Tonsillectomy; Nerve Block (10/19/15); Nerve Block (10-26-15 ); Foot surgery (2015); Hip arthroscopy (Left, 03/14/2018); and pr hip arthroscopy, dx (Left, 3/14/2018). I have reviewed and agree with Surgical History entered and it is pertinent to this complaint. CURRENT MEDICATIONS     No current facility-administered medications for this encounter. All medication charted and reviewed. ALLERGIES     is allergic to bee pollen, erythromycin, pcn [penicillins], tetanus toxoids, tetracyclines & related, and lisinopril. FAMILY HISTORY     She indicated that the status of her mother is unknown. She indicated that the status of her father is unknown. She indicated that the status of her brother is unknown. She indicated that the status of her paternal grandfather is unknown.     family history includes Heart Disease in her father and paternal grandfather; Dene Ganser in her brother and mother. The patient denies any pertinent family history. I have reviewed and agree with the family history entered.   I have reviewed the Family History and it is not significant to the case    SOCIAL HISTORY      reports that she has never smoked. She has never used smokeless tobacco. She reports that she does not drink alcohol and does not use drugs. I have reviewed and agree with all Social.  There are no concerns for substance abuse/use. PHYSICAL EXAM     INITIAL VITALS:  height is 5' 2\" (1.575 m) and weight is 130 lb (59 kg). Her oral temperature is 97.6 °F (36.4 °C). Her blood pressure is 118/70 and her pulse is 77. Her respiration is 27 and oxygen saturation is 97%. CONSTITUTIONAL: AOx4, no apparent distress, appears stated age    HEAD: normocephalic, atraumatic   EYES: PERRLA, EOMI    ENT: moist mucous membranes, uvula midline   NECK: supple, symmetric   BACK: symmetric   LUNGS: clear to auscultation bilaterally   CARDIOVASCULAR: regular rate and rhythm, no murmurs, rubs or gallops   ABDOMEN: soft, non-tender, non-distended with normal active bowel sounds   NEUROLOGIC:  MAEx4, no focal sensory or motor deficits   MUSCULOSKELETAL: no clubbing, cyanosis. No pitting edema of bilateral lower extremities.    SKIN: no rash or wounds       DIFFERENTIAL DIAGNOSIS/MDM:   Shortness of breath  Differential includes heart failure, lupus, pericarditis, pleuritis, asthma, pneumonia, COVID-19    DIAGNOSTIC RESULTS     EKG: All EKG's are interpreted by the Observation Physician who either signs or Co-signs this chart in the absence of a cardiologist.    EKG Interpretation    Interpreted by observation physician    Rhythm: normal sinus   Rate: normal  Axis: normal  Ectopy: none  Conduction: normal  ST Segments: no acute change  T Waves: flattening\Q Waves: none    Clinical Impression: non-specific EKG    Leslee Niño MD      RADIOLOGY:   I directly visualized the following  images and reviewed the radiologist interpretations:    XR CHEST (2 VW)    Result Date: 12/19/2021  EXAMINATION: TWO XRAY VIEWS OF THE CHEST 12/19/2021 11:37 pm COMPARISON: CT chest [id number] HISTORY: ORDERING SYSTEM PROVIDED HISTORY: SHortness of breath. recent pericardiocentesis TECHNOLOGIST PROVIDED HISTORY: SHortness of breath. recent pericardiocentesis FINDINGS: The cardiomediastinal silhouette is mildly enlarged. In size and contour. The lungs are clear. No pleural effusion or pneumothorax is present. Degenerate change     No acute cardiopulmonary process       LABS:  I have reviewed and interpreted all available lab results.   Labs Reviewed   CBC WITH AUTO DIFFERENTIAL - Abnormal; Notable for the following components:       Result Value    RDW 16.5 (*)     Immature Granulocytes 4 (*)     Absolute Immature Granulocyte 0.36 (*)     All other components within normal limits   BASIC METABOLIC PANEL W/ REFLEX TO MG FOR LOW K - Abnormal; Notable for the following components:    Glucose 101 (*)     Sodium 145 (*)     Potassium 3.5 (*)     Chloride 111 (*)     All other components within normal limits   COVID-19, RAPID   TROPONIN   BRAIN NATRIURETIC PEPTIDE   TROPONIN   MAGNESIUM       SCREENING TOOLS:    HEART Risk Score for Chest Pain Patients   History and Physical Exam Suspicion Level  (Nausea, Vomiting, Diaphoresis, Radiation, Exertion)   Slightly Suspicious (0 pts)   Moderately Suspicious (1 pt)   Highly Suspicious (2 pts)   EKG Interpretation   Normal (0 pts)   Non-Specific Repolarization Disturbance (1 pt)   Significant ST-Depression (2 pts)   Age of Patient (in years)   = 39 (0 pts)   46-64 (1 pt)   = 65 (2 pts)   Risk Factors   No Risk Factors (0 pts)   1-2 Risk Factors (1 pt)   = 3 Risk Factors (2 pts)   Risk Factors Include:   Hypercholesterolemia   Hypertension   Diabetes Mellitus   Cigarette smoking   Positive family history   Obesity   CAD   (SLE, CKDz, HIV, Cocaine abuse)   Troponin Levels   = Normal Limit (0 pts)   1-3 Times Normal Limit (1 pt)   > 3 Times Normal Limit (2 pts)  TOTAL:    Percent Risk for Major Adverse Cardiac Event (MACE)  0-3 pts indicates low risk for MACE   2.5% (DISCHARGE)   4-7 pts indicates moderate risk for MACE  20.3% (OBS)  8-10 pts indicates high risk for MACE  72.7% (EARLY INVASIVE TX)    CDU IMPRESSION / PLAN      Yoel Velazquez is a 78 y.o. female who presents with    · Shortness of breath, could be seen in pericarditis versus pleuritis versus new heart failure  · Appreciate cardiology recommendations  · Plan for echo and Lasix  · If Lasix does not improve shortness of breath, will discuss patient with rheumatology   · continue home medications and pain control  · Monitor vitals, labs, and imaging  · DISPO: pending consults and clinical improvement    CONSULTS:    IP CONSULT TO CARDIOLOGY    PROCEDURES:  Per Cardiology      PATIENT REFERRED TO:    [facility]  [address]  In 2 weeks        --  Alejandra Harden MD   Emergency Medicine Resident     This dictation was generated by voice recognition computer software. Although all attempts are made to edit the dictation for accuracy, there may be errors in the transcription that are not intended.

## 2022-02-03 ENCOUNTER — APPOINTMENT (OUTPATIENT)
Dept: GENERAL RADIOLOGY | Age: 77
DRG: 187 | End: 2022-02-03
Payer: MEDICARE

## 2022-02-03 ENCOUNTER — HOSPITAL ENCOUNTER (INPATIENT)
Age: 77
LOS: 2 days | Discharge: HOME | DRG: 187 | End: 2022-02-05
Attending: EMERGENCY MEDICINE | Admitting: FAMILY MEDICINE
Payer: MEDICARE

## 2022-02-03 ENCOUNTER — APPOINTMENT (OUTPATIENT)
Dept: CT IMAGING | Age: 77
DRG: 187 | End: 2022-02-03
Payer: MEDICARE

## 2022-02-03 DIAGNOSIS — J90 BILATERAL PLEURAL EFFUSION: ICD-10-CM

## 2022-02-03 DIAGNOSIS — R07.9 CHEST PAIN, UNSPECIFIED TYPE: Primary | ICD-10-CM

## 2022-02-03 PROBLEM — M32.9 LUPUS (HCC): Status: ACTIVE | Noted: 2022-02-03

## 2022-02-03 LAB
ABSOLUTE EOS #: 0.18 K/UL (ref 0–0.44)
ABSOLUTE IMMATURE GRANULOCYTE: 0.06 K/UL (ref 0–0.3)
ABSOLUTE LYMPH #: 0.71 K/UL (ref 1.1–3.7)
ABSOLUTE MONO #: 0.49 K/UL (ref 0.1–1.2)
ANION GAP SERPL CALCULATED.3IONS-SCNC: 9 MMOL/L (ref 9–17)
BASOPHILS # BLD: 0 % (ref 0–2)
BASOPHILS ABSOLUTE: <0.03 K/UL (ref 0–0.2)
BNP INTERPRETATION: ABNORMAL
BUN BLDV-MCNC: 11 MG/DL (ref 8–23)
BUN/CREAT BLD: ABNORMAL (ref 9–20)
CALCIUM SERPL-MCNC: 8.2 MG/DL (ref 8.6–10.4)
CHLORIDE BLD-SCNC: 107 MMOL/L (ref 98–107)
CO2: 23 MMOL/L (ref 20–31)
CREAT SERPL-MCNC: 0.49 MG/DL (ref 0.5–0.9)
D-DIMER QUANTITATIVE: 0.59 MG/L FEU
DIFFERENTIAL TYPE: ABNORMAL
EOSINOPHILS RELATIVE PERCENT: 3 % (ref 1–4)
GFR AFRICAN AMERICAN: >60 ML/MIN
GFR NON-AFRICAN AMERICAN: >60 ML/MIN
GFR SERPL CREATININE-BSD FRML MDRD: ABNORMAL ML/MIN/{1.73_M2}
GFR SERPL CREATININE-BSD FRML MDRD: ABNORMAL ML/MIN/{1.73_M2}
GLUCOSE BLD-MCNC: 122 MG/DL (ref 70–99)
HCT VFR BLD CALC: 38.5 % (ref 36.3–47.1)
HEMOGLOBIN: 12.4 G/DL (ref 11.9–15.1)
IMMATURE GRANULOCYTES: 1 %
LYMPHOCYTES # BLD: 10 % (ref 24–43)
MCH RBC QN AUTO: 29.8 PG (ref 25.2–33.5)
MCHC RBC AUTO-ENTMCNC: 32.2 G/DL (ref 28.4–34.8)
MCV RBC AUTO: 92.5 FL (ref 82.6–102.9)
MONOCYTES # BLD: 7 % (ref 3–12)
NRBC AUTOMATED: 0 PER 100 WBC
PDW BLD-RTO: 14.6 % (ref 11.8–14.4)
PLATELET # BLD: ABNORMAL K/UL (ref 138–453)
PLATELET ESTIMATE: ABNORMAL
PLATELET, FLUORESCENCE: NORMAL K/UL (ref 138–453)
PMV BLD AUTO: ABNORMAL FL (ref 8.1–13.5)
POTASSIUM SERPL-SCNC: 4 MMOL/L (ref 3.7–5.3)
PRO-BNP: 324 PG/ML
RBC # BLD: 4.16 M/UL (ref 3.95–5.11)
RBC # BLD: ABNORMAL 10*6/UL
SARS-COV-2, RAPID: NOT DETECTED
SEG NEUTROPHILS: 79 % (ref 36–65)
SEGMENTED NEUTROPHILS ABSOLUTE COUNT: 5.44 K/UL (ref 1.5–8.1)
SODIUM BLD-SCNC: 139 MMOL/L (ref 135–144)
SPECIMEN DESCRIPTION: NORMAL
TROPONIN INTERP: NORMAL
TROPONIN INTERP: NORMAL
TROPONIN T: NORMAL NG/ML
TROPONIN T: NORMAL NG/ML
TROPONIN, HIGH SENSITIVITY: 10 NG/L (ref 0–14)
TROPONIN, HIGH SENSITIVITY: 9 NG/L (ref 0–14)
WBC # BLD: 6.9 K/UL (ref 3.5–11.3)
WBC # BLD: ABNORMAL 10*3/UL

## 2022-02-03 PROCEDURE — 83880 ASSAY OF NATRIURETIC PEPTIDE: CPT

## 2022-02-03 PROCEDURE — 6360000002 HC RX W HCPCS

## 2022-02-03 PROCEDURE — 85025 COMPLETE CBC W/AUTO DIFF WBC: CPT

## 2022-02-03 PROCEDURE — 6370000000 HC RX 637 (ALT 250 FOR IP): Performed by: NURSE PRACTITIONER

## 2022-02-03 PROCEDURE — 6360000004 HC RX CONTRAST MEDICATION

## 2022-02-03 PROCEDURE — 85379 FIBRIN DEGRADATION QUANT: CPT

## 2022-02-03 PROCEDURE — 6370000000 HC RX 637 (ALT 250 FOR IP)

## 2022-02-03 PROCEDURE — 6360000002 HC RX W HCPCS: Performed by: NURSE PRACTITIONER

## 2022-02-03 PROCEDURE — 71260 CT THORAX DX C+: CPT

## 2022-02-03 PROCEDURE — 93005 ELECTROCARDIOGRAM TRACING: CPT

## 2022-02-03 PROCEDURE — 84484 ASSAY OF TROPONIN QUANT: CPT

## 2022-02-03 PROCEDURE — 96374 THER/PROPH/DIAG INJ IV PUSH: CPT

## 2022-02-03 PROCEDURE — 2580000003 HC RX 258: Performed by: NURSE PRACTITIONER

## 2022-02-03 PROCEDURE — 87635 SARS-COV-2 COVID-19 AMP PRB: CPT

## 2022-02-03 PROCEDURE — 1200000000 HC SEMI PRIVATE

## 2022-02-03 PROCEDURE — 85055 RETICULATED PLATELET ASSAY: CPT

## 2022-02-03 PROCEDURE — 71045 X-RAY EXAM CHEST 1 VIEW: CPT

## 2022-02-03 PROCEDURE — 99222 1ST HOSP IP/OBS MODERATE 55: CPT | Performed by: NURSE PRACTITIONER

## 2022-02-03 PROCEDURE — 80048 BASIC METABOLIC PNL TOTAL CA: CPT

## 2022-02-03 PROCEDURE — 99285 EMERGENCY DEPT VISIT HI MDM: CPT

## 2022-02-03 RX ORDER — ACETAMINOPHEN 650 MG/1
650 SUPPOSITORY RECTAL EVERY 6 HOURS PRN
Status: DISCONTINUED | OUTPATIENT
Start: 2022-02-03 | End: 2022-02-05 | Stop reason: HOSPADM

## 2022-02-03 RX ORDER — COLCHICINE 0.6 MG/1
0.6 TABLET ORAL DAILY
COMMUNITY

## 2022-02-03 RX ORDER — FUROSEMIDE 10 MG/ML
40 INJECTION INTRAMUSCULAR; INTRAVENOUS 2 TIMES DAILY
Status: DISCONTINUED | OUTPATIENT
Start: 2022-02-03 | End: 2022-02-05

## 2022-02-03 RX ORDER — POLYETHYLENE GLYCOL 3350 17 G/17G
17 POWDER, FOR SOLUTION ORAL DAILY PRN
Status: DISCONTINUED | OUTPATIENT
Start: 2022-02-03 | End: 2022-02-05 | Stop reason: HOSPADM

## 2022-02-03 RX ORDER — SODIUM CHLORIDE 9 MG/ML
25 INJECTION, SOLUTION INTRAVENOUS PRN
Status: DISCONTINUED | OUTPATIENT
Start: 2022-02-03 | End: 2022-02-05 | Stop reason: HOSPADM

## 2022-02-03 RX ORDER — SODIUM CHLORIDE 0.9 % (FLUSH) 0.9 %
5-40 SYRINGE (ML) INJECTION EVERY 12 HOURS SCHEDULED
Status: DISCONTINUED | OUTPATIENT
Start: 2022-02-03 | End: 2022-02-05 | Stop reason: HOSPADM

## 2022-02-03 RX ORDER — BUDESONIDE AND FORMOTEROL FUMARATE DIHYDRATE 160; 4.5 UG/1; UG/1
2 AEROSOL RESPIRATORY (INHALATION) 2 TIMES DAILY
Status: DISCONTINUED | OUTPATIENT
Start: 2022-02-03 | End: 2022-02-05 | Stop reason: HOSPADM

## 2022-02-03 RX ORDER — MAGNESIUM SULFATE 1 G/100ML
1000 INJECTION INTRAVENOUS PRN
Status: DISCONTINUED | OUTPATIENT
Start: 2022-02-03 | End: 2022-02-05 | Stop reason: HOSPADM

## 2022-02-03 RX ORDER — ACETAMINOPHEN 325 MG/1
650 TABLET ORAL EVERY 6 HOURS PRN
Status: DISCONTINUED | OUTPATIENT
Start: 2022-02-03 | End: 2022-02-05 | Stop reason: HOSPADM

## 2022-02-03 RX ORDER — ONDANSETRON 4 MG/1
4 TABLET, ORALLY DISINTEGRATING ORAL EVERY 8 HOURS PRN
Status: DISCONTINUED | OUTPATIENT
Start: 2022-02-03 | End: 2022-02-05 | Stop reason: HOSPADM

## 2022-02-03 RX ORDER — POTASSIUM CHLORIDE 7.45 MG/ML
10 INJECTION INTRAVENOUS PRN
Status: DISCONTINUED | OUTPATIENT
Start: 2022-02-03 | End: 2022-02-05 | Stop reason: HOSPADM

## 2022-02-03 RX ORDER — ONDANSETRON 2 MG/ML
4 INJECTION INTRAMUSCULAR; INTRAVENOUS EVERY 6 HOURS PRN
Status: DISCONTINUED | OUTPATIENT
Start: 2022-02-03 | End: 2022-02-05 | Stop reason: HOSPADM

## 2022-02-03 RX ORDER — COLCHICINE 0.6 MG/1
0.6 TABLET ORAL 2 TIMES DAILY
Status: DISCONTINUED | OUTPATIENT
Start: 2022-02-03 | End: 2022-02-05 | Stop reason: HOSPADM

## 2022-02-03 RX ORDER — SODIUM CHLORIDE 0.9 % (FLUSH) 0.9 %
10 SYRINGE (ML) INJECTION PRN
Status: DISCONTINUED | OUTPATIENT
Start: 2022-02-03 | End: 2022-02-05 | Stop reason: HOSPADM

## 2022-02-03 RX ORDER — ASPIRIN 81 MG/1
324 TABLET, CHEWABLE ORAL ONCE
Status: COMPLETED | OUTPATIENT
Start: 2022-02-03 | End: 2022-02-03

## 2022-02-03 RX ORDER — KETOROLAC TROMETHAMINE 30 MG/ML
30 INJECTION, SOLUTION INTRAMUSCULAR; INTRAVENOUS ONCE
Status: COMPLETED | OUTPATIENT
Start: 2022-02-03 | End: 2022-02-03

## 2022-02-03 RX ORDER — POTASSIUM CHLORIDE 20 MEQ/1
40 TABLET, EXTENDED RELEASE ORAL PRN
Status: DISCONTINUED | OUTPATIENT
Start: 2022-02-03 | End: 2022-02-05 | Stop reason: HOSPADM

## 2022-02-03 RX ORDER — NYSTATIN 100000 U/G
CREAM TOPICAL ONCE
Status: DISCONTINUED | OUTPATIENT
Start: 2022-02-03 | End: 2022-02-03

## 2022-02-03 RX ORDER — HYDROXYCHLOROQUINE SULFATE 200 MG/1
200 TABLET, FILM COATED ORAL DAILY
Status: DISCONTINUED | OUTPATIENT
Start: 2022-02-03 | End: 2022-02-05 | Stop reason: HOSPADM

## 2022-02-03 RX ORDER — ALBUTEROL SULFATE 90 UG/1
2 AEROSOL, METERED RESPIRATORY (INHALATION) EVERY 6 HOURS PRN
Status: DISCONTINUED | OUTPATIENT
Start: 2022-02-03 | End: 2022-02-05 | Stop reason: HOSPADM

## 2022-02-03 RX ADMIN — SODIUM CHLORIDE, PRESERVATIVE FREE 10 ML: 5 INJECTION INTRAVENOUS at 22:19

## 2022-02-03 RX ADMIN — KETOROLAC TROMETHAMINE 30 MG: 30 INJECTION, SOLUTION INTRAMUSCULAR at 12:48

## 2022-02-03 RX ADMIN — COLCHICINE 0.6 MG: 0.6 TABLET, FILM COATED ORAL at 22:18

## 2022-02-03 RX ADMIN — IOPAMIDOL 75 ML: 755 INJECTION, SOLUTION INTRAVENOUS at 13:06

## 2022-02-03 RX ADMIN — ASPIRIN 324 MG: 81 TABLET, CHEWABLE ORAL at 11:22

## 2022-02-03 RX ADMIN — ACETAMINOPHEN 650 MG: 325 TABLET ORAL at 18:19

## 2022-02-03 RX ADMIN — FUROSEMIDE 40 MG: 10 INJECTION, SOLUTION INTRAMUSCULAR; INTRAVENOUS at 18:19

## 2022-02-03 ASSESSMENT — ENCOUNTER SYMPTOMS
PHOTOPHOBIA: 0
NAUSEA: 0
SINUS PAIN: 0
CHEST TIGHTNESS: 1
SINUS PRESSURE: 0
TROUBLE SWALLOWING: 0
RHINORRHEA: 0
SHORTNESS OF BREATH: 1
SORE THROAT: 0
CONSTIPATION: 0
WHEEZING: 0
BACK PAIN: 0
COUGH: 0
VOMITING: 0
DIARRHEA: 0
COLOR CHANGE: 0
ABDOMINAL PAIN: 0

## 2022-02-03 ASSESSMENT — PAIN DESCRIPTION - ONSET: ONSET: ON-GOING

## 2022-02-03 ASSESSMENT — PAIN DESCRIPTION - PAIN TYPE: TYPE: ACUTE PAIN

## 2022-02-03 ASSESSMENT — PAIN DESCRIPTION - ORIENTATION: ORIENTATION: LEFT;RIGHT

## 2022-02-03 ASSESSMENT — PAIN SCALES - GENERAL
PAINLEVEL_OUTOF10: 6
PAINLEVEL_OUTOF10: 0
PAINLEVEL_OUTOF10: 2
PAINLEVEL_OUTOF10: 2
PAINLEVEL_OUTOF10: 3
PAINLEVEL_OUTOF10: 5
PAINLEVEL_OUTOF10: 4

## 2022-02-03 ASSESSMENT — PAIN DESCRIPTION - PROGRESSION: CLINICAL_PROGRESSION: GRADUALLY WORSENING

## 2022-02-03 ASSESSMENT — PAIN DESCRIPTION - FREQUENCY: FREQUENCY: INTERMITTENT

## 2022-02-03 ASSESSMENT — PAIN DESCRIPTION - LOCATION
LOCATION: CHEST
LOCATION: CHEST

## 2022-02-03 ASSESSMENT — PAIN DESCRIPTION - DESCRIPTORS: DESCRIPTORS: SHARP

## 2022-02-03 NOTE — ED NOTES
Pt from home escorted by family member. Pt stand/ transferred from wheelchair to bed with limited assistance. C/o SOB and CPx4 days that has been worsening. Denies cough, f/c, n/v.   Pt is not on blood thinners. Not COVID vaccinated.      Recent Lupus dx and hx of pleural effusion         Hazel Haines RN  02/03/22 0172

## 2022-02-03 NOTE — ED PROVIDER NOTES
[facility]  Emergency Department Encounter  EmergencyMedicine Resident     Pt Name:Ale Giron  MRN: [id number]  [place] 1945  Date of evaluation: 2/3/22  PCP:  CELY Cunha CNP    This patient was evaluated in the Emergency Department for symptoms described in the history of present illness. The patient was evaluated in the context of the global COVID-19 pandemic, which necessitated consideration that the patient might be at risk for infection with the SARS-CoV-2 virus that causes COVID-19. Institutional protocols and algorithms that pertain to the evaluation of patients at risk for COVID-19 are in a state of rapid change based on information released by regulatory bodies including the CDC and federal and state organizations. These policies and algorithms were followed during the patient's care in the ED. CHIEF COMPLAINT       Chief Complaint   Patient presents with    Shortness of Breath    Chest Pain   CP, SOB    HISTORY OF PRESENT ILLNESS  (Location/Symptom, Timing/Onset, Context/Setting, Quality, Duration, Modifying Factors, Severity.)      Jesús Escobedo is a 78 y.o. female who presents with c/o CP and SOB over the past 4 days, worse when laying down or on side and better when sitting up. No hx blood clot. Recent diagnosis lupus in 09/2021 s/p cardiac arrest when pt had pericardial and pleural effusions. On chronic steroids, plaquenil. Takes lasix as needed. Denies change in weight recently and weighs self daily. Follows with  for pulmonology. No weakness, numbness, tingling, HA, vision changes. Notes pain started bilateral chest 4 days ago, but now L sided 10/10. Had echo 12/21. Also has hx CP. Not covid vaccinated. No cough, fever, chills, n/v/d.      PAST MEDICAL / SURGICAL / SOCIAL / FAMILY HISTORY      has a past medical history of Arthritis, Displacement of lumbar intervertebral disc without myelopathy, Hypertension, Lumbar disc disease, Personal history of TIA (transient ischemic attack), Sleep deprivation, SOB (shortness of breath), and Wears glasses. has a past surgical history that includes Wrist surgery (Left); Hysterectomy; Ankle surgery (Right); Tonsillectomy; Nerve Block (10/19/15); Nerve Block (10-26-15 ); Foot surgery (2015); Hip arthroscopy (Left, 03/14/2018); and pr hip arthroscopy, dx (Left, 3/14/2018). Social History     Socioeconomic History    Marital status:      Spouse name: Not on file    Number of children: Not on file    Years of education: Not on file    Highest education level: Not on file   Occupational History     Employer: [id number]     Comment: [employer]   Tobacco Use    Smoking status: Never Smoker    Smokeless tobacco: Never Used   Vaping Use    Vaping Use: Never used   Substance and Sexual Activity    Alcohol use: No    Drug use: No    Sexual activity: Not on file   Other Topics Concern    Not on file   Social History Narrative    Not on file     Social Determinants of Health     Financial Resource Strain:     Difficulty of Paying Living Expenses: Not on file   Food Insecurity:     Worried About [address] in the Last Year: Not on file    Paz of Food in the Last Year: Not on file   Transportation Needs:     Lack of Transportation (Medical): Not on file    Lack of Transportation (Non-Medical):  Not on file   Physical Activity:     Days of Exercise per Week: Not on file    Minutes of Exercise per Session: Not on file   Stress:     Feeling of Stress : Not on file   Social Connections:     Frequency of Communication with Friends and Family: Not on file    Frequency of Social Gatherings with Friends and Family: Not on file    Attends Ep[employer] Services: Not on file    Active Member of Clubs or Organizations: Not on file    Attends Club or Organization Meetings: Not on file    Marital Status: Not on file   Intimate Partner Violence:     Fear of Current or Ex-Partner: Not on file   Jo Frost Emotionally Abused: Not on file    Physically Abused: Not on file    Sexually Abused: Not on file   Housing Stability:     Unable to Pay for Housing in the Last Year: Not on file    Number of Places Lived in the Last Year: Not on file    Unstable Housing in the Last Year: Not on file       Family History   Problem Relation Age of Onset    Lung Cancer Mother     Lung Cancer Brother     Heart Disease Father     Heart Disease Paternal Grandfather        Allergies:  Bee pollen, Erythromycin, Pcn [penicillins], Tetanus toxoids, Tetracyclines & related, and Lisinopril    Home Medications:  Prior to Admission medications    Medication Sig Start Date End Date Taking? Authorizing Provider   colchicine (COLCRYS) 0.6 MG tablet Take 0.6 mg by mouth daily   Yes Historical Provider, MD   furosemide (LASIX) 20 MG tablet Take 1 tablet by mouth daily as needed (swelling) 12/20/21   Cait Benson,    hydroxychloroquine (PLAQUENIL) 200 MG tablet Take 200 mg by mouth daily Take 1 and 1/2 tablet daily    Historical Provider, MD   amLODIPine (NORVASC) 5 MG tablet Take 1 tablet by mouth daily 10/5/21 11/4/21  May MARILEE Sinha       REVIEW OF SYSTEMS    (2-9 systems for level 4, 10 or more for level 5)      Review of Systems   Constitutional: Negative for chills and fever. HENT: Negative for congestion and rhinorrhea. Eyes: Negative for photophobia and visual disturbance. Respiratory: Positive for shortness of breath. Negative for cough and wheezing. Cardiovascular: Positive for chest pain. Negative for palpitations. Gastrointestinal: Negative for abdominal pain, nausea and vomiting. Genitourinary: Negative for dysuria and frequency. Musculoskeletal: Negative for back pain and neck pain. Skin: Positive for rash. Negative for wound. Neurological: Negative for dizziness, light-headedness and numbness.        PHYSICAL EXAM   (up to 7 for level 4, 8 or more for level 5)      INITIAL VITALS:   BP (!) 114/58 Pulse 80   Temp 98.2 °F (36.8 °C) (Oral)   Resp 24   Ht 5' 2\" (1.575 m)   Wt 130 lb (59 kg)   SpO2 92%   BMI 23.78 kg/m²     Physical Exam  Vitals and nursing note reviewed. Constitutional:       General: She is not in acute distress. HENT:      Head: Normocephalic and atraumatic. Mouth/Throat:      Mouth: Mucous membranes are moist.      Pharynx: Oropharynx is clear. Eyes:      Pupils: Pupils are equal, round, and reactive to light. Cardiovascular:      Rate and Rhythm: Normal rate and regular rhythm. Pulmonary:      Effort: Pulmonary effort is normal. No respiratory distress. Breath sounds: No stridor. Abdominal:      Palpations: Abdomen is soft. Tenderness: There is no abdominal tenderness. Musculoskeletal:         General: No swelling. Normal range of motion. Skin:     General: Skin is warm and dry. Capillary Refill: Capillary refill takes less than 2 seconds. Neurological:      General: No focal deficit present. Mental Status: She is alert and oriented to person, place, and time. Sensory: No sensory deficit. Motor: No weakness. DIFFERENTIAL  DIAGNOSIS     PLAN (LABS / IMAGING / EKG):  Orders Placed This Encounter   Procedures    COVID-19, Rapid    XR CHEST PORTABLE    CT CHEST PULMONARY EMBOLISM W CONTRAST    CBC Auto Differential    Basic Metabolic Panel w/ Reflex to MG    Troponin    Brain Natriuretic Peptide    Troponin    D-Dimer, Quantitative    Immature Platelet Fraction    Basic Metabolic Panel w/ Reflex to MG    CBC    Protime-INR    ADULT DIET; Regular;  Low Sodium (2 gm)    Telemetry monitoring - continuous duration    Vital signs per unit routine    Notify physician    Up with assistance    Daily weights    Intake and output    Place intermittent pneumatic compression device    Full Code    Inpatient consult to Hospitalist    Inpatient consult to Cardiology    OT eval and treat    PT evaluation and treat    Initiate Oxygen Therapy Protocol    Pulse Oximetry Spot Check    EKG 12 Lead    ECHO Complete 2D W Doppler W Color    PATIENT STATUS (FROM ED OR OR/PROCEDURAL) Inpatient       MEDICATIONS ORDERED:  Orders Placed This Encounter   Medications    aspirin chewable tablet 324 mg    ketorolac (TORADOL) injection 30 mg    iopamidol (ISOVUE-370) 76 % injection 75 mL    DISCONTD: nystatin (MYCOSTATIN) cream    albuterol sulfate  (90 Base) MCG/ACT inhaler 2 puff     Order Specific Question:   Initiate RT Bronchodilator Protocol     Answer: Yes    budesonide-formoterol (SYMBICORT) 160-4.5 MCG/ACT inhaler 2 puff    colchicine (COLCRYS) tablet 0.6 mg    hydroxychloroquine (PLAQUENIL) tablet 200 mg    furosemide (LASIX) injection 40 mg    sodium chloride flush 0.9 % injection 5-40 mL    sodium chloride flush 0.9 % injection 10 mL    0.9 % sodium chloride infusion    OR Linked Order Group     potassium chloride (KLOR-CON M) extended release tablet 40 mEq     potassium bicarb-citric acid (EFFER-K) effervescent tablet 40 mEq     potassium chloride 10 mEq/100 mL IVPB (Peripheral Line)    magnesium sulfate 1000 mg in dextrose 5% 100 mL IVPB    enoxaparin (LOVENOX) injection 40 mg    OR Linked Order Group     ondansetron (ZOFRAN-ODT) disintegrating tablet 4 mg     ondansetron (ZOFRAN) injection 4 mg    polyethylene glycol (GLYCOLAX) packet 17 g    OR Linked Order Group     acetaminophen (TYLENOL) tablet 650 mg     acetaminophen (TYLENOL) suppository 650 mg       DDX: ACS, pericarditis, PE, PNA, CHF    DIAGNOSTIC RESULTS / EMERGENCY DEPARTMENT COURSE / MDM   LAB RESULTS:  Results for orders placed or performed during the hospital encounter of 02/03/22   COVID-19, Rapid    Specimen: Nasopharyngeal Swab   Result Value Ref Range    Specimen Description . NASOPHARYNGEAL SWAB     SARS-CoV-2, Rapid Not Detected Not Detected   CBC Auto Differential   Result Value Ref Range    WBC 6.9 3.5 - 11.3 k/uL    RBC 4. 16 3.95 - 5.11 m/uL    Hemoglobin 12.4 11.9 - 15.1 g/dL    Hematocrit 38.5 36.3 - 47.1 %    MCV 92.5 82.6 - 102.9 fL    MCH 29.8 25.2 - 33.5 pg    MCHC 32.2 28.4 - 34.8 g/dL    RDW 14.6 (H) 11.8 - 14.4 %    Platelets See Reflexed IPF Result 138 - 453 k/uL    MPV NOT REPORTED 8.1 - 13.5 fL    NRBC Automated 0.0 0.0 per 100 WBC    Differential Type NOT REPORTED     WBC Morphology NOT REPORTED     RBC Morphology ANISOCYTOSIS PRESENT     Platelet Estimate NOT REPORTED     Seg Neutrophils 79 (H) 36 - 65 %    Lymphocytes 10 (L) 24 - 43 %    Monocytes 7 3 - 12 %    Eosinophils % 3 1 - 4 %    Basophils 0 0 - 2 %    Immature Granulocytes 1 (H) 0 %    Segs Absolute 5.44 1.50 - 8.10 k/uL    Absolute Lymph # 0.71 (L) 1.10 - 3.70 k/uL    Absolute Mono # 0.49 0.10 - 1.20 k/uL    Absolute Eos # 0.18 0.00 - 0.44 k/uL    Basophils Absolute <0.03 0.00 - 0.20 k/uL    Absolute Immature Granulocyte 0.06 0.00 - 0.30 k/uL   Basic Metabolic Panel w/ Reflex to MG   Result Value Ref Range    Glucose 122 (H) 70 - 99 mg/dL    BUN 11 8 - 23 mg/dL    CREATININE 0.49 (L) 0.50 - 0.90 mg/dL    Bun/Cre Ratio NOT REPORTED 9 - 20    Calcium 8.2 (L) 8.6 - 10.4 mg/dL    Sodium 139 135 - 144 mmol/L    Potassium 4.0 3.7 - 5.3 mmol/L    Chloride 107 98 - 107 mmol/L    CO2 23 20 - 31 mmol/L    Anion Gap 9 9 - 17 mmol/L    GFR Non-African American >60 >60 mL/min    GFR African American >60 >60 mL/min    GFR Comment          GFR Staging NOT REPORTED    Troponin   Result Value Ref Range    Troponin, High Sensitivity 10 0 - 14 ng/L    Troponin T NOT REPORTED <0.03 ng/mL    Troponin Interp NOT REPORTED    Brain Natriuretic Peptide   Result Value Ref Range    Pro- (H) <300 pg/mL    BNP Interpretation NOT REPORTED    Troponin   Result Value Ref Range    Troponin, High Sensitivity 9 0 - 14 ng/L    Troponin T NOT REPORTED <0.03 ng/mL    Troponin Interp NOT REPORTED    D-Dimer, Quantitative   Result Value Ref Range    D-Dimer, Quant 0.59 mg/L FEU   Immature Platelet Fraction   Result Value Ref Range    Platelet, Fluorescence Platelet clumps present, count appears adequate. 138 - 453 k/uL       IMPRESSION: Chest pain, bilateral pleural effusion    RADIOLOGY:  CT CHEST PULMONARY EMBOLISM W CONTRAST   Final Result   No evidence of pulmonary embolism. New left greater than right pleural effusions with atelectasis in the lung   bases. New pericardial effusion. XR CHEST PORTABLE   Final Result   1. Left retrocardiac opacity with likely bilateral pleural effusions. 2. Similar appearing prominence of the cardiac silhouette. EKG  EKG Interpretation    Interpreted by emergency department physician    Rhythm: normal sinus   Rate: tachycardia  Axis: normal  Ectopy: none  Conduction: normal  ST Segments: normal  T Waves: upright in V1  Q Waves: none    Clinical Impression: non-specific EKG    Janey Traore MD    All EKG's are interpreted by the Emergency Department Physician who either signs or Co-signs this chart in the absence of a cardiologist.    EMERGENCY DEPARTMENT COURSE:  47-year-old female, history of lupus diagnosed in 9/2021 and history of cardiac arrest at that time with pericardial effusion and pleural effusion, presented to ED with complaints of 4 days of chest pain, shortness of breath. Patient noted pain was better when sitting up. Denied any weakness, numbness, tingling. Noted chest pain has been constant and initially was bilateral but now more left-sided. On exam, afebrile, tachycardic on arrival, swelling to bilateral lower extremities. BNP elevated from previous. Patient noted she takes Lasix as needed for swelling. EKG showed upright T waves in V1 but no ST depression or elevation. Given 324mg ASA. Trop 10, 9. D-dimer 0.59 but patient was still scanned for CT PE to shortness of breath in the setting of lupus. Small pericardial effusion seen on CT PE and bilateral pleural effusion.   White blood cell count within normal limits. Patient admitted for chest pain, bilateral pleural effusion with cardiology consult. Cards recommended 2D echo that was ordered. Last echo 12/19/2021 showed EF of 55% without pericardial effusion. PROCEDURES:  None    CONSULTS:  IP CONSULT TO HOSPITALIST  IP CONSULT TO CARDIOLOGY    CRITICAL CARE:  None    FINAL IMPRESSION      1. Chest pain, unspecified type    2. Bilateral pleural effusion          DISPOSITION / PLAN     DISPOSITION Admitted 02/03/2022 02:05:45 PM      PATIENT REFERRED TO:  No follow-up provider specified.     DISCHARGE MEDICATIONS:  Current Discharge Medication List          Hermila Zaidi MD  Emergency Medicine Resident    (Please note that portions of thisnote were completed with a voice recognition program.  Efforts were made to edit the dictations but occasionally words are mis-transcribed.)      Audelia Saunders MD  Resident  02/03/22 1640

## 2022-02-03 NOTE — H&P
[facility]  Office: [address], DO, Kristina Stover, DO, Carolyn Singleton DO, Francisco Javier Briscoe Katerina, DO, Jus Rodriguez MD, Eunice Vines MD, Joseluis Chavez MD, Skyler Gray MD, Shanae Hansen MD, Ginette Kelly MD, Antionette Kerns MD, Dari Lopez, DO, Octavio Melara DO, Brett Cox MD,  Jenna Azevedo DO, Aubree Castaneda MD, Ron Marcos MD, Brittney Swanson MD, Luis Antonio Lao MD, Kallie Singh MD, [facility], [facility], [facility], CNP, Sandeep Ramírez, CNP, Jj Danielson, CNS, Joselo Dasilva, CNP, Ammy Torrez, CNP, Doug Gates, CNP, Titi Wilson, CNP, Claudia Webb, CNP, Storm Caballero PA-C, Manuel Tripp, KAT, Keshia Kaiser DNP, Sam Cruz CNP, Wilfredo Campos, CNP, Kali Carrington, CNP, Libertad Li CNP, Katt Mora, CNP, Jem Vidal, [address]    HISTORY AND PHYSICAL EXAMINATION            Date:   2/3/2022  Patient name:  Casandra Leon  Date of admission:  2/3/2022 11:01 AM  MRN:   [id number]  Account:  [de-identified]  YOB: 1945  PCP:    CELY Montemayor CNP  Room:   22/22  Code Status:    Prior    Chief Complaint:     Chief Complaint   Patient presents with    Shortness of Breath    Chest Pain       History Obtained From:     patient, electronic medical record    History of Present Illness:     Casandra Leon is a 78 y.o. Non- / non  female who presents with Shortness of Breath and Chest Pain   and is admitted to the hospital for the management of Bilateral pleural effusion. Lucía Kenyon is a 78year old female who presented today for evaluation of 4 day history of left chest pain and SOB. She states she knew it was her lungs and has had this discomfort in the past, most recently in December of 2021. She had no associated radiation or diaphoresis. She was diagnosed with lupus in September, 2021 and has been following with rheumatology and cardiology.  She has been on prednisone daily and has had decreased dosing due to swelling of her face and legs. She is currently taking 5mg daily. She takes lasix 20mg po as needed which she states is usually every few days,  last dose was 3 days ago. She states she has not been able to follow with pulmonology because of Covid restrictions but has an appoinment scheduled for March, 2022. She denies any fever, nausea or vomiting. She denies changes in bowel or bladder. Medical history includes HTN, arthritis and lupus. She is compliant with all home medications. She is a non-smoker, denies use of alcohol or recreational drugs. CT PE:  Impression:  No evidence of pulmonary embolism. New left greater than right pleural effusions with atelectasis in the lung   bases. New pericardial effusion.        Past Medical History:     Past Medical History:   Diagnosis Date    Arthritis     Displacement of lumbar intervertebral disc without myelopathy 9/30/2015    Hypertension     exacerbated after last injection 10-    Lumbar disc disease     Personal history of TIA (transient ischemic attack)     2012 mini stroke high blood pressure    Sleep deprivation     SOB (shortness of breath)     \"walk to fast and going upstairs\" \"can walk a city block\"    Wears glasses         Past Surgical History:     Past Surgical History:   Procedure Laterality Date    ANKLE SURGERY Right     x 3    FOOT SURGERY  2015    left    HIP ARTHROSCOPY Left 03/14/2018    band release bursectomy    HYSTERECTOMY      NERVE BLOCK  10/19/15    caudal #1  celestone 9mg    NERVE BLOCK  10-26-15     caudal epidural steroid block #2 decadron 5 mg    DC HIP ARTHROSCOPY, DX Left 3/14/2018    LEFT HIP ARTHROSCOPY WITH  IT BAND RELEASE BURSECTOMY WITH GLUTEUS MUSCLE REPAIR performed by Sally Reynoso DO at [facility] Left     x 2        Medications Prior to Admission:     Prior to Admission medications    Medication Sig Start Date End Date Taking? Authorizing Provider   furosemide (LASIX) 20 MG tablet Take 1 tablet by mouth daily as needed (swelling) 12/20/21   Maria Luisa Bland DO   hydroxychloroquine (PLAQUENIL) 200 MG tablet Take 200 mg by mouth daily Take 1 and 1/2 tablet daily    Historical Provider, MD   colchicine (COLCRYS) 0.6 MG tablet Take 1 tablet by mouth 2 times daily 10/5/21   Ian Sharp PA-C   budesonide-formoterol [facility]) 160-4.5 MCG/ACT AERO Inhale 2 puffs into the lungs 2 times daily 10/5/21   Terry Yang PA-C   albuterol sulfate HFA (VENTOLIN HFA) 108 (90 Base) MCG/ACT inhaler Inhale 2 puffs into the lungs 4 times daily as needed for Wheezing 10/5/21   Ian Sharp PA-C   albuterol sulfate HFA (VENTOLIN HFA) 108 (90 Base) MCG/ACT inhaler Inhale 2 puffs into the lungs every 6 hours as needed for Wheezing or Shortness of Breath 10/5/21   Ian Sharp PA-C   amLODIPine (NORVASC) 5 MG tablet Take 1 tablet by mouth daily 10/5/21 11/4/21  Ian Sharp PA-C   Capsaicin-Menthol (ALLEVESS EX) Apply topically     Historical Provider, MD        Allergies:     Bee pollen, Erythromycin, Pcn [penicillins], Tetanus toxoids, Tetracyclines & related, and Lisinopril    Social History:     Tobacco:    reports that she has never smoked. She has never used smokeless tobacco.  Alcohol:      reports no history of alcohol use. Drug Use:  reports no history of drug use. Family History:     Family History   Problem Relation Age of Onset    Lung Cancer Mother     Lung Cancer Brother     Heart Disease Father     Heart Disease Paternal Grandfather        Review of Systems:     Positive and Negative as described in HPI. Review of Systems   Constitutional: Positive for activity change (Decreased). Negative for chills and diaphoresis. HENT: Negative for congestion, sinus pressure, sinus pain, sore throat and trouble swallowing. Eyes: Negative for photophobia and visual disturbance. Respiratory: Positive for chest tightness. Cardiovascular: Positive for chest pain. Gastrointestinal: Negative for constipation, diarrhea, nausea and vomiting. Endocrine: Negative for polydipsia, polyphagia and polyuria. Genitourinary: Negative for decreased urine volume, dysuria, enuresis and hematuria. Musculoskeletal: Positive for arthralgias and myalgias. Negative for back pain, gait problem and joint swelling. Skin: Positive for rash (Erythemic moist rash beneath breasts bilaterally). Negative for color change, pallor and wound. Allergic/Immunologic: Positive for immunocompromised state. Negative for environmental allergies and food allergies. Neurological: Negative for dizziness, tremors, seizures, weakness and headaches. Hematological: Negative for adenopathy. Does not bruise/bleed easily. Psychiatric/Behavioral: Negative for agitation, confusion, self-injury and sleep disturbance. Physical Exam:   /69   Pulse 87   Temp 98.2 °F (36.8 °C) (Oral)   Resp 28   SpO2 92%   Temp (24hrs), Av.2 °F (36.8 °C), Min:98.2 °F (36.8 °C), Max:98.2 °F (36.8 °C)    No results for input(s): POCGLU in the last 72 hours. No intake or output data in the 24 hours ending 22 1410    Physical Exam  Vitals and nursing note reviewed. Constitutional:       General: She is not in acute distress. Appearance: She is normal weight. She is not ill-appearing. HENT:      Mouth/Throat:      Mouth: Mucous membranes are moist.      Pharynx: Oropharynx is clear. No oropharyngeal exudate. Eyes:      General:         Right eye: No discharge. Left eye: No discharge. Extraocular Movements: Extraocular movements intact. Pupils: Pupils are equal, round, and reactive to light. Cardiovascular:      Rate and Rhythm: Normal rate and regular rhythm. Pulses: Normal pulses. Heart sounds: Normal heart sounds.    Pulmonary:      Effort: Pulmonary effort is normal.      Breath # 0.71 (L) 1.10 - 3.70 k/uL    Absolute Mono # 0.49 0.10 - 1.20 k/uL    Absolute Eos # 0.18 0.00 - 0.44 k/uL    Basophils Absolute <0.03 0.00 - 0.20 k/uL    Absolute Immature Granulocyte 0.06 0.00 - 0.30 k/uL   Basic Metabolic Panel w/ Reflex to MG    Collection Time: 02/03/22 11:32 AM   Result Value Ref Range    Glucose 122 (H) 70 - 99 mg/dL    BUN 11 8 - 23 mg/dL    CREATININE 0.49 (L) 0.50 - 0.90 mg/dL    Bun/Cre Ratio NOT REPORTED 9 - 20    Calcium 8.2 (L) 8.6 - 10.4 mg/dL    Sodium 139 135 - 144 mmol/L    Potassium 4.0 3.7 - 5.3 mmol/L    Chloride 107 98 - 107 mmol/L    CO2 23 20 - 31 mmol/L    Anion Gap 9 9 - 17 mmol/L    GFR Non-African American >60 >60 mL/min    GFR African American >60 >60 mL/min    GFR Comment          GFR Staging NOT REPORTED    Troponin    Collection Time: 02/03/22 11:32 AM   Result Value Ref Range    Troponin, High Sensitivity 10 0 - 14 ng/L    Troponin T NOT REPORTED <0.03 ng/mL    Troponin Interp NOT REPORTED    Brain Natriuretic Peptide    Collection Time: 02/03/22 11:32 AM   Result Value Ref Range    Pro- (H) <300 pg/mL    BNP Interpretation NOT REPORTED    D-Dimer, Quantitative    Collection Time: 02/03/22 11:32 AM   Result Value Ref Range    D-Dimer, Quant 0.59 mg/L FEU   Immature Platelet Fraction    Collection Time: 02/03/22 11:32 AM   Result Value Ref Range    Platelet, Fluorescence Platelet clumps present, count appears adequate. 138 - 453 k/uL   Troponin    Collection Time: 02/03/22 12:32 PM   Result Value Ref Range    Troponin, High Sensitivity 9 0 - 14 ng/L    Troponin T NOT REPORTED <0.03 ng/mL    Troponin Interp NOT REPORTED        Imaging/Diagnostics:  XR CHEST PORTABLE    Result Date: 2/3/2022  1. Left retrocardiac opacity with likely bilateral pleural effusions. 2. Similar appearing prominence of the cardiac silhouette. CT CHEST PULMONARY EMBOLISM W CONTRAST    Result Date: 2/3/2022  No evidence of pulmonary embolism.  New left greater than right pleural effusions with atelectasis in the lung bases. New pericardial effusion. Assessment :      Hospital Problems           Last Modified POA    * (Principal) Bilateral pleural effusion 2/3/2022 Yes    Pericardial effusion 2/3/2022 Yes    Pleural effusion 2/3/2022 Yes    Essential hypertension 2/3/2022 Yes    Lupus ([address] 2/3/2022 Yes          Plan:     Patient status inpatient in the Med/Surge    1. Admit to Intermed services  2. Inpatient consult to cardiology. Appreciate recommendations. Pending echocardiogram.  Continuous cardiac monitoring  3. proBNP elevated. Lasix 40 mg IV twice daily. Trend. Daily weights. Accurate I&O.  4. Manage hypertension. Continue home dose of Norvasc. As needed antihypertensives for systolic blood pressure greater than 160.  5. Continue home Plaquenil for lupus. 6. DVT prophylaxis  7. GI prophylaxis  8. Trend CBC/BMP. Replete electrolytes as needed. 9. Case management for home-going needs/discharge planning. 10. Full code    Consultations:   IP CONSULT TO HOSPITALIST  IP CONSULT TO CARDIOLOGY     Patient is admitted as inpatient status because of co-morbidities listed above, severity of signs and symptoms as outlined, requirement for current medical therapies and most importantly because of direct risk to patient if care not provided in a hospital setting. Expected length of stay > 48 hours.     CELY Cotton NP  2/3/2022  2:10 PM    Copy sent to CELY Selby - CNP

## 2022-02-03 NOTE — PLAN OF CARE
Problem: Sensory:  Goal: Ability to identify factors that increase the pain will improve  Description: Ability to identify factors that increase the pain will improve  Outcome: Ongoing  Goal: Ability to demonstrate ways to enhance comfort will improve  Description: Ability to demonstrate ways to enhance comfort will improve  Outcome: Ongoing  Goal: General experience of comfort will improve  Description: General experience of comfort will improve  Outcome: Ongoing no

## 2022-02-03 NOTE — ED PROVIDER NOTES
[facility]     Emergency Department     Faculty Note/ Attestation      Pt Name: Varun Queen                                       MRN: [id number]  [place] 1945  Date of evaluation: 2/3/2022    Patients PCP:    CELY Rosales - BAL[place]      Attestation  I performed a history and physical examination of the patient and discussed management with the resident. I reviewed the residents note and agree with the documented findings and plan of care. Any areas of disagreement are noted on the chart. I was personally present for the key portions of any procedures. I have documented in the chart those procedures where I was not present during the key portions. I have reviewed the emergency nurses triage note. I agree with the chief complaint, past medical history, past surgical history, allergies, medications, social and family history as documented unless otherwise noted below. For Physician Assistant/ Nurse Practitioner cases/documentation I have personally evaluated this patient and have completed at least one if not all key elements of the E/M (history, physical exam, and MDM). Additional findings are as noted.       Initial Screens:             Vitals:    Vitals:    02/03/22 1050 02/03/22 1110   BP:  129/68   Pulse: 115 113   Resp:  18   Temp:  98.2 °F (36.8 °C)   TempSrc:  Oral   SpO2: 100% 95%       CHIEF COMPLAINT       Chief Complaint   Patient presents with    Shortness of Breath    Chest Pain             DIAGNOSTIC RESULTS             RADIOLOGY:   XR CHEST PORTABLE    (Results Pending)         LABS:  Labs Reviewed   COVID-19, RAPID   CBC WITH AUTO DIFFERENTIAL   BASIC METABOLIC PANEL W/ REFLEX TO MG FOR LOW K   TROPONIN   BRAIN NATRIURETIC PEPTIDE   TROPONIN   D-DIMER, QUANTITATIVE         EMERGENCY DEPARTMENT COURSE:     -------------------------  BP: 129/68, Temp: 98.2 °F (36.8 °C), Pulse: 113, Resp: 18      Comments    CP/SOB x4 days  Recent Lupus dx  Prior pericardial effusion with cardiac arrest 5 mos ago  Prior PNA  No cough, no f/c, no URI sx    Plan for cardiac w/u, POCUS, fluids  C/f ACS, pericarditis, PE    2:12 PM EST  Likely fluid overload, no PE but does have new pleural effusions and sinus symptoms of volume overload as well as possible pericardial effusion.   Will admit to medicine with cardiology following      (Please note that portions of this note were completed with a voice recognition program.  Efforts were made to edit the dictations but occasionally words are mis-transcribed.)      Zoya Angel MD,, MD  Attending Emergency Physician         Zoya Angel MD  02/03/22 1180

## 2022-02-03 NOTE — CARE COORDINATION
Case Management Initial Discharge Plan  [place],             Met with:patient to discuss discharge plans. Information verified: address, contacts, phone number, , insurance Yes  Insurance Provider: [facility]     Emergency Contact/Next of Kin name & number: Josh Borges, daughter [phone]  Who are involved in patient's support system? SO    PCP: Berenice Saunders, APRN - CNP  Date of last visit: November       Discharge Planning    Living Arrangements:  Alone     Home has 1 stories  2 stairs to climb to get into front door, na stairs to climb to reach second floor  Location of bedroom/bathroom in home main level     Patient able to perform ADL's:Independent    Current Services (outpatient & in home) none   DME equipment: na  DME provider: na    Is patient receiving oral anticoagulation therapy? No    If indicated:   Physician managing anticoagulation treatment: na   Where does patient obtain lab work for ATC treatment? na      Potential Assistance Needed:  N/A    Patient agreeable to home care: No  [place] of choice provided:  n/a    Prior SNF/Rehab Placement and Facility: none   Agreeable to SNF/Rehab: No  [place] of choice provided: n/a     Evaluation: no    Expected Discharge date:  22    Patient expects to be discharged to: If home: is the family and/or caregiver wiling & able to provide support at home? Yes   Who will be providing this support? SO     Follow Up Appointment: Best Day/ Time:      Transportation provider: family   Transportation arrangements needed for discharge: No    Readmission Risk              Risk of Unplanned Readmission:  20             Does patient have a readmission risk score greater than 14?: No  If yes, follow-up appointment must be made within 7 days of discharge. Goals of Care:  To tell my why this is happening       Educated patient  on transitional options, provided freedom of choice and are agreeable with plan      Discharge

## 2022-02-04 ENCOUNTER — APPOINTMENT (OUTPATIENT)
Dept: ULTRASOUND IMAGING | Age: 77
DRG: 187 | End: 2022-02-04
Payer: MEDICARE

## 2022-02-04 LAB
ANION GAP SERPL CALCULATED.3IONS-SCNC: 9 MMOL/L (ref 9–17)
BUN BLDV-MCNC: 13 MG/DL (ref 8–23)
BUN/CREAT BLD: ABNORMAL (ref 9–20)
C-REACTIVE PROTEIN: 76.2 MG/L (ref 0–5)
CALCIUM SERPL-MCNC: 8.5 MG/DL (ref 8.6–10.4)
CHLORIDE BLD-SCNC: 108 MMOL/L (ref 98–107)
CO2: 27 MMOL/L (ref 20–31)
CREAT SERPL-MCNC: 0.64 MG/DL (ref 0.5–0.9)
GFR AFRICAN AMERICAN: >60 ML/MIN
GFR NON-AFRICAN AMERICAN: >60 ML/MIN
GFR SERPL CREATININE-BSD FRML MDRD: ABNORMAL ML/MIN/{1.73_M2}
GFR SERPL CREATININE-BSD FRML MDRD: ABNORMAL ML/MIN/{1.73_M2}
GLUCOSE BLD-MCNC: 80 MG/DL (ref 70–99)
HCT VFR BLD CALC: 37 % (ref 36.3–47.1)
HEMOGLOBIN: 11.8 G/DL (ref 11.9–15.1)
INR BLD: 1
LACTATE DEHYDROGENASE, FLUID: <10 U/L
LV EF: 55 %
LVEF MODALITY: NORMAL
MAGNESIUM: 2.3 MG/DL (ref 1.6–2.6)
MCH RBC QN AUTO: 29 PG (ref 25.2–33.5)
MCHC RBC AUTO-ENTMCNC: 31.9 G/DL (ref 28.4–34.8)
MCV RBC AUTO: 90.9 FL (ref 82.6–102.9)
NRBC AUTOMATED: 0 PER 100 WBC
PDW BLD-RTO: 14.3 % (ref 11.8–14.4)
PLATELET # BLD: 176 K/UL (ref 138–453)
PMV BLD AUTO: 10.2 FL (ref 8.1–13.5)
POTASSIUM SERPL-SCNC: 3.3 MMOL/L (ref 3.7–5.3)
PROTHROMBIN TIME: 10.4 SEC (ref 9.1–12.3)
RBC # BLD: 4.07 M/UL (ref 3.95–5.11)
SODIUM BLD-SCNC: 144 MMOL/L (ref 135–144)
SPECIMEN TYPE: NORMAL
SPECIMEN TYPE: NORMAL
TOTAL PROTEIN, BODY FLUID: <1 G/DL
WBC # BLD: 6.3 K/UL (ref 3.5–11.3)

## 2022-02-04 PROCEDURE — 88305 TISSUE EXAM BY PATHOLOGIST: CPT

## 2022-02-04 PROCEDURE — 89051 BODY FLUID CELL COUNT: CPT

## 2022-02-04 PROCEDURE — 88112 CYTOPATH CELL ENHANCE TECH: CPT

## 2022-02-04 PROCEDURE — 94760 N-INVAS EAR/PLS OXIMETRY 1: CPT

## 2022-02-04 PROCEDURE — 36415 COLL VENOUS BLD VENIPUNCTURE: CPT

## 2022-02-04 PROCEDURE — 87070 CULTURE OTHR SPECIMN AEROBIC: CPT

## 2022-02-04 PROCEDURE — 80048 BASIC METABOLIC PNL TOTAL CA: CPT

## 2022-02-04 PROCEDURE — 84157 ASSAY OF PROTEIN OTHER: CPT

## 2022-02-04 PROCEDURE — 6360000002 HC RX W HCPCS: Performed by: NURSE PRACTITIONER

## 2022-02-04 PROCEDURE — 1200000000 HC SEMI PRIVATE

## 2022-02-04 PROCEDURE — 83615 LACTATE (LD) (LDH) ENZYME: CPT

## 2022-02-04 PROCEDURE — 32555 ASPIRATE PLEURA W/ IMAGING: CPT

## 2022-02-04 PROCEDURE — 0W9B3ZX DRAINAGE OF LEFT PLEURAL CAVITY, PERCUTANEOUS APPROACH, DIAGNOSTIC: ICD-10-PCS | Performed by: RADIOLOGY

## 2022-02-04 PROCEDURE — 2580000003 HC RX 258: Performed by: NURSE PRACTITIONER

## 2022-02-04 PROCEDURE — 85027 COMPLETE CBC AUTOMATED: CPT

## 2022-02-04 PROCEDURE — 87205 SMEAR GRAM STAIN: CPT

## 2022-02-04 PROCEDURE — 6370000000 HC RX 637 (ALT 250 FOR IP): Performed by: NURSE PRACTITIONER

## 2022-02-04 PROCEDURE — 99232 SBSQ HOSP IP/OBS MODERATE 35: CPT | Performed by: FAMILY MEDICINE

## 2022-02-04 PROCEDURE — 85610 PROTHROMBIN TIME: CPT

## 2022-02-04 PROCEDURE — 6370000000 HC RX 637 (ALT 250 FOR IP): Performed by: FAMILY MEDICINE

## 2022-02-04 PROCEDURE — 87075 CULTR BACTERIA EXCEPT BLOOD: CPT

## 2022-02-04 PROCEDURE — 93306 TTE W/DOPPLER COMPLETE: CPT

## 2022-02-04 PROCEDURE — 2500000003 HC RX 250 WO HCPCS: Performed by: FAMILY MEDICINE

## 2022-02-04 PROCEDURE — 83735 ASSAY OF MAGNESIUM: CPT

## 2022-02-04 PROCEDURE — 86140 C-REACTIVE PROTEIN: CPT

## 2022-02-04 RX ORDER — PREDNISONE 20 MG/1
20 TABLET ORAL DAILY
Status: DISCONTINUED | OUTPATIENT
Start: 2022-02-04 | End: 2022-02-05 | Stop reason: HOSPADM

## 2022-02-04 RX ADMIN — HYDROXYCHLOROQUINE SULFATE 200 MG: 200 TABLET, FILM COATED ORAL at 08:52

## 2022-02-04 RX ADMIN — ANTI-FUNGAL POWDER MICONAZOLE NITRATE TALC FREE: 1.42 POWDER TOPICAL at 15:03

## 2022-02-04 RX ADMIN — COLCHICINE 0.6 MG: 0.6 TABLET, FILM COATED ORAL at 19:55

## 2022-02-04 RX ADMIN — PREDNISONE 20 MG: 20 TABLET ORAL at 14:59

## 2022-02-04 RX ADMIN — ANTI-FUNGAL POWDER MICONAZOLE NITRATE TALC FREE: 1.42 POWDER TOPICAL at 22:10

## 2022-02-04 RX ADMIN — SODIUM CHLORIDE, PRESERVATIVE FREE 10 ML: 5 INJECTION INTRAVENOUS at 11:22

## 2022-02-04 RX ADMIN — ACETAMINOPHEN 650 MG: 325 TABLET ORAL at 14:58

## 2022-02-04 RX ADMIN — FUROSEMIDE 40 MG: 10 INJECTION, SOLUTION INTRAMUSCULAR; INTRAVENOUS at 19:55

## 2022-02-04 RX ADMIN — COLCHICINE 0.6 MG: 0.6 TABLET, FILM COATED ORAL at 08:52

## 2022-02-04 RX ADMIN — FUROSEMIDE 40 MG: 10 INJECTION, SOLUTION INTRAMUSCULAR; INTRAVENOUS at 11:21

## 2022-02-04 RX ADMIN — ONDANSETRON 4 MG: 4 TABLET, ORALLY DISINTEGRATING ORAL at 14:59

## 2022-02-04 RX ADMIN — SODIUM CHLORIDE, PRESERVATIVE FREE 10 ML: 5 INJECTION INTRAVENOUS at 19:55

## 2022-02-04 RX ADMIN — ACETAMINOPHEN 650 MG: 325 TABLET ORAL at 08:52

## 2022-02-04 ASSESSMENT — PAIN DESCRIPTION - PAIN TYPE: TYPE: ACUTE PAIN

## 2022-02-04 ASSESSMENT — ENCOUNTER SYMPTOMS
FACIAL SWELLING: 1
DIARRHEA: 0
COUGH: 1
WHEEZING: 0
SHORTNESS OF BREATH: 1
BLOOD IN STOOL: 0
VOMITING: 0
CONSTIPATION: 0
ABDOMINAL PAIN: 0
NAUSEA: 0
CHEST TIGHTNESS: 0

## 2022-02-04 ASSESSMENT — PAIN DESCRIPTION - DESCRIPTORS: DESCRIPTORS: SHARP;SHOOTING

## 2022-02-04 ASSESSMENT — PAIN DESCRIPTION - FREQUENCY: FREQUENCY: INTERMITTENT

## 2022-02-04 ASSESSMENT — PAIN SCALES - GENERAL
PAINLEVEL_OUTOF10: 4
PAINLEVEL_OUTOF10: 10
PAINLEVEL_OUTOF10: 0
PAINLEVEL_OUTOF10: 4

## 2022-02-04 ASSESSMENT — PAIN DESCRIPTION - ORIENTATION: ORIENTATION: LEFT

## 2022-02-04 ASSESSMENT — PAIN DESCRIPTION - LOCATION: LOCATION: CHEST;BREAST

## 2022-02-04 NOTE — PROGRESS NOTES
[facility]  Office: [address], DO, Brie Sesay, DO, Marcelina Preston, DO, Zeferino Borges, DO, Brain Dancer, MD, Peggy Lucia MD, Veronica Peterson MD, Chris Cunningham MD, Negrita Leroy MD, Burgess Zach MD, Bridget Arechiga MD, Rose Barroso, DO, Kuldip Ferro, DO, Kirsten Miller MD,  Frankie Liriano DO, Rachelle Serrano MD, Carole Haji MD, Tonja Wilson MD, Kunal Srinivasan MD, Elida Alvarez MD, Prasanth Brasher, CNP, Franchesca Roque CNP, Jase Murdock, CNP, Selvin Greene, CNS, Elo Hensley, CNP, Jj Arriola, CNP, Lloyd Perez, CNP, Rola Ojeda, CNP, Author Doug, BALDOMERO, Pricila Malone PA-C, José Miguel Bragg, KAT, Jack Reynoso, KAT, Elva Lopez, CNP, Marquita Lindsay, CNP, Jovani Solis, CNP, Zoya Tomas, CNP, Fabián Martinez, CNP, Jaden Bergman, [facility]    Progress Note    2/4/2022    3:34 PM    Name:   Elo Drake  MRN:     [id number]     Acct:      [de-identified]   Room:   [address] [place], [place] [id number] Day:  1  Admit Date:  2/3/2022 11:01 AM    PCP:   CELY Almeida CNP  Code Status:  Full Code    Subjective:     C/C:   Chief Complaint   Patient presents with    Shortness of Breath    Chest Pain     Interval History Status: not changed. Patient seen and examined at bedside, no acute events overnight. Continue to improve overall with better breathing. Patient denies any chest pain, shortness of breath, chills, fevers, nausea or vomiting. Patient vitals, labs and all providers notes were reviewed,from overnight shift and morning updates were noted and discussed with the nurse    Brief History:     Cintia Woods is a 78year old female who presented today for evaluation of 4 day history of left chest pain and SOB. She states she knew it was her lungs and has had this discomfort in the past, most recently in December of 2021. She had no associated radiation or diaphoresis.  She was diagnosed with lupus in September, 2021 and has been following with rheumatology and cardiology. She has been on prednisone daily and has had decreased dosing due to swelling of her face and legs. She is currently taking 5mg daily. She takes lasix 20mg po as needed which she states is usually every few days,  last dose was 3 days ago. She states she has not been able to follow with pulmonology because of Covid restrictions but has an appoinment scheduled for March, 2022. She denies any fever, nausea or vomiting. She denies changes in bowel or bladder. Medical history includes HTN, arthritis and lupus. She is compliant with all home medications. She is a non-smoker, denies use of alcohol or recreational drugs.      CT PE:  Impression:  No evidence of pulmonary embolism. New left greater than right pleural effusions with atelectasis in the lung   bases. New pericardial effusion. Review of Systems:     Review of Systems   Constitutional: Positive for activity change, appetite change and fatigue. Negative for chills, diaphoresis and fever. HENT: Positive for facial swelling. Negative for congestion. Eyes: Negative for visual disturbance. Respiratory: Positive for cough and shortness of breath. Negative for chest tightness and wheezing. Cardiovascular: Negative for chest pain, palpitations and leg swelling. Gastrointestinal: Negative for abdominal pain, blood in stool, constipation, diarrhea, nausea and vomiting. Genitourinary: Negative for difficulty urinating. Neurological: Negative for dizziness, weakness, light-headedness, numbness and headaches. All other systems reviewed and are negative. Medications: Allergies:     Allergies   Allergen Reactions    Bee Pollen Anaphylaxis    Erythromycin Anaphylaxis    Pcn [Penicillins] Anaphylaxis    Tetanus Toxoids Anaphylaxis    Tetracyclines & Related Anaphylaxis    Lisinopril Other (See Comments)     cough       Current Meds:   Scheduled Meds:  predniSONE  20 mg Oral Daily    miconazole   Topical BID    budesonide-formoterol  2 puff Inhalation BID    colchicine  0.6 mg Oral BID    hydroxychloroquine  200 mg Oral Daily    furosemide  40 mg IntraVENous BID    sodium chloride flush  5-40 mL IntraVENous 2 times per day    enoxaparin  40 mg SubCUTAneous Daily     Continuous Infusions:    sodium chloride       PRN Meds: albuterol sulfate HFA, sodium chloride flush, sodium chloride, potassium chloride **OR** potassium alternative oral replacement **OR** potassium chloride, magnesium sulfate, ondansetron **OR** ondansetron, polyethylene glycol, acetaminophen **OR** acetaminophen    Data:     Past Medical History:   has a past medical history of Arthritis, Displacement of lumbar intervertebral disc without myelopathy, Hypertension, Lumbar disc disease, Personal history of TIA (transient ischemic attack), Sleep deprivation, SOB (shortness of breath), and Wears glasses. Social History:   reports that she has never smoked. She has never used smokeless tobacco. She reports that she does not drink alcohol and does not use drugs. Family History:   Family History   Problem Relation Age of Onset    Lung Cancer Mother     Lung Cancer Brother     Heart Disease Father     Heart Disease Paternal Grandfather        Vitals:  BP (!) 160/137   Pulse 100   Temp 98.2 °F (36.8 °C) (Oral)   Resp 18   Ht 5' 2\" (1.575 m)   Wt 135 lb (61.2 kg)   SpO2 96%   BMI 24.69 kg/m²   Temp (24hrs), Av.8 °F (36.6 °C), Min:97 °F (36.1 °C), Max:98.2 °F (36.8 °C)    No results for input(s): POCGLU in the last 72 hours. I/O (24Hr):     Intake/Output Summary (Last 24 hours) at 2022 1534  Last data filed at 2022 0234  Gross per 24 hour   Intake --   Output 300 ml   Net -300 ml       Labs:  Hematology:  Recent Labs     22  1132 22  0423   WBC 6.9 6.3   RBC 4.16 4.07   HGB 12.4 11.8*   HCT 38.5 37.0   MCV 92.5 90.9   MCH 29.8 29.0   MCHC 32.2 31.9 secondary to steroid use, Lasix as needed    DVT and GI prophylaxis    Discussed in detail with the patient and her  and her daughter in the room    Sho Dutta MD  2/4/2022  3:34 PM

## 2022-02-04 NOTE — PLAN OF CARE
Problem: Sensory:  Goal: Ability to identify factors that increase the pain will improve  Description: Ability to identify factors that increase the pain will improve  2/4/2022 0431 by Ames Dakin, RN  Outcome: Ongoing  2/3/2022 1621 by Lorie Crespo RN  Outcome: Ongoing  Goal: Ability to demonstrate ways to enhance comfort will improve  Description: Ability to demonstrate ways to enhance comfort will improve  2/4/2022 0431 by Ames Dakin, RN  Outcome: Ongoing  2/3/2022 1621 by Lorie Crespo RN  Outcome: Ongoing  Goal: General experience of comfort will improve  Description: General experience of comfort will improve  2/4/2022 0431 by Ames Dakin, RN  Outcome: Ongoing  2/3/2022 1621 by Lorie Crespo RN  Outcome: Ongoing

## 2022-02-04 NOTE — PROGRESS NOTES
Physical Therapy         Physical Therapy Cancel Note      DATE: 2022    NAME: Sameera Delong  MRN: [id number]   : 1945      Patient not seen this date for Physical Therapy due to:    Patient unavailable for eval.  At IR for a procedure.       Electronically signed by Dejuan Iraheta PT on 2022 at 2:14 PM

## 2022-02-04 NOTE — BRIEF OP NOTE
Brief Postoperative Note for Thoracentesis    Chandni Victor  YOB: 1945  [id number]    Pre-operative Diagnosis: left Pleural effusion      Post-operative Diagnosis: Same    Procedure: Ultrasound guided Thoracentesis     Anesthesia: 1% Lidocaine     Surgeons/Assistants: Isa Gutiérrez PA-C    Complications: none    EBL: Minimal    Specimens: were obtained    Ultrasound guided left thoracentesis performed. 150 ml clear yellow fluid obtained. Dressing applied.       Electronically signed by DAKOTAH Vazquez on 2/4/2022 at 2:41 PM

## 2022-02-04 NOTE — PROGRESS NOTES
Patient to [facility],[address] for left thoracentesis. JR CLAIRE and ANDRIY RDMS at bedside. Site prepped and draped, lidocaine used. Access obtained and draining clear yellow. 150ml obtained. Specimen collected and to be sent to lab by [facility]. Access removed and dry sterile drsg applied. Report called to RN and patient taken to IR holding area awaiting transport. Patient tolerated well.

## 2022-02-04 NOTE — PROGRESS NOTES
[facility]  Documentation Note                Admission Dx: Bilateral pleural effusion [J90]  Chest pain, unspecified type [R07.9]    Past Medical History:   has a past medical history of Arthritis, Displacement of lumbar intervertebral disc without myelopathy, Hypertension, Lumbar disc disease, Personal history of TIA (transient ischemic attack), Sleep deprivation, SOB (shortness of breath), and Wears glasses. Previous Testing:     LIMITED ECHO 12/20/2021: EF 55%, small anterior echo free space suggestive of adipose tissue/fat pad vs old residual clotted effusion. ECHO 9/29/2021: EF 55%, moderate-large PCE -- posteriorly measuring 2.29 cm, anteriorly measuring 3.66 cm, gelantinous material is noted throughout. Can't r/o RV collapse in the subcostal views. Pleural effusion is noted. Previous office/hospital visit:   BALTAZAR Bradshaw 1/13/2022: 1. Pericardial effusion status post pericardiocentesis on September 27, 2021. Echo done on October 4, 2021 showing small pericardial effusion with preserved LV systolic function. Repeat echocardiogram on October 22, 2021 showing small pericardial effusion. 2. History of pleural effusion status post thoracentesis. On the same admission as pericardiocentesis. 3. SLE. On chloroquine and steroids. Followed by Rheumatology. 4. Echocardiogram on December 2021. Small anterior free space fat versus small pericardial effusion. Global LV systolic function is within normal with ejection fraction    Plan --   Shortness of breath on just mild exertion. Chest pain is more pleuritic in nature. She does have SLE and in treatment for that and followed by rheumatology. No signs of volume overload on physical exam. Her echocardiogram showed small pericardial effusion with preserved LV systolic function. I will proceed with a Persantine Cardiolite stress test to rule out any coronary artery disease.  If this turns out to be normal I will have her follow-up with her rheumatologist and pulmonologist to rule out other causes.     Alysa Nicholson, [facility]

## 2022-02-04 NOTE — CONSULTS
Attestation signed by      Attending Physician Statement:    I have discussed the care of  Manny Monroe , including pertinent history and exam findings, with the Cardiology fellow/resident. I have seen and examined the patient and the key elements of all parts of the encounter have been performed by me. I agree with the assessment, plan and orders as documented by the fellow/resident, after I modified exam findings and plan of treatments, and the final version is my approved version of the assessment. Additional Comments:   Pericardial effusions on CT   Will await echo   Continue rest     MD Sintia [facility]    Consult / H&P               Today's Date: 2/4/2022  Patient Name: Manny Monroe  Date of admission: 2/3/2022 11:01 AM  Patient's age: 78 y.o., 1945  Admission Dx: Bilateral pleural effusion [J90]  Chest pain, unspecified type [R07.9]    Reason for Consult:  Cardiac evaluation    Requesting Physician: Carlos Marie MD    CHIEF COMPLAINT:  Chest Pain, SOB     History Obtained From:  patient, electronic medical record    HISTORY OF PRESENT ILLNESS:      The patient is a 78 y.o. female with a past medical history of lupus who presented with chest pain or shortness of breath. This is been going on for the past 4 days and is similar to her episode that she had in December 2021. The chest pain is rated 4 out of 10, left-sided, sharp, and with no radiation. She also complains of shortness of breath but has not required any supplemental oxygen is saturating well on room air. She follows with cardiology and rheumatology for her lupus and has been on prednisone daily; of note there is been issues with her prednisone dosage due to side effect such as generalized swelling of the face and upper extremities. Additionally she takes Lasix 20 mg orally as needed but recently has been requiring more; her last dose was 3 days ago.   She has been following with pulmonology but has unable to see them due to Covid pandemic. Past Medical History:   has a past medical history of Arthritis, Displacement of lumbar intervertebral disc without myelopathy, Hypertension, Lumbar disc disease, Personal history of TIA (transient ischemic attack), Sleep deprivation, SOB (shortness of breath), and Wears glasses. Past Surgical History:   has a past surgical history that includes Wrist surgery (Left); Hysterectomy; Ankle surgery (Right); Tonsillectomy; Nerve Block (10/19/15); Nerve Block (10-26-15 ); Foot surgery (2015); Hip arthroscopy (Left, 03/14/2018); and pr hip arthroscopy, dx (Left, 3/14/2018). Home Medications:    Prior to Admission medications    Medication Sig Start Date End Date Taking?  Authorizing Provider   colchicine (COLCRYS) 0.6 MG tablet Take 0.6 mg by mouth daily   Yes Historical Provider, MD   furosemide (LASIX) 20 MG tablet Take 1 tablet by mouth daily as needed (swelling) 12/20/21   Jess Genaro, DO   hydroxychloroquine (PLAQUENIL) 200 MG tablet Take 200 mg by mouth daily Take 1 and 1/2 tablet daily    Historical Provider, MD   amLODIPine (NORVASC) 5 MG tablet Take 1 tablet by mouth daily 10/5/21 11/4/21  Rocio Cee PA-C      Current Facility-Administered Medications: albuterol sulfate  (90 Base) MCG/ACT inhaler 2 puff, 2 puff, Inhalation, Q6H PRN  budesonide-formoterol (SYMBICORT) 160-4.5 MCG/ACT inhaler 2 puff, 2 puff, Inhalation, BID  colchicine (COLCRYS) tablet 0.6 mg, 0.6 mg, Oral, BID  hydroxychloroquine (PLAQUENIL) tablet 200 mg, 200 mg, Oral, Daily  furosemide (LASIX) injection 40 mg, 40 mg, IntraVENous, BID  sodium chloride flush 0.9 % injection 5-40 mL, 5-40 mL, IntraVENous, 2 times per day  sodium chloride flush 0.9 % injection 10 mL, 10 mL, IntraVENous, PRN  0.9 % sodium chloride infusion, 25 mL, IntraVENous, PRN  potassium chloride (KLOR-CON M) extended release tablet 40 mEq, 40 mEq, Oral, PRN **OR** potassium bicarb-citric acid (EFFER-K) effervescent tablet 40 mEq, 40 mEq, Oral, PRN **OR** potassium chloride 10 mEq/100 mL IVPB (Peripheral Line), 10 mEq, IntraVENous, PRN  magnesium sulfate 1000 mg in dextrose 5% 100 mL IVPB, 1,000 mg, IntraVENous, PRN  enoxaparin (LOVENOX) injection 40 mg, 40 mg, SubCUTAneous, Daily  ondansetron (ZOFRAN-ODT) disintegrating tablet 4 mg, 4 mg, Oral, Q8H PRN **OR** ondansetron (ZOFRAN) injection 4 mg, 4 mg, IntraVENous, Q6H PRN  polyethylene glycol (GLYCOLAX) packet 17 g, 17 g, Oral, Daily PRN  acetaminophen (TYLENOL) tablet 650 mg, 650 mg, Oral, Q6H PRN **OR** acetaminophen (TYLENOL) suppository 650 mg, 650 mg, Rectal, Q6H PRN    Allergies:  Bee pollen, Erythromycin, Pcn [penicillins], Tetanus toxoids, Tetracyclines & related, and Lisinopril    Social History:   reports that she has never smoked. She has never used smokeless tobacco. She reports that she does not drink alcohol and does not use drugs. Family History: family history includes Heart Disease in her father and paternal grandfather; Eva Velásquez in her brother and mother. REVIEW OF SYSTEMS:    Constitutional: Positive for activity change (Decreased). Negative for chills and diaphoresis. HENT: Negative for congestion, sinus pressure, sinus pain, sore throat and trouble swallowing. Eyes: Negative for photophobia and visual disturbance. Respiratory: Positive for chest tightness. Cardiovascular: Positive for chest pain. Gastrointestinal: Negative for constipation, diarrhea, nausea and vomiting. Endocrine: Negative for polydipsia, polyphagia and polyuria. Genitourinary: Negative for decreased urine volume, dysuria, enuresis and hematuria. Musculoskeletal: Positive for arthralgias and myalgias. Negative for back pain, gait problem and joint swelling. Skin: Positive for rash (Erythemic moist rash beneath breasts bilaterally). Negative for color change, pallor and wound. Allergic/Immunologic: Positive for immunocompromised state.  Negative for environmental allergies and food allergies. Neurological: Negative for dizziness, tremors, seizures, weakness and headaches. Hematological: Negative for adenopathy. Does not bruise/bleed easily. Psychiatric/Behavioral: Negative for agitation, confusion, self-injury and sleep disturbance. PHYSICAL EXAM:      /60   Pulse 94   Temp 98.2 °F (36.8 °C) (Oral)   Resp 18   Ht 5' 2\" (1.575 m)   Wt 135 lb (61.2 kg)   SpO2 95%   BMI 24.69 kg/m²    Vitals and nursing note reviewed. Constitutional:       General: She is not in acute distress. Appearance: She is normal weight. She is not ill-appearing. HENT:      Mouth/Throat:      Mouth: Mucous membranes are moist.      Pharynx: Oropharynx is clear. No oropharyngeal exudate. Eyes:      General:         Right eye: No discharge. Left eye: No discharge. Extraocular Movements: Extraocular movements intact. Pupils: Pupils are equal, round, and reactive to light. Cardiovascular:      Rate and Rhythm: Normal rate and regular rhythm. Pulses: Normal pulses. Heart sounds: Normal heart sounds. Pulmonary:      Effort: Pulmonary effort is normal.      Breath sounds: Normal breath sounds. Abdominal:      General: Bowel sounds are normal. There is no distension. Palpations: Abdomen is soft. There is no mass. Tenderness: There is no abdominal tenderness. There is no right CVA tenderness or left CVA tenderness. Hernia: No hernia is present. Musculoskeletal:         General: No swelling or tenderness. Normal range of motion. Cervical back: Normal range of motion. Right lower leg: No edema. Left lower leg: No edema. Skin:     General: Skin is warm and dry. Capillary Refill: Capillary refill takes less than 2 seconds. Neurological:      General: No focal deficit present. Mental Status: She is alert and oriented to person, place, and time.    Psychiatric:         Mood and Affect: Mood normal.         Behavior: Behavior normal.     DATA:    Diagnostics:    EK2021  Normal sinus rhythm  Nonspecific T wave abnormality    ECHO:  2021  Limited Echo  Global left ventricular systolic function is normal. Estimated ejection  fraction is 55 % . Calculated EF via 3D Heart Model is 54 %. No significant pericardial effusion is seen. Small anterior echo free space suggestive of adipose tissue/fat pad vs. old  residual clotted effusion. Labs:   CBC:   Recent Labs     22  1132 223   WBC 6.9 6.3   HGB 12.4 11.8*   HCT 38.5 37.0   PLT See Reflexed IPF Result 176     BMP:   Recent Labs     22  1132 22  0423    144   K 4.0 3.3*   CO2 23 27   BUN 11 13   CREATININE 0.49* 0.64   LABGLOM >60 >60   GLUCOSE 122* 80     BNP: No results for input(s): BNP in the last 72 hours. PT/INR:   Recent Labs     22   PROTIME 10.4   INR 1.0     APTT:No results for input(s): APTT in the last 72 hours. CARDIAC ENZYMES:No results for input(s): CKTOTAL, CKMB, CKMBINDEX, TROPONINI in the last 72 hours. FASTING LIPID PANEL:No results found for: HDL, LDLDIRECT, LDLCALC, TRIG  LIVER PROFILE:No results for input(s): AST, ALT, LABALBU in the last 72 hours. IMPRESSION:    Patient Active Problem List   Diagnosis    Displacement of lumbar intervertebral disc without myelopathy    Pericardial effusion    Pleural effusion    Essential hypertension    Listeria sepsis (HCC)    Positive SOSA (antinuclear antibody)    Anemia    Lower extremity edema    Bilateral pleural effusion    Lupus ([address]     RECOMMENDATIONS:  1. Follow up on Echo with further recommendations pending results  2. Continue diuresis as tolerated. Will likely require Left sided thoracentesis  3. Further management per Primary and Pulmonology  4. Will follow. Thank you for consultation. 5. Further recommendations after discussion with Dr. Ej Sanchez.  Huseyin Ribeiro MD  PGY-2, Internal Medicine Resident  [facility]  2/4/2022 9:25 AM    [facility]      [phone]

## 2022-02-04 NOTE — PROGRESS NOTES
[facility]  Occupational Therapy Not Seen Note    DATE: 2022    NAME: Mariam Beaver  MRN: [id number]   : 1945      Patient not seen this date for Occupational Therapy due to:    Surgery/Procedure: Pt currently off floor to interventional radiology for ultrasound guided thoracentesis.                 Next Scheduled Treatment: 2022    Electronically signed by ALICIA Tidwell on 2022 at 3:12 PM

## 2022-02-05 VITALS
WEIGHT: 135 LBS | HEIGHT: 62 IN | RESPIRATION RATE: 16 BRPM | OXYGEN SATURATION: 98 % | BODY MASS INDEX: 24.84 KG/M2 | TEMPERATURE: 98.1 F | DIASTOLIC BLOOD PRESSURE: 81 MMHG | HEART RATE: 93 BPM | SYSTOLIC BLOOD PRESSURE: 136 MMHG

## 2022-02-05 LAB
ABSOLUTE EOS #: 0.16 K/UL (ref 0–0.44)
ABSOLUTE IMMATURE GRANULOCYTE: 0.05 K/UL (ref 0–0.3)
ABSOLUTE LYMPH #: 1.02 K/UL (ref 1.1–3.7)
ABSOLUTE MONO #: 0.56 K/UL (ref 0.1–1.2)
ANION GAP SERPL CALCULATED.3IONS-SCNC: 10 MMOL/L (ref 9–17)
BASOPHILS # BLD: 0 % (ref 0–2)
BASOPHILS ABSOLUTE: 0.03 K/UL (ref 0–0.2)
BUN BLDV-MCNC: 15 MG/DL (ref 8–23)
BUN/CREAT BLD: ABNORMAL (ref 9–20)
CALCIUM SERPL-MCNC: 8.2 MG/DL (ref 8.6–10.4)
CHLORIDE BLD-SCNC: 102 MMOL/L (ref 98–107)
CO2: 26 MMOL/L (ref 20–31)
CREAT SERPL-MCNC: 0.61 MG/DL (ref 0.5–0.9)
DIFFERENTIAL TYPE: ABNORMAL
EOSINOPHILS RELATIVE PERCENT: 2 % (ref 1–4)
GFR AFRICAN AMERICAN: >60 ML/MIN
GFR NON-AFRICAN AMERICAN: >60 ML/MIN
GFR SERPL CREATININE-BSD FRML MDRD: ABNORMAL ML/MIN/{1.73_M2}
GFR SERPL CREATININE-BSD FRML MDRD: ABNORMAL ML/MIN/{1.73_M2}
GLUCOSE BLD-MCNC: 91 MG/DL (ref 70–99)
HCT VFR BLD CALC: 35.7 % (ref 36.3–47.1)
HEMOGLOBIN: 11.8 G/DL (ref 11.9–15.1)
IMMATURE GRANULOCYTES: 1 %
LYMPHOCYTES # BLD: 14 % (ref 24–43)
MCH RBC QN AUTO: 29.6 PG (ref 25.2–33.5)
MCHC RBC AUTO-ENTMCNC: 33.1 G/DL (ref 28.4–34.8)
MCV RBC AUTO: 89.7 FL (ref 82.6–102.9)
MONOCYTES # BLD: 8 % (ref 3–12)
NRBC AUTOMATED: 0 PER 100 WBC
PDW BLD-RTO: 14.2 % (ref 11.8–14.4)
PLATELET # BLD: 206 K/UL (ref 138–453)
PLATELET ESTIMATE: ABNORMAL
PMV BLD AUTO: 10.8 FL (ref 8.1–13.5)
POTASSIUM SERPL-SCNC: 3.5 MMOL/L (ref 3.7–5.3)
RBC # BLD: 3.98 M/UL (ref 3.95–5.11)
RBC # BLD: ABNORMAL 10*6/UL
SEG NEUTROPHILS: 75 % (ref 36–65)
SEGMENTED NEUTROPHILS ABSOLUTE COUNT: 5.28 K/UL (ref 1.5–8.1)
SODIUM BLD-SCNC: 138 MMOL/L (ref 135–144)
WBC # BLD: 7.1 K/UL (ref 3.5–11.3)
WBC # BLD: ABNORMAL 10*3/UL

## 2022-02-05 PROCEDURE — 97165 OT EVAL LOW COMPLEX 30 MIN: CPT

## 2022-02-05 PROCEDURE — 36415 COLL VENOUS BLD VENIPUNCTURE: CPT

## 2022-02-05 PROCEDURE — 85025 COMPLETE CBC W/AUTO DIFF WBC: CPT

## 2022-02-05 PROCEDURE — 6370000000 HC RX 637 (ALT 250 FOR IP): Performed by: FAMILY MEDICINE

## 2022-02-05 PROCEDURE — 6370000000 HC RX 637 (ALT 250 FOR IP): Performed by: NURSE PRACTITIONER

## 2022-02-05 PROCEDURE — 97535 SELF CARE MNGMENT TRAINING: CPT

## 2022-02-05 PROCEDURE — 99239 HOSP IP/OBS DSCHRG MGMT >30: CPT | Performed by: FAMILY MEDICINE

## 2022-02-05 PROCEDURE — 80048 BASIC METABOLIC PNL TOTAL CA: CPT

## 2022-02-05 RX ORDER — PREDNISONE 20 MG/1
20 TABLET ORAL DAILY
Qty: 30 TABLET | Refills: 0 | Status: SHIPPED | OUTPATIENT
Start: 2022-02-06

## 2022-02-05 RX ORDER — FUROSEMIDE 20 MG/1
20 TABLET ORAL
Qty: 60 TABLET | Refills: 0 | Status: SHIPPED | OUTPATIENT
Start: 2022-02-07

## 2022-02-05 RX ORDER — POTASSIUM CHLORIDE 20 MEQ/1
40 TABLET, EXTENDED RELEASE ORAL ONCE
Status: COMPLETED | OUTPATIENT
Start: 2022-02-05 | End: 2022-02-05

## 2022-02-05 RX ORDER — FUROSEMIDE 40 MG/1
40 TABLET ORAL DAILY
Status: DISCONTINUED | OUTPATIENT
Start: 2022-02-05 | End: 2022-02-05 | Stop reason: HOSPADM

## 2022-02-05 RX ADMIN — COLCHICINE 0.6 MG: 0.6 TABLET, FILM COATED ORAL at 09:53

## 2022-02-05 RX ADMIN — POTASSIUM CHLORIDE 40 MEQ: 1500 TABLET, EXTENDED RELEASE ORAL at 12:54

## 2022-02-05 RX ADMIN — HYDROXYCHLOROQUINE SULFATE 200 MG: 200 TABLET, FILM COATED ORAL at 09:53

## 2022-02-05 RX ADMIN — FUROSEMIDE 40 MG: 40 TABLET ORAL at 09:53

## 2022-02-05 RX ADMIN — PREDNISONE 20 MG: 20 TABLET ORAL at 09:53

## 2022-02-05 ASSESSMENT — ENCOUNTER SYMPTOMS
ABDOMINAL PAIN: 0
CHEST TIGHTNESS: 0
NAUSEA: 0
CONSTIPATION: 0
SHORTNESS OF BREATH: 1
DIARRHEA: 0
VOMITING: 0
BLOOD IN STOOL: 0
COUGH: 1
WHEEZING: 0
FACIAL SWELLING: 1

## 2022-02-05 ASSESSMENT — PAIN SCALES - GENERAL: PAINLEVEL_OUTOF10: 0

## 2022-02-05 NOTE — PROGRESS NOTES
[facility]  Office: [address], DO, Carlin Gannon, DO, Grisel [place], DO, Yang Maloney Blood, DO, Madina Acevedo MD, Ngoc Garza MD, Jamaica Fowler MD, Liane Wong MD, Yazan Valerio MD, Yaritza Morris MD, Louise Duke MD, Marycruz Triana, DO, Kali Fraser, DO, Marnie Chambers MD,  Nathen Montoya, DO, Jl De MD, Nayely Gillette MD, Olman Jolley MD, Alexa Bolden MD, Sirisha Huntley MD, Malak Mcconnell, Luke Heller, CNP, Jose Armas, CNP, Anish Ferrer, CNS, Dhruv Singh, CNP, Beata Pendleton, CNP, Maria G Munoz, CNP, Chetan Desir, CNP, Angie Dumont, CNP, Nini Hendrickson PA-C, Pamella Whipple DNP, Julián Galeano DNP, Alisa Jolley, CNP, Lasandra Runner, CNP, Jolie Ortiz, CNP, Gerardo Carlisle, CNP, Kuldip Michael [facility], [facility], [address] [place] [address]    Progress Note    2/5/2022    7:32 AM    Name:   Shayla Vizcarra  MRN:     [id number]     Acct:      [de-identified]   Room:   [facility] Day:  2  Admit Date:  2/3/2022 11:01 AM    PCP:   CELY Allison CNP  Code Status:  Full Code    Subjective:     C/C:   Chief Complaint   Patient presents with    Shortness of Breath    Chest Pain     Interval History Status: Improved  Patient seen and examined at bedside, no acute events overnight. Had her thoracentesis yesterday was 150 mL out. Was not seen by rheumatology yesterday. Labs remarkable for only hypokalemia   CRP from yesterday 76.2  Patient denies any chest pain, shortness of breath, chills, fevers, nausea or vomiting. Patient vitals, labs and all providers notes were reviewed,from overnight shift and morning updates were noted and discussed with the nurse    Brief History:     Glory Gomez is a 78year old female who presented today for evaluation of 4 day history of left chest pain and SOB.   She states she knew it was her lungs and has had this discomfort in the past, most recently in December of 2021. She had no associated radiation or diaphoresis. She was diagnosed with lupus in September, 2021 and has been following with rheumatology and cardiology. She has been on prednisone daily and has had decreased dosing due to swelling of her face and legs. She is currently taking 5mg daily. She takes lasix 20mg po as needed which she states is usually every few days,  last dose was 3 days ago. She states she has not been able to follow with pulmonology because of Covid restrictions but has an appoinment scheduled for March, 2022. She denies any fever, nausea or vomiting. She denies changes in bowel or bladder. Medical history includes HTN, arthritis and lupus. She is compliant with all home medications. She is a non-smoker, denies use of alcohol or recreational drugs.      CT PE:  Impression:  No evidence of pulmonary embolism. New left greater than right pleural effusions with atelectasis in the lung   bases. New pericardial effusion. Review of Systems:     Review of Systems   Constitutional: Positive for activity change, appetite change and fatigue. Negative for chills, diaphoresis and fever. HENT: Positive for facial swelling. Negative for congestion. Eyes: Negative for visual disturbance. Respiratory: Positive for cough and shortness of breath. Negative for chest tightness and wheezing. Cardiovascular: Negative for chest pain, palpitations and leg swelling. Gastrointestinal: Negative for abdominal pain, blood in stool, constipation, diarrhea, nausea and vomiting. Genitourinary: Negative for difficulty urinating. Neurological: Negative for dizziness, weakness, light-headedness, numbness and headaches. All other systems reviewed and are negative. Medications: Allergies:     Allergies   Allergen Reactions    Bee Pollen Anaphylaxis    Erythromycin Anaphylaxis    Pcn [Penicillins] Anaphylaxis    Tetanus Toxoids Anaphylaxis    Tetracyclines & Related Anaphylaxis    Lisinopril Other (See Comments)     cough       Current Meds:   Scheduled Meds:    predniSONE  20 mg Oral Daily    miconazole   Topical BID    budesonide-formoterol  2 puff Inhalation BID    colchicine  0.6 mg Oral BID    hydroxychloroquine  200 mg Oral Daily    furosemide  40 mg IntraVENous BID    sodium chloride flush  5-40 mL IntraVENous 2 times per day    enoxaparin  40 mg SubCUTAneous Daily     Continuous Infusions:    sodium chloride       PRN Meds: albuterol sulfate HFA, sodium chloride flush, sodium chloride, potassium chloride **OR** potassium alternative oral replacement **OR** potassium chloride, magnesium sulfate, ondansetron **OR** ondansetron, polyethylene glycol, acetaminophen **OR** acetaminophen    Data:     Past Medical History:   has a past medical history of Arthritis, Displacement of lumbar intervertebral disc without myelopathy, Hypertension, Lumbar disc disease, Personal history of TIA (transient ischemic attack), Sleep deprivation, SOB (shortness of breath), and Wears glasses. Social History:   reports that she has never smoked. She has never used smokeless tobacco. She reports that she does not drink alcohol and does not use drugs. Family History:   Family History   Problem Relation Age of Onset    Lung Cancer Mother     Lung Cancer Brother     Heart Disease Father     Heart Disease Paternal Grandfather        Vitals:  /67   Pulse 86   Temp 97 °F (36.1 °C) (Oral)   Resp 19   Ht 5' 2\" (1.575 m)   Wt 135 lb (61.2 kg)   SpO2 95%   BMI 24.69 kg/m²   Temp (24hrs), Av.6 °F (36.4 °C), Min:97 °F (36.1 °C), Max:98.2 °F (36.8 °C)    No results for input(s): POCGLU in the last 72 hours. I/O (24Hr):   No intake or output data in the 24 hours ending 22 0732    Labs:  Hematology:  Recent Labs     22  1132 22  0423   WBC 6.9 6.3   RBC 4.16 4.07   HGB 12.4 11.8*   HCT 38.5 37.0   MCV 92.5 90.9   MCH 29.8 29.0   MCHC 32.2 31.9   RDW 14.6* 14.3   PLT See Reflexed IPF Result 176   MPV NOT REPORTED 10.2   CRP  --  76.2*   INR  --  1.0   DDIMER 0.59  --      Chemistry:  Recent Labs     02/03/22  1132 02/03/22  1232 02/04/22  0423     --  144   K 4.0  --  3.3*     --  108*   CO2 23  --  27   GLUCOSE 122*  --  80   BUN 11  --  13   CREATININE 0.49*  --  0.64   MG  --   --  2.3   ANIONGAP 9  --  9   LABGLOM >60  --  >60   GFRAA >60  --  >60   CALCIUM 8.2*  --  8.5*   PROBNP 324*  --   --    TROPHS 10 9  --    No results for input(s): PROT, LABALBU, LABA1C, I6GOTSA, P1HMUII, FT4, TSH, AST, ALT, LDH, GGT, ALKPHOS, LABGGT, BILITOT, BILIDIR, AMMONIA, AMYLASE, LIPASE, LACTATE, CHOL, HDL, LDLCHOLESTEROL, CHOLHDLRATIO, TRIG, VLDL, ECF05EL, PHENYTOIN, PHENYF, URICACID, POCGLU in the last 72 hours. ABG:No results found for: POCPH, PHART, PH, POCPCO2, SZU4ODZ, PCO2, POCPO2, PO2ART, PO2, POCHCO3, XST6HOI, HCO3, NBEA, PBEA, BEART, BE, THGBART, THB, IAU8UFV, OFSA7KVB, W1CHSCFJ, O2SAT, FIO2  Lab Results   Component Value Date/Time    SPECIAL NOT REPORTED 02/04/2022 03:01 PM     Lab Results   Component Value Date/Time    CULTURE PENDING 02/04/2022 03:01 PM       Radiology:  XR CHEST PORTABLE    Result Date: 2/3/2022  1. Left retrocardiac opacity with likely bilateral pleural effusions. 2. Similar appearing prominence of the cardiac silhouette. CT CHEST PULMONARY EMBOLISM W CONTRAST    Result Date: 2/3/2022  No evidence of pulmonary embolism. New left greater than right pleural effusions with atelectasis in the lung bases. New pericardial effusion. Physical Examination:        Physical Exam  Vitals and nursing note reviewed. Constitutional:       General: She is not in acute distress. HENT:      Head: Normocephalic and atraumatic. Eyes:      Conjunctiva/sclera: Conjunctivae normal.      Pupils: Pupils are equal, round, and reactive to light. Cardiovascular:      Rate and Rhythm: Normal rate and regular rhythm. Heart sounds:  No murmur heard. Pulmonary:      Effort: Pulmonary effort is normal. No accessory muscle usage or respiratory distress. Breath sounds: No stridor. Examination of the left-middle field reveals decreased breath sounds. Examination of the right-lower field reveals decreased breath sounds. Examination of the left-lower field reveals decreased breath sounds. Decreased breath sounds present. No wheezing, rhonchi or rales. Abdominal:      General: Bowel sounds are normal. There is no distension. Palpations: Abdomen is soft. Abdomen is not rigid. Tenderness: There is no abdominal tenderness. There is no guarding. Musculoskeletal:         General: No tenderness. Skin:     General: Skin is warm and dry. Findings: No erythema, lesion or rash. Neurological:      Mental Status: She is alert and oriented to person, place, and time. Cranial Nerves: No cranial nerve deficit. Motor: No seizure activity. Psychiatric:         Speech: Speech normal.         Behavior: Behavior normal. Behavior is cooperative.          Assessment:        Hospital Problems           Last Modified POA    Bilateral pleural effusion 2/4/2022 Yes    Lupus ([address] 2/4/2022 Yes    Essential hypertension 2/4/2022 Yes    Pericardial effusion 2/3/2022 Yes    Pleural effusion 2/3/2022 Yes          Plan:        History of lupus admitted with bilateral pleural effusion left more than right) chronic effusion on CT chest: Echocardiogram with no significant pericardial effusion  Cardiology consulted, eval is pending  S/P left-sided thoracentesis 2/4  Appreciate  rheumatology input regarding steroid dose  Patient noted worsening in her symptoms after decreasing her steroid dose  Continue Plaquenil at home dose    Essential hypertension: Continue chronic meds    Facial swelling and lower extremity swelling: Likely secondary to steroid use, Lasix as needed    DVT and GI prophylaxis    Discussed in detail with the patient and her daughter in the room  Per rheumatology okay to send on prednisone 20 mg and F/U with Dr Marv Bowden. As OP     Med rec done  Scripts added   30+ minutes spent      Chelo Gomez MD  2/5/2022  7:32 AM

## 2022-02-05 NOTE — PLAN OF CARE
Rheumatology paged to get recommendations on d/c steroid dosing. Message left for Dr. Gualberto Kirk. Waiting for response.

## 2022-02-05 NOTE — PROGRESS NOTES
Discharge teaching and instructions completed with patient using teachback method. AVS reviewed. Printed prescriptions called into [facility] for patient and patient picked up at discharge. Patient voiced understanding regarding prescriptions, follow up appointments, and care of self at home. Discharged home with family. All questions answered.

## 2022-02-05 NOTE — PROGRESS NOTES
Physical Therapy        Physical Therapy Cancel Note      DATE: 2022    NAME: Casandra Leon  MRN: [id number]   : 1945      Patient not seen this date for Physical Therapy due to:    Patient independent with functional mobility. Will defer PT evaluation at this time. Please reorder PT if future needs arise. Pt was evaluated by OT and [address] [place] [address] 28 d/t independence; has been ambulating in the hallway independently.         Electronically signed by Sarwat Lauren PT on 2022 at 10:51 AM

## 2022-02-05 NOTE — PROGRESS NOTES
Occupational Therapy   Occupational Therapy Initial Assessment  Date: 2022   Patient Name: Casandra Leon  MRN: [id number]     : 1945    Date of Service: 2022    Discharge Recommendations: No therapy recommended at discharge. OT Equipment Recommendations  Equipment Needed: No    Assessment   Assessment: Pt demosntrated bed mobility with Mod IND, functional transfers IND, functional mobility with SUP for safety only and LB dressing IND. Pt with no acute functional deficits observed at this time. Pt with no concerns about completing ADLs, IADLs and functional  mobility upon discharge. Pt does not currently require skilled OT services at this time and is expected to be safe to return to prior living arrangement without / assist if needed upon discharge. Pt has good support from significant other as needed. Prognosis: Good  Decision Making: Low Complexity  Patient Education: Educated on OT role, purpose of EVAL, home safety-good return  No Skilled OT: Independent with functional mobility; Independent with ADL's;No OT goals identified  REQUIRES OT FOLLOW UP: No  Activity Tolerance  Activity Tolerance: Patient Tolerated treatment well  Safety Devices  Safety Devices in place: Yes  Type of devices: Left in bed;Call light within reach;Gait belt  Restraints  Initially in place: No           Patient Diagnosis(es): The primary encounter diagnosis was Chest pain, unspecified type. A diagnosis of Bilateral pleural effusion was also pertinent to this visit. has a past medical history of Arthritis, Displacement of lumbar intervertebral disc without myelopathy, Hypertension, Lumbar disc disease, Personal history of TIA (transient ischemic attack), Sleep deprivation, SOB (shortness of breath), and Wears glasses. has a past surgical history that includes Wrist surgery (Left); Hysterectomy; Ankle surgery (Right); Tonsillectomy; Nerve Block (10/19/15); Nerve Block (10-26-15 ); Foot surgery ();  Hip arthroscopy (Left, 03/14/2018); and pr hip arthroscopy, dx (Left, 3/14/2018). Restrictions  Restrictions/Precautions  Required Braces or Orthoses?: No  Position Activity Restriction  Other position/activity restrictions: Up with assist; 2/4 thorecentisis; hx of lupus    Subjective   General  Patient assessed for rehabilitation services?: Yes  Family / Caregiver Present: Yes (daughter)  General Comment  Comments: RN ok'd for OT lisy this AM. Pt agreeable to session, pt pleasent/cooperative throughout. Patient Currently in Pain: Denies  Pain Assessment  Pain Level: 0  Vital Signs  Patient Currently in Pain: Denies     Social/Functional History  Social/Functional History  Lives With: Significant other  Type of Home: House  Home Layout: One level  Home Access: Stairs to enter with rails  Entrance Stairs - Number of Steps: 2  Entrance Stairs - Rails: Right  Bathroom Shower/Tub: Walk-in shower  Bathroom Equipment: Shower chair (Does not use it)  Home Equipment: Rolling walker,Standard walker,Cane (Has rollator. 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        Objective   Vision: Impaired  Vision Exceptions: Wears glasses at all times  Hearing: Within functional limits    Orientation  Overall Orientation Status: Within Functional Limits     Balance  Sitting Balance: Independent (Seated EOB unsupportd for ~7 min LB dressing, ROM/MMT and eval questions)  Standing Balance: Independent  Standing Balance  Time: 3 min  Activity: static standing  Comment: no LOB/unsteadiness    Functional Mobility  Functional - Mobility Device: No device  Activity: Other  Assist Level: Supervision  Functional Mobility Comments: SUP for safety only, pt demonstrated IND with no acute deficits throughout functional mobility. Pt completed mobility to/from bathroom and around unit to practice household and work distances. No LOB, unsteadiness, SOB or pt complaints/concerns. Toilet Transfers  Toilet - Technique: Ambulating  Equipment Used: Standard toilet  Toilet Transfer: Independent    ADL  Feeding: Independent  Grooming: Independent  UE Bathing: Independent  LE Bathing: Independent  UE Dressing: Independent  LE Dressing: Independent (Pt donned/doffed shoes IND)  Toileting: Independent  Additional Comments: Declined further ADLs d/t completing eariler this AM.  Tone RUE  RUE Tone: Normotonic  Tone LUE  LUE Tone: Normotonic  Coordination  Movements Are Fluid And Coordinated: Yes     Bed mobility  Supine to Sit: Modified independent  Sit to Supine: Modified independent  Scooting: Independent  Comment: HOB elevated ~30 degrees     Transfers  Sit to stand: Independent  Stand to sit:  Independent     Cognition  Overall Cognitive Status: WFL        Sensation  Overall Sensation Status: WFL (denies any numbness/tingling)        LUE AROM (degrees)  LUE AROM : WFL  Left Hand AROM (degrees)  Left Hand AROM: WFL  RUE AROM (degrees)  RUE AROM : WFL  Right Hand AROM (degrees)  Right Hand AROM: WFL  LUE Strength  Gross LUE Strength: WFL  L Hand General: 5/5  LUE Strength Comment: Grossly 5/5  RUE Strength  Gross RUE Strength: WFL  R Hand General: 5/5  RUE Strength Comment: Grossly 5/5          AM-PAC Score        AM-PAC Inpatient Daily Activity Raw Score: 24 (02/05/22 1029)  AM-PAC Inpatient ADL T-Scale Score : 57.54 (02/05/22 1029)  ADL Inpatient CMS 0-100% Score: 0 (02/05/22 1029)  ADL Inpatient CMS G-Code Modifier : CH (02/05/22 1029)          Therapy Time   Individual Concurrent Group Co-treatment   Time In 0930         Time Out 0946         Minutes 16         Timed Code Treatment Minutes: 10 Minutes       Clarisse Pedro OTR/L

## 2022-02-05 NOTE — DISCHARGE SUMMARY
[facility]  Office: [address], DO, Luz Maria Leone, DO, Niharikavalerie Leyva, DO, Marlo Kanner [facility], DO, Vaughn Serrano MD, Melanie Garza MD, Oneal Lara MD, Macy Herman MD, Slime Mari MD, Anna Marie Tolliver MD, Alfredo Lynch MD, Liliane Ordoñez DO, Zelda Portillo, DO, Austin Lopez MD,  Katy Perdomo DO, Shilpa De La Cruz MD, Romy Son MD, Kellie Jones MD, Lori Payne MD, Jasmin Trotter MD, Dave Barth, [facility], [facility], CNP, Cuong Baker, CNP, Yuri Estrada, CNS, Jasmine Bowden, CNP, Ania Locke, CNP, Heide Mccray, CNP, Gloria Pederson, CNP, Jesus Miller, CNP, Tracy Chacko PA-C, Dhruv Shaffer, [facility], Grayson Gaines, [facility], Megha Melendez, CNP, Ida Giang, CNP, Petey Conley, CNP, Amina Shafer, CNP, Edd Keyes, CNP, Rios Mtz, [facility]    Discharge Summary     Patient ID: Varun Queen  :  1945   MRN: [id number]     ACCOUNT:  [de-identified]   Patient's PCP: CELY Rosales CNP  Admit Date: 2/3/2022   Discharge Date: 2022     Length of Stay: 2  Code Status:  Full Code  Admitting Physician: Macy Herman MD  Discharge Physician: Macy Herman MD     Active Discharge Diagnoses:     Hospital Problem Lists:  Active Problems:    Bilateral pleural effusion    Lupus [facility])    Essential hypertension    Pericardial effusion    Pleural effusion  Resolved Problems:    * No resolved hospital problems. *      Admission Condition:  poor     Discharged Condition: fair    Hospital Stay:     HPI :  The patient is a 78 y.o. female with a past medical history of lupus who presented with chest pain or shortness of breath. This is been going on for the past 4 days and is similar to her episode that she had in 2021. The chest pain is rated 4 out of 10, left-sided, sharp, and with no radiation.   She also complains of shortness of breath but has not required any supplemental oxygen is saturating well on room air. She follows with cardiology and rheumatology for her lupus and has been on prednisone daily; of note there is been issues with her prednisone dosage due to side effect such as generalized swelling of the face and upper extremities. Additionally she takes Lasix 20 mg orally as needed but recently has been requiring more; her last dose was 3 days ago.   She has been following with pulmonology but has unable to see them due to Covid pandemic.      During the admission patient was managed as follow    History of lupus admitted with bilateral pleural effusion left more than right) chronic effusion on CT chest: Echocardiogram with no significant pericardial effusion  Cardiology consulted, eval is pending  S/P left-sided thoracentesis 2/4  Appreciate  rheumatology input regarding steroid dose  Patient noted worsening in her symptoms after decreasing her steroid dose  Continue Plaquenil at home dose     Essential hypertension: Continue chronic meds     Facial swelling and lower extremity swelling: Likely secondary to steroid use, Lasix as needed     DVT and GI prophylaxis     Per rheumatology okay to send on prednisone 20 mg and F/U with Dr Mary Ponce ( her rheumatologist)  As OP      Med rec done  Scripts added   30+ minutes spent  Significant therapeutic interventions: thoracentesis     Significant Diagnostic Studies:   Labs / Micro:  CBC:   Lab Results   Component Value Date    WBC 7.1 02/05/2022    RBC 3.98 02/05/2022    HGB 11.8 02/05/2022    HCT 35.7 02/05/2022    MCV 89.7 02/05/2022    MCH 29.6 02/05/2022    MCHC 33.1 02/05/2022    RDW 14.2 02/05/2022     02/05/2022     BMP:    Lab Results   Component Value Date    GLUCOSE 91 02/05/2022     02/05/2022    K 3.5 02/05/2022     02/05/2022    CO2 26 02/05/2022    ANIONGAP 10 02/05/2022    BUN 15 02/05/2022    CREATININE 0.61 02/05/2022    BUNCRER NOT REPORTED 02/05/2022    CALCIUM 8.2 02/05/2022    LABGLOM >60 02/05/2022 GFRAA >60 02/05/2022    GFR      02/05/2022    GFR NOT REPORTED 02/05/2022     HFP:    Lab Results   Component Value Date    PROT 6.1 11/18/2021     CMP:    Lab Results   Component Value Date    GLUCOSE 91 02/05/2022     02/05/2022    K 3.5 02/05/2022     02/05/2022    CO2 26 02/05/2022    BUN 15 02/05/2022    CREATININE 0.61 02/05/2022    ANIONGAP 10 02/05/2022    ALKPHOS 95 11/18/2021    ALT 39 11/18/2021    AST 19 11/18/2021    BILITOT 0.55 11/18/2021    LABALBU 3.8 11/18/2021    ALBUMIN 1.7 11/18/2021    LABGLOM >60 02/05/2022    GFRAA >60 02/05/2022    GFR      02/05/2022    GFR NOT REPORTED 02/05/2022    PROT 6.1 11/18/2021    CALCIUM 8.2 02/05/2022     PT/INR:    Lab Results   Component Value Date    PROTIME 10.4 02/04/2022    INR 1.0 02/04/2022     PTT:   Lab Results   Component Value Date    APTT 26.1 09/26/2021     FLP:  No results found for: CHOL, TRIG, HDL  U/A:    Lab Results   Component Value Date    COLORU Yellow 10/02/2021    TURBIDITY Clear 10/02/2021    SPECGRAV 1.007 10/02/2021    HGBUR SMALL 10/02/2021    PHUR 5.5 10/02/2021    PROTEINU NEGATIVE 10/02/2021    GLUCOSEU NEGATIVE 10/02/2021    KETUA NEGATIVE 10/02/2021    BILIRUBINUR NEGATIVE 10/02/2021    UROBILINOGEN Normal 10/02/2021    NITRU NEGATIVE 10/02/2021    LEUKOCYTESUR NEGATIVE 10/02/2021     TSH:    Lab Results   Component Value Date    TSH 1.24 09/28/2021        Radiology:  XR CHEST PORTABLE    Result Date: 2/3/2022  1. Left retrocardiac opacity with likely bilateral pleural effusions. 2. Similar appearing prominence of the cardiac silhouette. CT CHEST PULMONARY EMBOLISM W CONTRAST    Result Date: 2/3/2022  No evidence of pulmonary embolism. New left greater than right pleural effusions with atelectasis in the lung bases. New pericardial effusion. US THORACENTESIS Which side should the procedure be performed? Left    Result Date: 2/4/2022  Successful ultrasound guided thoracentesis.        Consultations: Consults:     Final Specialist Recommendations/Findings:   IP CONSULT TO HOSPITALIST  IP CONSULT TO CARDIOLOGY  IP CONSULT TO RHEUMATOLOGY      The patient was seen and examined on day of discharge and this discharge summary is in conjunction with any daily progress note from day of discharge. Discharge plan:     Disposition: Home    Physician Follow Up:     CELY Vergara - CNP  [address] [facility]  [place]  [phone]    In 1 week      Wyatt Orellana [address]  [address] [facility]  [phone]      as scheduled     Jimena Dykes MD  [facility]  [address] [facility]  [phone]      as scheduled       Requiring Further Evaluation/Follow Up POST HOSPITALIZATION/Incidental Findings:     Diet: low salt diet    Activity: As tolerated    Instructions to Patient:     Discharge Medications:      Medication List      START taking these medications    miconazole 2 % powder  Commonly known as: MICOTIN  Apply topically 2 times daily.      predniSONE 20 MG tablet  Commonly known as: DELTASONE  Take 1 tablet by mouth daily  Start taking on: February 6, 2022        CHANGE how you take these medications    furosemide 20 MG tablet  Commonly known as: LASIX  Take 1 tablet by mouth three times a week  Start taking on: February 7, 2022  What changed:   · when to take this  · reasons to take this        CONTINUE taking these medications    colchicine 0.6 MG tablet  Commonly known as: COLCRYS     hydroxychloroquine 200 MG tablet  Commonly known as: PLAQUENIL        STOP taking these medications    albuterol sulfate  (90 Base) MCG/ACT inhaler  Commonly known as: Ventolin HFA     amLODIPine 5 MG tablet  Commonly known as: NORVASC     budesonide-formoterol 160-4.5 MCG/ACT Aero  Commonly known as: SYMBICORT           Where to Get Your Medications      These medications were sent to [facility] [address] [facility], [address] [place] [address] [address], [address] [place] [address]    Phone: [phone]   · furosemide 20 MG tablet  · miconazole 2 % powder  · predniSONE 20 MG tablet         No discharge procedures on file. Time Spent on discharge is  35 mins in patient examination, evaluation, counseling as well as medication reconciliation, prescriptions for required medications, discharge plan and follow up. Electronically signed by   Chris Cunningham MD  2/5/2022  12:48 PM      Thank you CELY Pereira - BALDOMERO for the opportunity to be involved in this patient's care.

## 2022-02-05 NOTE — CONSULTS
Rheumatology Inpatient Consult Note          Patient: Varun Queen / 1945 (89 y.o.)  MRN: [id number]        Reason for Consult:  SLE, steroid dosing  Requesting Physician: Dr. Daria Osborne  Primary Care Physician: CELY Rosales - CNP    CHIEF COMPLAINT:    Chief Complaint   Patient presents with    Shortness of Breath    Chest Pain       History Obtained From:  patient, family member, electronic medical record    HISTORY OF PRESENT ILLNESS:                The patient is a 78 y.o. female with significant past medical history of SLE who presents with chest pain and shortness of breath. She was found to have pericardial and bilateral pleural effusion worse on the on the left side. She underwent thoracentesis. She is feeling well at this point. She first presented with pleural effusion in September of last year and was on prednisone 20 mg daily. She was recently seen in the office and prednisone was being tapered down. She was restarted on prednisone 20 mg daily in the hospital.She reports rash bilaterally under her breast folds. She denies any other rash or oral ulcers. Denies joint swelling and new pain.     Past Medical History:    Past Medical History:   Diagnosis Date    Arthritis     Displacement of lumbar intervertebral disc without myelopathy 9/30/2015    Hypertension     exacerbated after last injection 10-    Lumbar disc disease     Personal history of TIA (transient ischemic attack)     2012 mini stroke high blood pressure    Sleep deprivation     SOB (shortness of breath)     \"walk to fast and going upstairs\" \"can walk a city block\"    Wears glasses        Past Surgical History:    Past Surgical History:   Procedure Laterality Date    ANKLE SURGERY Right     x 3    FOOT SURGERY  2015    left    HIP ARTHROSCOPY Left 03/14/2018    band release bursectomy    HYSTERECTOMY      NERVE BLOCK  10/19/15    caudal #1  celestone 9mg    NERVE BLOCK  10-26-15     caudal epidural steroid block #2 decadron 5 mg    MA HIP ARTHROSCOPY, DX Left 3/14/2018    LEFT HIP ARTHROSCOPY WITH  IT BAND RELEASE BURSECTOMY WITH GLUTEUS MUSCLE REPAIR performed by Dominik Joseph DO at [address] Left     x 2       Allergies:  Bee pollen, Erythromycin, Pcn [penicillins], Tetanus toxoids, Tetracyclines & related, and Lisinopril      Current Medications:    Current Facility-Administered Medications   Medication Dose Route Frequency Provider Last Rate Last Admin    furosemide (LASIX) tablet 40 mg  40 mg Oral Daily Cristopher Low MD   40 mg at 02/05/22 7727    predniSONE (DELTASONE) tablet 20 mg  20 mg Oral Daily Cristopher Low MD   20 mg at 02/05/22 0953    miconazole (MICOTIN) 2 % powder   Topical BID Cristopher Low MD   Given at 02/04/22 2210    albuterol sulfate  (90 Base) MCG/ACT inhaler 2 puff  2 puff Inhalation Q6H PRN Thresa Bur, APRN - NP        budesonide-formoterol (SYMBICORT) 160-4.5 MCG/ACT inhaler 2 puff  2 puff Inhalation BID Thresa Bur, APRN - NP        colchicine (COLCRYS) tablet 0.6 mg  0.6 mg Oral BID Thresa Bur, APRN - NP   0.6 mg at 02/05/22 5406    hydroxychloroquine (PLAQUENIL) tablet 200 mg  200 mg Oral Daily Thresa Bur, APRN - NP   200 mg at 02/05/22 6442    sodium chloride flush 0.9 % injection 5-40 mL  5-40 mL IntraVENous 2 times per day Thresa Bur, APRN - NP   10 mL at 02/04/22 1955    sodium chloride flush 0.9 % injection 10 mL  10 mL IntraVENous PRN Thresa Bur, APRN - NP        0.9 % sodium chloride infusion  25 mL IntraVENous PRN Thresa Bur, APRN - NP        potassium chloride (KLOR-CON M) extended release tablet 40 mEq  40 mEq Oral PRN Thresa Bur, APRN - NP        Or    potassium bicarb-citric acid (EFFER-K) effervescent tablet 40 mEq  40 mEq Oral PRN Thresa Bur, APRN - NP        Or    potassium chloride 10 mEq/100 mL IVPB (Peripheral Line)  10 mEq IntraVENous PRN Thresa Bur, APRN - NP        magnesium sulfate 1000 mg in dextrose 5% 100 mL IVPB  1,000 mg IntraVENous PRN Rosalba Royals, APRN - NP        enoxaparin (LOVENOX) injection 40 mg  40 mg SubCUTAneous Daily Rosalba Royals, APRN - NP        ondansetron (ZOFRAN-ODT) disintegrating tablet 4 mg  4 mg Oral Q8H PRN Rosalba Royals, APRN - NP   4 mg at 02/04/22 1459    Or    ondansetron (ZOFRAN) injection 4 mg  4 mg IntraVENous Q6H PRN Rosalba Royals, APRN - NP        polyethylene glycol (GLYCOLAX) packet 17 g  17 g Oral Daily PRN Rosalba Royals, APRN - NP        acetaminophen (TYLENOL) tablet 650 mg  650 mg Oral Q6H PRN Rosalba Royals, APRN - NP   650 mg at 02/04/22 1458    Or    acetaminophen (TYLENOL) suppository 650 mg  650 mg Rectal Q6H PRN Rosalba Royals, APRN - NP           Home Medications:    Prior to Admission medications    Medication Sig Start Date End Date Taking?  Authorizing Provider   colchicine (COLCRYS) 0.6 MG tablet Take 0.6 mg by mouth daily   Yes Historical Provider, MD   furosemide (LASIX) 20 MG tablet Take 1 tablet by mouth daily as needed (swelling) 12/20/21   Carleen Esparza DO   hydroxychloroquine (PLAQUENIL) 200 MG tablet Take 200 mg by mouth daily Take 1 and 1/2 tablet daily    Historical Provider, MD   amLODIPine (NORVASC) 5 MG tablet Take 1 tablet by mouth daily 10/5/21 11/4/21  Ramiro Harris PA-C       REVIEW OF SYSTEMS:    CONSTITUTIONAL:  fatigue  DERMATOLOGICAL: positive for - rash  NEUROLOGICAL:  negative  EYES:  negative  HEENT:  negative  RESPIRATORY:  positive for  chest pain and shortness of breath  CARDIOVASCULAR:  positive for  chest pain, dyspnea, fatigue  GASTROINTESTINAL:  negative  GENITO-URINARY: no dysuria, trouble voiding, or hematuria  ENDOCRINE: negative  MUSCULOSKELETAL:  positive for  myalgias and arthralgias  HEMATOLOGICAL AND LYMPHATIC: negative  IMMUNOLOGICAL: negative  PSYCHOLOGICAL: negative    Family History:       Problem Relation Age of Onset    Lung Cancer Mother     Lung Cancer Brother     Heart Disease Father     Heart Disease Paternal Grandfather        Social History:    TOBACCO:   reports that she has never smoked. She has never used smokeless tobacco.  ETOH:   reports no history of alcohol use. DRUGS:   reports no history of drug use. SEXUAL HISTORY:  [unfilled]  MARITAL STATUS:  [unfilled]  OCCUPATION:  [unfilled]  Patient currently lives with family         PHYSICAL EXAM:      Vitals:    /81   Pulse 93   Temp 98.1 °F (36.7 °C) (Oral)   Resp 16   Ht 5' 2\" (1.575 m)   Wt 135 lb (61.2 kg)   SpO2 98%   BMI 24.69 kg/m²   GENERAL EXAM: On exam- pt appears stated age. She is a pleasant female in no acute distress. NEURO:  Alert and oriented x 3. Cranial nerves intact Motor strength grossly normal  HEENT: head- atraumatic-normocephalic. No discharge from ears, nose or throat. NECK: Supple. No jugular venous distention. LUNGS: Bilateral chest movements without the use of accessory muscles. Respirations easy, nonlabored, breath sounds clear. CARDIOVASCULAR:  Heart rate and rhythm regular. ABDOMEN: Abdomen soft, nondistended, nontender, bowel sounds present. No palpable hernias noted. RECTAL: exam deferred. EXTREMITIES: No calf tenderness. No edema. Gait normal.   MUSCULOSKELETAL:   NECK:  Full ROM  SHOULDERS:  Full ROM  ELBOWS:  No swelling, warmth, full ROM  WRISTS:  No swelling, warmth, full ROM  MCP: no synovial thickening, Non-tender. PIP: no swelling, no tenderness  DIP: no swelling, no tenderness  No flexor crepitus, :  HIPS: full ROM on FABERE  KNEES: no swelling, no warmth, no effusion  ANKLES:  No warmth, no swelling, no erythema.  Full ROM  TOES: non-tender  SKIN: Fungal rash under breast folds    DATA:    CBC with Differential:    Lab Results   Component Value Date    WBC 7.1 02/05/2022    RBC 3.98 02/05/2022    HGB 11.8 02/05/2022    HCT 35.7 02/05/2022     02/05/2022    MCV 89.7 02/05/2022    MCH 29.6 02/05/2022    MCHC 33.1 02/05/2022    RDW 14.2 02/05/2022    LYMPHOPCT 14 02/05/2022 MONOPCT 8 02/05/2022    BASOPCT 0 02/05/2022    MONOSABS 0.56 02/05/2022    LYMPHSABS 1.02 02/05/2022    EOSABS 0.16 02/05/2022    BASOSABS 0.03 02/05/2022    DIFFTYPE NOT REPORTED 02/05/2022     CMP:    Lab Results   Component Value Date     02/05/2022    K 3.5 02/05/2022     02/05/2022    CO2 26 02/05/2022    BUN 15 02/05/2022    CREATININE 0.61 02/05/2022    GFRAA >60 02/05/2022    LABGLOM >60 02/05/2022    GLUCOSE 91 02/05/2022    PROT 6.1 11/18/2021    LABALBU 3.8 11/18/2021    CALCIUM 8.2 02/05/2022    BILITOT 0.55 11/18/2021    ALKPHOS 95 11/18/2021    AST 19 11/18/2021    ALT 39 11/18/2021     Magnesium:    Lab Results   Component Value Date    MG 2.3 02/04/2022     Phosphorus:  No results found for: PHOS  LDH:    Lab Results   Component Value Date     09/29/2021     Uric Acid:  No results found for: LABURIC, URICACID  TSH:    Lab Results   Component Value Date    TSH 1.24 09/28/2021     FERRITIN:  No results found for: FERRITIN  SOSA:    Lab Results   Component Value Date    SOSA POSITIVE 10/01/2021     RF:  No results found for: RF  DSDNA:  No components found for: DNA  24 Hour Urine for Protein:  No components found for: Nithin Hu, MNHW28DB, UTV3  Urine Culture:  No components found for: CURINE  Blood Culture:  No components found for: CBLOOD, CFUNGUSBL  Sputum Culture:  No components found for: CSPUTUM    IMPRESSION/RECOMMENDATIONS:      Patient Active Problem List   Diagnosis    Displacement of lumbar intervertebral disc without myelopathy    Pericardial effusion    Pleural effusion    Essential hypertension    Listeria sepsis (HCC)    Positive SOSA (antinuclear antibody)    Anemia    Lower extremity edema    Bilateral pleural effusion    Lupus (HCC)     Pleural effusion  Pericardial effusion  She is status post thoracentesis. She is stable with prednisone 20 mg daily and Plaquenil 200 mg bid. She will continue current treatment and follow up with our office in 3-4 weeks.   She will use antifungal powder for rash    Luis Manuel Vicente MD   2/5/2022   10:18 AM   Rheumatic and immunologic diseases  [facility]  [phone]

## 2022-02-06 LAB
EKG ATRIAL RATE: 105 BPM
EKG P AXIS: 44 DEGREES
EKG P-R INTERVAL: 166 MS
EKG Q-T INTERVAL: 302 MS
EKG QRS DURATION: 72 MS
EKG QTC CALCULATION (BAZETT): 399 MS
EKG R AXIS: 6 DEGREES
EKG T AXIS: 112 DEGREES
EKG VENTRICULAR RATE: 105 BPM

## 2022-02-06 PROCEDURE — 93010 ELECTROCARDIOGRAM REPORT: CPT | Performed by: INTERNAL MEDICINE

## 2022-02-07 LAB
APPEARANCE FLUID: NORMAL
BASO FLUID: NORMAL %
CASE NUMBER:: NORMAL
COLOR FLUID: NORMAL
EOSINOPHIL FLUID: NORMAL %
FLUID DIFF COMMENT: NORMAL
LYMPHOCYTES, BODY FLUID: 4 %
MONOCYTE, FLUID: NORMAL %
NEUTROPHIL, FLUID: 37 %
OTHER CELLS FLUID: NORMAL %
RBC FLUID: <3000 /MM3
SPECIMEN DESCRIPTION: NORMAL
SPECIMEN TYPE: NORMAL
WBC FLUID: 569 /MM3

## 2022-02-07 NOTE — ADT AUTH CERT
Letter of recommendation by Jurgen Norman RN       Review Status Review Entered   In Primary 2022 15:29      Criteria Review   slr keep in    Name: Jamal Queen   : 1945   CSN: [id number]   INSURANCE: [employer]  -----------------------------------------------------------------------------  [facility] TREATMENT [facility] Physician Advisor Recommendation    Based on the clinical acuity, complexity, hospital course, data review and physician/consultant impressions and findings noted below it is our recommendation to keep patient in INPATIENT status.  ---------------------------------------------------------------------------------  Summary of impressions and findings:     Clinical summary - 78 y. o. with SOB. With CT showing Pericardial and pleural effusion. proBNP elevated. Hx of Lupus and HTN. Elevated BNP. Lasix 40 mg IV twice daily. Cardiology need for L sided Thoracentesis. Vitals - tachycardia and tachypnea. Labs and imaging - , Echo EF 55%, no significant pericardial effusion  MCG criteria -   Comments - Keep patient as Inpatient status. Chart reviewed and VUR notified. Sam Cohen MD MPH  Physician [address]  Cell   [email or web address]   Additional Notes   DATE:          Pertinent Updates:   Brief Postoperative Note for Thoracentesis       Jamal Queen   Date of Birth:  1945   [id number]       Pre-operative Diagnosis: left Pleural effusion                               Post-operative Diagnosis: Same       Procedure: Ultrasound guided Thoracentesis                   Anesthesia: 1% Lidocaine      Vitals:   Temp 98.2  RR 18    /137      Abnl/Pertinent Lab:      Potassium: 3.3 (L)   Chloride: 108 (H)3   Glucose: 80   CALCIUM, SERUM, 193700: 8.5 (L)   CRP: 76.2 (H)   Hemoglobin Quant: 11.8 (L)         Physical Exam:   Constitutional:        General: She is not in acute distress.    HENT:       Head: Normocephalic and atraumatic. Eyes:       Conjunctiva/sclera: Conjunctivae normal.       Pupils: Pupils are equal, round, and reactive to light. Cardiovascular:       Rate and Rhythm: Normal rate and regular rhythm.       Heart sounds: No murmur heard.          Pulmonary:       Effort: Pulmonary effort is normal. No accessory muscle usage or respiratory distress.       Breath sounds: No stridor. Examination of the left-middle field reveals decreased breath sounds. Examination of the right-lower field reveals decreased breath sounds. Examination of the left-lower field reveals decreased breath sounds. Decreased breath sounds present. No wheezing, rhonchi or rales. Abdominal:       General: Bowel sounds are normal. There is no distension.       Palpations: Abdomen is soft. Abdomen is not rigid.       Tenderness: There is no abdominal tenderness. There is no guarding. Musculoskeletal:          General: No tenderness. Skin:      General: Skin is warm and dry.       Findings: No erythema, lesion or rash. Neurological:       Mental Status: She is alert and oriented to person, place, and time.       Cranial Nerves: No cranial nerve deficit.       Motor: No seizure activity. Psychiatric:          Speech: Speech normal.          Behavior: Behavior normal. Behavior is cooperative.        MD Consults/Assessments & Plans:      Internal Medicine       Assessment:          Hospital Problems      Last Modified POA     Bilateral pleural effusion 2/4/2022 Yes     Lupus ([address] 2/4/2022 Yes     Essential hypertension 2/4/2022 Yes     Pericardial effusion 2/3/2022 Yes     Pleural effusion 2/3/2022 Yes               Plan:          History of lupus admitted with bilateral pleural effusion left more than right) chronic effusion on CT chest: Cardiogram was no significant pericardial effusion   Cardiology consulted, eval is pending   IR consult for left-sided thoracentesis   We will add rheumatology consult for input regarding steroid dose   Patient noted worsening in her symptoms after decreasing her steroid dose   Continue Plaquenil at home dose       Essential hypertension: Continue chronic meds       Facial swelling and lower extremity swelling: Likely secondary to steroid use, Lasix as needed       DVT and GI prophylaxis       Cardiology   IMPRESSION:     Patient Active Problem List   Diagnosis   · Displacement of lumbar intervertebral disc without myelopathy   · Pericardial effusion   · Pleural effusion   · Essential hypertension   · Listeria sepsis (HCC)   · Positive SOSA (antinuclear antibody)   · Anemia   · Lower extremity edema   · Bilateral pleural effusion   · Lupus (HCC)       RECOMMENDATIONS:   Follow up on Echo with further recommendations pending results   Continue diuresis as tolerated. Will likely require Left sided thoracentesis   Further management per Primary and Pulmonology   Will follow. Thank you for consultation.     Further recommendations after discussion with Dr. Jorge Luis Mayfield

## 2022-02-08 LAB — SURGICAL PATHOLOGY REPORT: NORMAL

## 2022-02-11 LAB
CULTURE: ABNORMAL
DIRECT EXAM: ABNORMAL
Lab: ABNORMAL
SPECIMEN DESCRIPTION: ABNORMAL

## 2022-03-01 ENCOUNTER — OFFICE VISIT (OUTPATIENT)
Dept: PULMONOLOGY | Age: 77
End: 2022-03-01
Payer: MEDICARE

## 2022-03-01 VITALS
SYSTOLIC BLOOD PRESSURE: 108 MMHG | BODY MASS INDEX: 24.66 KG/M2 | OXYGEN SATURATION: 97 % | HEIGHT: 62 IN | HEART RATE: 90 BPM | DIASTOLIC BLOOD PRESSURE: 66 MMHG | TEMPERATURE: 97.1 F | WEIGHT: 134 LBS

## 2022-03-01 DIAGNOSIS — I31.39 PERICARDIAL EFFUSION: ICD-10-CM

## 2022-03-01 DIAGNOSIS — M32.19 SYSTEMIC LUPUS ERYTHEMATOSUS WITH OTHER ORGAN INVOLVEMENT, UNSPECIFIED SLE TYPE (HCC): ICD-10-CM

## 2022-03-01 DIAGNOSIS — R05.3 CHRONIC COUGH: ICD-10-CM

## 2022-03-01 DIAGNOSIS — J90 PLEURAL EFFUSION EXUDATIVE: Primary | ICD-10-CM

## 2022-03-01 PROCEDURE — 99214 OFFICE O/P EST MOD 30 MIN: CPT | Performed by: INTERNAL MEDICINE

## 2022-03-01 RX ORDER — HYDROCHLOROTHIAZIDE 12.5 MG/1
12.5 TABLET ORAL
COMMUNITY
Start: 2021-11-11

## 2022-03-01 ASSESSMENT — SLEEP AND FATIGUE QUESTIONNAIRES
HOW LIKELY ARE YOU TO NOD OFF OR FALL ASLEEP WHEN YOU ARE A PASSENGER IN A CAR FOR AN HOUR WITHOUT A BREAK: 0
HOW LIKELY ARE YOU TO NOD OFF OR FALL ASLEEP WHILE SITTING QUIETLY AFTER LUNCH WITHOUT ALCOHOL: 0
HOW LIKELY ARE YOU TO NOD OFF OR FALL ASLEEP WHILE SITTING INACTIVE IN A PUBLIC PLACE: 3
HOW LIKELY ARE YOU TO NOD OFF OR FALL ASLEEP WHILE SITTING AND READING: 2
HOW LIKELY ARE YOU TO NOD OFF OR FALL ASLEEP WHILE SITTING AND TALKING TO SOMEONE: 0
HOW LIKELY ARE YOU TO NOD OFF OR FALL ASLEEP WHILE LYING DOWN TO REST IN THE AFTERNOON WHEN CIRCUMSTANCES PERMIT: 2
ESS TOTAL SCORE: 7
HOW LIKELY ARE YOU TO NOD OFF OR FALL ASLEEP WHILE WATCHING TV: 0
HOW LIKELY ARE YOU TO NOD OFF OR FALL ASLEEP IN A CAR, WHILE STOPPED FOR A FEW MINUTES IN TRAFFIC: 0

## 2022-03-01 NOTE — PROGRESS NOTES
OUTPATIENT PULMONARY PROGRESS NOTE      Patient:  Anirudh Perkins  MRN: [id number]    Consulting Physician: CELY Kenyon CNP  Reason for Consult: Bilateral exudative pleural effusion  Primacy Care Physician: CELY Kenyon CNP        HISTORY OF PRESENT ILLNESS:   She is here for follow-up of bilateral exudative pleural effusion related to lupus/connective tissue disease. Since she was seen last time she was admitted again in the hospital according to patient she was having shortness of breath. She was admitted to hospital in early February about a month ago when a CTA chest shows small recurrent pericardial effusion and very small right and a small left pleural effusion she had a thoracentesis done and only 150 mL of fluid was withdrawn by interventional radiology which was negative for cultures and cytology. On review of the pleural fluid cytology on 02/04/2022 LDH was less than 10 and total protein was less than 1 WBC was 5 6937% neutrophil cultures were negative and pleural fluid was negative for malignancy. She has been followed by rheumatology and according to patient when she was seen by rheumatology on office visit her prednisone was decreased from 20 mg to 15 mg and gradually to 5 mg when she reached to 5 mg she started having recurrent symptoms and thus when she was admitted to hospital in February she was seen by rheumatology in the hospital and was recommended to continue prednisone 20 mg which she was given in the hospital she is on hydroxychloroquine which she is taking 1 and half tablet once daily and she is on colchicine by rheumatology. According to patient she does not have any cough anymore. She does have chest pain sometime intermittently when especially when she bend. She does not complain of fever does not complain of his skin rash. She denies wheezing.   Sometimes she has shortness of breath especially when she has to do exertional activity but denies persistent shortness of breath. She does not complain of joint pain morning stiffness joint swelling denies difficulty swallowing appetite is good. She has been seen by cardiology and had an echocardiogram done on 02/03/2022 which shows normal LV function normal RV size and function RVSP 20 no significant pericardial effusion per report. She was seen by cardiology recently and is a scheduled for a stress test.  CT scan of the chest on 02/03/2022 shows circumferential pericardial effusion small left pleural effusion with left basilar atelectasis and very small right pleural effusion. Initial office history and visit on 11/05/2021. The patient is a 78 y.o. 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 Since she was discharged from the hospital according patient she is doing better she does not complain of shortness of breath although she is not active she feels fatigue and tired. According to patient around 12 noon and 1:00 she does get so tired that she feels like sleeping. She had a history of chronic cough apparently attributed to ACE inhibitor's which she was taking for many years cough was therefore 2 to 3 years. Recent man were discontinued. When she was in the hospital cough was secondary to pleuropericarditis which was worse when she developed symptoms of pleuropericarditis but cough had resolved completely and she denies any cough at this time. According to patient she does not have shortness of breath or shortness of breath is improved she is able to do regular activities at home if it is not because of weakness and tiredness.   She denies orthopnea PND or pedal edema had completely resolved she is not on diuretics after she was discharged from the hospital.  Her weight has been stable she denies weight loss since she was discharged, appetite is good  She does not complain of fever she sometimes have left-sided pain going into her axilla. She does not complain of sputum production. She denies any hemoptysis does not complain of skin rash joint swelling in joints pain. She does not complain of snoring waking up with snoring gasping or choking. Since she was discharged from the hospital when she was in the hospital she was treated with IV steroids on discharge she was on prednisone. She was seen by rheumatology and her prednisone was on 30 mg. According to patient she had more work-up done by rheumatology and she was told that her lupus test were going up. She was told that to taper down the prednisone from 30mg to 20 mg in a week and then to follow-up with rheumatology. She was started on Plaquenil also. Hospitalization summary summary from 09/27/2021 to 10/05/21    PMH of chronic cough/possible asthma, hypertension who presented to the ER at [facility] on 9/12/21 for complaints of chest pain, shortness of breath after pushing multiple shopping carts. CT chest at the time was unremarkable. This was attributed to physical strain and she was discharged from the ER. She returns to the [facility] ER on 9/26 with complaints of cough, shortness of breath and difficulty sleeping where CT scan of the chest shows moderate pericardial effusion, small right and moderate left pleural effusions with adjacent subsegmental atelectasis. Patient declined admission at that time and was discharged home with albuterol inhaler, tessalon, guaifenesin and dextromethorphan. Due to worsening symptoms she returned to the ER the next day, stayed overnight and [place] [facility] [place]. On my evaluation, patient is tachycardic in the 130s, tachypneic, blood pressure stable, and unable to speak due to cough.  Respiratory panel was sent which showed Entero/rhinovirus positive PCR test.  She had thoracentesis done bilaterally when she was in the hospital was negative for culture AFB and and cytology were negative. Echocardiogram shows pericardial effusion she had pericardiocentesis done by cardiology and had a pericardial drain which later was removed. Connective tissue disease work-up shows positive SOSA, elevated JENI antibody and positive anti-SSA. She was seen by rheumatology she was started on IV steroids by rheumatology her symptoms gradually improved on steroids her cough and shortness of breath improved she was weaned off oxygen. She was discharged home on prednisone to be followed up by rheumatology cardiology. And our office    Work-up in hospital  She is status post left thoracentesis on 09/28/2021. She is status post pericardiocentesis/pericardial drain on 09/29/2021  Status post repeat left thoracentesis on 09/30/2021. Status post right thoracentesis on 10/01/2021     A repeat CT scan of the chest on 09/30/2021 showed pericardial effusion is a smaller she has a small left pleural effusion and she has a moderate right pleural effusion which is increased from last CT scan of the chest on admission  Her pleural fluid on the left side was exudative by protein criteria and Gram stain and culture negative and AFB smear is negative. Cytology negative  Her pleural fluid on the right side was exudative by protein criteria, Gram stain and AFB smear negative bacterial culture is negative. Pericardial fluid culture is negative and AB smear is negative. Repeat echo with EF 55%, small pericardial effusion anteriorly. Similar to echo on 9/30. No signs of tamponade.     Past Medical History:        Diagnosis Date    Arthritis     Displacement of lumbar intervertebral disc without myelopathy 9/30/2015    Hypertension     exacerbated after last injection 10-    Lumbar disc disease     Personal history of TIA (transient ischemic attack)     2012 mini stroke high blood pressure  Sleep deprivation     SOB (shortness of breath)     \"walk to fast and going upstairs\" \"can walk a city block\"    Wears glasses        Past Surgical History:        Procedure Laterality Date    ANKLE SURGERY Right     x 3    FOOT SURGERY  2015    left    HIP ARTHROSCOPY Left 03/14/2018    band release bursectomy    HYSTERECTOMY      NERVE BLOCK  10/19/15    caudal #1  celestone 9mg    NERVE BLOCK  10-26-15     caudal epidural steroid block #2 decadron 5 mg    [place] HIP ARTHROSCOPY, DX Left 3/14/2018    LEFT HIP ARTHROSCOPY WITH  IT BAND RELEASE BURSECTOMY WITH GLUTEUS MUSCLE REPAIR performed by Kaitlynn Hudson DO at [address] Left     x 2       Allergies: Allergies   Allergen Reactions    Bee Pollen Anaphylaxis    Erythromycin Anaphylaxis    Pcn [Penicillins] Anaphylaxis    Tetanus Toxoids Anaphylaxis    Tetracyclines & Related Anaphylaxis    Lisinopril Other (See Comments)     cough         Home Meds:   Outpatient Encounter Medications as of 3/1/2022   Medication Sig Dispense Refill    hydroCHLOROthiazide (HYDRODIURIL) 12.5 MG tablet Take 12.5 mg by mouth 2 times daily (before meals)      predniSONE (DELTASONE) 20 MG tablet Take 1 tablet by mouth daily 30 tablet 0    miconazole (MICOTIN) 2 % powder Apply topically 2 times daily. 45 g 1    furosemide (LASIX) 20 MG tablet Take 1 tablet by mouth three times a week 60 tablet 0    colchicine (COLCRYS) 0.6 MG tablet Take 0.6 mg by mouth daily      hydroxychloroquine (PLAQUENIL) 200 MG tablet Take 200 mg by mouth daily Take 1 and 1/2 tablet daily       No facility-administered encounter medications on file as of 3/1/2022. Social History:   TOBACCO:   reports that she has never smoked. She has never used smokeless tobacco.  ETOH:   reports no history of alcohol use.   OCCUPATION:      Family History:       Problem Relation Age of Onset    Lung Cancer Mother     Lung Cancer Brother     Heart Disease Father  Heart Disease Paternal Grandfather        Immunizations: There is no immunization history on file for this patient.       REVIEW OF SYSTEMS:  CONSTITUTIONAL: Positive for fatigue and tiredness negative for  fevers, chills, sweats, anorexia, and weight loss  EYES:  negative for  double vision, blurred vision, dry eyes, eye discharge, visual disturbance, redness, and icterus  HEENT:  negative for  hearing loss, tinnitus, ear drainage, earaches, nasal congestion, epistaxis, sore throat, hoarseness, voice change, and postnasal drip  RESPIRATORY: Positive for dyspnea on exertion, negative for  dry cough, cough with sputum, wheezing, hemoptysis, chest pain, and pleuritic pain  CARDIOVASCULAR: Occasional intermittent left-sided chest pain, negative for  palpitations, orthopnea, PND, exertional chest pressure/discomfort, fatigue, edema, syncope  GASTROINTESTINAL:  negative for nausea, vomiting, diarrhea, constipation, abdominal pain, abdominal mass, abdominal distention, jaundice, dysphagia, reflux, odynophagia, hematemesis, and hemtochezia  GENITOURINARY:  negative for frequency, dysuria, nocturia, and hematuria  HEMATOLOGIC/LYMPHATIC:  negative for easy bruising, bleeding, lymphadenopathy, and petechiae  ALLERGIC/IMMUNOLOGIC:  negative for recurrent infections, urticaria, hay fever, angioedema, anaphylaxis, and drug reactions  ENDOCRINE:  negative for heat intolerance, cold intolerance, tremor, and weight changes  MUSCULOSKELETAL:  negative for  myalgias, arthralgias, joint swelling, stiff joints, and muscle weakness  NEUROLOGICAL:  negative for headaches, dizziness, seizures, memory problems, speech problems, visual disturbance, gait problems, tremor, dysphagia, weakness, numbness, syncope, and tingling  BEHAVIOR/PSYCH:  negative for decreased sleep, decreased energy level, increased energy level, poor concentration, depressed mood, and anxiety          Physical Exam:    Vitals: /66 (Site: Left Upper Arm, Position: Sitting, Cuff Size: Medium Adult)   Pulse 90   Temp 97.1 °F (36.2 °C) (Temporal)   Ht 5' 2\" (1.575 m)   Wt 134 lb (60.8 kg)   SpO2 97% Comment: room air at rest  BMI 24.51 kg/m²   Last 3 weights: Wt Readings from Last 3 Encounters:   03/01/22 134 lb (60.8 kg)   02/04/22 135 lb (61.2 kg)   12/20/21 130 lb (59 kg)     Body mass index is 24.51 kg/m². Physical Examination:   General appearance - alert, well appearing, and in no distress, normal appearing weight and acyanotic, in no respiratory distress  Mental status - alert, oriented to person, place, and time  Eyes - pupils equal and reactive, extraocular eye movements intact, sclera anicteric  Ears - right ear normal, left ear normal  Nose - normal and patent, no erythema, discharge or polyps  Mouth - mucous membranes moist, pharynx normal without lesions  Neck - supple, no significant adenopathy  Chest - no tachypnea, retractions or cyanosis bilateral symmetrical chest movement, normal resonance on percussion, air entry is present bilaterally and symmetrical, minimally decreased breath sound at left base as compared to right no crackles present, no expiratory wheezing rhonchi.   Heart - normal rate, regular rhythm, normal S1, S2, no murmurs, rubs, clicks or gallops  Abdomen - soft, nontender, nondistended, no masses or organomegaly  Neurological - alert, oriented, normal speech, no focal findings or movement disorder noted  Extremities - peripheral pulses normal, no pedal edema, no clubbing or cyanosis  Skin - normal coloration and turgor, no rashes, no suspicious skin lesions noted       LABS:    CBC:   WBC   Date Value Ref Range Status   02/05/2022 7.1 3.5 - 11.3 k/uL Final   02/04/2022 6.3 3.5 - 11.3 k/uL Final   02/03/2022 6.9 3.5 - 11.3 k/uL Final     Hemoglobin   Date Value Ref Range Status   02/05/2022 11.8 (L) 11.9 - 15.1 g/dL Final   02/04/2022 11.8 (L) 11.9 - 15.1 g/dL Final   02/03/2022 12.4 11.9 - 15.1 g/dL Final     Platelets   Date Value Ref Range Status   02/05/2022 206 138 - 453 k/uL Final   02/04/2022 176 138 - 453 k/uL Final   02/03/2022 See Reflexed IPF Result 138 - 453 k/uL Final     BMP:   Sodium   Date Value Ref Range Status   02/05/2022 138 135 - 144 mmol/L Final   02/04/2022 144 135 - 144 mmol/L Final   02/03/2022 139 135 - 144 mmol/L Final     Potassium   Date Value Ref Range Status   02/05/2022 3.5 (L) 3.7 - 5.3 mmol/L Final   02/04/2022 3.3 (L) 3.7 - 5.3 mmol/L Final   02/03/2022 4.0 3.7 - 5.3 mmol/L Final     Chloride   Date Value Ref Range Status   02/05/2022 102 98 - 107 mmol/L Final   02/04/2022 108 (H) 98 - 107 mmol/L Final   02/03/2022 107 98 - 107 mmol/L Final     CO2   Date Value Ref Range Status   02/05/2022 26 20 - 31 mmol/L Final   02/04/2022 27 20 - 31 mmol/L Final   02/03/2022 23 20 - 31 mmol/L Final     BUN   Date Value Ref Range Status   02/05/2022 15 8 - 23 mg/dL Final   02/04/2022 13 8 - 23 mg/dL Final   02/03/2022 11 8 - 23 mg/dL Final     CREATININE   Date Value Ref Range Status   02/05/2022 0.61 0.50 - 0.90 mg/dL Final   02/04/2022 0.64 0.50 - 0.90 mg/dL Final   02/03/2022 0.49 (L) 0.50 - 0.90 mg/dL Final     Glucose   Date Value Ref Range Status   02/05/2022 91 70 - 99 mg/dL Final   02/04/2022 80 70 - 99 mg/dL Final   02/03/2022 122 (H) 70 - 99 mg/dL Final     Hepatic:     Amylase: No results found for: AMYLASE  Lipase:   Lipase   Date Value Ref Range Status   09/11/2021 25 13 - 60 U/L Final     CARDIAC ENZYMES: No results found for: CKTOTAL, CKMB, CKMBINDEX, TROPONINI  BNP: No results found for: BNP  Lipids: No results found for: CHOL, HDL    INR:     Thyroid:   TSH   Date Value Ref Range Status   09/28/2021 1.24 0.30 - 5.00 mIU/L Final     Urinalysis:     Cultures:-  -----------------------------------------------------------------    ABGs: No results found for: PHART, PO2ART, UUY2JVY    Pulmonary Functions Testing Results:    No results found for: FEV1, FVC, YDF4EPZ, TLC, DLCO    CXR  10/04/2021.   1.  Small residual right pleural effusion following interval thoracentesis.       2.  Similar appearance of small left effusion and basilar atelectasis.       3.  Interval improvement of vascular congestion         CT Scans    CT chest  CT scan of the chest on 02/03/2022 shows circumferential pericardial effusion small left pleural effusion with left basilar atelectasis and very small right pleural effusion. CT chest 10/01/2021. 1.  Pericardial drain in place with interval reduction of effusion.       2.  Right pleural effusion has increased in size compared to 09/27/2021 CT   exam, moderate in size.       3.  Small residual left pleural effusion, improved. CT chest 09/27/2021.  1. No evidence for acute pulmonary embolism. 2. Moderate pericardial effusion. 3. Small right and moderate left pleural effusion with adjacent subsegmental   atelectasis. CT chest 09/12/2021. Mildly motion limited evaluation with no evidence of acute pulmonary embolus   to the segmental level.  No acute process in the chest.       ECHO:      Assessment and Plan       ICD-10-CM    1. Pleural effusion exudative  J90    2. Pericardial effusion  I31.3    3. Chronic cough  R05.3    4. Systemic lupus erythematosus with other organ involvement, unspecified SLE type ([facility] [address]  M32.19    5. ACE inhibitor induced cough      Assessment:    Patient had bilateral exudative pleural effusion and pericardial effusion pleuropericarditis likely related to autoimmune disease/possible lupus less likely to be viral etiology with positive SOSA, JENI and SSA antibody. Patient has been followed by rheumatology and she is on prednisone she had recurrence of pleuropericardial effusion once her prednisone was reduced to 5 mg and she was admitted to hospital and at that time she had small pleural effusion and 150 mL output this time it was transudate of pleural effusion patient is also on hydroxychloroquine and colchicine.   I have advised her to follow-up with rheumatology as she is currently on prednisone therapy and hydroxychloroquine and need to continue adjustment and follow-up there. We will get chest x-ray PA lateral view in 2 to 3 weeks. Plan and recommendation:    Chest x-ray PA lateral view 2-3 weeks. Ultimately when symptoms improve we will get pulmonary function test.  Follow-up with rheumatology as scheduled. Prednisone dosing and Plaquenil per rheumatology. Patient did not have Covid vaccination COVID vaccine recommended patient does not want Covid vaccination discuss again today. Flu vaccination recommended she did not want flu vaccine. She does not want pneumonia vaccine  Maintain an active lifestyle     Although she does not have symptoms of sleep apnea but if she continues to complain of tiredness may screen for sleep study once her rheumatologic issue are stable. Follow good sleep hygeine instructions  Increase activity and exercise as tolerated  Questions answered pertaining to diagnosis and management explained importance of compliance with therapy       RTC 6 to 8 weeks    It was my pleasure to evaluate Sameera Delong today. Please call with questions. Jamey Montanez MD, MD             3/1/2022, 10:56 AM    Please note that this chart was generated using voice recognition Dragon dictation software. Although every effort was made to ensure the accuracy of this automated transcription, some errors in transcription may have occurred.

## 2022-03-01 NOTE — LETTER
[address] [address]  Phone: [phone]  Fax: [phone]    Ciara Vickers MD    March 1, 2022     [address] [place] [id number]    Patient: Eduarda Chiang   MR Number: [id number]   YOB: 1945   Date of Visit: 3/1/2022       Dear Maryam Eth: Thank you for referring David Jaime to me for evaluation/treatment. Below are the relevant portions of my assessment and plan of care. If you have questions, please do not hesitate to call me. I look forward to following Donte Flynn along with you.     Sincerely,      Ciara Vickers MD

## 2022-03-21 ENCOUNTER — HOSPITAL ENCOUNTER (OUTPATIENT)
Dept: GENERAL RADIOLOGY | Age: 77
Discharge: HOME | End: 2022-03-23
Payer: MEDICARE

## 2022-03-21 ENCOUNTER — HOSPITAL ENCOUNTER (OUTPATIENT)
Age: 77
Discharge: HOME | End: 2022-03-23
Payer: MEDICARE

## 2022-03-21 DIAGNOSIS — M32.19 SYSTEMIC LUPUS ERYTHEMATOSUS WITH OTHER ORGAN INVOLVEMENT, UNSPECIFIED SLE TYPE (HCC): ICD-10-CM

## 2022-03-21 DIAGNOSIS — J90 PLEURAL EFFUSION EXUDATIVE: ICD-10-CM

## 2022-03-21 PROCEDURE — 71046 X-RAY EXAM CHEST 2 VIEWS: CPT

## 2022-04-08 ENCOUNTER — HOSPITAL ENCOUNTER (OUTPATIENT)
Age: 77
Setting detail: SPECIMEN
Discharge: HOME | End: 2022-04-08

## 2022-04-08 LAB
ABSOLUTE EOS #: 0.13 K/UL (ref 0–0.44)
ABSOLUTE IMMATURE GRANULOCYTE: 0.1 K/UL (ref 0–0.3)
ABSOLUTE LYMPH #: 1.91 K/UL (ref 1.1–3.7)
ABSOLUTE MONO #: 0.46 K/UL (ref 0.1–1.2)
BASOPHILS # BLD: 1 % (ref 0–2)
BASOPHILS ABSOLUTE: 0.04 K/UL (ref 0–0.2)
EOSINOPHILS RELATIVE PERCENT: 2 % (ref 1–4)
HCT VFR BLD CALC: 43.4 % (ref 36.3–47.1)
HEMOGLOBIN: 13.7 G/DL (ref 11.9–15.1)
IMMATURE GRANULOCYTES: 2 %
LYMPHOCYTES # BLD: 29 % (ref 24–43)
MCH RBC QN AUTO: 28.6 PG (ref 25.2–33.5)
MCHC RBC AUTO-ENTMCNC: 31.6 G/DL (ref 28.4–34.8)
MCV RBC AUTO: 90.6 FL (ref 82.6–102.9)
MONOCYTES # BLD: 7 % (ref 3–12)
NRBC AUTOMATED: 0 PER 100 WBC
PDW BLD-RTO: 14.5 % (ref 11.8–14.4)
PLATELET # BLD: 242 K/UL (ref 138–453)
PMV BLD AUTO: 11.1 FL (ref 8.1–13.5)
RBC # BLD: 4.79 M/UL (ref 3.95–5.11)
RBC # BLD: ABNORMAL 10*6/UL
REASON FOR REJECTION: NORMAL
SEDIMENTATION RATE, ERYTHROCYTE: 3 MM/HR (ref 0–30)
SEG NEUTROPHILS: 59 % (ref 36–65)
SEGMENTED NEUTROPHILS ABSOLUTE COUNT: 4.06 K/UL (ref 1.5–8.1)
WBC # BLD: 6.7 K/UL (ref 3.5–11.3)
ZZ NTE CLEAN UP: ORDERED TEST: NORMAL
ZZ NTE WITH NAME CLEAN UP: SPECIMEN SOURCE: NORMAL

## 2022-04-09 LAB
ALBUMIN SERPL-MCNC: 3.9 G/DL (ref 3.5–5.2)
ALBUMIN/GLOBULIN RATIO: 2.2 (ref 1–2.5)
ALP BLD-CCNC: 87 U/L (ref 35–104)
ALT SERPL-CCNC: 38 U/L (ref 5–33)
ANION GAP SERPL CALCULATED.3IONS-SCNC: 7 MMOL/L (ref 9–17)
AST SERPL-CCNC: 24 U/L
BILIRUB SERPL-MCNC: 0.36 MG/DL (ref 0.3–1.2)
BUN BLDV-MCNC: 14 MG/DL (ref 8–23)
CALCIUM SERPL-MCNC: 8.9 MG/DL (ref 8.6–10.4)
CHLORIDE BLD-SCNC: 101 MMOL/L (ref 98–107)
CO2: 31 MMOL/L (ref 20–31)
CREAT SERPL-MCNC: 0.68 MG/DL (ref 0.5–0.9)
GFR AFRICAN AMERICAN: >60 ML/MIN
GFR NON-AFRICAN AMERICAN: >60 ML/MIN
GFR SERPL CREATININE-BSD FRML MDRD: ABNORMAL ML/MIN/{1.73_M2}
GLUCOSE BLD-MCNC: 79 MG/DL (ref 70–99)
HIGH SENSITIVE C-REACTIVE PROTEIN: 1.6 MG/L
POTASSIUM SERPL-SCNC: 3.1 MMOL/L (ref 3.7–5.3)
SODIUM BLD-SCNC: 139 MMOL/L (ref 135–144)
TOTAL CK: 75 U/L (ref 26–192)
TOTAL PROTEIN: 5.7 G/DL (ref 6.4–8.3)

## 2022-04-15 ENCOUNTER — OFFICE VISIT (OUTPATIENT)
Dept: PULMONOLOGY | Age: 77
End: 2022-04-15
Payer: MEDICARE

## 2022-04-15 VITALS
HEART RATE: 72 BPM | DIASTOLIC BLOOD PRESSURE: 73 MMHG | HEIGHT: 62 IN | RESPIRATION RATE: 16 BRPM | TEMPERATURE: 97 F | SYSTOLIC BLOOD PRESSURE: 129 MMHG | WEIGHT: 130 LBS | OXYGEN SATURATION: 99 % | BODY MASS INDEX: 23.92 KG/M2

## 2022-04-15 DIAGNOSIS — M32.19 SYSTEMIC LUPUS ERYTHEMATOSUS WITH OTHER ORGAN INVOLVEMENT, UNSPECIFIED SLE TYPE (HCC): ICD-10-CM

## 2022-04-15 DIAGNOSIS — R05.3 CHRONIC COUGH: ICD-10-CM

## 2022-04-15 DIAGNOSIS — J90 PLEURAL EFFUSION EXUDATIVE: Primary | ICD-10-CM

## 2022-04-15 PROCEDURE — 99214 OFFICE O/P EST MOD 30 MIN: CPT | Performed by: INTERNAL MEDICINE

## 2022-04-15 NOTE — PROGRESS NOTES
OUTPATIENT PULMONARY PROGRESS NOTE      Patient:  Maryann Bass  MRN: [id number]    Consulting Physician: CELY Arboleda CNP  Reason for Consult: Bilateral exudative pleural effusion  Primacy Care Physician: CELY Arboleda CNP        HISTORY OF PRESENT ILLNESS:   She is here for follow-up of bilateral exudative pleural effusion related to lupus/pericardial effusion/connective tissue disease. According to patient she has some good days and some bad days her shortness of breath on exertion activities is stable she usually is able to do her regular activities without getting shortness of breath. She does not complain of much cough cough is mild and intermittent denies daily or persistent cough. She does not complain of wheezing she denies nocturnal awakening with cough wheezing chest tightness or shortness of breath. She does have pedal edema she is on Lasix 3 times a week managed by rheumatology. According to patient few days ago she had significant pedal edema and she took double dose of Lasix and keep her leg elevated and next morning her swelling was better. She denies shortness of breath on regular activity except on exertional activity and on the stairs. She does not complain of fever denies joint swelling denies weight loss does have weight gain. She is currently on prednisone 15 mg a day it was tapered down but she was having symptoms and required backup on the prednisone. She is also on hydroxychloroquine and she is on colchicine currently. When she was seen last time a chest x-ray was ordered for follow-up of pleural effusion and chest x-ray on 03/21/2022 did not show much pleural effusion.     Previous visit summary  She was admitted to hospital in early February second time when a CTA chest shows small recurrent pericardial effusion and very small right and a small left pleural effusion she had a thoracentesis done and only 150 mL of fluid was withdrawn by interventional radiology which was negative for cultures and cytology. On review of the pleural fluid cytology on 02/04/2022 LDH was less than 10 and total protein was less than 1 WBC was 569 37% neutrophil cultures were negative and pleural fluid was negative for malignancy. She has been followed by rheumatology and according to patient when she was seen by rheumatology on office visit her prednisone was decreased from 20 mg to 15 mg and gradually to 5 mg when she reached to 5 mg she started having recurrent symptoms and thus when she was admitted to hospital in February she was seen by rheumatology in the hospital and was recommended to continue prednisone 20 mg which she was given in the hospital she is on hydroxychloroquine which she is taking 1 and half tablet once daily and she is on colchicine by rheumatology. She has been seen by cardiology and had an echocardiogram done on 02/03/2022 which shows normal LV function normal RV size and function RVSP 20 no significant pericardial effusion per report. She was seen by cardiology recently and is a scheduled for a stress test.  CT scan of the chest on 02/03/2022 shows circumferential pericardial effusion small left pleural effusion with left basilar atelectasis and very small right pleural effusion. Initial office history and visit on 11/05/2021. The patient is a 78 y.o. 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 Since she was discharged from the hospital according patient she is doing better she does not complain of shortness of breath although she is not active she feels fatigue and tired. According to patient around 12 noon and 1:00 she does get so tired that she feels like sleeping.   She had a history of chronic cough apparently attributed to ACE inhibitor's which she was taking for many years cough was therefore 2 to 3 years. Recent man were discontinued. When she was in the hospital cough was secondary to pleuropericarditis which was worse when she developed symptoms of pleuropericarditis but cough had resolved completely and she denies any cough at this time. According to patient she does not have shortness of breath or shortness of breath is improved she is able to do regular activities at home if it is not because of weakness and tiredness. She denies orthopnea PND or pedal edema had completely resolved she is not on diuretics after she was discharged from the hospital.  Her weight has been stable she denies weight loss since she was discharged, appetite is good  She does not complain of fever she sometimes have left-sided pain going into her axilla. She does not complain of sputum production. She denies any hemoptysis does not complain of skin rash joint swelling in joints pain. She does not complain of snoring waking up with snoring gasping or choking. Since she was discharged from the hospital when she was in the hospital she was treated with IV steroids on discharge she was on prednisone. She was seen by rheumatology and her prednisone was on 30 mg. According to patient she had more work-up done by rheumatology and she was told that her lupus test were going up. She was told that to taper down the prednisone from 30mg to 20 mg in a week and then to follow-up with rheumatology. She was started on Plaquenil also. Hospitalization summary summary from 09/27/2021 to 10/05/21    PMH of chronic cough/possible asthma, hypertension who presented to the ER at [facility] on 9/12/21 for complaints of chest pain, shortness of breath after pushing multiple shopping carts. CT chest at the time was unremarkable. This was attributed to physical strain and she was discharged from the ER.   She returns to the [facility] ER on 9/26 with complaints of cough, shortness of breath and difficulty sleeping where CT scan of the chest shows moderate pericardial effusion, small right and moderate left pleural effusions with adjacent subsegmental atelectasis. Patient declined admission at that time and was discharged home with albuterol inhaler, tessalon, guaifenesin and dextromethorphan. Due to worsening symptoms she returned to the ER the next day, stayed overnight and was transferred to [facility] for admission on 9/28/21. On my evaluation, patient is tachycardic in the 130s, tachypneic, blood pressure stable, and unable to speak due to cough. Respiratory panel was sent which showed Entero/rhinovirus positive PCR test.  She had thoracentesis done bilaterally when she was in the hospital was negative for culture AFB and and cytology were negative. Echocardiogram shows pericardial effusion she had pericardiocentesis done by cardiology and had a pericardial drain which later was removed. Connective tissue disease work-up shows positive SOSA, elevated JENI antibody and positive anti-SSA. She was seen by rheumatology she was started on IV steroids by rheumatology her symptoms gradually improved on steroids her cough and shortness of breath improved she was weaned off oxygen. She was discharged home on prednisone to be followed up by rheumatology cardiology. And our office    Work-up in hospital  She is status post left thoracentesis on 09/28/2021. She is status post pericardiocentesis/pericardial drain on 09/29/2021  Status post repeat left thoracentesis on 09/30/2021.   Status post right thoracentesis on 10/01/2021     A repeat CT scan of the chest on 09/30/2021 showed pericardial effusion is a smaller she has a small left pleural effusion and she has a moderate right pleural effusion which is increased from last CT scan of the chest on admission  Her pleural fluid on the left side was exudative by protein criteria and Gram stain and culture negative and AFB smear is negative. Cytology negative  Her pleural fluid on the right side was exudative by protein criteria, Gram stain and AFB smear negative bacterial culture is negative. Pericardial fluid culture is negative and AB smear is negative. Repeat echo with EF 55%, small pericardial effusion anteriorly. Similar to echo on 9/30. No signs of tamponade. Past Medical History:        Diagnosis Date    Arthritis     Displacement of lumbar intervertebral disc without myelopathy 9/30/2015    Hypertension     exacerbated after last injection 10-    Lumbar disc disease     Personal history of TIA (transient ischemic attack)     2012 mini stroke high blood pressure    Sleep deprivation     SOB (shortness of breath)     \"walk to fast and going upstairs\" \"can walk a city block\"    Wears glasses        Past Surgical History:        Procedure Laterality Date    ANKLE SURGERY Right     x 3    FOOT SURGERY  2015    left    HIP ARTHROSCOPY Left 03/14/2018    band release bursectomy    HYSTERECTOMY      NERVE BLOCK  10/19/15    caudal #1  celestone 9mg    NERVE BLOCK  10-26-15     caudal epidural steroid block #2 decadron 5 mg    TN HIP ARTHROSCOPY, DX Left 3/14/2018    LEFT HIP ARTHROSCOPY WITH  IT BAND RELEASE BURSECTOMY WITH GLUTEUS MUSCLE REPAIR performed by Mohinder Dos Santos DO at [address] Left     x 2       Allergies:     Allergies   Allergen Reactions    Bee Pollen Anaphylaxis    Erythromycin Anaphylaxis    Pcn [Penicillins] Anaphylaxis    Tetanus Toxoids Anaphylaxis    Tetracyclines & Related Anaphylaxis    Lisinopril Other (See Comments)     cough         Home Meds:   Outpatient Encounter Medications as of 4/15/2022   Medication Sig Dispense Refill    hydroCHLOROthiazide (HYDRODIURIL) 12.5 MG tablet Take 12.5 mg by mouth 2 times daily (before meals)      predniSONE (DELTASONE) 20 MG tablet Take 1 tablet by mouth daily 30 tablet 0    furosemide (LASIX) 20 MG tablet Take 1 tablet by mouth three times a week 60 tablet 0    colchicine (COLCRYS) 0.6 MG tablet Take 0.6 mg by mouth daily      hydroxychloroquine (PLAQUENIL) 200 MG tablet Take 200 mg by mouth daily Take 1 and 1/2 tablet daily      miconazole (MICOTIN) 2 % powder Apply topically 2 times daily. (Patient not taking: Reported on 4/15/2022) 45 g 1     No facility-administered encounter medications on file as of 4/15/2022. Social History:   TOBACCO:   reports that she has never smoked. She has never used smokeless tobacco.  ETOH:   reports no history of alcohol use. OCCUPATION:      Family History:       Problem Relation Age of Onset    Lung Cancer Mother     Lung Cancer Brother     Heart Disease Father     Heart Disease Paternal Grandfather        Immunizations: There is no immunization history on file for this patient.       REVIEW OF SYSTEMS:  CONSTITUTIONAL: Positive for mild fatigue and tiredness negative for  fevers, chills, sweats, anorexia, and weight loss  EYES:  negative for  double vision, blurred vision, dry eyes, eye discharge, visual disturbance, redness, and icterus  HEENT:  negative for  hearing loss, tinnitus, ear drainage, earaches, nasal congestion, epistaxis, sore throat, hoarseness, voice change, and postnasal drip  RESPIRATORY: Positive for dyspnea on exertion, negative for  dry cough, cough with sputum, wheezing, hemoptysis, chest pain, and pleuritic pain  CARDIOVASCULAR: Negative for chest pain, negative for  palpitations, orthopnea, PND, exertional chest pressure/discomfort, fatigue, edema, syncope  GASTROINTESTINAL:  negative for nausea, vomiting, diarrhea, constipation, abdominal pain, abdominal mass, abdominal distention, jaundice, dysphagia, reflux, odynophagia, hematemesis, and hemtochezia  GENITOURINARY:  negative for frequency, dysuria, nocturia, and hematuria  HEMATOLOGIC/LYMPHATIC:  negative for easy bruising, bleeding, lymphadenopathy, and petechiae  ALLERGIC/IMMUNOLOGIC:  negative for recurrent infections, urticaria, hay fever, angioedema, anaphylaxis, and drug reactions  ENDOCRINE:  negative for heat intolerance, cold intolerance, tremor, and weight changes  MUSCULOSKELETAL:  negative for  myalgias, arthralgias, joint swelling, stiff joints, and muscle weakness  NEUROLOGICAL:  negative for headaches, dizziness, seizures, memory problems, speech problems, visual disturbance, gait problems, tremor, dysphagia, weakness, numbness, syncope, and tingling  BEHAVIOR/PSYCH:  negative for decreased sleep, decreased energy level, increased energy level, poor concentration, depressed mood, and anxiety          Physical Exam:    Vitals: /73 (Site: Right Upper Arm)   Pulse 72   Temp 97 °F (36.1 °C)   Resp 16   Ht 5' 2\" (1.575 m)   Wt 130 lb (59 kg)   SpO2 99%   BMI 23.78 kg/m²   Last 3 weights: Wt Readings from Last 3 Encounters:   04/15/22 130 lb (59 kg)   03/01/22 134 lb (60.8 kg)   02/04/22 135 lb (61.2 kg)     Body mass index is 23.78 kg/m². Physical Examination:   General appearance - alert, well appearing, and in no distress, normal appearing weight and acyanotic, in no respiratory distress  Mental status - alert, oriented to person, place, and time  Eyes - pupils equal and reactive, extraocular eye movements intact, sclera anicteric  Ears - right ear normal, left ear normal  Nose - normal and patent, no erythema, discharge or polyps  Mouth - mucous membranes moist, pharynx normal without lesions  Neck - supple, no significant adenopathy  Chest - no tachypnea, retractions or cyanosis bilateral symmetrical chest movement, normal resonance on percussion, air entry is present bilaterally and symmetrical, minimal crackles at left base, no expiratory wheezing rhonchi.   Heart - normal rate, regular rhythm, normal S1, S2, no murmurs, rubs, clicks or gallops  Abdomen - soft, nontender, nondistended, no masses or organomegaly  Neurological - alert, Ref Range Status   09/11/2021 25 13 - 60 U/L Final     CARDIAC ENZYMES:   Total CK   Date Value Ref Range Status   04/08/2022 75 26 - 192 U/L Final     BNP: No results found for: BNP  Lipids: No results found for: CHOL, HDL    INR:     Thyroid:   TSH   Date Value Ref Range Status   09/28/2021 1.24 0.30 - 5.00 mIU/L Final     Urinalysis:     Cultures:-  -----------------------------------------------------------------    ABGs: No results found for: PHART, PO2ART, BQV5HRR    Pulmonary Functions Testing Results:    No results found for: FEV1, FVC, LQN5PYJ, TLC, DLCO    CXR    03/21/2022. Heart is normal in size.  Lungs are clear.  No free air.  Osseous structures   demonstrate degenerative change. 10/04/2021. 1.  Small residual right pleural effusion following interval thoracentesis.       2.  Similar appearance of small left effusion and basilar atelectasis.       3.  Interval improvement of vascular congestion         CT Scans    CT chest  CT scan of the chest on 02/03/2022 shows circumferential pericardial effusion small left pleural effusion with left basilar atelectasis and very small right pleural effusion. CT chest 10/01/2021. 1.  Pericardial drain in place with interval reduction of effusion.       2.  Right pleural effusion has increased in size compared to 09/27/2021 CT   exam, moderate in size.       3.  Small residual left pleural effusion, improved. CT chest 09/27/2021.  1. No evidence for acute pulmonary embolism. 2. Moderate pericardial effusion. 3. Small right and moderate left pleural effusion with adjacent subsegmental   atelectasis. CT chest 09/12/2021. Mildly motion limited evaluation with no evidence of acute pulmonary embolus   to the segmental level.  No acute process in the chest.       ECHO:      Assessment and Plan       ICD-10-CM    1. Pleural effusion exudative  J90    2. Chronic cough  R05.3    3.  Systemic lupus erythematosus with other organ involvement, unspecified [facility]  M32.19    4. ACE inhibitor induced cough      Assessment:    Patient had bilateral exudative pleural effusion and pericardial effusion pleuropericarditis likely related to autoimmune disease/possible lupus less likely to be viral etiology with positive OSSA, JENI and SSA antibody. Patient has been followed by rheumatology and she is on prednisone she had recurrence of pleuropericardial effusion once her prednisone was reduced to 5 mg and she was admitted to hospital and at that time she had small pleural effusion and 150 mL output this time it was transudate of pleural effusion patient is also on hydroxychloroquine and colchicine. I have advised her to continue to follow-up with rheumatology as she is currently on prednisone therapy 15 mg and hydroxychloroquine and need to continue adjustment and follow-up there. Chest x-ray on 03/21/2022 which was a follow-up did not show pleural effusion    Plan and recommendation:    Chest x-ray PA lateral view seen from march 2022. Ultimately when symptoms improve we will get pulmonary function test.  Follow-up with rheumatology as scheduled. Prednisone dosing and Plaquenil per rheumatology. Patient did not have Covid vaccination COVID vaccine recommended patient does not want Covid vaccination discuss again today. Flu vaccination recommended she did not want flu vaccine. She does not want pneumonia vaccine  Maintain an active lifestyle     Although she does not have symptoms of sleep apnea but if she complain of tiredness may screen for sleep study once her rheumatologic issue are stable. Follow good sleep hygeine instructions  Increase activity and exercise as tolerated  Questions answered pertaining to diagnosis and management explained importance of compliance with therapy       RTC 4 months      It was my pleasure to evaluate Sammy Ornelas today. Please call with questions.     Ryan Gar MD, MD             4/15/2022, 1:11 PM    Please note that this chart was generated using voice recognition Dragon dictation software. Although every effort was made to ensure the accuracy of this automated transcription, some errors in transcription may have occurred.

## 2022-04-15 NOTE — LETTER
[address] [facility] [address]  Phone: [phone]  Fax: [phone]    Tiom Samuel MD    April 15, 2022     Clif Arizmendi, APRN - [facility]  [address] [address]    Patient: Nicole Richard   MR Number: [id number]   YOB: 1945   Date of Visit: 4/15/2022       Dear Clif Arizmendi: Thank you for referring Sen Davis to me for evaluation/treatment. Below are the relevant portions of my assessment and plan of care. If you have questions, please do not hesitate to call me. I look forward to following Kay Luevano along with you.     Sincerely,      Timo Samuel MD

## 2022-09-22 ENCOUNTER — TELEPHONE (OUTPATIENT)
Dept: PULMONOLOGY | Age: 77
End: 2022-09-22

## 2022-09-22 NOTE — TELEPHONE ENCOUNTER
Patient called and stated that NEAL Dumont reviewed CXR( you can see it in Care everywhere) and told patient both lungs are filling up with fluid and she needs to call Dr. Latasha douglas to find out how to proceed. Please advise. Thanks     I spoke to Dr. Latasha Hammond and he asked I get the films pushed so he can see the CXR. I requested this. I also called Mary Tejada's office and spoke to [facility] she said she herself spoke to patient and informed her of the chest X-ray results she said she told her that there was a small amount of fluid. I spoke to patient and let her know the following and informed her if she has any issues, breathing or otherwise to please go to the ER. Patient understood and will wait until Dr. Latasha Hammond reviews the CXR.

## 2022-10-12 ENCOUNTER — OFFICE VISIT (OUTPATIENT)
Dept: PULMONOLOGY | Age: 77
End: 2022-10-12
Payer: MEDICARE

## 2022-10-12 VITALS
BODY MASS INDEX: 24.84 KG/M2 | SYSTOLIC BLOOD PRESSURE: 124 MMHG | WEIGHT: 135 LBS | RESPIRATION RATE: 16 BRPM | DIASTOLIC BLOOD PRESSURE: 70 MMHG | OXYGEN SATURATION: 97 % | HEART RATE: 71 BPM | HEIGHT: 62 IN

## 2022-10-12 DIAGNOSIS — J90 PLEURAL EFFUSION EXUDATIVE: Primary | ICD-10-CM

## 2022-10-12 DIAGNOSIS — I31.39 PERICARDIAL EFFUSION: ICD-10-CM

## 2022-10-12 DIAGNOSIS — M32.19 SYSTEMIC LUPUS ERYTHEMATOSUS WITH OTHER ORGAN INVOLVEMENT, UNSPECIFIED SLE TYPE (HCC): ICD-10-CM

## 2022-10-12 PROCEDURE — 99214 OFFICE O/P EST MOD 30 MIN: CPT | Performed by: INTERNAL MEDICINE

## 2022-10-12 PROCEDURE — 1123F ACP DISCUSS/DSCN MKR DOCD: CPT | Performed by: INTERNAL MEDICINE

## 2022-10-12 NOTE — PROGRESS NOTES
OUTPATIENT PULMONARY PROGRESS NOTE      Patient:  Ashleigh Leroy  MRN: [id number]    Consulting Physician: CELY Vivar CNP  Reason for Consult: Bilateral exudative pleural effusion  Primacy Care Physician: CELY Vivar CNP        HISTORY OF PRESENT ILLNESS:   She is here for follow-up of bilateral exudative pleural effusion related to lupus/pericardial effusion/connective tissue disease. According to patient since she was seen last time for last 3 weeks or so she was complaining of heart palpitation but did not have chest pain. She was complaining of increased shortness of breath without any cough or wheezing. Shortness of breath with associated with palpitation. She had a chest x-ray done on 09/19/2021 1 review of the images shows a small/mild bilateral pleural effusion with blunting of costophrenic angle. 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 She is also complaining of her hip giving her problem both the hip but mostly left hip she had 2 surgeries done for her left hip. She does not complain of cough denies wheezing and denies chest pain denies pleuritic pain. According patient she did have weight gain since she has been on steroids for over a year started November last year. She does not complain of fever hemoptysis night sweats. She does have decreased appetite  She does not complain of joint stiffness or joint swelling. Does not have any rash. Denies dysphagia or reflux symptoms.       Previous visit summary  She was admitted to hospital in early February second time when a CTA chest shows small recurrent pericardial effusion and very small right and a small left pleural effusion she had a thoracentesis done and only 150 mL of fluid was withdrawn by interventional radiology which was negative for cultures and cytology. On review of the pleural fluid cytology on 02/04/2022 LDH was less than 10 and total protein was less than 1 WBC was 569 37% neutrophil cultures were negative and pleural fluid was negative for malignancy. She has been followed by rheumatology and according to patient when she was seen by rheumatology on office visit her prednisone was decreased from 20 mg to 15 mg and gradually to 5 mg when she reached to 5 mg she started having recurrent symptoms and thus when she was admitted to hospital in February she was seen by rheumatology in the hospital and was recommended to continue prednisone 20 mg which she was given in the hospital she is on hydroxychloroquine which she is taking 1 and half tablet once daily and she is on colchicine by rheumatology. She has been seen by cardiology and had an echocardiogram done on 02/03/2022 which shows normal LV function normal RV size and function RVSP 20 no significant pericardial effusion per report. She was seen by cardiology recently and is a scheduled for a stress test.  CT scan of the chest on 02/03/2022 shows circumferential pericardial effusion small left pleural effusion with left basilar atelectasis and very small right pleural effusion. Initial office history and visit on 11/05/2021. The patient is a 78 y.o. 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 Since she was discharged from the hospital according patient she is doing better she does not complain of shortness of breath although she is not active she feels fatigue and tired.   According to patient around 15 noon and 1:00 she does get so tired that she feels like sleeping. She had a history of chronic cough apparently attributed to ACE inhibitor's which she was taking for many years cough was therefore 2 to 3 years. Recent man were discontinued. When she was in the hospital cough was secondary to pleuropericarditis which was worse when she developed symptoms of pleuropericarditis but cough had resolved completely and she denies any cough at this time. According to patient she does not have shortness of breath or shortness of breath is improved she is able to do regular activities at home if it is not because of weakness and tiredness. She denies orthopnea PND or pedal edema had completely resolved she is not on diuretics after she was discharged from the hospital.  Her weight has been stable she denies weight loss since she was discharged, appetite is good  She does not complain of fever she sometimes have left-sided pain going into her axilla. She does not complain of sputum production. She denies any hemoptysis does not complain of skin rash joint swelling in joints pain. She does not complain of snoring waking up with snoring gasping or choking. Since she was discharged from the hospital when she was in the hospital she was treated with IV steroids on discharge she was on prednisone. She was seen by rheumatology and her prednisone was on 30 mg. According to patient she had more work-up done by rheumatology and she was told that her lupus test were going up. She was told that to taper down the prednisone from 30mg to 20 mg in a week and then to follow-up with rheumatology. She was started on Plaquenil also. Hospitalization summary summary from 09/27/2021 to 10/05/21    PMH of chronic cough/possible asthma, hypertension who presented to the ER at [place] on 9/12/21 for complaints of chest pain, shortness of breath after pushing multiple shopping carts. CT chest at the time was unremarkable. This was attributed to physical strain and she was discharged from the ER. She returns to the [facility] ER on 9/26 with complaints of cough, shortness of breath and difficulty sleeping where CT scan of the chest shows moderate pericardial effusion, small right and moderate left pleural effusions with adjacent subsegmental atelectasis. Patient declined admission at that time and was discharged home with albuterol inhaler, tessalon, guaifenesin and dextromethorphan. Due to worsening symptoms she returned to the ER the next day, stayed overnight and was [place] [facility]. On my evaluation, patient is tachycardic in the 130s, tachypneic, blood pressure stable, and unable to speak due to cough. Respiratory panel was sent which showed Entero/rhinovirus positive PCR test.  She had thoracentesis done bilaterally when she was in the hospital was negative for culture AFB and and cytology were negative. Echocardiogram shows pericardial effusion she had pericardiocentesis done by cardiology and had a pericardial drain which later was removed. Connective tissue disease work-up shows positive SOSA, elevated JENI antibody and positive anti-SSA. She was seen by rheumatology she was started on IV steroids by rheumatology her symptoms gradually improved on steroids her cough and shortness of breath improved she was weaned off oxygen. She was discharged home on prednisone to be followed up by rheumatology cardiology. And our office    Work-up in hospital  She is status post left thoracentesis on 09/28/2021. She is status post pericardiocentesis/pericardial drain on 09/29/2021  Status post repeat left thoracentesis on 09/30/2021.   Status post right thoracentesis on 10/01/2021     A repeat CT scan of the chest on 09/30/2021 showed pericardial effusion is a smaller she has a small left pleural effusion and she has a moderate right pleural effusion which is increased from last CT scan of the chest on admission  Her pleural fluid on the left side was exudative by protein criteria and Gram stain and culture negative and AFB smear is negative. Cytology negative  Her pleural fluid on the right side was exudative by protein criteria, Gram stain and AFB smear negative bacterial culture is negative. Pericardial fluid culture is negative and AB smear is negative. Repeat echo with EF 55%, small pericardial effusion anteriorly. Similar to echo on 9/30. No signs of tamponade. Past Medical History:        Diagnosis Date    Arthritis     Cataract of both eyes     surgery on both eyes 9/2022 and 10/2022    Displacement of lumbar intervertebral disc without myelopathy 09/30/2015    Hypertension     exacerbated after last injection 10-    Lumbar disc disease     Personal history of TIA (transient ischemic attack)     2012 mini stroke high blood pressure    Sleep deprivation     SOB (shortness of breath)     \"walk to fast and going upstairs\" \"can walk a city block\"    Wears glasses        Past Surgical History:        Procedure Laterality Date    ANKLE SURGERY Right     x 3    FOOT SURGERY  2015    left    HIP ARTHROSCOPY Left 03/14/2018    band release bursectomy    HYSTERECTOMY (CERVIX STATUS UNKNOWN)      NERVE BLOCK  10/19/15    caudal #1  celestone 9mg    NERVE BLOCK  10-26-15     caudal epidural steroid block #2 decadron 5 mg    IA HIP ARTHROSCOPY, DX Left 3/14/2018    LEFT HIP ARTHROSCOPY WITH  IT BAND RELEASE BURSECTOMY WITH GLUTEUS MUSCLE REPAIR performed by Eduardo Daily, DO at [address] Left     x 2       Allergies:     Allergies   Allergen Reactions    Bee Pollen Anaphylaxis    Erythromycin Anaphylaxis    Pcn [Penicillins] Anaphylaxis    Tetanus Toxoids Anaphylaxis    Tetracyclines & Related Anaphylaxis    Lisinopril Other (See Comments)     cough         Home Meds:   Outpatient Encounter Medications as of 10/12/2022   Medication Sig Dispense Refill furosemide (LASIX) 20 MG tablet Take 1 tablet by mouth three times a week 60 tablet 0    hydroCHLOROthiazide (HYDRODIURIL) 12.5 MG tablet Take 12.5 mg by mouth 2 times daily (before meals)      predniSONE (DELTASONE) 20 MG tablet Take 1 tablet by mouth daily 30 tablet 0    miconazole (MICOTIN) 2 % powder Apply topically 2 times daily. (Patient not taking: Reported on 4/15/2022) 45 g 1    colchicine (COLCRYS) 0.6 MG tablet Take 0.6 mg by mouth daily (Patient not taking: Reported on 10/12/2022)      hydroxychloroquine (PLAQUENIL) 200 MG tablet Take 200 mg by mouth daily Take 1 and 1/2 tablet daily       No facility-administered encounter medications on file as of 10/12/2022. Social History:   TOBACCO:   reports that she has never smoked. She has never used smokeless tobacco.  ETOH:   reports no history of alcohol use. OCCUPATION:      Family History:       Problem Relation Age of Onset    Lung Cancer Mother     Lung Cancer Brother     Heart Disease Father     Heart Disease Paternal Grandfather        Immunizations: There is no immunization history on file for this patient.       REVIEW OF SYSTEMS:  CONSTITUTIONAL: Positive for mild fatigue and tiredness, decreased appetite negative for  fevers, chills, sweats, anorexia, and weight loss  EYES:  negative for  double vision, blurred vision, dry eyes, eye discharge, visual disturbance, redness, and icterus  HEENT:  negative for  hearing loss, tinnitus, ear drainage, earaches, nasal congestion, epistaxis, sore throat, hoarseness, voice change, and postnasal drip  RESPIRATORY: Positive for dyspnea on exertion, negative for  dry cough, cough with sputum, wheezing, hemoptysis, chest pain, and pleuritic pain  CARDIOVASCULAR: Positive for palpitation, negative for chest pain, negative for orthopnea, PND, exertional chest pressure/discomfort, fatigue, edema, syncope  GASTROINTESTINAL:  negative for nausea, vomiting, diarrhea, constipation, abdominal pain, abdominal mass, abdominal distention, jaundice, dysphagia, reflux, odynophagia, hematemesis, and hemtochezia  GENITOURINARY:  negative for frequency, dysuria, nocturia, and hematuria  HEMATOLOGIC/LYMPHATIC:  negative for easy bruising, bleeding, lymphadenopathy, and petechiae  ALLERGIC/IMMUNOLOGIC:  negative for recurrent infections, urticaria, hay fever, angioedema, anaphylaxis, and drug reactions  ENDOCRINE:  negative for heat intolerance, cold intolerance, tremor, and weight changes  MUSCULOSKELETAL:  negative for  myalgias, arthralgias, joint swelling, stiff joints, and muscle weakness  NEUROLOGICAL:  negative for headaches, dizziness, seizures, memory problems, speech problems, visual disturbance, gait problems, tremor, dysphagia, weakness, numbness, syncope, and tingling  BEHAVIOR/PSYCH:  negative for decreased sleep, decreased energy level, increased energy level, poor concentration, depressed mood, and anxiety          Physical Exam:    Vitals: /70 (Site: Right Upper Arm, Position: Sitting, Cuff Size: Medium Adult)   Pulse 71   Resp 16   Ht 5' 2\" (1.575 m)   Wt 135 lb (61.2 kg)   SpO2 97%   BMI 24.69 kg/m²   Last 3 weights: Wt Readings from Last 3 Encounters:   10/12/22 135 lb (61.2 kg)   04/15/22 130 lb (59 kg)   03/01/22 134 lb (60.8 kg)     Body mass index is 24.69 kg/m².     Physical Examination:   General appearance - alert, well appearing, and in no distress, normal appearing weight and acyanotic, in no respiratory distress  Mental status - alert, oriented to person, place, and time  Eyes - pupils equal and reactive, extraocular eye movements intact, sclera anicteric  Ears - right ear normal, left ear normal  Nose - normal and patent, no erythema, discharge or polyps  Mouth - mucous membranes moist, pharynx normal without lesions  Neck - supple, no significant adenopathy  Chest - no tachypnea, retractions or cyanosis bilateral symmetrical chest movement, normal resonance on percussion, air entry is present bilaterally and symmetrical, no crackles at left base, no expiratory wheezing rhonchi.   Heart - normal rate, regular rhythm, normal S1, S2, no murmurs, rubs, clicks or gallops  Abdomen - soft, nontender, nondistended, no masses or organomegaly  Neurological - alert, oriented, normal speech, no focal findings or movement disorder noted  Extremities - peripheral pulses normal, no pedal edema, no clubbing or cyanosis  Skin - normal coloration and turgor, no rashes, no suspicious skin lesions noted       LABS:    CBC:   WBC   Date Value Ref Range Status   04/08/2022 6.7 3.5 - 11.3 k/uL Final   02/05/2022 7.1 3.5 - 11.3 k/uL Final   02/04/2022 6.3 3.5 - 11.3 k/uL Final     Hemoglobin   Date Value Ref Range Status   04/08/2022 13.7 11.9 - 15.1 g/dL Final   02/05/2022 11.8 (L) 11.9 - 15.1 g/dL Final   02/04/2022 11.8 (L) 11.9 - 15.1 g/dL Final     Platelets   Date Value Ref Range Status   04/08/2022 242 138 - 453 k/uL Final   02/05/2022 206 138 - 453 k/uL Final   02/04/2022 176 138 - 453 k/uL Final     BMP:   Sodium   Date Value Ref Range Status   04/08/2022 139 135 - 144 mmol/L Final   02/05/2022 138 135 - 144 mmol/L Final   02/04/2022 144 135 - 144 mmol/L Final     Potassium   Date Value Ref Range Status   04/08/2022 3.1 (L) 3.7 - 5.3 mmol/L Final   02/05/2022 3.5 (L) 3.7 - 5.3 mmol/L Final   02/04/2022 3.3 (L) 3.7 - 5.3 mmol/L Final     Chloride   Date Value Ref Range Status   04/08/2022 101 98 - 107 mmol/L Final   02/05/2022 102 98 - 107 mmol/L Final   02/04/2022 108 (H) 98 - 107 mmol/L Final     CO2   Date Value Ref Range Status   04/08/2022 31 20 - 31 mmol/L Final   02/05/2022 26 20 - 31 mmol/L Final   02/04/2022 27 20 - 31 mmol/L Final     BUN   Date Value Ref Range Status   04/08/2022 14 8 - 23 mg/dL Final   02/05/2022 15 8 - 23 mg/dL Final   02/04/2022 13 8 - 23 mg/dL Final     Creatinine   Date Value Ref Range Status   04/08/2022 0.68 0.50 - 0.90 mg/dL Final   02/05/2022 0.61 0.50 - 0.90 mg/dL Final 02/04/2022 0.64 0.50 - 0.90 mg/dL Final     Glucose   Date Value Ref Range Status   04/08/2022 79 70 - 99 mg/dL Final   02/05/2022 91 70 - 99 mg/dL Final   02/04/2022 80 70 - 99 mg/dL Final     Hepatic:     Amylase: No results found for: AMYLASE  Lipase:   Lipase   Date Value Ref Range Status   09/11/2021 25 13 - 60 U/L Final     CARDIAC ENZYMES:   Total CK   Date Value Ref Range Status   04/08/2022 75 26 - 192 U/L Final     BNP: No results found for: BNP  Lipids: No results found for: CHOL, HDL    INR:     Thyroid:   TSH   Date Value Ref Range Status   09/28/2021 1.24 0.30 - 5.00 mIU/L Final     Urinalysis:     Cultures:-  -----------------------------------------------------------------    ABGs: No results found for: PHART, PO2ART, TZK6GEH    Pulmonary Functions Testing Results:    No results found for: FEV1, FVC, PSQ6YDF, TLC, DLCO    CXR    03/21/2022. Heart is normal in size. Lungs are clear. No free air. Osseous structures   demonstrate degenerative change. 10/04/2021.  1.  Small residual right pleural effusion following interval thoracentesis. 2.  Similar appearance of small left effusion and basilar atelectasis. 3.  Interval improvement of vascular congestion         CT Scans    CT chest  CT scan of the chest on 02/03/2022 shows circumferential pericardial effusion small left pleural effusion with left basilar atelectasis and very small right pleural effusion. CT chest 10/01/2021. 1.  Pericardial drain in place with interval reduction of effusion. 2.  Right pleural effusion has increased in size compared to 09/27/2021 CT   exam, moderate in size. 3.  Small residual left pleural effusion, improved. CT chest 09/27/2021.  1. No evidence for acute pulmonary embolism. 2. Moderate pericardial effusion. 3. Small right and moderate left pleural effusion with adjacent subsegmental   atelectasis. CT chest 09/12/2021.   Mildly motion limited evaluation with no evidence adjustment and follow-up there. Plan and recommendation:    Chest x-ray PA lateral view seen from 09/19/2022 and march 2022. If she is progressing symptoms shortness of breath and will get repeat chest x-ray. If worsening symptoms then she will need thoracentesis. Recommend to follow-up with rheumatology for adjustment of steroids as she will likely need high-dose of steroids or possibly steroid sparing medication  Patient does get symptoms when she is on lower dose of prednisone apparently she does better on 1 5 mg. Advised to keep appointment with rheumatology as scheduled next week  Currently on prednisone 10 mg and on Plaquenil  Ultimately when symptoms improve we will get pulmonary function test.      Patient did not have Covid vaccination COVID vaccine recommended patient does not want Covid vaccination. Flu vaccination recommended she did not want flu vaccine. She does not want pneumonia vaccine  Maintain an active lifestyle     Although she does not have symptoms of sleep apnea but if she complain of tiredness may screen for sleep study once her rheumatologic issue are stable. Follow good sleep hygeine instructions  Increase activity and exercise as tolerated  Questions answered pertaining to diagnosis and management explained importance of compliance with therapy       RTC 3-4 months or earlier if worsening symptoms      It was my pleasure to evaluate Ashleigh Leroy today. Please call with questions. Mary Reyes MD, MD             10/12/2022, 4:19 PM    Please note that this chart was generated using voice recognition Dragon dictation software. Although every effort was made to ensure the accuracy of this automated transcription, some errors in transcription may have occurred.

## 2024-05-13 SDOH — HEALTH STABILITY: PHYSICAL HEALTH: ON AVERAGE, HOW MANY MINUTES DO YOU ENGAGE IN EXERCISE AT THIS LEVEL?: 50 MIN

## 2024-05-13 SDOH — HEALTH STABILITY: PHYSICAL HEALTH: ON AVERAGE, HOW MANY DAYS PER WEEK DO YOU ENGAGE IN MODERATE TO STRENUOUS EXERCISE (LIKE A BRISK WALK)?: 4 DAYS

## 2024-05-14 ENCOUNTER — OFFICE VISIT (OUTPATIENT)
Dept: ORTHOPEDIC SURGERY | Age: 79
End: 2024-05-14
Payer: MEDICARE

## 2024-05-14 VITALS — BODY MASS INDEX: 24.84 KG/M2 | RESPIRATION RATE: 14 BRPM | HEIGHT: 62 IN | WEIGHT: 135 LBS

## 2024-05-14 DIAGNOSIS — S76.019S TEAR OF GLUTEUS MEDIUS TENDON, SEQUELA: ICD-10-CM

## 2024-05-14 DIAGNOSIS — M25.551 RIGHT HIP PAIN: Primary | ICD-10-CM

## 2024-05-14 PROCEDURE — 1123F ACP DISCUSS/DSCN MKR DOCD: CPT | Performed by: ORTHOPAEDIC SURGERY

## 2024-05-14 PROCEDURE — 99214 OFFICE O/P EST MOD 30 MIN: CPT | Performed by: ORTHOPAEDIC SURGERY

## 2024-05-14 NOTE — PROGRESS NOTES
[facility], [facility] ORTHOPEDICS AND SPORTS MEDICINE  [address] [address] [address]  [address] [place], [place] [id number]  Dept: [phone]     Orthopedic Surgery    Hip Pain (right)       05/14/24  Ale Renee is a 79 y.o. female presenting for evaluation of right hip pain.  She is known to me from [facility] where I performed a right hip abductor tendon repair on 5/5/2023.  She did well from this up until 2 months ago where she started to have recurrent right hip pain.  It then felt like the tendon had read torn.  MRI of the right hip demonstrated a tear of the gluteus medius, full-thickness.  The repair along the posterior superior facet is intact but the repair along the lateral facet has retorn.  Options discussed.  She would like a revision of her right hip abductor tendon repair, open.  This time I would recommend augmentation with the [employer] Regeneten patch      Review of Systems:  Joint pain  All other systems reviewed and are negative.    Past Surgical History:   Procedure Laterality Date    ANKLE SURGERY Right     x 3    FOOT SURGERY  2015    left    HIP ARTHROSCOPY Left 03/14/2018    band release bursectomy    HYSTERECTOMY (CERVIX STATUS UNKNOWN)      NERVE BLOCK  10/19/15    caudal #1  celestone 9mg    NERVE BLOCK  10-26-15     caudal epidural steroid block #2 decadron 5 mg    AR ARTHROSCOPY HIP DIAGNOSTIC W/WO SYNOVIAL BYP SPX Left 3/14/2018    LEFT HIP ARTHROSCOPY WITH  IT BAND RELEASE BURSECTOMY WITH GLUTEUS MUSCLE REPAIR performed by Guillermo Espinal DO at [facility]    TONSILLECTOMY      WRIST SURGERY Left     x 2      Past Medical History:   Diagnosis Date    Arthritis     Cataract of both eyes     surgery on both eyes 9/2022 and 10/2022    Displacement of lumbar intervertebral disc without myelopathy 09/30/2015    Hypertension     exacerbated after last injection 10-    Lumbar disc disease     Personal history of TIA (transient ischemic

## 2024-05-21 ENCOUNTER — TELEPHONE (OUTPATIENT)
Dept: ORTHOPEDIC SURGERY | Age: 79
End: 2024-05-21

## 2024-05-21 NOTE — TELEPHONE ENCOUNTER
Patient's daughter, Chelo, called wanting to know about scheduling surgery for her mom.   Please call me, Yissel Tierney [facility] [place] office, [phone]. Thanks.

## 2024-05-21 NOTE — TELEPHONE ENCOUNTER
Received call from patients daughter Chelo requesting status update of scheduling pts surgery for her right hip.    Please advise.    Call back #: [phone]

## 2024-05-31 ENCOUNTER — HOSPITAL ENCOUNTER (OUTPATIENT)
Dept: PREADMISSION TESTING | Age: 79
Discharge: HOME | End: 2024-06-04
Payer: MEDICARE

## 2024-05-31 VITALS
TEMPERATURE: 96.9 F | WEIGHT: 138 LBS | SYSTOLIC BLOOD PRESSURE: 154 MMHG | DIASTOLIC BLOOD PRESSURE: 57 MMHG | OXYGEN SATURATION: 100 % | HEART RATE: 64 BPM | HEIGHT: 61 IN | BODY MASS INDEX: 26.06 KG/M2 | RESPIRATION RATE: 14 BRPM

## 2024-05-31 PROCEDURE — 93005 ELECTROCARDIOGRAM TRACING: CPT | Performed by: ANESTHESIOLOGY

## 2024-05-31 RX ORDER — AMLODIPINE BESYLATE 5 MG/1
5 TABLET ORAL DAILY
COMMUNITY

## 2024-05-31 NOTE — PRE-PROCEDURE INSTRUCTIONS
may brush your teeth but do not swallow the water.  3. If you wear glasses bring a case for them if you have one.  No contacts should be worn the day of surgery.  You may also bring your hearing aids. If you have dentures, most surgical procedures involving anesthesia will require that you remove them prior to surgery.  4. If you sleep with a CPAP or BiPAP machine at home and plan on staying in the hospital overnight after surgery, please bring your machine with you.   5. Do not wear any jewelry or body piercings the day of surgery.  No nail polish on the operative extremity (arm/hand or leg/foot surgeries)   6. If you are staying overnight with us, you may bring a small bag of necessary personal items.   7. Please wear loose, comfortable clothing.  If you are potentially going to have a cast, sling, brace or bulky dressing, make sure to wear clothing that will fit over it.   8. In case of illness - If you have cold or flu like symptoms (high fever, runny nose, sore throat, cough, etc.) rash, nausea, vomiting, loose stools, and/or recent contact with someone who has a contagious disease (chicken pox, measles, COVID-19, etc.).  Please call your surgeon before coming to the hospital.    Transportation After Your Surgery/Procedure:     If you are going home the same day of surgery you need someone to drive you home.  Your  must be at least 18 years of age.  A taxi cab or other nonmedical public transportation is not acceptable unless you have someone to ride home in the vehicle with you.   For your safety, someone must remain with you for the first 24 hours after your surgery if you receive anesthesia or medication.  If you do not have someone to stay with you, your procedure may be cancelled.  As a patient at [facility] you can expect quality medical and nursing care that is centered on you individual needs.  Our goal is to make your surgical experience as comfortable as possible.    Any questions

## 2024-06-01 ENCOUNTER — APPOINTMENT (OUTPATIENT)
Dept: CT IMAGING | Age: 79
End: 2024-06-01
Payer: MEDICARE

## 2024-06-01 ENCOUNTER — APPOINTMENT (OUTPATIENT)
Dept: GENERAL RADIOLOGY | Age: 79
End: 2024-06-01
Payer: MEDICARE

## 2024-06-01 ENCOUNTER — HOSPITAL ENCOUNTER (EMERGENCY)
Age: 79
Discharge: HOME | End: 2024-06-01
Attending: EMERGENCY MEDICINE
Payer: MEDICARE

## 2024-06-01 VITALS
OXYGEN SATURATION: 100 % | DIASTOLIC BLOOD PRESSURE: 99 MMHG | SYSTOLIC BLOOD PRESSURE: 157 MMHG | WEIGHT: 135 LBS | HEIGHT: 61 IN | BODY MASS INDEX: 25.49 KG/M2 | RESPIRATION RATE: 18 BRPM | TEMPERATURE: 98 F | HEART RATE: 79 BPM

## 2024-06-01 DIAGNOSIS — M48.56XA: ICD-10-CM

## 2024-06-01 DIAGNOSIS — J18.9 PNEUMONIA OF LEFT UPPER LOBE DUE TO INFECTIOUS ORGANISM: Primary | ICD-10-CM

## 2024-06-01 DIAGNOSIS — M48.54XA NON-TRAUMATIC COMPRESSION FRACTURE OF T6 THORACIC VERTEBRA, INITIAL ENCOUNTER (HCC): ICD-10-CM

## 2024-06-01 LAB
ANION GAP SERPL CALCULATED.3IONS-SCNC: 9 MMOL/L (ref 9–17)
BASOPHILS # BLD: 0.08 K/UL (ref 0–0.2)
BASOPHILS NFR BLD: 1 % (ref 0–2)
BNP SERPL-MCNC: 261 PG/ML
BUN SERPL-MCNC: 11 MG/DL (ref 8–23)
CALCIUM SERPL-MCNC: 8.9 MG/DL (ref 8.6–10.4)
CHLORIDE SERPL-SCNC: 109 MMOL/L (ref 98–107)
CO2 SERPL-SCNC: 26 MMOL/L (ref 20–31)
CREAT SERPL-MCNC: 0.7 MG/DL (ref 0.5–0.9)
EKG ATRIAL RATE: 67 BPM
EKG ATRIAL RATE: 74 BPM
EKG ATRIAL RATE: 74 BPM
EKG P AXIS: 2 DEGREES
EKG P AXIS: 41 DEGREES
EKG P AXIS: 41 DEGREES
EKG P-R INTERVAL: 162 MS
EKG P-R INTERVAL: 164 MS
EKG P-R INTERVAL: 164 MS
EKG Q-T INTERVAL: 418 MS
EKG Q-T INTERVAL: 418 MS
EKG Q-T INTERVAL: 420 MS
EKG QRS DURATION: 86 MS
EKG QRS DURATION: 92 MS
EKG QRS DURATION: 92 MS
EKG QTC CALCULATION (BAZETT): 443 MS
EKG QTC CALCULATION (BAZETT): 463 MS
EKG QTC CALCULATION (BAZETT): 463 MS
EKG R AXIS: 20 DEGREES
EKG R AXIS: 21 DEGREES
EKG R AXIS: 21 DEGREES
EKG T AXIS: 40 DEGREES
EKG T AXIS: 63 DEGREES
EKG T AXIS: 63 DEGREES
EKG VENTRICULAR RATE: 67 BPM
EKG VENTRICULAR RATE: 74 BPM
EKG VENTRICULAR RATE: 74 BPM
EOSINOPHIL # BLD: 0.25 K/UL (ref 0–0.4)
EOSINOPHILS RELATIVE PERCENT: 3 % (ref 1–4)
ERYTHROCYTE [DISTWIDTH] IN BLOOD BY AUTOMATED COUNT: 14.5 % (ref 12.5–15.4)
GFR, ESTIMATED: 89 ML/MIN/1.73M2
GLUCOSE SERPL-MCNC: 90 MG/DL (ref 70–99)
HCT VFR BLD AUTO: 38.9 % (ref 36–46)
HGB BLD-MCNC: 13.1 G/DL (ref 12–16)
LYMPHOCYTES NFR BLD: 1.56 K/UL (ref 1–4.8)
LYMPHOCYTES RELATIVE PERCENT: 19 % (ref 24–44)
MCH RBC QN AUTO: 29.6 PG (ref 26–34)
MCHC RBC AUTO-ENTMCNC: 33.5 G/DL (ref 31–37)
MCV RBC AUTO: 88.1 FL (ref 80–100)
MONOCYTES NFR BLD: 0.57 K/UL (ref 0.1–1.2)
MONOCYTES NFR BLD: 7 % (ref 2–11)
MORPHOLOGY: NORMAL
NEUTROPHILS NFR BLD: 70 % (ref 36–66)
NEUTS SEG NFR BLD: 5.74 K/UL (ref 1.8–7.7)
PLATELET # BLD AUTO: 277 K/UL (ref 140–450)
PMV BLD AUTO: 8.2 FL (ref 6–12)
POTASSIUM SERPL-SCNC: 3.6 MMOL/L (ref 3.7–5.3)
RBC # BLD AUTO: 4.42 M/UL (ref 4–5.2)
SODIUM SERPL-SCNC: 144 MMOL/L (ref 135–144)
TROPONIN I SERPL HS-MCNC: 15 NG/L (ref 0–14)
WBC OTHER # BLD: 8.2 K/UL (ref 3.5–11)

## 2024-06-01 PROCEDURE — 85025 COMPLETE CBC W/AUTO DIFF WBC: CPT

## 2024-06-01 PROCEDURE — 99285 EMERGENCY DEPT VISIT HI MDM: CPT

## 2024-06-01 PROCEDURE — 6370000000 HC RX 637 (ALT 250 FOR IP)

## 2024-06-01 PROCEDURE — 71250 CT THORAX DX C-: CPT

## 2024-06-01 PROCEDURE — 93010 ELECTROCARDIOGRAM REPORT: CPT | Performed by: INTERNAL MEDICINE

## 2024-06-01 PROCEDURE — 84484 ASSAY OF TROPONIN QUANT: CPT

## 2024-06-01 PROCEDURE — 6360000002 HC RX W HCPCS

## 2024-06-01 PROCEDURE — 36415 COLL VENOUS BLD VENIPUNCTURE: CPT

## 2024-06-01 PROCEDURE — 93005 ELECTROCARDIOGRAM TRACING: CPT

## 2024-06-01 PROCEDURE — 83880 ASSAY OF NATRIURETIC PEPTIDE: CPT

## 2024-06-01 PROCEDURE — 80048 BASIC METABOLIC PNL TOTAL CA: CPT

## 2024-06-01 PROCEDURE — 71046 X-RAY EXAM CHEST 2 VIEWS: CPT

## 2024-06-01 PROCEDURE — 94640 AIRWAY INHALATION TREATMENT: CPT

## 2024-06-01 RX ORDER — ALBUTEROL SULFATE 2.5 MG/3ML
2.5 SOLUTION RESPIRATORY (INHALATION) EVERY 6 HOURS PRN
Qty: 120 EACH | Refills: 0 | Status: SHIPPED | OUTPATIENT
Start: 2024-06-01

## 2024-06-01 RX ORDER — IPRATROPIUM BROMIDE AND ALBUTEROL SULFATE 2.5; .5 MG/3ML; MG/3ML
1 SOLUTION RESPIRATORY (INHALATION) ONCE
Status: COMPLETED | OUTPATIENT
Start: 2024-06-01 | End: 2024-06-01

## 2024-06-01 RX ORDER — DOXYCYCLINE HYCLATE 100 MG
100 TABLET ORAL ONCE
Status: COMPLETED | OUTPATIENT
Start: 2024-06-01 | End: 2024-06-01

## 2024-06-01 RX ORDER — BENZONATATE 100 MG/1
100 CAPSULE ORAL ONCE
Status: COMPLETED | OUTPATIENT
Start: 2024-06-01 | End: 2024-06-01

## 2024-06-01 RX ORDER — DEXAMETHASONE SODIUM PHOSPHATE 10 MG/ML
10 INJECTION, SOLUTION INTRAMUSCULAR; INTRAVENOUS ONCE
Status: COMPLETED | OUTPATIENT
Start: 2024-06-01 | End: 2024-06-01

## 2024-06-01 RX ORDER — BENZONATATE 100 MG/1
100 CAPSULE ORAL 3 TIMES DAILY PRN
Qty: 21 CAPSULE | Refills: 0 | Status: SHIPPED | OUTPATIENT
Start: 2024-06-01 | End: 2024-06-08

## 2024-06-01 RX ORDER — DOXYCYCLINE HYCLATE 100 MG
100 TABLET ORAL 2 TIMES DAILY
Qty: 14 TABLET | Refills: 0 | Status: SHIPPED | OUTPATIENT
Start: 2024-06-01 | End: 2024-06-08

## 2024-06-01 RX ORDER — NEBULIZER ACCESSORIES
1 KIT MISCELLANEOUS DAILY PRN
Qty: 1 KIT | Refills: 0 | Status: SHIPPED | OUTPATIENT
Start: 2024-06-01

## 2024-06-01 RX ORDER — GUAIFENESIN 600 MG/1
600 TABLET, EXTENDED RELEASE ORAL ONCE
Status: COMPLETED | OUTPATIENT
Start: 2024-06-01 | End: 2024-06-01

## 2024-06-01 RX ADMIN — GUAIFENESIN 600 MG: 600 TABLET, EXTENDED RELEASE ORAL at 09:59

## 2024-06-01 RX ADMIN — DEXAMETHASONE SODIUM PHOSPHATE 10 MG: 10 INJECTION, SOLUTION INTRAMUSCULAR; INTRAVENOUS at 11:51

## 2024-06-01 RX ADMIN — IPRATROPIUM BROMIDE AND ALBUTEROL SULFATE 1 DOSE: .5; 2.5 SOLUTION RESPIRATORY (INHALATION) at 11:07

## 2024-06-01 RX ADMIN — DOXYCYCLINE HYCLATE 100 MG: 100 TABLET, COATED ORAL at 11:51

## 2024-06-01 RX ADMIN — BENZONATATE 100 MG: 100 CAPSULE ORAL at 09:59

## 2024-06-01 ASSESSMENT — ENCOUNTER SYMPTOMS
NAUSEA: 0
TROUBLE SWALLOWING: 0
COUGH: 1
SINUS PRESSURE: 0
VOICE CHANGE: 0
SORE THROAT: 0
DIARRHEA: 0
VOMITING: 0
ABDOMINAL PAIN: 0
CONSTIPATION: 0
SINUS PAIN: 0
SHORTNESS OF BREATH: 0
CHEST TIGHTNESS: 0

## 2024-06-01 ASSESSMENT — PAIN - FUNCTIONAL ASSESSMENT: PAIN_FUNCTIONAL_ASSESSMENT: NONE - DENIES PAIN

## 2024-06-01 ASSESSMENT — HEART SCORE: ECG: NON-SPECIFC REPOLARIZATION DISTURBANCE/LBTB/PM

## 2024-06-01 NOTE — ED PROVIDER NOTES
Temp: 98 °F (36.7 °C)      TempSrc: Oral      SpO2: 100% 100% 98% 100%   Weight: 61.2 kg (135 lb)      Height: 1.549 m (5' 1\")        -------------------------  BP: (!) 157/99, Temp: 98 °F (36.7 °C), Pulse: 79, Respirations: 18      PERTINENT ATTENDING PHYSICIAN COMMENTS:    This is a nice 79-year-old female was personally seen and evaluated by myself conjunction with Kate Lafleur and nurse practitioner.  Patient presents here with an intractable cough.  She has had this for about 3 weeks.  She does have history of bronchitis.  A week ago Tuesday she was seen by her lupus doctor for she has heart and lung lupus.  They did some blood work and the only abnormality was her calcium.  She saw her PCP last week Thursday, Melani Tejada who ordered blood work as well as a chest x-ray.  She was started on clindamycin just completed yesterday.  Her chest x-ray was a week ago Friday that showed a spot on her lung?  Without any further direction.  Patient states she is continuously drinking Robitussin and using cough drops.  Patient has Friday surgery time for a hip would like this taken care of.     ED Course as of 06/02/24 1501   Sat Jun 01, 2024   1027 Possible compression deformity noted on chest x-ray of thoracic vertebrae.  Patient does not have any point tenderness to CT LS spine, no midline or bony tenderness.  Denies any injury or fall.  Will obtain a CT of her chest with CT reconstruction of her thoracic spine to further evaluate this.  No obvious pneumonia or focal consolidation seen on chest x-ray.  Will check a BNP and troponin as well as an EKG given her significant cardiac history.  Will give a one-time dose of Decadron for inflammation related to persistent coughing.  Patient already taking 20 mg of prednisone daily for her lupus.  Will also try DuoNeb for possible pneumonitis noted on previous chest x-ray. [AJ]   1136 Pro-BNP: 261 [AJ]   1136 WBC: 8.2 [AJ]   1136 RBC: 4.42 [AJ]   1136 Hemoglobin Quant:  return precautions discussed, I did prescribe Tessalon Perles for persistent coughing and the patient was given a one-time dose of Decadron.  I provided a prescription for a nebulizer kit and albuterol solution for at home.  Patient states she is borrowing a nebulizer from her daughter currently and has access to this.  Patient feeling better after nebulizer treatment in the ER. [AJ]      ED Course User Index  [AJ] Kate Lafleur, CELY - CNP  [VL] Shyanne Link DO       (Please note that portions of this note were completed with a voice recognition program.  Efforts were made to edit the dictations but occasionally words are mis-transcribed.)    Shyanne Link DO  Board Certified Emergency Medicine Physician       Shyanne Link DO  06/02/24 1660

## 2024-06-01 NOTE — ED PROVIDER NOTES
needed for Cough    DOXYCYCLINE HYCLATE (VIBRA-TABS) 100 MG TABLET    Take 1 tablet by mouth 2 times daily for 7 days    RESPIRATORY THERAPY SUPPLIES (NEBULIZER/TUBING/MOUTHPIECE) KIT    1 kit by Does not apply route daily as needed (wheezing, shortness of breath)       CELY Horn - CNP   Emergency Medicine Nurse Practitioner    (Please note that portions of this note were completed with a voice recognition program.  Efforts were made to edit the dictations but occasionally words aremis-transcribed.)       Kate Lafleur APRN - CNP  06/01/24 1159       Kate Lafleur APRN - CNP  06/01/24 1202

## 2024-06-03 NOTE — PERIOP NOTE
Reviewed EKG, CXR, CT chest, and recent ER visit with .  PCP clearance is requested. 's office notified.

## 2024-06-06 ENCOUNTER — TELEPHONE (OUTPATIENT)
Dept: ORTHOPEDIC SURGERY | Age: 79
End: 2024-06-06

## 2024-06-06 NOTE — TELEPHONE ENCOUNTER
Patient was scheduled for surgery at [facility] 6/7/24. Medical clearance was requested by anesthesia. I spoke with PCP office (Mary Tejada, [phone]) and they said they have been treating patient for pneumonia and cannot clear her at this time. I spoke with Dr. Amos and he advised to cancel tomorrow's surgery. I left voice mail X 2 for patient advising cancelling surgery and asking her to call me. I cancelled surgery with  Evelia at surgery scheduling.

## 2024-06-14 ENCOUNTER — ANESTHESIA EVENT (OUTPATIENT)
Dept: OPERATING ROOM | Age: 79
End: 2024-06-14
Payer: MEDICARE

## 2024-06-21 ENCOUNTER — ANESTHESIA (OUTPATIENT)
Dept: OPERATING ROOM | Age: 79
End: 2024-06-21
Payer: MEDICARE

## 2024-07-05 ENCOUNTER — HOSPITAL ENCOUNTER (OUTPATIENT)
Age: 79
Setting detail: OUTPATIENT SURGERY
Discharge: HOME | End: 2024-07-05
Attending: ORTHOPAEDIC SURGERY | Admitting: ORTHOPAEDIC SURGERY
Payer: MEDICARE

## 2024-07-05 VITALS
HEIGHT: 62 IN | WEIGHT: 140 LBS | HEART RATE: 75 BPM | TEMPERATURE: 97 F | OXYGEN SATURATION: 97 % | RESPIRATION RATE: 18 BRPM | DIASTOLIC BLOOD PRESSURE: 65 MMHG | BODY MASS INDEX: 25.76 KG/M2 | SYSTOLIC BLOOD PRESSURE: 164 MMHG

## 2024-07-05 DIAGNOSIS — G89.18 POST-OP PAIN: Primary | ICD-10-CM

## 2024-07-05 PROCEDURE — C1713 ANCHOR/SCREW BN/BN,TIS/BN: HCPCS | Performed by: ORTHOPAEDIC SURGERY

## 2024-07-05 PROCEDURE — 3700000001 HC ADD 15 MINUTES (ANESTHESIA): Performed by: ORTHOPAEDIC SURGERY

## 2024-07-05 PROCEDURE — 2500000003 HC RX 250 WO HCPCS: Performed by: ORTHOPAEDIC SURGERY

## 2024-07-05 PROCEDURE — C1763 CONN TISS, NON-HUMAN: HCPCS | Performed by: ORTHOPAEDIC SURGERY

## 2024-07-05 PROCEDURE — 7100000010 HC PHASE II RECOVERY - FIRST 15 MIN: Performed by: ORTHOPAEDIC SURGERY

## 2024-07-05 PROCEDURE — 7100000011 HC PHASE II RECOVERY - ADDTL 15 MIN: Performed by: ORTHOPAEDIC SURGERY

## 2024-07-05 PROCEDURE — 2709999900 HC NON-CHARGEABLE SUPPLY: Performed by: ORTHOPAEDIC SURGERY

## 2024-07-05 PROCEDURE — 6360000002 HC RX W HCPCS: Performed by: NURSE ANESTHETIST, CERTIFIED REGISTERED

## 2024-07-05 PROCEDURE — 2580000003 HC RX 258: Performed by: ANESTHESIOLOGY

## 2024-07-05 PROCEDURE — 3700000000 HC ANESTHESIA ATTENDED CARE: Performed by: ORTHOPAEDIC SURGERY

## 2024-07-05 PROCEDURE — 2720000010 HC SURG SUPPLY STERILE: Performed by: ORTHOPAEDIC SURGERY

## 2024-07-05 PROCEDURE — 2500000003 HC RX 250 WO HCPCS: Performed by: NURSE ANESTHETIST, CERTIFIED REGISTERED

## 2024-07-05 PROCEDURE — 7100000001 HC PACU RECOVERY - ADDTL 15 MIN: Performed by: ORTHOPAEDIC SURGERY

## 2024-07-05 PROCEDURE — 6360000002 HC RX W HCPCS: Performed by: ORTHOPAEDIC SURGERY

## 2024-07-05 PROCEDURE — 3600000002 HC SURGERY LEVEL 2 BASE: Performed by: ORTHOPAEDIC SURGERY

## 2024-07-05 PROCEDURE — 2580000003 HC RX 258: Performed by: ORTHOPAEDIC SURGERY

## 2024-07-05 PROCEDURE — 3600000012 HC SURGERY LEVEL 2 ADDTL 15MIN: Performed by: ORTHOPAEDIC SURGERY

## 2024-07-05 PROCEDURE — 7100000000 HC PACU RECOVERY - FIRST 15 MIN: Performed by: ORTHOPAEDIC SURGERY

## 2024-07-05 DEVICE — IMPLANTABLE DEVICE
Type: IMPLANTABLE DEVICE | Site: HIP | Status: FUNCTIONAL
Brand: BIOINDUCTIVE IMPLANT WITH ARTHROSCOPIC DELIVERY SYSTEM - LARGE

## 2024-07-05 DEVICE — BONE ANCHORS 3 WITH ARTHROSCOPIC DELIVERY SYSTEM ADVANCED
Type: IMPLANTABLE DEVICE | Site: HIP | Status: FUNCTIONAL
Brand: BONE ANCHORS WITH ARTHROSCOPIC DELIVERY SYSTEM - ADVANCED

## 2024-07-05 DEVICE — 1.8MM Q-FIX ALL SUTURE ANCHOR
Type: IMPLANTABLE DEVICE | Site: HIP | Status: FUNCTIONAL
Brand: Q-FIX

## 2024-07-05 DEVICE — ANCHOR TEND 8 FOR REGENETEN BIOINDUCTIVE IMPL SYS: Type: IMPLANTABLE DEVICE | Site: HIP | Status: FUNCTIONAL

## 2024-07-05 RX ORDER — METOCLOPRAMIDE HYDROCHLORIDE 5 MG/ML
10 INJECTION INTRAMUSCULAR; INTRAVENOUS
Status: DISCONTINUED | OUTPATIENT
Start: 2024-07-05 | End: 2024-07-05 | Stop reason: HOSPADM

## 2024-07-05 RX ORDER — OXYCODONE HYDROCHLORIDE AND ACETAMINOPHEN 5; 325 MG/1; MG/1
1 TABLET ORAL EVERY 6 HOURS PRN
Qty: 28 TABLET | Refills: 0 | Status: SHIPPED | OUTPATIENT
Start: 2024-07-05 | End: 2024-07-12

## 2024-07-05 RX ORDER — SODIUM CHLORIDE 0.9 % (FLUSH) 0.9 %
5-40 SYRINGE (ML) INJECTION EVERY 12 HOURS SCHEDULED
Status: DISCONTINUED | OUTPATIENT
Start: 2024-07-05 | End: 2024-07-05 | Stop reason: HOSPADM

## 2024-07-05 RX ORDER — LIDOCAINE HYDROCHLORIDE 10 MG/ML
1 INJECTION, SOLUTION EPIDURAL; INFILTRATION; INTRACAUDAL; PERINEURAL
Status: DISCONTINUED | OUTPATIENT
Start: 2024-07-06 | End: 2024-07-05 | Stop reason: HOSPADM

## 2024-07-05 RX ORDER — NALOXONE HYDROCHLORIDE 0.4 MG/ML
INJECTION, SOLUTION INTRAMUSCULAR; INTRAVENOUS; SUBCUTANEOUS PRN
Status: DISCONTINUED | OUTPATIENT
Start: 2024-07-05 | End: 2024-07-05 | Stop reason: HOSPADM

## 2024-07-05 RX ORDER — LIDOCAINE HYDROCHLORIDE AND EPINEPHRINE 10; 10 MG/ML; UG/ML
INJECTION, SOLUTION INFILTRATION; PERINEURAL PRN
Status: DISCONTINUED | OUTPATIENT
Start: 2024-07-05 | End: 2024-07-05 | Stop reason: ALTCHOICE

## 2024-07-05 RX ORDER — HYDROMORPHONE HYDROCHLORIDE 1 MG/ML
0.5 INJECTION, SOLUTION INTRAMUSCULAR; INTRAVENOUS; SUBCUTANEOUS EVERY 5 MIN PRN
Status: DISCONTINUED | OUTPATIENT
Start: 2024-07-05 | End: 2024-07-05 | Stop reason: HOSPADM

## 2024-07-05 RX ORDER — SODIUM CHLORIDE 0.9 % (FLUSH) 0.9 %
5-40 SYRINGE (ML) INJECTION PRN
Status: DISCONTINUED | OUTPATIENT
Start: 2024-07-05 | End: 2024-07-05 | Stop reason: HOSPADM

## 2024-07-05 RX ORDER — OXYCODONE HYDROCHLORIDE 5 MG/1
5 TABLET ORAL
Status: DISCONTINUED | OUTPATIENT
Start: 2024-07-05 | End: 2024-07-05 | Stop reason: HOSPADM

## 2024-07-05 RX ORDER — ASPIRIN 325 MG
325 TABLET ORAL DAILY
Qty: 21 TABLET | Refills: 0 | Status: SHIPPED | OUTPATIENT
Start: 2024-07-05 | End: 2024-07-05

## 2024-07-05 RX ORDER — FENTANYL CITRATE 50 UG/ML
25 INJECTION, SOLUTION INTRAMUSCULAR; INTRAVENOUS EVERY 5 MIN PRN
Status: DISCONTINUED | OUTPATIENT
Start: 2024-07-05 | End: 2024-07-05 | Stop reason: HOSPADM

## 2024-07-05 RX ORDER — ONDANSETRON 2 MG/ML
INJECTION INTRAMUSCULAR; INTRAVENOUS PRN
Status: DISCONTINUED | OUTPATIENT
Start: 2024-07-05 | End: 2024-07-05 | Stop reason: SDUPTHER

## 2024-07-05 RX ORDER — PROPOFOL 10 MG/ML
INJECTION, EMULSION INTRAVENOUS PRN
Status: DISCONTINUED | OUTPATIENT
Start: 2024-07-05 | End: 2024-07-05 | Stop reason: SDUPTHER

## 2024-07-05 RX ORDER — ASPIRIN 325 MG
325 TABLET ORAL DAILY
Qty: 42 TABLET | Refills: 0 | Status: SHIPPED | OUTPATIENT
Start: 2024-07-05 | End: 2024-08-16

## 2024-07-05 RX ORDER — SODIUM CHLORIDE, SODIUM LACTATE, POTASSIUM CHLORIDE, CALCIUM CHLORIDE 600; 310; 30; 20 MG/100ML; MG/100ML; MG/100ML; MG/100ML
INJECTION, SOLUTION INTRAVENOUS CONTINUOUS
Status: DISCONTINUED | OUTPATIENT
Start: 2024-07-05 | End: 2024-07-05 | Stop reason: HOSPADM

## 2024-07-05 RX ORDER — DIPHENHYDRAMINE HYDROCHLORIDE 50 MG/ML
12.5 INJECTION INTRAMUSCULAR; INTRAVENOUS
Status: DISCONTINUED | OUTPATIENT
Start: 2024-07-05 | End: 2024-07-05 | Stop reason: HOSPADM

## 2024-07-05 RX ORDER — PROCHLORPERAZINE EDISYLATE 5 MG/ML
10 INJECTION INTRAMUSCULAR; INTRAVENOUS
Status: DISCONTINUED | OUTPATIENT
Start: 2024-07-05 | End: 2024-07-05 | Stop reason: HOSPADM

## 2024-07-05 RX ORDER — SODIUM CHLORIDE 9 MG/ML
INJECTION, SOLUTION INTRAVENOUS CONTINUOUS
Status: DISCONTINUED | OUTPATIENT
Start: 2024-07-05 | End: 2024-07-05 | Stop reason: HOSPADM

## 2024-07-05 RX ORDER — IBUPROFEN 800 MG/1
800 TABLET ORAL 3 TIMES DAILY PRN
Qty: 90 TABLET | Refills: 2 | Status: SHIPPED | OUTPATIENT
Start: 2024-07-05

## 2024-07-05 RX ORDER — SODIUM CHLORIDE 9 MG/ML
INJECTION, SOLUTION INTRAVENOUS PRN
Status: DISCONTINUED | OUTPATIENT
Start: 2024-07-05 | End: 2024-07-05 | Stop reason: HOSPADM

## 2024-07-05 RX ORDER — DOCUSATE SODIUM 100 MG/1
100 CAPSULE, LIQUID FILLED ORAL 2 TIMES DAILY PRN
Qty: 20 CAPSULE | Refills: 0 | Status: SHIPPED | OUTPATIENT
Start: 2024-07-05

## 2024-07-05 RX ORDER — DEXAMETHASONE SODIUM PHOSPHATE 10 MG/ML
INJECTION, SOLUTION INTRAMUSCULAR; INTRAVENOUS PRN
Status: DISCONTINUED | OUTPATIENT
Start: 2024-07-05 | End: 2024-07-05 | Stop reason: SDUPTHER

## 2024-07-05 RX ORDER — LIDOCAINE HYDROCHLORIDE 20 MG/ML
INJECTION, SOLUTION EPIDURAL; INFILTRATION; INTRACAUDAL; PERINEURAL PRN
Status: DISCONTINUED | OUTPATIENT
Start: 2024-07-05 | End: 2024-07-05 | Stop reason: SDUPTHER

## 2024-07-05 RX ORDER — FENTANYL CITRATE 50 UG/ML
INJECTION, SOLUTION INTRAMUSCULAR; INTRAVENOUS PRN
Status: DISCONTINUED | OUTPATIENT
Start: 2024-07-05 | End: 2024-07-05 | Stop reason: SDUPTHER

## 2024-07-05 RX ORDER — ROCURONIUM BROMIDE 10 MG/ML
INJECTION, SOLUTION INTRAVENOUS PRN
Status: DISCONTINUED | OUTPATIENT
Start: 2024-07-05 | End: 2024-07-05 | Stop reason: SDUPTHER

## 2024-07-05 RX ADMIN — SODIUM CHLORIDE, POTASSIUM CHLORIDE, SODIUM LACTATE AND CALCIUM CHLORIDE: 600; 310; 30; 20 INJECTION, SOLUTION INTRAVENOUS at 06:29

## 2024-07-05 RX ADMIN — ONDANSETRON 4 MG: 2 INJECTION INTRAMUSCULAR; INTRAVENOUS at 08:20

## 2024-07-05 RX ADMIN — SUGAMMADEX 200 MG: 100 INJECTION, SOLUTION INTRAVENOUS at 08:22

## 2024-07-05 RX ADMIN — PROPOFOL 150 MG: 10 INJECTION, EMULSION INTRAVENOUS at 07:35

## 2024-07-05 RX ADMIN — FENTANYL CITRATE 100 MCG: 50 INJECTION INTRAMUSCULAR; INTRAVENOUS at 07:35

## 2024-07-05 RX ADMIN — ROCURONIUM BROMIDE 40 MG: 10 INJECTION, SOLUTION INTRAVENOUS at 07:35

## 2024-07-05 RX ADMIN — VANCOMYCIN HYDROCHLORIDE 1000 MG: 1 INJECTION, POWDER, LYOPHILIZED, FOR SOLUTION INTRAVENOUS at 07:35

## 2024-07-05 RX ADMIN — SODIUM CHLORIDE, POTASSIUM CHLORIDE, SODIUM LACTATE AND CALCIUM CHLORIDE: 600; 310; 30; 20 INJECTION, SOLUTION INTRAVENOUS at 08:30

## 2024-07-05 RX ADMIN — LIDOCAINE HYDROCHLORIDE 60 MG: 20 INJECTION, SOLUTION EPIDURAL; INFILTRATION; INTRACAUDAL; PERINEURAL at 07:35

## 2024-07-05 RX ADMIN — DEXAMETHASONE SODIUM PHOSPHATE 10 MG: 10 INJECTION, SOLUTION INTRAMUSCULAR; INTRAVENOUS at 07:35

## 2024-07-05 ASSESSMENT — LIFESTYLE VARIABLES: SMOKING_STATUS: 0

## 2024-07-05 ASSESSMENT — PAIN - FUNCTIONAL ASSESSMENT
PAIN_FUNCTIONAL_ASSESSMENT: 0-10
PAIN_FUNCTIONAL_ASSESSMENT: FACE, LEGS, ACTIVITY, CRY, AND CONSOLABILITY (FLACC)

## 2024-07-05 ASSESSMENT — ENCOUNTER SYMPTOMS: SHORTNESS OF BREATH: 1

## 2024-07-05 NOTE — DISCHARGE INSTRUCTIONS
Orthopaedic Instructions:  -Weight bearing status: toe touch weight bearing with the  right leg with crutches.   -Starting two days after surgery, okay for daily dressing changes until wound/surgical incision site is dry. Dressing changes can be performed with simple Band-aids or gauze pads secured with tape/ace bandages. Once you no longer see drainage from your wounds on your dressings, it is okay to shower. Do not scrub vigorously, just let water run over wound/surgical sites. Additionally, one no longer needs to change dressings daily. It is important that you do not soak the wound/incision site underwater, though. This includes baths, hot tubs, swimming pools, etc.  -Always look for signs of compartment syndrome: pain out of proportion to the injury, pain not controlled with pain medication, numbness in digits, changing of color of digits (paleness). If these signs occur return to ED immediately for reassessment.  -Always work on  ankle  motion to decrease swelling.  -Ice (20 minutes on and off 1 hour) to reduce swelling and throbbing pain.  -Should urinate within 8 hours of surgery.  -Call the office or come to Emergency Room if signs of infection appear (hot, swollen, red, draining pus, fever)  -Take medications as prescribed.  -Wean off narcotics (percocet/norco) as soon as possible. Do not take tylenol if still taking narcotics.  -Follow up with  Dr. Amos  in his office 10-14 days after surgery. Call [phone] to schedule/confirm.

## 2024-07-05 NOTE — H&P
Interval H&P Note    Pt Name: Ale Renee  MRN: [id number]  YOB: 1945  Date of evaluation: 7/5/2024      [x] I have reviewed in [facility] the primary care progress note by DEREK Forrester dated 06/12/2024, attached below for an Interval History and Physical note.     [x] I have examined  Ale Renee, a 79 y.o. female.There are no changes to the patient who is scheduled for RIGHT HIP OPEN ABDUCTOR  REPAIR by Garth Amos MD for Right hip pain. The patient denies new health changes, fever, chills, wheezing, cough, increased SOB, chest pain, open sores or wounds. Denies hx of diabetes. Denies any current blood thinning medications.     The patient recently saw PCP and received medical clearance. She was treated for pneumonia at the beginning of June.    Vital signs: BP (!) 159/78   Pulse 74   Temp 97.5 °F (36.4 °C)   Resp 20   Ht 1.575 m (5' 2\")   Wt 63.5 kg (140 lb)   SpO2 97%   BMI 25.61 kg/m²     Allergies:  Bee pollen, Erythromycin, Pcn [penicillins], Tetanus toxoids, Tetracyclines & related, and Lisinopril    Medications:    Prior to Admission medications    Medication Sig Start Date End Date Taking? Authorizing Provider   albuterol (PROVENTIL) (2.5 MG/3ML) 0.083% nebulizer solution Take 3 mLs by nebulization every 6 hours as needed for Wheezing 6/1/24   Kate Lafleur APRN - CNP   Respiratory Therapy Supplies (NEBULIZER/TUBING/MOUTHPIECE) KIT 1 kit by Does not apply route daily as needed (wheezing, shortness of breath) 6/1/24   Kate Lafleur APRN - CNP   amLODIPine (NORVASC) 5 MG tablet Take 1 tablet by mouth daily    ProviderBilly MD   hydroCHLOROthiazide (HYDRODIURIL) 12.5 MG tablet Take 12.5 mg by mouth 2 times daily (before meals) 11/11/21   Billy Chen MD   predniSONE (DELTASONE) 20 MG tablet Take 1 tablet by mouth daily 2/6/22   Jennifer Estrada MD   miconazole (MICOTIN) 2 % powder Apply topically 2 times daily.  Patient not taking: Reported on  dry.  Neurological:  Mental Status: She is alert.  Psychiatric:  Attention and Perception: Attention and perception normal.  Mood and Affect: Mood and affect normal.  Speech: Speech normal.  Behavior: Behavior normal.        Assessment/Plan:  (Will clear for surgery. Much improvement in symptoms.)  Labs for this visit:    No visits with results within 1 Day(s) from this visit.  Latest known visit with results is:  Office Visit on 05/22/2024  Component Date Value  External Poct Influenza * 05/22/2024 Negative  External Poct Influenza * 05/22/2024 Negative  External POCT SARS COV 2* 05/22/2024 Presumptive Negative      Problem List Items Addressed This Visit  None  Visit Diagnoses    Pneumonia of left upper lobe due to infectious organism - Primary  Primary osteoarthritis of right hip          No orders of the defined types were placed in this encounter.    Patient Instructions  Will send clearance for surgery since you are feeling better.  Will call office if any symptoms restart so that you can be treated right away.  Continue with Norvasc for blood pressure control.  Follow-up after surgery as needed.    - DEREK BELL  06/13/24 7:38 AM    - Essence Crawford CMA  06/13/24 7:38 AM    DEREK Bell  06/13/24 0738    Electronically signed by DEREK Bell at 06/13/2024 7:38 AM EDT

## 2024-07-05 NOTE — OP NOTE
Operative Note      Patient: Ale Renee  YOB: 1945  MRN: [id number]    Date of Procedure: 7/5/2024    Pre-Op Diagnosis Codes:     * Right hip pain [M25.551]    Post-Op Diagnosis: Post-Op Diagnosis Codes:     * Rupture of tendon of hip abductor [S76.019A]     * Trochanteric bursitis, right hip [M70.61]       Procedure(s):  RIGHT HIP OPEN ABDUCTOR TENDON REPAIR with bio inductive implant, illiao-tibial band release  ILIOTIBIAL BAND RELEASE    Surgeon(s):  Garth Amos MD    Assistant:   First Assistant: Kim Limon  Resident: Hunter Hearn MD    Anesthesia: General    Estimated Blood Loss (mL): Minimal    Complications: None    Specimens:   * No specimens in log *    Implants:  Implant Name Type Inv. Item Serial No.  Lot No. LRB No. Used Action   ANCHOR SUT DIA1.8MM FOR ACET LABRAL REP Q-FIX - [id number]  ANCHOR SUT DIA1.8MM FOR ACET LABRAL REP Q-FIX  [employer]- [id number] Right 2 Implanted   ANCHOR SUT DIA1.8MM FOR ACET LABRAL REP Q-FIX - [id number]  ANCHOR SUT DIA1.8MM FOR ACET LABRAL REP Q-FIX  [employer]- [id number] Right 2 Implanted   IMPLANT BIOINDUCTIVE L BOV ACHILLES TEND W/ ARTHSCP DEL SYS - [id number]  IMPLANT BIOINDUCTIVE L BOV ACHILLES TEND W/ ARTHSCP DEL SYS  [employer]- [id number] Right 1 Implanted   ANCHOR TEND 8 FOR REGENETEN BIOINDUCTIVE IMPL SYS - [id number]  ANCHOR TEND 8 FOR REGENETEN BIOINDUCTIVE IMPL SYS  [employer]- [id number] Right 1 Implanted   ANCHOR BONE W/ ARTHSCP DEL SYS ADV - [id number]  ANCHOR BONE W/ ARTHSCP DEL SYS ADV  [employer]-WD [id number] Right 1 Implanted         Drains: * No LDAs found *    Findings:  Infection Present At Time Of Surgery (PATOS) (choose all levels that have infection present):  No infection present  Other Findings:     Detailed Description of Procedure:   After confirmation and marking of the correct surgical extremity in the preoperative holding area,

## 2024-07-05 NOTE — ANESTHESIA PRE PROCEDURE
Department of Anesthesiology  Preprocedure Note       Name:  Ale Renee   Age:  79 y.o.  :  1945                                          MRN:  [id number]         Date:  2024      Surgeon: Surgeon(s):  Garth Amos MD    Procedure: Procedure(s):  LEFT HIP ARTHROSCOPY WITH  IT BAND RELEASE BURSECTOMY WITH POSSIBLE GLUTEUS MUSCLE REPAIR- HENRIQUE ARTHREX HIP SCOPE, SHOULDER INSTRUMENT TRAY , S &N DISTRACTORS    Medications prior to admission:   Prior to Admission medications    Medication Sig Start Date End Date Taking? Authorizing Provider   albuterol (PROVENTIL) (2.5 MG/3ML) 0.083% nebulizer solution Take 3 mLs by nebulization every 6 hours as needed for Wheezing 24   Kate Lafleur APRN - CNP   Respiratory Therapy Supplies (NEBULIZER/TUBING/MOUTHPIECE) KIT 1 kit by Does not apply route daily as needed (wheezing, shortness of breath) 24   Kate Lafleur APRN - CNP   amLODIPine (NORVASC) 5 MG tablet Take 1 tablet by mouth daily    Billy Chen MD   hydroCHLOROthiazide (HYDRODIURIL) 12.5 MG tablet Take 12.5 mg by mouth 2 times daily (before meals) 21   Billy Chen MD   predniSONE (DELTASONE) 20 MG tablet Take 1 tablet by mouth daily 22   Jennifer Estrada MD   miconazole (MICOTIN) 2 % powder Apply topically 2 times daily.  Patient not taking: Reported on 4/15/2022 2/5/22   Jennifer Estrada MD   furosemide (LASIX) 20 MG tablet Take 1 tablet by mouth three times a week  Patient not taking: Reported on 2024   Jennifer Estrada MD   colchicine (COLCRYS) 0.6 MG tablet Take 0.6 mg by mouth daily  Patient not taking: Reported on 10/12/2022    Billy Chen MD   hydroxychloroquine (PLAQUENIL) 200 MG tablet Take 1 tablet by mouth daily Take 1 and 1/2 tablet daily    Billy Chen MD       Current medications:    Current Facility-Administered Medications   Medication Dose Route Frequency Provider Last Rate Last Admin   • [START ON 2024] lidocaine PF

## 2024-07-06 NOTE — ANESTHESIA POSTPROCEDURE EVALUATION
Department of Anesthesiology  Postprocedure Note    Patient: Ale Renee  MRN: [id number]  YOB: 1945  Date of evaluation: 7/6/2024    Procedure Summary       Date: 07/05/24 Room / Location: [address]    Anesthesia Start: 0731 Anesthesia Stop: 0848    Procedures:       RIGHT HIP OPEN ABDUCTOR TENDON REPAIR with bio inductive implant, illiao-tibial band release (Right: Hip)      ILIOTIBIAL BAND RELEASE (Right: Hip) Diagnosis:       Rupture of tendon of hip abductor      Trochanteric bursitis, right hip      (Right hip pain [M25.551])    Surgeons: Garth Amos MD Responsible Provider: Corrine Titus MD    Anesthesia Type: General ASA Status: 3            Anesthesia Type: General    Michelle Phase I: Michelle Score: 8    Michelle Phase II: Michelle Score: 10    Anesthesia Post Evaluation    Patient location during evaluation: PACU  Patient participation: complete - patient participated  Level of consciousness: awake and alert  Airway patency: patent  Nausea & Vomiting: no nausea and no vomiting  Cardiovascular status: hemodynamically stable  Respiratory status: acceptable  Hydration status: euvolemic  Pain management: adequate    No notable events documented.

## 2024-07-23 ENCOUNTER — OFFICE VISIT (OUTPATIENT)
Dept: ORTHOPEDIC SURGERY | Age: 79
End: 2024-07-23

## 2024-07-23 VITALS — WEIGHT: 140 LBS | BODY MASS INDEX: 25.76 KG/M2 | HEIGHT: 62 IN | RESPIRATION RATE: 14 BRPM

## 2024-07-23 DIAGNOSIS — M25.551 RIGHT HIP PAIN: Primary | ICD-10-CM

## 2024-07-23 DIAGNOSIS — S76.019S TEAR OF GLUTEUS MEDIUS TENDON, SEQUELA: ICD-10-CM

## 2024-07-23 PROCEDURE — 99024 POSTOP FOLLOW-UP VISIT: CPT | Performed by: ORTHOPAEDIC SURGERY

## 2024-07-23 NOTE — PROGRESS NOTES
[facility], [facility] ORTHOPEDICS AND SPORTS MEDICINE  [address]  [place] [address]     Surgery:  7/5/2024  RIGHT HIP OPEN ABDUCTOR TENDON REPAIR with bio inductive implant, illiao-tibial band release - Right and Iliotibial Band Release - Right    History of Present Illness:    This is a 79 y.o. female who presents to the clinic today for post op follow up for RIGHT HIP OPEN ABDUCTOR TENDON REPAIR with bio inductive implant, illiao-tibial band release - Right and Iliotibial Band Release - Right on 7/5/2024 .  Ale is doing well from a pain perspective.  She has no pain and the incision is healing beautifully.  The big difficulty she is having is that she needs to be nonweightbearing to allow the abductor tendon to heal properly.  She was not comfortable with a walker at home so she started using the crutches.  I observed how she is using them and she has difficulty and confusion about which leg should be stepping when she is applying support with the crutches.  I tried to explain but it is still confusing to her.  I would recommend that she start physical therapy so that she can be trained in how to use either crutches or walker.  She needs to be nonweightbearing for 6 weeks after the surgery.      Physical Exam:  Pain is well-controlled.  she is doing well postoperatively.  The operative extremity has a well-healed incision.  It is clean, dry, and intact.    We discussed the expected postoperative course, including the relevant weightbearing restrictions/immobilization.     1. Right hip pain    2. Tear of gluteus medius tendon, sequela      Physical therapy for assistive device training.  Follow-up in 1 month  Electronically signed by Garth Amos MD on 7/23/2024 at 2:03 PM

## 2024-08-02 ENCOUNTER — HOSPITAL ENCOUNTER (OUTPATIENT)
Age: 79
Setting detail: THERAPIES SERIES
Discharge: HOME | End: 2024-08-02
Attending: ORTHOPAEDIC SURGERY
Payer: MEDICARE

## 2024-08-02 PROCEDURE — 97162 PT EVAL MOD COMPLEX 30 MIN: CPT

## 2024-08-02 NOTE — CONSULTS
8/16/24  Exercises:  Exercise Reps/ Time Weight/ Level Comments   Heel slides 15     Supine hip abduction AROM 15     SLR 10     clamshells 15     Sidelying abduction 10     Other:    Specific Instructions for next treatment: progress strengthening program, progress ROM within WB restrictions    Treatment Charges: Mins Units   [x] Evaluation       []  Low       [x]  Moderate       []  High 45 1   []  Modalities     []  Ther Exercise     []  Manual Therapy     []  Ther Activities     []  Aquatics     []  Neuromuscular     []  Gait Training     []  Dry Needling           1-2 muscles     []  Dry Needling           3 or more          muscles     [] Vasocompression     []  Other 45 1       TOTAL BILLABLE TIME-timed units: 0    Time in:1100   Time Out:1145    Electronically signed by: Sam Buckner PT        Physician Signature:________________________________Date:__________________  By signing above or cosigning this note, I have reviewed this plan of care and certify a need for medically necessary rehabilitation services.     *PLEASE SIGN ABOVE AND FAX BACK ALL PAGES*

## 2024-08-06 ENCOUNTER — HOSPITAL ENCOUNTER (OUTPATIENT)
Age: 79
Setting detail: THERAPIES SERIES
Discharge: HOME | End: 2024-08-06
Attending: ORTHOPAEDIC SURGERY
Payer: MEDICARE

## 2024-08-06 PROCEDURE — 97110 THERAPEUTIC EXERCISES: CPT

## 2024-08-06 NOTE — FLOWSHEET NOTE
[facility] Rehabilitation &  Therapy  [address], [address]  [place] [address]  [phone]  F: [phone]     Physical Therapy Daily Treatment Note    Date:  2024  Patient Name:  Ale Renee    :  1945  MRN: [id number]  Physician: Garth Amos MD                           Insurance: [employer] MEDICARE **AUTH AFTER EVAL (CARELON)  $30.00 COPAYMENT (14 visits approved after eval)  Medical Diagnosis: Right hip pain (M25.551)                      Rehab Codes: Right hip pain (M25.551)   Onset date: referral 24             Next Dr's appt.: 9/3/24    Visit Count: 2                                Cancel/No Show: 0/0    Subjective:      Pain:  [x] Yes  [] No Location: right lateral hip   Pain Rating: (0-10 scale) 4/10  Pain altered Tx:  [x] No  [] Yes  Action:    Comments:  Pt reports right lateral hip discomfort with pressure, getting in and out of her car and when sitting in chairs.  Overall, minimal right LE pain, mainly stiffness.  Today, pain is slightly provoked for unknown reasons.       Objective: minimal tightness or right LE, good strength, maintains NWB right LE well with walker.         Today’s Treatment:    Modalities:   Precautions:NWB till 24 Fall Risk     Exercise Reps/ Time Weight/ Level Comments   Heel slides 2 x 15        Supine hip abduction AROM 15       SLR 2 x 10        clamshells 2 x 10        Sidelying abduction 2 x 10     with assist to decrease lateral hip force   LAQ 2 x 10  0#    Ham curls  2 x 10  3# tube Seated    4- way ankle  X 20 each  Orange     Other:    Response to treatment: Pt tolerated session well, minimal c/o discomfort throughout session, objectives as documented above.  Pt progressing well within current restrictions.       Treatment Charges: Mins Units   []  Modalities     [x]  Ther Exercise 40 3   []  Manual Therapy     []  Ther Activities     []  Neuro Re-ed     []  Vasocompression     [] Gait     []  Other    Unable to leave message, will send letter for patient to call us back that June is open.

## 2024-08-13 ENCOUNTER — HOSPITAL ENCOUNTER (OUTPATIENT)
Age: 79
Setting detail: THERAPIES SERIES
Discharge: HOME | End: 2024-08-13
Attending: ORTHOPAEDIC SURGERY
Payer: MEDICARE

## 2024-08-13 PROCEDURE — 97110 THERAPEUTIC EXERCISES: CPT

## 2024-08-13 NOTE — FLOWSHEET NOTE
orders. .       Treatment Charges: Mins Units   []  Modalities     [x]  Ther Exercise 40 3   []  Manual Therapy     []  Ther Activities     []  Neuro Re-ed     []  Vasocompression     [] Gait     []  Other     Total billable time 40 3       Assessment: [x] Progressing toward goals.    [] No change.     [] Other:              [x] Patient would continue to benefit from skilled physical therapy services in order to: work on hip mobility; strength, function post surgery.       STG: (to be met in 8 treatments)  ? Pain: 1-2/10 to allow improved overall general function  ? ROM: knee flexion 130 degrees with heel slide, flexion 75 degrees SLR, abduction 25 degrees, IR 40 degrees, ER 40 degrees  ? Strength: 4/5 strength testing  ? Function: hold until appropriate per WB restrictions  Independent with Home Exercise Programs  Demonstrate Knowledge of fall prevention  LTG: (to be met in 12 treatments)  ? Pain: 0-1/10 to allow improved overall general function  ? ROM: knee flexion 130 degrees with heel slide, flexion 75 degrees SLR, abduction 25 degrees, IR 40 degrees, ER 45 degrees  ? Strength: 4+/5 strength testing to help normalize gait pattern  ? Function: ambulates without (A) device, able to ascend and descend stairs with minimal to no difficulty  Independent with Home Exercise Programs  Demonstrate Knowledge of fall prevention                 Patient goals: go back to work without pain     Functional Assessment Used:  LEFS 29/80 36% function  Current Status: Score  Goal Status: Score 60/80 or better    Pt. Education:  [x] Plans/Goals, Risks/Benefits discussed  [x] Home exercise program    Method of Education: [x] Verbal  [] Demo  [x] Written  Comprehension of Education:  [x] Verbalizes understanding.  [] Demonstrates understanding.  [] Needs Review.  [] Demonstrates/verbalizes understanding of HEP/Ed previously given.     Exercises:Given initially  Exercise Reps/ Time Weight/ Level Comments   Heel slides 15       Supine

## 2024-08-20 ENCOUNTER — OFFICE VISIT (OUTPATIENT)
Dept: ORTHOPEDIC SURGERY | Age: 79
End: 2024-08-20

## 2024-08-20 ENCOUNTER — APPOINTMENT (OUTPATIENT)
Age: 79
End: 2024-08-20
Attending: ORTHOPAEDIC SURGERY
Payer: MEDICARE

## 2024-08-20 VITALS — WEIGHT: 140 LBS | RESPIRATION RATE: 14 BRPM | BODY MASS INDEX: 25.76 KG/M2 | HEIGHT: 62 IN

## 2024-08-20 DIAGNOSIS — S76.019S TEAR OF GLUTEUS MEDIUS TENDON, SEQUELA: Primary | ICD-10-CM

## 2024-08-20 NOTE — PROGRESS NOTES
[facility], [facility] ORTHOPEDICS AND SPORTS MEDICINE  [address]  [place] [address]     Surgery:  7/5/2024  RIGHT HIP OPEN ABDUCTOR TENDON REPAIR with bio inductive implant, illiao-tibial band release - Right and Iliotibial Band Release - Right    History of Present Illness:  8/20/2024  Ale returns today for follow-up.  She does admit that she was weightbearing on the right lower extremity for the past 3 weeks.  Although I wanted her to be nonweightbearing for 6 weeks after surgery in order to allow the abductor tendon to heal, at this point there is nothing we can do.  Either the repair has healed as we hoped, or it has not.  I will order physical therapy and see her back in 6 weeks time.  She is not having any pain      7/24/2024  This is a 79 y.o. female who presents to the clinic today for post op follow up for RIGHT HIP OPEN ABDUCTOR TENDON REPAIR with bio inductive implant, illiao-tibial band release - Right and Iliotibial Band Release - Right on 7/5/2024 .  Ale is doing well from a pain perspective.  She has no pain and the incision is healing beautifully.  The big difficulty she is having is that she needs to be nonweightbearing to allow the abductor tendon to heal properly.  She was not comfortable with a walker at home so she started using the crutches.  I observed how she is using them and she has difficulty and confusion about which leg should be stepping when she is applying support with the crutches.  I tried to explain but it is still confusing to her.  I would recommend that she start physical therapy so that she can be trained in how to use either crutches or walker.  She needs to be nonweightbearing for 6 weeks after the surgery.      Physical Exam:  Pain is well-controlled.  she is doing well postoperatively.  The operative extremity has a well-healed incision.  It is clean, dry, and intact.        1. Right hip pain    2.

## 2024-08-22 ENCOUNTER — HOSPITAL ENCOUNTER (OUTPATIENT)
Age: 79
Setting detail: THERAPIES SERIES
Discharge: HOME | End: 2024-08-22
Attending: ORTHOPAEDIC SURGERY
Payer: MEDICARE

## 2024-08-22 PROCEDURE — 97110 THERAPEUTIC EXERCISES: CPT

## 2024-08-22 NOTE — FLOWSHEET NOTE
[facility] Rehabilitation &  Therapy  [address], [address]  [facility] [address]  [phone]  F: [phone]     Physical Therapy Daily Treatment Note    Date:  2024  Patient Name:  Ale Renee    :  1945  MRN: [id number]  Physician: Garth Amos MD                           Insurance: [employer] MEDICARE **AUTH AFTER EVAL (CARELON)  $30.00 COPAYMENT (14 visits approved after eval)  Medical Diagnosis: Right hip pain (M25.551)                      Rehab Codes: Right hip pain (M25.551)     Onset date: referral 24             Next Dr's appt.: 24 and 9/3/24    Visit Count:                                 Cancel/No Show: 0/0    Subjective:      Pain:  [x] Yes  [] No Location: right lateral hip   Pain Rating: (0-10 scale) 410  Pain altered Tx:  [x] No  [] Yes  Action:    Comments:  Pt reports that she saw her surgeon on Tuesday, cleared to ambulate WBAT left LE may return to work .  Pt reports some right hip soreness today 4/10 states she has been walking more around her home.    Objective: presents ambulating WBAT left LE without device gait slightly antalgic. Lateral hip discomfort with right hip extension, pt preforming too aggressively, instructed to keep exercises within minimal pain ranges.          Today’s Treatment:    Modalities:   Precautions:NWB till 24 Fall Risk,  6 weeks post-op 24    Exercise Reps/ Time Weight/ Level Comments   SciFit 6 min   L1      Standing calf stretch 20\" x 4        Heel toe raises 2 x 10    at stall bars   squats X 15   at stall bars    3-way hip  X 15  0#  At stall bars bilaterally    Hip ABD 2 x 10   green loop   seated gentle to sx tolerance    Sidelying abduction 2 x 10     with assist to decrease lateral hip force   LAQ 2 x 10  3#    Ham curls  2 x 10  5# tube Seated    Hip ADD  5\" x 15   Toy ball    4- way ankle  X 20 each  Orange     Other:     Response to treatment:  Pt reports discomfort with

## 2024-08-27 ENCOUNTER — HOSPITAL ENCOUNTER (OUTPATIENT)
Age: 79
Setting detail: THERAPIES SERIES
Discharge: HOME | End: 2024-08-27
Attending: ORTHOPAEDIC SURGERY
Payer: MEDICARE

## 2024-08-27 PROCEDURE — 97116 GAIT TRAINING THERAPY: CPT

## 2024-08-27 PROCEDURE — 97110 THERAPEUTIC EXERCISES: CPT

## 2024-08-27 NOTE — FLOWSHEET NOTE
[facility] Rehabilitation &  Therapy  [address], [address]  [facility] [address]  [phone]  F: [phone]     Physical Therapy Daily Treatment Note    Date:  2024  Patient Name:  Ale Renee    :  1945  MRN: [id number]  Physician: Garth Amos MD                           Insurance: [employer]M MEDICARE **AUTH AFTER EVAL (CARELON)  $30.00 COPAYMENT (14 visits approved after eval)  Medical Diagnosis: Right hip pain (M25.551)                      Rehab Codes: Right hip pain (M25.551)     Onset date: referral 24             Next Dr's appt.: 24 and 9/3/24    Visit Count:                                 Cancel/No Show: 0/0    Subjective:      Pain:  [x] Yes  [] No Location: right lateral hip   Pain Rating: (0-10 scale) 3/10  Pain altered Tx:  [x] No  [] Yes  Action:    Comments:  Pt reports that she continues to increase her walking frequency and distances but states she is still \"limping around\"      Objective: presents ambulating WBAT right LE without device gait slightly antalgic. Decreased right knee extension, decreased heel strike. Pt educated on gait corrections an can demonstrate to some degree, improved mechanics when ambulating with a std cane.      Today’s Treatment:    Modalities:   Precautions:NWB till 24 Fall Risk,  6 weeks post-op 24    Exercise Reps/ Time Weight/ Level Comments   SciFit 6 min   L1      Standing calf stretch 20\" x 4        Heel toe raises 2 x 10    at stall bars   squats X 15   at stall bars    3-way hip  X 15  0#  At stall bars bilaterally    Step-ups X 15  4\" step     Hip ABD 2 x 10  green loop   seated gentle to sx tolerance    Sidelying abduction 2 x 10     with assist to decrease lateral hip force   LAQ 2 x 10  3#    Ham curls  2 x 10  5# tube Seated    Hip ADD  5\" x 15   Toy ball    Seated right hip flexion X 20     4- way ankle  X 20 each  Orange     Other: ambulation with std cane and without a device  to

## 2024-09-03 ENCOUNTER — HOSPITAL ENCOUNTER (OUTPATIENT)
Age: 79
Setting detail: THERAPIES SERIES
Discharge: HOME | End: 2024-09-03
Attending: ORTHOPAEDIC SURGERY
Payer: MEDICARE

## 2024-09-03 PROCEDURE — 97110 THERAPEUTIC EXERCISES: CPT

## 2024-09-03 NOTE — FLOWSHEET NOTE
[facility] Rehabilitation &  Therapy  [address], [address]  [facility] [address]  [phone]  F: [phone]     Physical Therapy Daily Treatment Note    Date:  9/3/2024  Patient Name:  Ale Renee    :  1945  MRN: [id number]  Physician: Garth Amos MD                           Insurance: [employer]M MEDICARE **AUTH AFTER EVAL (CARELON)  $30.00 COPAYMENT (14 visits approved after eval)  Medical Diagnosis: Right hip pain (M25.551)                      Rehab Codes: Right hip pain (M25.551)     Onset date: referral 24             Next Dr's appt.: 10/1/24    Visit Count:                                 Cancel/No Show: 0/0    Subjective:      Pain:  [x] Yes  [] No Location: right lateral hip   Pain Rating: (0-10 scale) 4/10  Pain altered Tx:  [x] No  [] Yes  Action:    Comments:  Pt reports that she continues to have lateral right hip soreness-trying to walk longer distances but states her endurance is limited, pain increases with longevity. Pt to return to work tomorrow.     Objective: presents ambulating WBAT right LE without device gait slightly antalgic but improved heel strike to toe off in comparison to previous session.      Today’s Treatment:    Modalities:   Precautions: WBAT right LE starting 24,  (6 weeks post-op)     Exercise Reps/ Time Weight/ Level Comments   SciFit 6 min   L2     Standing calf stretch 20\" x 4        Heel toe raises 2 x 10    at stall bars   squats X 15   at stall bars    3-way hip  X 15  2# right LE At stall bars bilaterally    Step-ups X 15  4\" step     Hip ABD 2 x 10  green loop   seated gentle to sx tolerance    LAQ X 20 3#    Ham curls  2 x 10  5# tube Seated    Hip ADD  5\" x 15   Toy ball    Seated right hip flexion X 20     4- way ankle  X 20 each  Orange       Other:     Response to treatment:  Pt tolerated strengthening activities well, some lateral right hip soreness with hip extension and step-ups, resolved upon

## 2024-09-05 ENCOUNTER — HOSPITAL ENCOUNTER (OUTPATIENT)
Age: 79
Setting detail: THERAPIES SERIES
Discharge: HOME | End: 2024-09-05
Attending: ORTHOPAEDIC SURGERY
Payer: MEDICARE

## 2024-09-05 PROCEDURE — 97110 THERAPEUTIC EXERCISES: CPT

## 2024-09-05 NOTE — FLOWSHEET NOTE
[facility] Rehabilitation &  Therapy  [address]  [facility] [facility] [address]  [phone]  F: [phone]     Physical Therapy Daily Treatment Note    Date:  2024  Patient Name:  Ale Renee    :  1945  MRN: [id number]  Physician: Garth Amos MD                           Insurance: [employer]M MEDICARE **AUTH AFTER EVAL (CARELON)  $30.00 COPAYMENT (14 visits approved after eval)  Medical Diagnosis: Right hip pain (M25.551)                      Rehab Codes: Right hip pain (M25.551)     Onset date: referral 24             Next Dr's appt.: 10/1/24    Visit Count:                                 Cancel/No Show: 0/0    Subjective:      Pain:  [x] Yes  [] No Location: right lateral hip and right low   Pain Rating: (0-10 scale) 5/10  Pain altered Tx:  [x] No  [] Yes  Action:    Comments:  Pt reports that she is \"very sore\" due to returning to work and having to stand for several hours. C/O lateral right hip and low back pain.     Objective: presents ambulating WBAT right LE without device gait slightly antalgic but improved heel strike to toe off in comparison to previous session.      Today’s Treatment:    Modalities: MHP seated to low back and right lateral hip 12 min post session   Precautions: WBAT right LE starting 24,  (6 weeks post-op)     Exercise Reps/ Time Weight/ Level Comments   SciFit 6 min   L2     Standing calf stretch 20\" x 4        Heel toe raises 2 x 10    at stall bars   squats X 10   at stall bars    3-way hip  X 15  0# right LE At stall bars bilaterally    Step-ups X 15  4\" step     Hip ABD 2 x 10  green loop   seated gentle to sx tolerance    LAQ X 25 3#    Ham curls  2 x 10  5# tube Seated    Hip IR/ER X 20 each  3# tube  Seated    Hip ADD  5\" x 15   Toy ball    Seated right hip flexion X 20     4- way ankle  X 20 each  Orange       Other: passive right LE ROM 5 min , LTR x 10 manually    Response to treatment:  Pt with generalized

## 2024-09-10 ENCOUNTER — HOSPITAL ENCOUNTER (OUTPATIENT)
Age: 79
Setting detail: THERAPIES SERIES
End: 2024-09-10
Attending: ORTHOPAEDIC SURGERY
Payer: MEDICARE

## 2024-09-12 ENCOUNTER — HOSPITAL ENCOUNTER (OUTPATIENT)
Age: 79
Setting detail: THERAPIES SERIES
Discharge: HOME | End: 2024-09-12
Attending: ORTHOPAEDIC SURGERY
Payer: MEDICARE

## 2024-10-01 ENCOUNTER — OFFICE VISIT (OUTPATIENT)
Dept: ORTHOPEDIC SURGERY | Age: 79
End: 2024-10-01

## 2024-10-01 VITALS — BODY MASS INDEX: 25.76 KG/M2 | HEIGHT: 62 IN | RESPIRATION RATE: 14 BRPM | WEIGHT: 140 LBS

## 2024-10-01 DIAGNOSIS — S76.019S TEAR OF GLUTEUS MEDIUS TENDON, SEQUELA: Primary | ICD-10-CM

## 2024-10-01 PROCEDURE — 99024 POSTOP FOLLOW-UP VISIT: CPT | Performed by: ORTHOPAEDIC SURGERY

## 2024-10-01 NOTE — PROGRESS NOTES
[facility] ORTHOPEDICS AND SPORTS MEDICINE  [address] [address]     Surgery:  7/5/2024  RIGHT HIP OPEN ABDUCTOR TENDON REPAIR with bio inductive implant, illiao-tibial band release - Right and Iliotibial Band Release - Right    History of Present Illness:    This is a 79 y.o. female who presents to the clinic today for post op follow up for RIGHT HIP OPEN ABDUCTOR TENDON REPAIR with bio inductive implant, illiao-tibial band release - Right and Iliotibial Band Release - Right on 7/5/2024 .  Ale returns today for her 3-month follow-up after right hip abductor tendon repair.  She is doing fantastic.  No pain.  She is having some pulmonary issues as well as some rheumatologic issues from lupus.  She is      Physical Exam:  Pain is well-controlled.  she is doing well postoperatively.  The operative extremity has a well-healed incision.  It is clean, dry, and intact.  No pain and able to ambulate well without assistance.    We discussed the expected postoperative course, including the relevant weightbearing restrictions/immobilization.     1. Tear of gluteus medius tendon, sequela      No further treatment indicated, follow-up as needed

## 2024-10-02 ENCOUNTER — HOSPITAL ENCOUNTER (OUTPATIENT)
Age: 79
Discharge: HOME | End: 2024-10-04
Attending: INTERNAL MEDICINE
Payer: MEDICARE

## 2024-10-02 VITALS — HEIGHT: 62 IN | WEIGHT: 138.89 LBS | BODY MASS INDEX: 25.56 KG/M2

## 2024-10-02 DIAGNOSIS — M32.9 SYSTEMIC LUPUS ERYTHEMATOSUS, UNSPECIFIED SLE TYPE, UNSPECIFIED ORGAN INVOLVEMENT STATUS (HCC): ICD-10-CM

## 2024-10-02 DIAGNOSIS — I31.9 PERICARDITIS WITH EFFUSION: ICD-10-CM

## 2024-10-02 LAB
ECHO AO ROOT DIAM: 2.7 CM
ECHO AO ROOT INDEX: 1.66 CM/M2
ECHO AV AREA PEAK VELOCITY: 1.7 CM2
ECHO AV AREA/BSA PEAK VELOCITY: 1 CM2/M2
ECHO AV PEAK GRADIENT: 7 MMHG
ECHO AV PEAK VELOCITY: 1.3 M/S
ECHO AV VELOCITY RATIO: 0.85
ECHO BSA: 1.66 M2
ECHO EST RA PRESSURE: 3 MMHG
ECHO LA AREA 2C: 14.1 CM2
ECHO LA AREA 4C: 12.3 CM2
ECHO LA DIAMETER INDEX: 1.78 CM/M2
ECHO LA DIAMETER: 2.9 CM
ECHO LA MAJOR AXIS: 4.4 CM
ECHO LA MINOR AXIS: 4.7 CM
ECHO LA TO AORTIC ROOT RATIO: 1.07
ECHO LA VOL BP: 31 ML (ref 22–52)
ECHO LA VOL MOD A2C: 34 ML (ref 22–52)
ECHO LA VOL MOD A4C: 27 ML (ref 22–52)
ECHO LA VOL/BSA BIPLANE: 19 ML/M2 (ref 16–34)
ECHO LA VOLUME INDEX MOD A2C: 21 ML/M2 (ref 16–34)
ECHO LA VOLUME INDEX MOD A4C: 17 ML/M2 (ref 16–34)
ECHO LV E' LATERAL VELOCITY: 6.3 CM/S
ECHO LV E' SEPTAL VELOCITY: 7.4 CM/S
ECHO LV EDV A4C: 61 ML
ECHO LV EDV INDEX A4C: 37 ML/M2
ECHO LV EF PHYSICIAN: 65 %
ECHO LV EJECTION FRACTION A4C: 68 %
ECHO LV ESV A4C: 20 ML
ECHO LV ESV INDEX A4C: 12 ML/M2
ECHO LV FRACTIONAL SHORTENING: 31 % (ref 28–44)
ECHO LV INTERNAL DIMENSION DIASTOLE INDEX: 2.39 CM/M2
ECHO LV INTERNAL DIMENSION DIASTOLIC: 3.9 CM (ref 3.9–5.3)
ECHO LV INTERNAL DIMENSION SYSTOLIC INDEX: 1.66 CM/M2
ECHO LV INTERNAL DIMENSION SYSTOLIC: 2.7 CM
ECHO LV IVSD: 1.3 CM (ref 0.6–0.9)
ECHO LV MASS 2D: 159.3 G (ref 67–162)
ECHO LV MASS INDEX 2D: 97.7 G/M2 (ref 43–95)
ECHO LV POSTERIOR WALL DIASTOLIC: 1.1 CM (ref 0.6–0.9)
ECHO LV RELATIVE WALL THICKNESS RATIO: 0.56
ECHO LVOT AREA: 2 CM2
ECHO LVOT DIAM: 1.6 CM
ECHO LVOT MEAN GRADIENT: 3 MMHG
ECHO LVOT PEAK GRADIENT: 5 MMHG
ECHO LVOT PEAK VELOCITY: 1.1 M/S
ECHO LVOT STROKE VOLUME INDEX: 32.7 ML/M2
ECHO LVOT SV: 53.3 ML
ECHO LVOT VTI: 26.5 CM
ECHO MV A VELOCITY: 0.8 M/S
ECHO MV E DECELERATION TIME (DT): 204 MS
ECHO MV E VELOCITY: 0.67 M/S
ECHO MV E/A RATIO: 0.84
ECHO MV E/E' LATERAL: 10.63
ECHO MV E/E' RATIO (AVERAGED): 9.84
ECHO MV E/E' SEPTAL: 9.05
ECHO RIGHT VENTRICULAR SYSTOLIC PRESSURE (RVSP): 27 MMHG
ECHO RV TAPSE: 2.3 CM (ref 1.7–?)
ECHO TV REGURGITANT MAX VELOCITY: 2.47 M/S
ECHO TV REGURGITANT PEAK GRADIENT: 24 MMHG

## 2024-10-02 PROCEDURE — 93306 TTE W/DOPPLER COMPLETE: CPT | Performed by: INTERNAL MEDICINE

## 2024-10-02 PROCEDURE — 93306 TTE W/DOPPLER COMPLETE: CPT

## 2024-11-30 ENCOUNTER — HOSPITAL ENCOUNTER (EMERGENCY)
Age: 79
Discharge: HOME | End: 2024-11-30
Attending: EMERGENCY MEDICINE
Payer: MEDICARE

## 2024-11-30 VITALS
SYSTOLIC BLOOD PRESSURE: 130 MMHG | WEIGHT: 141.09 LBS | DIASTOLIC BLOOD PRESSURE: 52 MMHG | TEMPERATURE: 98.3 F | RESPIRATION RATE: 18 BRPM | OXYGEN SATURATION: 97 % | BODY MASS INDEX: 25.96 KG/M2 | HEART RATE: 60 BPM | HEIGHT: 62 IN

## 2024-11-30 DIAGNOSIS — R59.0 LYMPHADENOPATHY OF LEFT CERVICAL REGION: Primary | ICD-10-CM

## 2024-11-30 PROCEDURE — 6370000000 HC RX 637 (ALT 250 FOR IP): Performed by: EMERGENCY MEDICINE

## 2024-11-30 PROCEDURE — 99283 EMERGENCY DEPT VISIT LOW MDM: CPT

## 2024-11-30 RX ORDER — SULFAMETHOXAZOLE AND TRIMETHOPRIM 800; 160 MG/1; MG/1
1 TABLET ORAL 2 TIMES DAILY
Qty: 14 TABLET | Refills: 0 | Status: SHIPPED | OUTPATIENT
Start: 2024-11-30 | End: 2024-12-07

## 2024-11-30 RX ORDER — SULFAMETHOXAZOLE AND TRIMETHOPRIM 800; 160 MG/1; MG/1
1 TABLET ORAL ONCE
Status: COMPLETED | OUTPATIENT
Start: 2024-11-30 | End: 2024-11-30

## 2024-11-30 RX ADMIN — SULFAMETHOXAZOLE AND TRIMETHOPRIM 1 TABLET: 800; 160 TABLET ORAL at 08:33

## 2024-11-30 ASSESSMENT — PAIN DESCRIPTION - LOCATION: LOCATION: NECK

## 2024-11-30 ASSESSMENT — PAIN DESCRIPTION - PAIN TYPE: TYPE: ACUTE PAIN

## 2024-11-30 ASSESSMENT — PAIN SCALES - GENERAL: PAINLEVEL_OUTOF10: 6

## 2024-11-30 ASSESSMENT — PAIN - FUNCTIONAL ASSESSMENT: PAIN_FUNCTIONAL_ASSESSMENT: 0-10

## 2024-11-30 ASSESSMENT — PAIN DESCRIPTION - ORIENTATION: ORIENTATION: LEFT;ANTERIOR

## 2024-11-30 ASSESSMENT — PAIN DESCRIPTION - DESCRIPTORS: DESCRIPTORS: ACHING

## 2024-11-30 NOTE — DISCHARGE INSTRUCTIONS
Take medications as prescribed      Return immediately if any worsening symptoms or any other concerns    Please understand that early in the process of an illness or injury, an emergency department workup can be falsely reassuring.      Tell us how we did visit: [email or web address]   and let us know about your experience

## 2024-11-30 NOTE — ED PROVIDER NOTES
[facility] Emergency Department  [address]  [facility] [address]  Phone: [phone]  Fax: [phone]  EMERGENCY DEPARTMENT ENCOUNTER      Pt Name: Ale Renee  MRN: [id number]  Birthdate 1945  Date of evaluation: 11/30/2024    CHIEF COMPLAINT       Chief Complaint   Patient presents with    Neck Pain     Pt complains of painful lump to Left, anterior neck area ..  Onset about 5 days ago.        HISTORY OF PRESENT ILLNESS    Ale Renee is a 79 y.o. female who presents to the emergency department complaining of swelling the left side of her neck.  A lump that she noticed about 5 days ago.  Not really getting worse but not getting better.  Denies any cough congestion fevers or chills.  She rates her discomfort 6 out of 10.  No other relevant symptoms.    REVIEW OF SYSTEMS       Constitutional: No fevers or chills   HENT: No sore throat, rhinorrhea, or earache positive swollen lymph node on the left  Eyes: No blurry vision or double vision no drainage   Cardiovascular: No chest pain or tachycardia   Respiratory: No wheezing or shortness of breath no cough   Gastrointestinal: No nausea, vomiting, diarrhea, constipation, or abdominal pain   : No hematuria or dysuria   Musculoskeletal: No swelling or pain   Skin: No rash   Neurological: No focal neurologic complaints, paresthesias, weakness, or headache     PAST MEDICAL HISTORY    has a past medical history of Anxiety, Arthritis, Cataract of both eyes, Displacement of lumbar intervertebral disc without myelopathy, Hypertension, Lumbar disc disease, Lupus, Personal history of TIA (transient ischemic attack), Sleep deprivation, SOB (shortness of breath), Under care of team, Under care of team, Under care of team, and Wears glasses.    SURGICAL HISTORY      has a past surgical history that includes Wrist surgery (Left); Hysterectomy; Ankle surgery (Right); Tonsillectomy; Nerve Block (10/19/2015); Nerve Block (10/26/2015); Foot

## 2025-01-22 NOTE — PROGRESS NOTES
Pre-procedure phone call completed. Reviewed arrival time  1230-100 , Entrance A and NPO. Confirmed that patient has an adult  and that they have someone available to stay with them if they are discharged to home afterward. Patient asked to bring a list of home medications. Patient verbalizes understanding.

## 2025-01-23 ENCOUNTER — HOSPITAL ENCOUNTER (OUTPATIENT)
Age: 80
Setting detail: OUTPATIENT SURGERY
Discharge: HOME | End: 2025-01-23
Attending: INTERNAL MEDICINE | Admitting: INTERNAL MEDICINE
Payer: MEDICARE

## 2025-01-23 VITALS
TEMPERATURE: 98.5 F | WEIGHT: 139.99 LBS | SYSTOLIC BLOOD PRESSURE: 126 MMHG | BODY MASS INDEX: 25.6 KG/M2 | HEART RATE: 68 BPM | OXYGEN SATURATION: 93 % | DIASTOLIC BLOOD PRESSURE: 46 MMHG | RESPIRATION RATE: 20 BRPM

## 2025-01-23 DIAGNOSIS — R94.39 ABNORMAL STRESS TEST: ICD-10-CM

## 2025-01-23 LAB
ANION GAP SERPL CALCULATED.3IONS-SCNC: 12 MMOL/L (ref 9–17)
BUN SERPL-MCNC: 14 MG/DL (ref 8–23)
CALCIUM SERPL-MCNC: 9.2 MG/DL (ref 8.6–10.4)
CHLORIDE SERPL-SCNC: 106 MMOL/L (ref 98–107)
CO2 SERPL-SCNC: 23 MMOL/L (ref 20–31)
CREAT SERPL-MCNC: 0.7 MG/DL (ref 0.5–0.9)
GFR, ESTIMATED: 89 ML/MIN/1.73M2
GLUCOSE SERPL-MCNC: 107 MG/DL (ref 70–99)
HCT VFR BLD AUTO: 42.5 % (ref 36–46)
HGB BLD-MCNC: 13.9 G/DL (ref 12–16)
PLATELET # BLD AUTO: 158 K/UL (ref 140–450)
POTASSIUM SERPL-SCNC: 4 MMOL/L (ref 3.7–5.3)
SODIUM SERPL-SCNC: 141 MMOL/L (ref 135–144)

## 2025-01-23 PROCEDURE — 99152 MOD SED SAME PHYS/QHP 5/>YRS: CPT | Performed by: INTERNAL MEDICINE

## 2025-01-23 PROCEDURE — C1894 INTRO/SHEATH, NON-LASER: HCPCS | Performed by: INTERNAL MEDICINE

## 2025-01-23 PROCEDURE — 93458 L HRT ARTERY/VENTRICLE ANGIO: CPT | Performed by: INTERNAL MEDICINE

## 2025-01-23 PROCEDURE — 93454 CORONARY ARTERY ANGIO S&I: CPT | Performed by: INTERNAL MEDICINE

## 2025-01-23 PROCEDURE — 2709999900 HC NON-CHARGEABLE SUPPLY: Performed by: INTERNAL MEDICINE

## 2025-01-23 PROCEDURE — 6360000002 HC RX W HCPCS: Performed by: INTERNAL MEDICINE

## 2025-01-23 PROCEDURE — 99153 MOD SED SAME PHYS/QHP EA: CPT | Performed by: INTERNAL MEDICINE

## 2025-01-23 PROCEDURE — 85018 HEMOGLOBIN: CPT

## 2025-01-23 PROCEDURE — 6360000004 HC RX CONTRAST MEDICATION: Performed by: INTERNAL MEDICINE

## 2025-01-23 PROCEDURE — 80048 BASIC METABOLIC PNL TOTAL CA: CPT

## 2025-01-23 PROCEDURE — 7100000011 HC PHASE II RECOVERY - ADDTL 15 MIN: Performed by: INTERNAL MEDICINE

## 2025-01-23 PROCEDURE — 85049 AUTOMATED PLATELET COUNT: CPT

## 2025-01-23 PROCEDURE — 7100000010 HC PHASE II RECOVERY - FIRST 15 MIN: Performed by: INTERNAL MEDICINE

## 2025-01-23 PROCEDURE — 85014 HEMATOCRIT: CPT

## 2025-01-23 PROCEDURE — 6370000000 HC RX 637 (ALT 250 FOR IP): Performed by: INTERNAL MEDICINE

## 2025-01-23 PROCEDURE — 93452 LEFT HRT CATH W/VENTRCLGRPHY: CPT | Performed by: INTERNAL MEDICINE

## 2025-01-23 RX ORDER — SODIUM CHLORIDE 9 MG/ML
INJECTION, SOLUTION INTRAVENOUS CONTINUOUS
Status: DISCONTINUED | OUTPATIENT
Start: 2025-01-23 | End: 2025-01-23 | Stop reason: HOSPADM

## 2025-01-23 RX ORDER — ACETAMINOPHEN 325 MG/1
650 TABLET ORAL EVERY 4 HOURS PRN
Status: DISCONTINUED | OUTPATIENT
Start: 2025-01-23 | End: 2025-01-23 | Stop reason: HOSPADM

## 2025-01-23 RX ORDER — SODIUM CHLORIDE 0.9 % (FLUSH) 0.9 %
5-40 SYRINGE (ML) INJECTION PRN
Status: DISCONTINUED | OUTPATIENT
Start: 2025-01-23 | End: 2025-01-23 | Stop reason: HOSPADM

## 2025-01-23 RX ORDER — HEPARIN SODIUM 1000 [USP'U]/ML
INJECTION, SOLUTION INTRAVENOUS; SUBCUTANEOUS PRN
Status: DISCONTINUED | OUTPATIENT
Start: 2025-01-23 | End: 2025-01-23 | Stop reason: HOSPADM

## 2025-01-23 RX ORDER — SODIUM CHLORIDE 0.9 % (FLUSH) 0.9 %
5-40 SYRINGE (ML) INJECTION EVERY 12 HOURS SCHEDULED
Status: DISCONTINUED | OUTPATIENT
Start: 2025-01-23 | End: 2025-01-23 | Stop reason: HOSPADM

## 2025-01-23 RX ORDER — HYDRALAZINE HYDROCHLORIDE 20 MG/ML
INJECTION INTRAMUSCULAR; INTRAVENOUS PRN
Status: DISCONTINUED | OUTPATIENT
Start: 2025-01-23 | End: 2025-01-23 | Stop reason: HOSPADM

## 2025-01-23 RX ORDER — IOPAMIDOL 755 MG/ML
INJECTION, SOLUTION INTRAVASCULAR PRN
Status: DISCONTINUED | OUTPATIENT
Start: 2025-01-23 | End: 2025-01-23 | Stop reason: HOSPADM

## 2025-01-23 RX ORDER — MIDAZOLAM 1 MG/ML
INJECTION INTRAMUSCULAR; INTRAVENOUS PRN
Status: DISCONTINUED | OUTPATIENT
Start: 2025-01-23 | End: 2025-01-23 | Stop reason: HOSPADM

## 2025-01-23 RX ORDER — HYDRALAZINE HYDROCHLORIDE 20 MG/ML
10 INJECTION INTRAMUSCULAR; INTRAVENOUS ONCE
Status: COMPLETED | OUTPATIENT
Start: 2025-01-23 | End: 2025-01-23

## 2025-01-23 RX ORDER — SODIUM CHLORIDE 9 MG/ML
INJECTION, SOLUTION INTRAVENOUS PRN
Status: DISCONTINUED | OUTPATIENT
Start: 2025-01-23 | End: 2025-01-23 | Stop reason: HOSPADM

## 2025-01-23 RX ORDER — ASPIRIN 325 MG
325 TABLET ORAL ONCE
Status: COMPLETED | OUTPATIENT
Start: 2025-01-23 | End: 2025-01-23

## 2025-01-23 RX ORDER — LIDOCAINE HYDROCHLORIDE 10 MG/ML
INJECTION, SOLUTION INFILTRATION; PERINEURAL PRN
Status: DISCONTINUED | OUTPATIENT
Start: 2025-01-23 | End: 2025-01-23 | Stop reason: HOSPADM

## 2025-01-23 RX ORDER — M-VIT,TX,IRON,MINS/CALC/FOLIC 27MG-0.4MG
1 TABLET ORAL DAILY
COMMUNITY

## 2025-01-23 RX ORDER — CALCIUM CARBONATE 500(1250)
500 TABLET ORAL DAILY
COMMUNITY

## 2025-01-23 RX ADMIN — ASPIRIN 325 MG: 325 TABLET ORAL at 13:00

## 2025-01-23 RX ADMIN — HYDRALAZINE HYDROCHLORIDE 10 MG: 20 INJECTION INTRAMUSCULAR; INTRAVENOUS at 13:50

## 2025-01-23 ASSESSMENT — PAIN - FUNCTIONAL ASSESSMENT: PAIN_FUNCTIONAL_ASSESSMENT: NONE - DENIES PAIN

## 2025-01-23 NOTE — PROGRESS NOTES
Pt transferred back to PCC Bed 2, pt alert oriented, pt denies pain, assessment done, cardiac monitor placed, family to bedside.

## 2025-01-23 NOTE — H&P
CELY Love CNP   predniSONE (DELTASONE) 20 MG tablet Take 1 tablet by mouth daily 2/6/22  Yes Jennifer Estrada MD   furosemide (LASIX) 20 MG tablet Take 1 tablet by mouth three times a week 2/7/22  Yes Jennifer Estrada MD   hydroxychloroquine (PLAQUENIL) 200 MG tablet Take 1 tablet by mouth daily Take 1 and 1/2 tablet daily   Yes Provider, MD Billy   Respiratory Therapy Supplies (NEBULIZER/TUBING/MOUTHPIECE) KIT 1 kit by Does not apply route daily as needed (wheezing, shortness of breath) 6/1/24   Kate Lafleur APRN - CNP       Allergies:  Bee pollen, Erythromycin, Pcn [penicillins], Tetanus toxoids, Tetracyclines & related, and Lisinopril    Social History:   reports that she has never smoked. She has never used smokeless tobacco. She reports that she does not drink alcohol and does not use drugs.     Family History: family history includes Heart Disease in her father and paternal grandfather; Lung Cancer in her brother and mother. No for premature CAD. No for h/o sudden cardiac death    REVIEW OF SYSTEMS:    Constitutional: there has been no unanticipated weight loss. There's been Yes change in energy level, Yes change in activity level.     Eyes: No visual changes or diplopia. No scleral icterus.  ENT: No Headaches, hearing loss or vertigo. No mouth sores or sore throat.  Cardiovascular: Yes chest pain, Yes dyspnea on exertion, No palpitations or No loss of consciousness. No cough, hemoptysis, No pleuritic pain, or phlebitis.  Respiratory: No cough or wheezing, no sputum production. No hematemesis.    Gastrointestinal: No abdominal pain, appetite loss, blood in stools. No change in bowel or bladder habits.  Genitourinary: No dysuria, trouble voiding, or hematuria.  Musculoskeletal:  No gait disturbance, No weakness or joint complaints.  Integumentary: No rash or pruritis.  Neurological: No headache, diplopia, change in muscle strength, numbness or tingling. No change in gait, balance, coordination,  \"CKMBINDEX\", \"TROPONINI\" in the last 72 hours.  FASTING LIPID PANEL:No results found for: \"HDL\", \"LDLDIRECT\", \"TRIG\"  LIVER PROFILE:No results for input(s): \"AST\", \"ALT\", \"LABALBU\" in the last 72 hours.      IMPRESSION/RECOMMENDATIONS:  Chest pain. Positive stress test for ischemia. Referred for coronary angiography and possible PCI. Benefits, risks and alternatives explained and consent signed and agree to proceed.             Discussed with patient and nursing.    Donny Bradshaw MD, MD [facility]        [phone]

## 2025-01-23 NOTE — PROGRESS NOTES
Pt ambulated to room, steady gait, weight obtained, pt changed into gown, belongings collected, cardiac monitor placed, assessment done, IV started, labs sent, family to bedside.

## 2025-01-23 NOTE — DISCHARGE INSTRUCTIONS
DISCHARGE INSTRUCTIONS / ARM CARE POST CATHERIZATION    Home Care     Ok to shower in 24 hours. Discontinue pressure dressing in 24 hours.   Do not apply band aids. KEEP SITE CLEAN DRY AND OPEN TO THE AIR.  No powder or lotion.  Do not soak in a pool or tub and do not swim for one week.   If there is any bleeding at the catheter site, apply firm pressure directly over site with your hand until the bleeding stops. If bleeding continues after 3 minutes call 911.   If there is any swelling or firm areas at your puncture site, this could be bleeding under the skin(hematoma), and if you have any concerns seek help immediately.   Drink plenty of fluids after the test. This will flush the x-ray dye from your system.   Return to your normal diet.         NO STRENUOUS LIFTING WITH AFFECTED ARM FOR 3 DAYS ANYTHING HEAVIER THAN 8 TO 10 POUNDS    WATCH FOR SIGNS OF INFECTION /  REDNESS / SWELLING / DRAINAGE / WARMTH / TEMPERATURE GREATER THAN 101    IF AREA BECOMES HARD AND SWOLLEN AND IF YOU ARE AT ALL CONCERNED SEEK HELP IMMEDIATELY    SEEK HELP IMMEDIATELY IF AFFECTED ARM BECOMES COLD / NUMB / SEVERE PAIN / NAILBEDS TURN BLUE     IF ON METFORMIN / GLUCOPHAGE DO NOT RESTART MEDICATION FOR 48 HOURS    CALL 911 if you have symptoms including:   Drooping facial muscles   Changes in vision or speech   Difficulty walking or using your limbs   Change in sensation to affected leg, including numbness, feeling cold, or change in color   Extreme sweating, nausea or vomiting   Dizziness or lightheadedness   Chest pain   Rapid, irregular heartbeat   Palpitations   Cough, shortness of breath, or difficulty breathing   Weakness or fainting   If you think you have an emergency, CALL 911 .        Coronary artery disease (CAD) occurs when plaque builds up in the arteries that bring oxygen-rich blood to your heart. Plaque is a fatty substance made of cholesterol, calcium, and other substances in the blood. This process is called hardening of  around narrowed or blocked arteries. This is called bypass surgery.  Follow-up care is a key part of your treatment and safety. Be sure to make and go to all appointments, and call your doctor if you are having problems. It's also a good idea to know your test results and keep a list of the medicines you take.          SEDATION / ANALGESIA INFORMATION / HOME GOING ADVICE  You have received the sedation/analgesia medication during your visit    Sedation/analgesia is used during short medical procedures under controlled supervision. The medication will produce a strong relaxation. You will be able to hear, speak and follow instructions, but your memory and alertness will be decreased.    You will be able to swallow and breathe on your own. During sedation/analgesia your blood pressure, heart and breathing will be watched closely. After the procedure, you may not remember what was said or done.    You may have the following effects from the medication.  \" Drowsiness, dizziness, sleepiness or confusion.  \" Difficulty remembering or delayed reaction times.  \" Loss of fine muscle control or difficulty with your balance especially while walking.  \" Difficulty focusing or blurred vision.  You may not be aware of slight changes in your behavior and/or your reaction time because of the medication used during the procedure. Therefore you should follow these instructions.  \" Have someone responsible help you with your care.  \" Do not drive for 24 hours.  \" Do not operate equipment for 24 hours (lawnmowers, power tools, kitchen accessories, stove).  \" Do not drink any alcoholic beverages for a minimum of 24 hours.  \" Do not make important personal, legal or business decisions for 24 hours.  \" You may experience dizziness or lightheadedness. Move slowly and carefully, do not make sudden position changes.  \" Drink extra amounts of fluids today.  \" Increase your diet as tolerated (unless you have received specific instructions from

## 2025-03-04 ENCOUNTER — OFFICE VISIT (OUTPATIENT)
Dept: ORTHOPEDIC SURGERY | Age: 80
End: 2025-03-04

## 2025-03-04 ENCOUNTER — HOSPITAL ENCOUNTER (OUTPATIENT)
Age: 80
Discharge: HOME | End: 2025-03-04
Payer: MEDICARE

## 2025-03-04 VITALS — HEIGHT: 62 IN | BODY MASS INDEX: 26.68 KG/M2 | WEIGHT: 145 LBS

## 2025-03-04 DIAGNOSIS — S76.019S TEAR OF GLUTEUS MEDIUS TENDON, SEQUELA: Primary | ICD-10-CM

## 2025-03-04 DIAGNOSIS — M25.551 RIGHT HIP PAIN: ICD-10-CM

## 2025-03-04 LAB
ERYTHROCYTE [DISTWIDTH] IN BLOOD BY AUTOMATED COUNT: 13.3 % (ref 11.8–14.4)
HCT VFR BLD AUTO: 39.9 % (ref 36.3–47.1)
HGB BLD-MCNC: 12.6 G/DL (ref 11.9–15.1)
MCH RBC QN AUTO: 29.2 PG (ref 25.2–33.5)
MCHC RBC AUTO-ENTMCNC: 31.6 G/DL (ref 28.4–34.8)
MCV RBC AUTO: 92.4 FL (ref 82.6–102.9)
NRBC BLD-RTO: 0 PER 100 WBC
PLATELET # BLD AUTO: 166 K/UL (ref 138–453)
PMV BLD AUTO: 12.2 FL (ref 8.1–13.5)
RBC # BLD AUTO: 4.32 M/UL (ref 3.95–5.11)
WBC OTHER # BLD: 6.6 K/UL (ref 3.5–11.3)

## 2025-03-04 PROCEDURE — 84439 ASSAY OF FREE THYROXINE: CPT

## 2025-03-04 PROCEDURE — 84443 ASSAY THYROID STIM HORMONE: CPT

## 2025-03-04 PROCEDURE — 80053 COMPREHEN METABOLIC PANEL: CPT

## 2025-03-04 PROCEDURE — 85027 COMPLETE CBC AUTOMATED: CPT

## 2025-03-04 PROCEDURE — 36415 COLL VENOUS BLD VENIPUNCTURE: CPT

## 2025-03-04 NOTE — PROGRESS NOTES
[facility] PHYSICIANS [facility] ORTHOPEDICS AND SPORTS MEDICINE  [address] [address]     Surgery:  7/5/2024  RIGHT HIP OPEN ABDUCTOR TENDON REPAIR with bio inductive implant, illiao-tibial band release - Right and Iliotibial Band Release - Right    3/4/25  Ale returns today for follow-up.  She has 2 complaints.  First she feels that there is a leg length discrepancy which is causing her to put more stress on the right side.  She wonders if she can get shoe lifts.  I will refer her to see an orthotist.  She also is having some numbness of the anterior thigh.  I do not believe this is coming from the hip but could be lumbar radiculopathy.  She sees a pain management specialist and is wondering if it is safe for her to get epidural steroid injections or if this will compromise the hip repair.  I told her it is safe to get these injections.  Her right hip demonstrates point tenderness palpation of the abductor repair site as well as weakness of strength testing.  Clinical concern is for recurrent abductor tendon tear.  That said she does not want any type of surgery at this point.  If she changes her mind I can order an MRI at any time.    10/1/2024  This is a 79 y.o. female who presents to the clinic today for post op follow up for RIGHT HIP OPEN ABDUCTOR TENDON REPAIR with bio inductive implant, illiao-tibial band release - Right and Iliotibial Band Release - Right on 7/5/2024 .  Ale returns today for her 3-month follow-up after right hip abductor tendon repair.  She is doing fantastic.  No pain.  She is having some pulmonary issues as well as some rheumatologic issues from lupus.  She is      Physical Exam:  Right hip demonstrates point tenderness and weakness of abductor strength testing.      1. Tear of gluteus medius tendon, sequela      I have asked Pat to return if she wishes to have an MRI ordered for her right hip

## 2025-03-05 LAB
ALBUMIN SERPL-MCNC: 3.9 G/DL (ref 3.5–5.2)
ALBUMIN/GLOB SERPL: 1.9 {RATIO} (ref 1–2.5)
ALP SERPL-CCNC: 65 U/L (ref 35–104)
ALT SERPL-CCNC: 18 U/L (ref 10–35)
ANION GAP SERPL CALCULATED.3IONS-SCNC: 10 MMOL/L (ref 9–16)
AST SERPL-CCNC: 21 U/L (ref 10–35)
BILIRUB SERPL-MCNC: 0.3 MG/DL (ref 0–1.2)
BUN SERPL-MCNC: 12 MG/DL (ref 8–23)
CALCIUM SERPL-MCNC: 9.6 MG/DL (ref 8.6–10.4)
CHLORIDE SERPL-SCNC: 107 MMOL/L (ref 98–107)
CO2 SERPL-SCNC: 25 MMOL/L (ref 20–31)
CREAT SERPL-MCNC: 0.9 MG/DL (ref 0.6–0.9)
GFR, ESTIMATED: 66 ML/MIN/1.73M2
GLUCOSE SERPL-MCNC: 95 MG/DL (ref 74–99)
POTASSIUM SERPL-SCNC: 3.9 MMOL/L (ref 3.7–5.3)
PROT SERPL-MCNC: 6 G/DL (ref 6.6–8.7)
SODIUM SERPL-SCNC: 142 MMOL/L (ref 136–145)
T4 FREE SERPL-MCNC: 1 NG/DL (ref 0.92–1.68)
TSH SERPL DL<=0.05 MIU/L-ACNC: 4.87 UIU/ML (ref 0.27–4.2)

## 2025-04-16 ENCOUNTER — TELEPHONE (OUTPATIENT)
Age: 80
End: 2025-04-16

## 2025-04-16 DIAGNOSIS — M25.511 RIGHT SHOULDER PAIN, UNSPECIFIED CHRONICITY: Primary | ICD-10-CM

## 2025-04-17 ENCOUNTER — HOSPITAL ENCOUNTER (OUTPATIENT)
Age: 80
Discharge: HOME | End: 2025-04-19
Payer: MEDICARE

## 2025-04-17 ENCOUNTER — HOSPITAL ENCOUNTER (OUTPATIENT)
Dept: GENERAL RADIOLOGY | Age: 80
Discharge: HOME | End: 2025-04-19
Payer: MEDICARE

## 2025-04-17 PROCEDURE — 73030 X-RAY EXAM OF SHOULDER: CPT

## 2025-04-18 ENCOUNTER — OFFICE VISIT (OUTPATIENT)
Age: 80
End: 2025-04-18

## 2025-04-18 VITALS — BODY MASS INDEX: 26.68 KG/M2 | HEIGHT: 62 IN | WEIGHT: 145 LBS

## 2025-04-18 DIAGNOSIS — M25.811 SHOULDER IMPINGEMENT, RIGHT: Primary | ICD-10-CM

## 2025-04-18 RX ORDER — LIDOCAINE HYDROCHLORIDE 10 MG/ML
2 INJECTION, SOLUTION INFILTRATION; PERINEURAL ONCE
Status: COMPLETED | OUTPATIENT
Start: 2025-04-18 | End: 2025-04-18

## 2025-04-18 RX ORDER — METHYLPREDNISOLONE ACETATE 80 MG/ML
80 INJECTION, SUSPENSION INTRA-ARTICULAR; INTRALESIONAL; INTRAMUSCULAR; SOFT TISSUE ONCE
Status: COMPLETED | OUTPATIENT
Start: 2025-04-18 | End: 2025-04-18

## 2025-04-18 RX ADMIN — LIDOCAINE HYDROCHLORIDE 2 ML: 10 INJECTION, SOLUTION INFILTRATION; PERINEURAL at 11:40

## 2025-04-18 RX ADMIN — METHYLPREDNISOLONE ACETATE 80 MG: 80 INJECTION, SUSPENSION INTRA-ARTICULAR; INTRALESIONAL; INTRAMUSCULAR; SOFT TISSUE at 11:41

## 2025-04-18 NOTE — PROGRESS NOTES
[facility] Orthopaedics & Sports Medicine      [facility] ORTHOPAEDICS AND SPORTS MEDICINE  [address]  [place] [address]  Dept: [phone]  Dept Fax: [phone]    Chief Compliant:  Chief Complaint   Patient presents with    Shoulder Pain     Right shoulder        History of Present Illness:  This is a pleasant 79 y.o. female who presents to the clinic today for evaluation / follow up of right shoulder pain.  Patient states that she has been having ongoing right shoulder pain since March 11.  She states that she had skin cancer that was removed from her scapula and ever since the sutures were removed, she has had increased pain in her right shoulder.  Patient states that she works at [employer] and does a lot of repetitive overhead lifting.  She takes Aleve as needed for pain.  She has noticed that her range of motion is decreased.  She presents today for further evaluation and treatment.      Physical Exam:    Right shoulder: Patient is able to actively forward elevate her right shoulder to maybe about 120 degrees.  I can passively bring her up to probably 160 degrees on exam today.  She can reach behind her head however has pain with this motion.  She can internally rotate to her lower lumbar spine.  Negative liftoff test.  She probably only has 4-5 strength to resisted forward flexion today.  She is a positive [place] test.  Positive Neer impingement test.  Pain with speeds maneuver.  She has some tenderness to palpation along the bicipital groove.  She has some tenderness along the deltoid area.  No significant crepitus with range of motion.    Nursing note and vitals reviewed.     Labs and Imaging:     3 views of the right shoulder taken at outside facility were independently interpreted today which shows overall well-preserved glenohumeral joint space.  Well-preserved acromioclavicular joint space.  No

## 2025-05-02 ENCOUNTER — HOSPITAL ENCOUNTER (OUTPATIENT)
Age: 80
Setting detail: THERAPIES SERIES
Discharge: HOME | End: 2025-05-02
Payer: MEDICARE

## 2025-05-02 PROCEDURE — 97162 PT EVAL MOD COMPLEX 30 MIN: CPT

## 2025-05-02 NOTE — CONSULTS
understanding.  [] Needs Review.  [] Demonstrates/verbalizes understanding of HEP/Ed previously given.    Access Code: [email or web address]  URL: [email or web address]  Date: 05/02/2025  Prepared by: Sam Buckner    Exercises  - Supine Shoulder Flexion Extension AAROM with Dowel  - 1 x daily - 7 x weekly - 3 sets - 10 reps  - Standing Row with Anchored Resistance  - 2 x weekly - 2 sets - 10 reps  - Shoulder extension with resistance - Neutral  - 2-3 x weekly - 2 sets - 10 reps  - Shoulder External Rotation with Anchored Resistance  - 2-3 x weekly - 2 sets - 10 reps  - Standing Shoulder Horizontal Abduction with Resistance  - 2-3 x weekly - 1 sets - 15 reps  - Sidelying Shoulder External Rotation  - 1 x daily - 2-3 x weekly - 2 sets - 10 reps  - Sidelying Shoulder Abduction Palm Forward  - 1 x daily - 2-3 x weekly - 2 sets - 10 reps    Treatment Plan:  [] Therapeutic Exercise   31596  [] Iontophoresis: 4 mg/mL Dexamethasone Sodium Phosphate  mAmin  07045   [] Therapeutic Activity  97727 [] Vasopneumatic cold with compression  48180    [] Gait Training   13471 [] Ultrasound   54600   [] Neuromuscular Re-education  42102 [] Electrical Stimulation Unattended  19054   [] Manual Therapy  12785 [] Electrical Stimulation Attended  91505   [] Instruction in HEP  [] Lumbar/Cervical Traction  43368   [] Aquatic Therapy   12037 [] Cold/hotpack    [] Massage   90501      [] Dry Needling, 1 or 2 muscles  68436   [] Biofeedback, first 15 minutes   90912  [] Biofeedback, additional 15 minutes   01056 [] Dry Needling, 3 or more muscles  18180     []  Medication allergies reviewed for use of    Dexamethasone Sodium Phosphate 4mg/ml     with iontophoresis treatments.   Pt is not allergic.       Frequency: *** x/week for *** visits    Today’s Treatment:  Modalities:   Precautions:  Exercises:  Exercise Reps/ Time Weight/ Level Comments                                 Other:    Specific Instructions for next treatment:    Treatment

## (undated) DEVICE — STRIP,CLOSURE,WOUND,MEDI-STRIP,1/2X4: Brand: MEDLINE

## (undated) DEVICE — SUTURE PROL SZ 1 L30IN NONABSORBABLE BLU CTX L48MM 1/2 CIR 8455H

## (undated) DEVICE — INSTRUMENT POUCH,DOUBLE POCKET: Brand: DEVON

## (undated) DEVICE — STAZ MINOR BASIN: Brand: MEDLINE INDUSTRIES, INC.

## (undated) DEVICE — SUTURE FIBERWIRE SZ 2 L38IN NONABSORBABLE BLU L36.6MM 1/2 AR7202

## (undated) DEVICE — TRAY URIN CATH 16FR DRNGE BG STATLOK STBL DEV F SURSTP

## (undated) DEVICE — 3.75 MM INTEGRATED CABLE WAND ICW,                                    MULTIVAC 50 XL, 50 DEGREE: Brand: COBLATION

## (undated) DEVICE — SOLUTION IRRIG 1000ML 0.9% SOD CHL USP POUR PLAS BTL

## (undated) DEVICE — GLIDESHEATH SLENDER STAINLESS STEEL KIT: Brand: GLIDESHEATH SLENDER

## (undated) DEVICE — Device

## (undated) DEVICE — SHEET, ORTHO, SPLIT, STERILE: Brand: MEDLINE

## (undated) DEVICE — SUTURE FIBERTAPE FIBERWIRE SZ 2-0 30IN NONABSORB BLU AR72377

## (undated) DEVICE — BANDAGE ADH W0.75XL3IN UNIV WVN FAB NAT GEN USE STRP N ADH

## (undated) DEVICE — CATHETER ANGIO 5FR L100CM GRY S STL NYL JR4 3 SEG BRAID L

## (undated) DEVICE — MANIFOLD REPROC SUCT 4 PRT F/NEPTUNE 2 WST MGMT SYS

## (undated) DEVICE — 4-PORT MANIFOLD: Brand: NEPTUNE 2

## (undated) DEVICE — SUPER TURBOVAC 90 INTEGRATED CABLE WAND ICW: Brand: COBLATION

## (undated) DEVICE — DRESSING PETRO W3XL8IN OIL EMUL N ADH GZ KNIT IMPREG CELOS

## (undated) DEVICE — DRAPE,U/ SHT,SPLIT,PLAS,STERIL: Brand: MEDLINE

## (undated) DEVICE — BANDAGE COBAN 4 IN COMPR W4INXL5YD FOAM COHESIVE QUIK STK SELF ADH SFT

## (undated) DEVICE — ADHESIVE SKIN CLSR 0.7ML TOP DERMBND ADV

## (undated) DEVICE — YANKAUER,FLEXIBLE HANDLE,REGLR CAPACITY: Brand: MEDLINE INDUSTRIES, INC.

## (undated) DEVICE — SUTURE PROL SZ 1 L30IN NONABSORBABLE BLU L40MM CT 1/2 CIR 8435H

## (undated) DEVICE — DRESSING,GAUZE,XEROFORM,CURAD,5"X9",ST: Brand: CURAD

## (undated) DEVICE — DRESSING FOAM W4XL4IN AG SIL FACE BORD IONIC ANTIMIC ADH

## (undated) DEVICE — INTENDED FOR TISSUE SEPARATION, AND OTHER PROCEDURES THAT REQUIRE A SHARP SURGICAL BLADE TO PUNCTURE OR CUT.: Brand: BARD-PARKER ® CARBON RIB-BACK BLADES

## (undated) DEVICE — SOLUTION IV IRRIG LACTATED RINGERS 3000ML 2B7487

## (undated) DEVICE — DRAPE,REIN 53X77,STERILE: Brand: MEDLINE

## (undated) DEVICE — SUTURE VICRYL SZ 0 L36IN ABSRB UD L36MM CT-1 1/2 CIR J946H

## (undated) DEVICE — ELECTRODE PT RET AD L9FT HI MOIST COND ADH HYDRGEL CORDED

## (undated) DEVICE — SUTURE VICRYL + SZ 2-0 L36IN ABSRB UD L36MM CT-1 1/2 CIR VCP945H

## (undated) DEVICE — DRAPE,HIP,W/POUCHES,STERILE: Brand: MEDLINE

## (undated) DEVICE — SUTURE PERMAHAND SZ 3-0 L18IN NONABSORBABLE BLK L26MM SH C013D

## (undated) DEVICE — 3M™ WARMING BLANKET, UPPER BODY, 10 PER CASE, 42268: Brand: BAIR HUGGER™

## (undated) DEVICE — TUBING PMP L16FT MAIN DISP FOR AR-6400 AR-6475

## (undated) DEVICE — MINOR BSIN PK

## (undated) DEVICE — PAD,ABDOMINAL,5"X9",ST,LF,25/BX: Brand: MEDLINE INDUSTRIES, INC.

## (undated) DEVICE — SUTURE MONOCRYL SZ 3-0 L27IN ABSRB UD PS-2 3/8 CIR REV CUT NDL MCP427H

## (undated) DEVICE — GAUZE,SPONGE,FLUFF,6"X6.75",STRL,5/TRAY: Brand: MEDLINE

## (undated) DEVICE — PAD ABSRB W8XL10IN ABD HYDROPHOBIC NONWOVEN THCK LAYR CELOS

## (undated) DEVICE — 3M™ STERI-DRAPE™ U-DRAPE 1015: Brand: STERI-DRAPE™

## (undated) DEVICE — NEEDLE SPNL 18GA L3.5IN W/ QNCKE SHARPER BVL DURA CLICK

## (undated) DEVICE — STOCKINETTE,IMPERVIOUS,12X48,STERILE: Brand: MEDLINE

## (undated) DEVICE — SUTURE VCRL SZ 3-0 L27IN ABSRB UD L26MM SH 1/2 CIR J416H

## (undated) DEVICE — TRAY SURG CUST CRD CATH TOLEDO

## (undated) DEVICE — [STANDARD 12-FLUTE BARREL BUR, ARTHROSCOPIC SHAVER BLADE,  DO NOT RESTERILIZE,  DO NOT USE IF PACKAGE IS DAMAGED,  KEEP DRY,  KEEP AWAY FROM SUNLIGHT]: Brand: FORMULA

## (undated) DEVICE — CATHETER ANGIO 5FR L100CM GRY S STL NYL JL3.5 3 SEG BRAID L

## (undated) DEVICE — DISPOSABLE KIT, HIP FOR 1.8MM                                    Q-FIX IMPLANT, INCLUDES DRILL, DRILL                                    GUIDE AND OBTURATOR: Brand: Q-FIX

## (undated) DEVICE — GLOVE SURG SZ 85 L12IN FNGR THK13MIL WHT ISOLEX POLYISOPRENE

## (undated) DEVICE — TOWEL,OR,DSP,ST,BLUE,STD,4/PK,20PK/CS: Brand: MEDLINE

## (undated) DEVICE — NEEDLE SUT PASS FOR ROT CUF LABRAL REP SUREFIRE SCORPION

## (undated) DEVICE — BANDAGE COBAN 6 IN WND 6INX5YD FOAM

## (undated) DEVICE — 35 ML SYRINGE LUER-LOCK TIP: Brand: MONOJECT

## (undated) DEVICE — [TOMCAT CUTTER, ARTHROSCOPIC SHAVER BLADE,  DO NOT RESTERILIZE,  DO NOT USE IF PACKAGE IS DAMAGED,  KEEP DRY,  KEEP AWAY FROM SUNLIGHT]: Brand: FORMULA

## (undated) DEVICE — BAND COMPR L24CM REG CLR PLAS HEMSTAT EXT HK AND LOOP RETEN

## (undated) DEVICE — GLOVE SURG SZ 75 CRM LTX FREE POLYISOPRENE POLYMER BEAD ANTI

## (undated) DEVICE — CANNULA ARTHSCP L7CM ID8.25MM TRNSLUC THRD FLX W/ NO SQUIRT

## (undated) DEVICE — GOWN,AURORA,NON-REINFORCED,2XL: Brand: MEDLINE

## (undated) DEVICE — CHLORAPREP 26ML ORANGE

## (undated) DEVICE — TUBING, SUCTION, 1/4" X 12', STRAIGHT: Brand: MEDLINE

## (undated) DEVICE — SPONGE LAP W18XL18IN WHT COT 4 PLY FLD STRUNG RADPQ DISP ST 2 PER PACK

## (undated) DEVICE — STRAP ARMBRD W1.5XL32IN FOAM STR YET SFT W/ HK AND LOOP

## (undated) DEVICE — GLOVE SURG SZ 8 L12IN FNGR THK79MIL GRN LTX FREE

## (undated) DEVICE — SUTURE PDS II SZ 0 L60IN ABSRB VLT L65MM TP-1 1/2 CIR Z991G

## (undated) DEVICE — SYRINGE MED 20ML STD CLR PLAS LUERLOCK TIP N CTRL DISP